# Patient Record
Sex: FEMALE | Race: WHITE | NOT HISPANIC OR LATINO | Employment: UNEMPLOYED | URBAN - NONMETROPOLITAN AREA
[De-identification: names, ages, dates, MRNs, and addresses within clinical notes are randomized per-mention and may not be internally consistent; named-entity substitution may affect disease eponyms.]

---

## 2021-05-25 ENCOUNTER — APPOINTMENT (OUTPATIENT)
Dept: WOUND CARE | Facility: HOSPITAL | Age: 81
End: 2021-05-25

## 2021-05-27 ENCOUNTER — OFFICE VISIT (OUTPATIENT)
Dept: WOUND CARE | Facility: HOSPITAL | Age: 81
End: 2021-05-27

## 2021-05-27 ENCOUNTER — LAB REQUISITION (OUTPATIENT)
Dept: LAB | Facility: HOSPITAL | Age: 81
End: 2021-05-27

## 2021-05-27 DIAGNOSIS — E11.622 TYPE 2 DIABETES MELLITUS WITH OTHER SKIN ULCER, UNSPECIFIED WHETHER LONG TERM INSULIN USE (HCC): ICD-10-CM

## 2021-05-27 DIAGNOSIS — L97.822 NON-PRESSURE CHRONIC ULCER OF OTHER PART OF LEFT LOWER LEG WITH FAT LAYER EXPOSED (HCC): ICD-10-CM

## 2021-05-27 DIAGNOSIS — Q82.0 HEREDITARY LYMPHEDEMA: ICD-10-CM

## 2021-05-27 DIAGNOSIS — Z00.00 ENCOUNTER FOR GENERAL ADULT MEDICAL EXAMINATION WITHOUT ABNORMAL FINDINGS: ICD-10-CM

## 2021-05-27 DIAGNOSIS — L98.499 TYPE 2 DIABETES MELLITUS WITH OTHER SKIN ULCER, UNSPECIFIED WHETHER LONG TERM INSULIN USE (HCC): ICD-10-CM

## 2021-05-27 DIAGNOSIS — R26.89 OTHER ABNORMALITIES OF GAIT AND MOBILITY: ICD-10-CM

## 2021-05-27 PROCEDURE — 87186 SC STD MICRODIL/AGAR DIL: CPT | Performed by: NURSE PRACTITIONER

## 2021-05-27 PROCEDURE — 87205 SMEAR GRAM STAIN: CPT | Performed by: NURSE PRACTITIONER

## 2021-05-27 PROCEDURE — 87176 TISSUE HOMOGENIZATION CULTR: CPT | Performed by: NURSE PRACTITIONER

## 2021-05-27 PROCEDURE — 99203 OFFICE O/P NEW LOW 30 MIN: CPT | Performed by: NURSE PRACTITIONER

## 2021-05-27 PROCEDURE — 87070 CULTURE OTHR SPECIMN AEROBIC: CPT | Performed by: NURSE PRACTITIONER

## 2021-05-27 PROCEDURE — 11042 DBRDMT SUBQ TIS 1ST 20SQCM/<: CPT | Performed by: NURSE PRACTITIONER

## 2021-05-27 PROCEDURE — G0463 HOSPITAL OUTPT CLINIC VISIT: HCPCS

## 2021-05-27 PROCEDURE — 87075 CULTR BACTERIA EXCEPT BLOOD: CPT | Performed by: NURSE PRACTITIONER

## 2021-05-30 LAB
BACTERIA SPEC AEROBE CULT: ABNORMAL
BACTERIA SPEC AEROBE CULT: ABNORMAL
GRAM STN SPEC: ABNORMAL
GRAM STN SPEC: ABNORMAL

## 2021-06-01 LAB — BACTERIA SPEC ANAEROBE CULT: NORMAL

## 2021-06-03 ENCOUNTER — OFFICE VISIT (OUTPATIENT)
Dept: WOUND CARE | Facility: HOSPITAL | Age: 81
End: 2021-06-03

## 2021-06-03 DIAGNOSIS — R26.89 OTHER ABNORMALITIES OF GAIT AND MOBILITY: ICD-10-CM

## 2021-06-03 DIAGNOSIS — Q82.0 HEREDITARY LYMPHEDEMA: ICD-10-CM

## 2021-06-03 DIAGNOSIS — E11.622 TYPE 2 DIABETES MELLITUS WITH OTHER SKIN ULCER, UNSPECIFIED WHETHER LONG TERM INSULIN USE (HCC): ICD-10-CM

## 2021-06-03 DIAGNOSIS — L97.822 NON-PRESSURE CHRONIC ULCER OF OTHER PART OF LEFT LOWER LEG WITH FAT LAYER EXPOSED (HCC): ICD-10-CM

## 2021-06-03 DIAGNOSIS — L98.499 TYPE 2 DIABETES MELLITUS WITH OTHER SKIN ULCER, UNSPECIFIED WHETHER LONG TERM INSULIN USE (HCC): ICD-10-CM

## 2021-06-03 PROCEDURE — 11042 DBRDMT SUBQ TIS 1ST 20SQCM/<: CPT | Performed by: NURSE PRACTITIONER

## 2021-06-11 ENCOUNTER — OFFICE VISIT (OUTPATIENT)
Dept: WOUND CARE | Facility: HOSPITAL | Age: 81
End: 2021-06-11

## 2021-06-11 DIAGNOSIS — E11.622 TYPE 2 DIABETES MELLITUS WITH OTHER SKIN ULCER, UNSPECIFIED WHETHER LONG TERM INSULIN USE (HCC): ICD-10-CM

## 2021-06-11 DIAGNOSIS — L97.822 NON-PRESSURE CHRONIC ULCER OF OTHER PART OF LEFT LOWER LEG WITH FAT LAYER EXPOSED (HCC): ICD-10-CM

## 2021-06-11 DIAGNOSIS — Q82.0 HEREDITARY LYMPHEDEMA: ICD-10-CM

## 2021-06-11 DIAGNOSIS — L98.499 TYPE 2 DIABETES MELLITUS WITH OTHER SKIN ULCER, UNSPECIFIED WHETHER LONG TERM INSULIN USE (HCC): ICD-10-CM

## 2021-06-11 PROCEDURE — 11042 DBRDMT SUBQ TIS 1ST 20SQCM/<: CPT | Performed by: PODIATRIST

## 2021-06-17 ENCOUNTER — OFFICE VISIT (OUTPATIENT)
Dept: WOUND CARE | Facility: HOSPITAL | Age: 81
End: 2021-06-17

## 2021-06-28 ENCOUNTER — OFFICE VISIT (OUTPATIENT)
Dept: WOUND CARE | Facility: HOSPITAL | Age: 81
End: 2021-06-28

## 2021-06-28 DIAGNOSIS — L97.822 NON-PRESSURE CHRONIC ULCER OF OTHER PART OF LEFT LOWER LEG WITH FAT LAYER EXPOSED (HCC): ICD-10-CM

## 2021-06-28 DIAGNOSIS — Q82.0 HEREDITARY LYMPHEDEMA: ICD-10-CM

## 2021-06-28 DIAGNOSIS — L98.499 TYPE 2 DIABETES MELLITUS WITH OTHER SKIN ULCER, UNSPECIFIED WHETHER LONG TERM INSULIN USE (HCC): ICD-10-CM

## 2021-06-28 DIAGNOSIS — E11.622 TYPE 2 DIABETES MELLITUS WITH OTHER SKIN ULCER, UNSPECIFIED WHETHER LONG TERM INSULIN USE (HCC): ICD-10-CM

## 2021-06-28 PROCEDURE — 11042 DBRDMT SUBQ TIS 1ST 20SQCM/<: CPT | Performed by: PODIATRIST

## 2021-07-08 ENCOUNTER — OFFICE VISIT (OUTPATIENT)
Dept: WOUND CARE | Facility: HOSPITAL | Age: 81
End: 2021-07-08

## 2021-07-08 DIAGNOSIS — E11.622 TYPE 2 DIABETES MELLITUS WITH OTHER SKIN ULCER, UNSPECIFIED WHETHER LONG TERM INSULIN USE (HCC): ICD-10-CM

## 2021-07-08 DIAGNOSIS — Q82.0 HEREDITARY LYMPHEDEMA: ICD-10-CM

## 2021-07-08 DIAGNOSIS — R26.89 OTHER ABNORMALITIES OF GAIT AND MOBILITY: ICD-10-CM

## 2021-07-08 DIAGNOSIS — L98.499 TYPE 2 DIABETES MELLITUS WITH OTHER SKIN ULCER, UNSPECIFIED WHETHER LONG TERM INSULIN USE (HCC): ICD-10-CM

## 2021-07-08 DIAGNOSIS — L97.822 NON-PRESSURE CHRONIC ULCER OF OTHER PART OF LEFT LOWER LEG WITH FAT LAYER EXPOSED (HCC): ICD-10-CM

## 2021-07-08 PROCEDURE — 99212 OFFICE O/P EST SF 10 MIN: CPT | Performed by: NURSE PRACTITIONER

## 2021-07-08 PROCEDURE — G0463 HOSPITAL OUTPT CLINIC VISIT: HCPCS

## 2023-08-17 ENCOUNTER — APPOINTMENT (OUTPATIENT)
Dept: GENERAL RADIOLOGY | Facility: HOSPITAL | Age: 83
DRG: 309 | End: 2023-08-17
Payer: MEDICARE

## 2023-08-17 ENCOUNTER — HOSPITAL ENCOUNTER (INPATIENT)
Facility: HOSPITAL | Age: 83
LOS: 6 days | Discharge: NURSING, REHAB OR LONG-TERM CARE | DRG: 309 | End: 2023-08-23
Attending: STUDENT IN AN ORGANIZED HEALTH CARE EDUCATION/TRAINING PROGRAM | Admitting: FAMILY MEDICINE
Payer: MEDICARE

## 2023-08-17 DIAGNOSIS — Z74.09 IMPAIRED MOBILITY: ICD-10-CM

## 2023-08-17 DIAGNOSIS — N17.9 ACUTE KIDNEY INJURY: ICD-10-CM

## 2023-08-17 DIAGNOSIS — Z74.09 IMPAIRED MOBILITY AND ADLS: ICD-10-CM

## 2023-08-17 DIAGNOSIS — I51.7 CARDIOMEGALY: ICD-10-CM

## 2023-08-17 DIAGNOSIS — I50.9 NEW ONSET OF CONGESTIVE HEART FAILURE: ICD-10-CM

## 2023-08-17 DIAGNOSIS — Z78.9 IMPAIRED MOBILITY AND ADLS: ICD-10-CM

## 2023-08-17 DIAGNOSIS — R06.02 SHORTNESS OF BREATH: ICD-10-CM

## 2023-08-17 DIAGNOSIS — I48.91 NEW ONSET ATRIAL FIBRILLATION: Primary | ICD-10-CM

## 2023-08-17 LAB
ALBUMIN SERPL-MCNC: 3.8 G/DL (ref 3.5–5.2)
ALBUMIN/GLOB SERPL: 1.2 G/DL
ALP SERPL-CCNC: 125 U/L (ref 39–117)
ALT SERPL W P-5'-P-CCNC: 12 U/L (ref 1–33)
ANION GAP SERPL CALCULATED.3IONS-SCNC: 13 MMOL/L (ref 5–15)
AST SERPL-CCNC: 11 U/L (ref 1–32)
BASOPHILS # BLD AUTO: 0.05 10*3/MM3 (ref 0–0.2)
BASOPHILS NFR BLD AUTO: 0.3 % (ref 0–1.5)
BILIRUB SERPL-MCNC: <0.2 MG/DL (ref 0–1.2)
BUN SERPL-MCNC: 32 MG/DL (ref 8–23)
BUN/CREAT SERPL: 18.1 (ref 7–25)
CALCIUM SPEC-SCNC: 9.6 MG/DL (ref 8.6–10.5)
CHLORIDE SERPL-SCNC: 107 MMOL/L (ref 98–107)
CO2 SERPL-SCNC: 20 MMOL/L (ref 22–29)
CREAT SERPL-MCNC: 1.77 MG/DL (ref 0.57–1)
D DIMER PPP FEU-MCNC: 0.78 MCGFEU/ML (ref 0–0.82)
DEPRECATED RDW RBC AUTO: 45.6 FL (ref 37–54)
EGFRCR SERPLBLD CKD-EPI 2021: 28.4 ML/MIN/1.73
EOSINOPHIL # BLD AUTO: 0.23 10*3/MM3 (ref 0–0.4)
EOSINOPHIL NFR BLD AUTO: 1.6 % (ref 0.3–6.2)
ERYTHROCYTE [DISTWIDTH] IN BLOOD BY AUTOMATED COUNT: 12.9 % (ref 12.3–15.4)
FLUAV RNA RESP QL NAA+PROBE: NOT DETECTED
FLUBV RNA RESP QL NAA+PROBE: NOT DETECTED
GEN 5 2HR TROPONIN T REFLEX: 16 NG/L
GLOBULIN UR ELPH-MCNC: 3.3 GM/DL
GLUCOSE SERPL-MCNC: 108 MG/DL (ref 65–99)
HBA1C MFR BLD: 6.4 % (ref 4.8–5.6)
HCT VFR BLD AUTO: 32.4 % (ref 34–46.6)
HGB BLD-MCNC: 10 G/DL (ref 12–15.9)
IMM GRANULOCYTES # BLD AUTO: 0.07 10*3/MM3 (ref 0–0.05)
IMM GRANULOCYTES NFR BLD AUTO: 0.5 % (ref 0–0.5)
LYMPHOCYTES # BLD AUTO: 2.24 10*3/MM3 (ref 0.7–3.1)
LYMPHOCYTES NFR BLD AUTO: 15.2 % (ref 19.6–45.3)
MCH RBC QN AUTO: 29.9 PG (ref 26.6–33)
MCHC RBC AUTO-ENTMCNC: 30.9 G/DL (ref 31.5–35.7)
MCV RBC AUTO: 97 FL (ref 79–97)
MONOCYTES # BLD AUTO: 0.66 10*3/MM3 (ref 0.1–0.9)
MONOCYTES NFR BLD AUTO: 4.5 % (ref 5–12)
NEUTROPHILS NFR BLD AUTO: 11.45 10*3/MM3 (ref 1.7–7)
NEUTROPHILS NFR BLD AUTO: 77.9 % (ref 42.7–76)
NRBC BLD AUTO-RTO: 0 /100 WBC (ref 0–0.2)
NT-PROBNP SERPL-MCNC: 2536 PG/ML (ref 0–1800)
PLATELET # BLD AUTO: 321 10*3/MM3 (ref 140–450)
PMV BLD AUTO: 9.9 FL (ref 6–12)
POTASSIUM SERPL-SCNC: 5.6 MMOL/L (ref 3.5–5.2)
POTASSIUM SERPL-SCNC: 5.9 MMOL/L (ref 3.5–5.2)
PROCALCITONIN SERPL-MCNC: 0.09 NG/ML (ref 0–0.25)
PROT SERPL-MCNC: 7.1 G/DL (ref 6–8.5)
RBC # BLD AUTO: 3.34 10*6/MM3 (ref 3.77–5.28)
SARS-COV-2 RNA RESP QL NAA+PROBE: NOT DETECTED
SODIUM SERPL-SCNC: 140 MMOL/L (ref 136–145)
T4 FREE SERPL-MCNC: 1.53 NG/DL (ref 0.93–1.7)
TROPONIN T DELTA: -1 NG/L
TROPONIN T SERPL HS-MCNC: 17 NG/L
TSH SERPL DL<=0.05 MIU/L-ACNC: 1.02 UIU/ML (ref 0.27–4.2)
WBC NRBC COR # BLD: 14.7 10*3/MM3 (ref 3.4–10.8)

## 2023-08-17 PROCEDURE — 84443 ASSAY THYROID STIM HORMONE: CPT | Performed by: STUDENT IN AN ORGANIZED HEALTH CARE EDUCATION/TRAINING PROGRAM

## 2023-08-17 PROCEDURE — 80053 COMPREHEN METABOLIC PANEL: CPT | Performed by: STUDENT IN AN ORGANIZED HEALTH CARE EDUCATION/TRAINING PROGRAM

## 2023-08-17 PROCEDURE — 93005 ELECTROCARDIOGRAM TRACING: CPT | Performed by: FAMILY MEDICINE

## 2023-08-17 PROCEDURE — 83880 ASSAY OF NATRIURETIC PEPTIDE: CPT | Performed by: STUDENT IN AN ORGANIZED HEALTH CARE EDUCATION/TRAINING PROGRAM

## 2023-08-17 PROCEDURE — 84484 ASSAY OF TROPONIN QUANT: CPT | Performed by: STUDENT IN AN ORGANIZED HEALTH CARE EDUCATION/TRAINING PROGRAM

## 2023-08-17 PROCEDURE — 84439 ASSAY OF FREE THYROXINE: CPT | Performed by: STUDENT IN AN ORGANIZED HEALTH CARE EDUCATION/TRAINING PROGRAM

## 2023-08-17 PROCEDURE — 83036 HEMOGLOBIN GLYCOSYLATED A1C: CPT | Performed by: FAMILY MEDICINE

## 2023-08-17 PROCEDURE — 99285 EMERGENCY DEPT VISIT HI MDM: CPT

## 2023-08-17 PROCEDURE — 93010 ELECTROCARDIOGRAM REPORT: CPT | Performed by: INTERNAL MEDICINE

## 2023-08-17 PROCEDURE — 87636 SARSCOV2 & INF A&B AMP PRB: CPT | Performed by: STUDENT IN AN ORGANIZED HEALTH CARE EDUCATION/TRAINING PROGRAM

## 2023-08-17 PROCEDURE — 36415 COLL VENOUS BLD VENIPUNCTURE: CPT | Performed by: STUDENT IN AN ORGANIZED HEALTH CARE EDUCATION/TRAINING PROGRAM

## 2023-08-17 PROCEDURE — 85025 COMPLETE CBC W/AUTO DIFF WBC: CPT | Performed by: STUDENT IN AN ORGANIZED HEALTH CARE EDUCATION/TRAINING PROGRAM

## 2023-08-17 PROCEDURE — 84145 PROCALCITONIN (PCT): CPT | Performed by: STUDENT IN AN ORGANIZED HEALTH CARE EDUCATION/TRAINING PROGRAM

## 2023-08-17 PROCEDURE — 71045 X-RAY EXAM CHEST 1 VIEW: CPT

## 2023-08-17 PROCEDURE — 84132 ASSAY OF SERUM POTASSIUM: CPT | Performed by: FAMILY MEDICINE

## 2023-08-17 PROCEDURE — 85379 FIBRIN DEGRADATION QUANT: CPT | Performed by: STUDENT IN AN ORGANIZED HEALTH CARE EDUCATION/TRAINING PROGRAM

## 2023-08-17 RX ORDER — DEXTROSE MONOHYDRATE 25 G/50ML
25 INJECTION, SOLUTION INTRAVENOUS ONCE
Status: COMPLETED | OUTPATIENT
Start: 2023-08-18 | End: 2023-08-18

## 2023-08-17 RX ORDER — NICOTINE POLACRILEX 4 MG
15 LOZENGE BUCCAL
Status: DISCONTINUED | OUTPATIENT
Start: 2023-08-17 | End: 2023-08-23 | Stop reason: HOSPADM

## 2023-08-17 RX ORDER — NITROGLYCERIN 0.4 MG/1
0.4 TABLET SUBLINGUAL
Status: DISCONTINUED | OUTPATIENT
Start: 2023-08-17 | End: 2023-08-23 | Stop reason: HOSPADM

## 2023-08-17 RX ORDER — FUROSEMIDE 10 MG/ML
20 INJECTION INTRAMUSCULAR; INTRAVENOUS EVERY 12 HOURS
Status: DISCONTINUED | OUTPATIENT
Start: 2023-08-18 | End: 2023-08-18

## 2023-08-17 RX ORDER — SODIUM CHLORIDE 0.9 % (FLUSH) 0.9 %
10 SYRINGE (ML) INJECTION AS NEEDED
Status: DISCONTINUED | OUTPATIENT
Start: 2023-08-17 | End: 2023-08-23 | Stop reason: HOSPADM

## 2023-08-17 RX ORDER — DEXTROSE MONOHYDRATE 25 G/50ML
25 INJECTION, SOLUTION INTRAVENOUS
Status: DISCONTINUED | OUTPATIENT
Start: 2023-08-17 | End: 2023-08-23 | Stop reason: HOSPADM

## 2023-08-17 RX ORDER — ENOXAPARIN SODIUM 100 MG/ML
40 INJECTION SUBCUTANEOUS EVERY 24 HOURS
Status: DISCONTINUED | OUTPATIENT
Start: 2023-08-18 | End: 2023-08-18

## 2023-08-17 NOTE — ED PROVIDER NOTES
"EMERGENCY DEPARTMENT ATTENDING NOTE    Patient Name: Mar Rodriguez    Chief Complaint   Patient presents with    Shortness of Breath       PATIENT PRESENTATION:  Mar Rodriguez is a very pleasant 83 y.o. female with multiple medical comorbidities including cerebral palsy, diabetes mellitus, and recently diagnosed kidney dysfunction who resides in a nursing home presenting emergency department brought in by EMS due to concerns for shortness of breath.     History is mostly provided by the patient's sister at the bedside.  She states that recently her kidney function has been declining patient has been more short of breath she was noted to nursing to be wheezing and appeared to have more labored breathing per report from the nursing home.  They were unable to get in touch with the doctor per his sister's report so ultimately called EMS.  She states patient has no history of atrial fibrillation or cardiac dysfunction that she is aware of.  No history of heart failure.  She does have chronic lymphedema.  Patient has reportedly demonstrated significant difficulty with ambulation due to increased work of breathing.  Patient denies any chest pain.  She does not endorse an occasional cough.      PHYSICAL EXAM:   VS: /83 (BP Location: Right arm, Patient Position: Lying)   Pulse 74   Temp 97.7 øF (36.5 øC) (Oral)   Resp 18   Ht 165.1 cm (65\")   Wt 111 kg (244 lb 8 oz)   SpO2 97%   BMI 40.69 kg/mý   GENERAL: Chronically ill-appearing elderly woman sitting in stretcher no acute distress; well-nourished, well-developed, awake, alert, no acute distress, nontoxic appearing, comfortable  EYES: PERRL, sclerae anicteric, extraocular movements grossly intact, symmetric lids  EARS, NOSE, MOUTH, THROAT: atraumatic external nose and ears, moist mucous membranes  NECK: symmetric, trachea midline  RESPIRATORY: unlabored respiratory effort, clear to auscultation bilaterally, good air movement  CARDIOVASCULAR: no " murmurs, peripheral pulses 2+ and equal in all extremities  GI: soft, nontender, nondistended  MUSCULOSKELETAL/EXTREMITIES: Bilateral lower extremities with chronic appearing swelling consistent with lymphedema nonpitting in nature seen in photos below; extremities without obvious deformity  SKIN: Redness and erythema of the bilateral lower extremities skin photo below; otherwise skin warm and dry with no obvious rashes    NEUROLOGIC: moving all 4 extremities symmetrically, GCS15  PSYCHIATRIC: alert, pleasant and cooperative. Appropriate mood and affect.      MEDICAL DECISION MAKING:    Mar Rodrgiuez is a 83 y.o. female presenting emergency department due to increased work of breathing shortness of breath in the setting of multiple medical comorbidities.    Differential Diagnosis Considered: New onset heart failure, volume overload, renal failure, COVID, pneumonia, pneumothorax    Labs Ordered:  Labs Reviewed   COMPREHENSIVE METABOLIC PANEL - Abnormal; Notable for the following components:       Result Value    Glucose 108 (*)     BUN 32 (*)     Creatinine 1.77 (*)     Potassium 5.6 (*)     CO2 20.0 (*)     Alkaline Phosphatase 125 (*)     eGFR 28.4 (*)     All other components within normal limits    Narrative:     GFR Normal >60  Chronic Kidney Disease <60  Kidney Failure <15    The GFR formula is only valid for adults with stable renal function between ages 18 and 70.   TROPONIN - Abnormal; Notable for the following components:    HS Troponin T 17 (*)     All other components within normal limits    Narrative:     High Sensitive Troponin T Reference Range:  <10.0 ng/L- Negative Female for AMI  <15.0 ng/L- Negative Male for AMI  >=10 - Abnormal Female indicating possible myocardial injury.  >=15 - Abnormal Male indicating possible myocardial injury.   Clinicians would have to utilize clinical acumen, EKG, Troponin, and serial changes to determine if it is an Acute Myocardial Infarction or myocardial injury  due to an underlying chronic condition.        BNP (IN-HOUSE) - Abnormal; Notable for the following components:    proBNP 2,536.0 (*)     All other components within normal limits    Narrative:     Among patients with dyspnea, NT-proBNP is highly sensitive for the detection of acute congestive heart failure. In addition NT-proBNP of <300 pg/ml effectively rules out acute congestive heart failure with 99% negative predictive value.     CBC WITH AUTO DIFFERENTIAL - Abnormal; Notable for the following components:    WBC 14.70 (*)     RBC 3.34 (*)     Hemoglobin 10.0 (*)     Hematocrit 32.4 (*)     MCHC 30.9 (*)     Neutrophil % 77.9 (*)     Lymphocyte % 15.2 (*)     Monocyte % 4.5 (*)     Neutrophils, Absolute 11.45 (*)     Immature Grans, Absolute 0.07 (*)     All other components within normal limits   HIGH SENSITIVITIY TROPONIN T 2HR - Abnormal; Notable for the following components:    HS Troponin T 16 (*)     All other components within normal limits    Narrative:     High Sensitive Troponin T Reference Range:  <10.0 ng/L- Negative Female for AMI  <15.0 ng/L- Negative Male for AMI  >=10 - Abnormal Female indicating possible myocardial injury.  >=15 - Abnormal Male indicating possible myocardial injury.   Clinicians would have to utilize clinical acumen, EKG, Troponin, and serial changes to determine if it is an Acute Myocardial Infarction or myocardial injury due to an underlying chronic condition.        POTASSIUM - Abnormal; Notable for the following components:    Potassium 5.9 (*)     All other components within normal limits   BASIC METABOLIC PANEL - Abnormal; Notable for the following components:    Glucose 103 (*)     BUN 31 (*)     Creatinine 1.83 (*)     eGFR 27.3 (*)     All other components within normal limits    Narrative:     GFR Normal >60  Chronic Kidney Disease <60  Kidney Failure <15    The GFR formula is only valid for adults with stable renal function between ages 18 and 70.   HEMOGLOBIN A1C -  Abnormal; Notable for the following components:    Hemoglobin A1C 6.40 (*)     All other components within normal limits    Narrative:     Hemoglobin A1C Ranges:    Increased Risk for Diabetes  5.7% to 6.4%  Diabetes                     >= 6.5%  Diabetic Goal                < 7.0%   TROPONIN - Abnormal; Notable for the following components:    HS Troponin T 21 (*)     All other components within normal limits    Narrative:     High Sensitive Troponin T Reference Range:  <10.0 ng/L- Negative Female for AMI  <15.0 ng/L- Negative Male for AMI  >=10 - Abnormal Female indicating possible myocardial injury.  >=15 - Abnormal Male indicating possible myocardial injury.   Clinicians would have to utilize clinical acumen, EKG, Troponin, and serial changes to determine if it is an Acute Myocardial Infarction or myocardial injury due to an underlying chronic condition.        COMPREHENSIVE METABOLIC PANEL - Abnormal; Notable for the following components:    Glucose 108 (*)     BUN 30 (*)     Creatinine 1.87 (*)     Alkaline Phosphatase 122 (*)     eGFR 26.6 (*)     All other components within normal limits    Narrative:     GFR Normal >60  Chronic Kidney Disease <60  Kidney Failure <15    The GFR formula is only valid for adults with stable renal function between ages 18 and 70.   PTH, INTACT - Abnormal; Notable for the following components:    PTH, Intact 83.8 (*)     All other components within normal limits    Narrative:     Results may be falsely decreased if patient taking Biotin.     URIC ACID - Abnormal; Notable for the following components:    Uric Acid 7.7 (*)     All other components within normal limits   CBC WITH AUTO DIFFERENTIAL - Abnormal; Notable for the following components:    RBC 3.51 (*)     Hemoglobin 10.4 (*)     MCV 98.6 (*)     MCHC 30.1 (*)     All other components within normal limits   COVID-19 AND FLU A/B, NP SWAB IN TRANSPORT MEDIA 8-12 HR TAT - Normal    Narrative:     Fact sheet for providers:  "https://www.fda.gov/media/535491/download    Fact sheet for patients: https://www.fda.gov/media/624203/download    Test performed by PCR.   D-DIMER, QUANTITATIVE - Normal    Narrative:     According to the assay 's published package insert, a normal (<0.50 MCGFEU/mL) D-dimer result in conjunction with a non-high clinical probability assessment, excludes deep vein thrombosis (DVT) and pulmonary embolism (PE) with high sensitivity.    D-dimer values increase with age and this can make VTE exclusion of an older population difficult. To address this, the [employer], based on best available evidence and recent guidelines, recommends that clinicians use age-adjusted D-dimer thresholds in patients greater than 50 years of age with: a) a low probability of PE who do not meet all Pulmonary Embolism Rule Out Criteria, or b) in those with intermediate probability of PE.   The formula for an age-adjusted D-dimer cut-off is \"age/100\".  For example, a 60 year old patient would have an age-adjusted cut-off of 0.60 MCGFEU/mL and an 80 year old 0.80 MCGFEU/mL.   PROCALCITONIN - Normal    Narrative:     As a Marker for Sepsis (Non-Neonates):    1. <0.5 ng/mL represents a low risk of severe sepsis and/or septic shock.  2. >2 ng/mL represents a high risk of severe sepsis and/or septic shock.    As a Marker for Lower Respiratory Tract Infections that require antibiotic therapy:    PCT on Admission    Antibiotic Therapy       6-12 Hrs later    >0.5                Strongly Recommended  >0.25 - <0.5        Recommended   0.1 - 0.25          Discouraged              Remeasure/reassess PCT  <0.1                Strongly Discouraged     Remeasure/reassess PCT    As 28 day mortality risk marker: \"Change in Procalcitonin Result\" (>80% or <=80%) if Day 0 (or Day 1) and Day 4 values are available. Refer to http://www.[employer]-pct-calculator.com    Change in PCT <=80%  A decrease of PCT levels below or equal to 80% " defines a positive change in PCT test result representing a higher risk for 28-day all-cause mortality of patients diagnosed with severe sepsis for septic shock.    Change in PCT >80%  A decrease of PCT levels of more than 80% defines a negative change in PCT result representing a lower risk for 28-day all-cause mortality of patients diagnosed with severe sepsis or septic shock.      T4, FREE - Normal    Narrative:     Results may be falsely increased if patient taking Biotin.     TSH - Normal   URINALYSIS W/ MICROSCOPIC IF INDICATED (NO CULTURE) - Normal    Narrative:     Urine microscopic not indicated.   MAGNESIUM - Normal   PHOSPHORUS - Normal   POCT GLUCOSE FINGERSTICK - Normal   POCT GLUCOSE FINGERSTICK - Normal   POCT GLUCOSE FINGERSTICK - Normal   POCT GLUCOSE FINGERSTICK - Normal   POCT GLUCOSE FINGERSTICK - Normal   POCT GLUCOSE FINGERSTICK - Normal   POCT GLUCOSE FINGERSTICK - Normal   POCT GLUCOSE FINGERSTICK - Normal   PROTEIN, URINE, RANDOM    Narrative:     Reference intervals for random urine have not been established.  Clinical usage is dependent upon physician's interpretation in combination with other laboratory tests.      CREATININE URINE RANDOM (KIDNEY FUNCTION) GFR COMPONENT    Narrative:     Reference intervals for random urine have not been established.  Clinical usage is dependent upon physician's interpretation in combination with other laboratory tests.      SODIUM, URINE, RANDOM    Narrative:     Reference intervals for random urine have not been established.  Clinical usage is dependent upon physician's interpretation in combination with other laboratory tests.      UREA NITROGEN, URINE    Narrative:     Reference intervals for random urine have not been established.  Clinical usage is dependent upon physician's interpretation in combination with other laboratory tests.      VITAMIN D,25-HYDROXY   POCT GLUCOSE FINGERSTICK   POCT GLUCOSE FINGERSTICK   POCT GLUCOSE FINGERSTICK   POCT  GLUCOSE FINGERSTICK   POCT GLUCOSE FINGERSTICK   POCT GLUCOSE FINGERSTICK   POCT GLUCOSE FINGERSTICK   POCT GLUCOSE FINGERSTICK   POCT GLUCOSE FINGERSTICK   POCT GLUCOSE FINGERSTICK   POCT GLUCOSE FINGERSTICK   POCT GLUCOSE FINGERSTICK   POCT GLUCOSE FINGERSTICK   CBC AND DIFFERENTIAL    Narrative:     The following orders were created for panel order CBC & Differential.  Procedure                               Abnormality         Status                     ---------                               -----------         ------                     CBC Auto Differential[[id number]]         Abnormal            Final result                 Please view results for these tests on the individual orders.   CBC AND DIFFERENTIAL    Narrative:     The following orders were created for panel order CBC & Differential.  Procedure                               Abnormality         Status                     ---------                               -----------         ------                     CBC Auto Differential[[id number]]        Abnormal            Final result                 Please view results for these tests on the individual orders.        Imaging Ordered:   US Renal Bilateral   Final Result   1. Bilateral renal ultrasound is within normal limits.   2. Bladder wall thickening is probably related to underdistention.   This report was finalized on 08/18/2023 15:25 by Dr. Dewayne Omalley MD.      XR Chest 1 View   Final Result   1.. Cardiomegaly.   2. No acute consolidative pneumonia or effusion.   This report was finalized on 08/17/2023 19:10 by Dr. Jay Nova MD.          Internal chart review:   Past Medical History:   Diagnosis Date    Abnormality of gait and mobility     Anemia     Anxiety     Cerebral palsy     Cognitive communication deficit     Depression     Diabetes mellitus     Disease of thyroid gland     Hyperglycemia     Hyperlipidemia     Hypertension     Hypokalemia     Insomnia     Lymphedema     Neuropathy      Urge incontinence        History reviewed. No pertinent surgical history.    Allergies   Allergen Reactions    Nsaids Other (See Comments)     No NSAIDS r/t renal disease         Current Facility-Administered Medications:     apixaban (ELIQUIS) tablet 2.5 mg, 2.5 mg, Oral, Q12H, Patti Navas PA-C, 2.5 mg at 08/18/23 2022    busPIRone (BUSPAR) tablet 10 mg, 10 mg, Oral, Daily, Vandana Churchill MD, 10 mg at 08/18/23 1500    dextrose (D50W) (25 g/50 mL) IV injection 25 g, 25 g, Intravenous, Q15 Min PRN, Vandana Churchill MD    dextrose (GLUTOSE) oral gel 15 g, 15 g, Oral, Q15 Min PRN, Vandana Churchill MD    ferrous sulfate tablet 325 mg, 325 mg, Oral, BID With Meals, Vandana Churchill MD, 325 mg at 08/18/23 2022    furosemide (LASIX) injection 20 mg, 20 mg, Intravenous, BID, Vandana Churchill MD, 20 mg at 08/18/23 2021    gabapentin (NEURONTIN) capsule 300 mg, 300 mg, Oral, Nightly, Vandana Churchill MD, 300 mg at 08/18/23 2021    glucagon (GLUCAGEN) injection 1 mg, 1 mg, Subcutaneous, Q15 Min PRN, Vandana Churchill MD    levothyroxine (SYNTHROID, LEVOTHROID) tablet 175 mcg, 175 mcg, Oral, Nightly, Vandana Churchill MD, 175 mcg at 08/18/23 2022    metoprolol tartrate (LOPRESSOR) tablet 12.5 mg, 12.5 mg, Oral, Q12H, Patti Navas PA-C, 12.5 mg at 08/18/23 2022    nitroglycerin (NITROSTAT) SL tablet 0.4 mg, 0.4 mg, Sublingual, Q5 Min PRN, Vandana Churchill MD    PARoxetine (PAXIL) tablet 20 mg, 20 mg, Oral, Daily, Vandana Churchill MD, 20 mg at 08/18/23 1500    raloxifene (EVISTA) tablet 60 mg, 60 mg, Oral, Daily, Vandana Churchill MD, 60 mg at 08/18/23 1500    [COMPLETED] Insert Peripheral IV, , , Once **AND** sodium chloride 0.9 % flush 10 mL, 10 mL, Intravenous, PRN, Himanshu Santos MD    My EKG interpretation: Atrial fibrillation with a rate of 102 no acute ST elevations ST depressions or T wave inversions.    My lab interpretation: Initial  high since troponin of 17 3016 with negative delta of 1.  Elevated creatinine to 1.77.  Elevated BNP to 2536.  Elevated white count of 40.70.  Hemoglobin 10.    My imaging interpretation: Chest x-ray with no evidence of pulmonary edema or consolidation.    ED Course and Re-evaluation: 84yo F presenting to emergency department due to shortness of breath in the setting of patient's exam concerning for some element of chronic lymphedema but I am also concerned she may have developed heart failure.  She also has new atrial fibrillation which she is never had before.  She is not hypoxic but her BNP and troponin are elevated which is suspicion for new heart failure diagnosis given comparison to her prior problems.  She also significant cardiomegaly on chest x-ray.  We will consider pulm embolism but her D-dimer is negative.  Negative COVID influenza testing otherwise.  Case discussed with the patient's primary care provider Dr. Churchill who admitted the patient to her service for further management.      ED Diagnosis:  Shortness of breath; New onset atrial fibrillation; Cardiomegaly; Acute kidney injury; New onset of congestive heart failure    Disposition: admit to the patient's primary care provider, Dr. Churchill        Signed:  Himanshu Santos MD  Emergency Medicine Physician    Please note that portions of this note were completed with a voice recognition program.      Himanshu Santos MD  08/19/23 3051

## 2023-08-18 ENCOUNTER — APPOINTMENT (OUTPATIENT)
Dept: ULTRASOUND IMAGING | Facility: HOSPITAL | Age: 83
DRG: 309 | End: 2023-08-18
Payer: MEDICARE

## 2023-08-18 ENCOUNTER — APPOINTMENT (OUTPATIENT)
Dept: CARDIOLOGY | Facility: HOSPITAL | Age: 83
DRG: 309 | End: 2023-08-18
Payer: MEDICARE

## 2023-08-18 LAB
ANION GAP SERPL CALCULATED.3IONS-SCNC: 11 MMOL/L (ref 5–15)
BILIRUB UR QL STRIP: NEGATIVE
BUN SERPL-MCNC: 31 MG/DL (ref 8–23)
BUN/CREAT SERPL: 16.9 (ref 7–25)
CALCIUM SPEC-SCNC: 9.1 MG/DL (ref 8.6–10.5)
CHLORIDE SERPL-SCNC: 107 MMOL/L (ref 98–107)
CLARITY UR: CLEAR
CO2 SERPL-SCNC: 23 MMOL/L (ref 22–29)
COLOR UR: YELLOW
CREAT SERPL-MCNC: 1.83 MG/DL (ref 0.57–1)
EGFRCR SERPLBLD CKD-EPI 2021: 27.3 ML/MIN/1.73
GLUCOSE BLDC GLUCOMTR-MCNC: 105 MG/DL (ref 70–130)
GLUCOSE BLDC GLUCOMTR-MCNC: 113 MG/DL (ref 70–130)
GLUCOSE BLDC GLUCOMTR-MCNC: 114 MG/DL (ref 70–130)
GLUCOSE BLDC GLUCOMTR-MCNC: 126 MG/DL (ref 70–130)
GLUCOSE BLDC GLUCOMTR-MCNC: 78 MG/DL (ref 70–130)
GLUCOSE BLDC GLUCOMTR-MCNC: 89 MG/DL (ref 70–130)
GLUCOSE BLDC GLUCOMTR-MCNC: 90 MG/DL (ref 70–130)
GLUCOSE SERPL-MCNC: 103 MG/DL (ref 65–99)
GLUCOSE UR STRIP-MCNC: NEGATIVE MG/DL
HGB UR QL STRIP.AUTO: NEGATIVE
KETONES UR QL STRIP: NEGATIVE
LEUKOCYTE ESTERASE UR QL STRIP.AUTO: NEGATIVE
NITRITE UR QL STRIP: NEGATIVE
PH UR STRIP.AUTO: <=5 [PH] (ref 5–8)
POTASSIUM SERPL-SCNC: 4.7 MMOL/L (ref 3.5–5.2)
PROT ?TM UR-MCNC: 4.2 MG/DL
PROT UR QL STRIP: NEGATIVE
SODIUM SERPL-SCNC: 141 MMOL/L (ref 136–145)
SODIUM UR-SCNC: 125 MMOL/L
SP GR UR STRIP: 1.01 (ref 1–1.03)
TROPONIN T SERPL HS-MCNC: 21 NG/L
UROBILINOGEN UR QL STRIP: NORMAL

## 2023-08-18 PROCEDURE — 84540 ASSAY OF URINE/UREA-N: CPT | Performed by: NURSE PRACTITIONER

## 2023-08-18 PROCEDURE — 82570 ASSAY OF URINE CREATININE: CPT | Performed by: NURSE PRACTITIONER

## 2023-08-18 PROCEDURE — 84484 ASSAY OF TROPONIN QUANT: CPT | Performed by: FAMILY MEDICINE

## 2023-08-18 PROCEDURE — 76775 US EXAM ABDO BACK WALL LIM: CPT

## 2023-08-18 PROCEDURE — 97165 OT EVAL LOW COMPLEX 30 MIN: CPT | Performed by: OCCUPATIONAL THERAPIST

## 2023-08-18 PROCEDURE — 99222 1ST HOSP IP/OBS MODERATE 55: CPT | Performed by: INTERNAL MEDICINE

## 2023-08-18 PROCEDURE — 97162 PT EVAL MOD COMPLEX 30 MIN: CPT

## 2023-08-18 PROCEDURE — 80048 BASIC METABOLIC PNL TOTAL CA: CPT | Performed by: FAMILY MEDICINE

## 2023-08-18 PROCEDURE — 93306 TTE W/DOPPLER COMPLETE: CPT | Performed by: INTERNAL MEDICINE

## 2023-08-18 PROCEDURE — 25010000002 ENOXAPARIN PER 10 MG: Performed by: FAMILY MEDICINE

## 2023-08-18 PROCEDURE — 25510000001 PERFLUTREN 6.52 MG/ML SUSPENSION: Performed by: FAMILY MEDICINE

## 2023-08-18 PROCEDURE — 25010000002 FUROSEMIDE PER 20 MG: Performed by: FAMILY MEDICINE

## 2023-08-18 PROCEDURE — 63710000001 INSULIN REGULAR HUMAN PER 5 UNITS: Performed by: FAMILY MEDICINE

## 2023-08-18 PROCEDURE — 82948 REAGENT STRIP/BLOOD GLUCOSE: CPT

## 2023-08-18 PROCEDURE — 84300 ASSAY OF URINE SODIUM: CPT | Performed by: NURSE PRACTITIONER

## 2023-08-18 PROCEDURE — 93306 TTE W/DOPPLER COMPLETE: CPT

## 2023-08-18 PROCEDURE — 84156 ASSAY OF PROTEIN URINE: CPT | Performed by: NURSE PRACTITIONER

## 2023-08-18 PROCEDURE — 81003 URINALYSIS AUTO W/O SCOPE: CPT | Performed by: NURSE PRACTITIONER

## 2023-08-18 RX ORDER — NYSTATIN 100000 [USP'U]/G
1 POWDER TOPICAL 2 TIMES DAILY
COMMUNITY
End: 2023-08-23 | Stop reason: HOSPADM

## 2023-08-18 RX ORDER — PAROXETINE HYDROCHLORIDE 20 MG/1
20 TABLET, FILM COATED ORAL DAILY
COMMUNITY

## 2023-08-18 RX ORDER — MONTELUKAST SODIUM 4 MG/1
1 TABLET, CHEWABLE ORAL 2 TIMES DAILY
COMMUNITY

## 2023-08-18 RX ORDER — ATORVASTATIN CALCIUM 40 MG/1
40 TABLET, FILM COATED ORAL NIGHTLY
COMMUNITY

## 2023-08-18 RX ORDER — FUROSEMIDE 10 MG/ML
20 INJECTION INTRAMUSCULAR; INTRAVENOUS
Status: DISCONTINUED | OUTPATIENT
Start: 2023-08-18 | End: 2023-08-20

## 2023-08-18 RX ORDER — RALOXIFENE HYDROCHLORIDE 60 MG/1
60 TABLET, FILM COATED ORAL DAILY
COMMUNITY

## 2023-08-18 RX ORDER — LORATADINE 10 MG/1
10 TABLET ORAL DAILY PRN
COMMUNITY
End: 2023-08-23 | Stop reason: HOSPADM

## 2023-08-18 RX ORDER — LEVOTHYROXINE SODIUM 175 UG/1
175 TABLET ORAL NIGHTLY
COMMUNITY

## 2023-08-18 RX ORDER — BUSPIRONE HYDROCHLORIDE 10 MG/1
10 TABLET ORAL DAILY
Status: DISCONTINUED | OUTPATIENT
Start: 2023-08-18 | End: 2023-08-23 | Stop reason: HOSPADM

## 2023-08-18 RX ORDER — BUSPIRONE HYDROCHLORIDE 10 MG/1
10 TABLET ORAL DAILY
COMMUNITY

## 2023-08-18 RX ORDER — NICOTINE POLACRILEX 4 MG
15 LOZENGE BUCCAL AS NEEDED
COMMUNITY

## 2023-08-18 RX ORDER — GABAPENTIN 300 MG/1
300 CAPSULE ORAL NIGHTLY
Status: DISCONTINUED | OUTPATIENT
Start: 2023-08-18 | End: 2023-08-23 | Stop reason: HOSPADM

## 2023-08-18 RX ORDER — FERROUS SULFATE 325(65) MG
325 TABLET ORAL 2 TIMES DAILY WITH MEALS
Status: DISCONTINUED | OUTPATIENT
Start: 2023-08-18 | End: 2023-08-23 | Stop reason: HOSPADM

## 2023-08-18 RX ORDER — LANOLIN ALCOHOL/MO/W.PET/CERES
1000 CREAM (GRAM) TOPICAL DAILY
COMMUNITY

## 2023-08-18 RX ORDER — RALOXIFENE HYDROCHLORIDE 60 MG/1
60 TABLET, FILM COATED ORAL DAILY
Status: DISCONTINUED | OUTPATIENT
Start: 2023-08-18 | End: 2023-08-23 | Stop reason: HOSPADM

## 2023-08-18 RX ORDER — FERROUS SULFATE TAB EC 324 MG (65 MG FE EQUIVALENT) 324 (65 FE) MG
324 TABLET DELAYED RESPONSE ORAL 2 TIMES DAILY WITH MEALS
COMMUNITY

## 2023-08-18 RX ORDER — GABAPENTIN 300 MG/1
300 CAPSULE ORAL NIGHTLY
COMMUNITY
End: 2023-08-23 | Stop reason: HOSPADM

## 2023-08-18 RX ORDER — INSULIN DEGLUDEC INJECTION 100 U/ML
6 INJECTION, SOLUTION SUBCUTANEOUS NIGHTLY
COMMUNITY

## 2023-08-18 RX ORDER — LOPERAMIDE HYDROCHLORIDE 2 MG/1
2 CAPSULE ORAL 4 TIMES DAILY PRN
COMMUNITY
End: 2023-08-23 | Stop reason: HOSPADM

## 2023-08-18 RX ORDER — POTASSIUM CHLORIDE 20 MEQ/1
20 TABLET, EXTENDED RELEASE ORAL DAILY
COMMUNITY
End: 2023-08-23 | Stop reason: HOSPADM

## 2023-08-18 RX ORDER — PAROXETINE HYDROCHLORIDE 20 MG/1
20 TABLET, FILM COATED ORAL DAILY
Status: DISCONTINUED | OUTPATIENT
Start: 2023-08-18 | End: 2023-08-23 | Stop reason: HOSPADM

## 2023-08-18 RX ADMIN — APIXABAN 2.5 MG: 2.5 TABLET, FILM COATED ORAL at 20:22

## 2023-08-18 RX ADMIN — METOPROLOL TARTRATE 12.5 MG: 25 TABLET, FILM COATED ORAL at 11:50

## 2023-08-18 RX ADMIN — SODIUM ZIRCONIUM CYCLOSILICATE 10 G: 10 POWDER, FOR SUSPENSION ORAL at 00:08

## 2023-08-18 RX ADMIN — PAROXETINE 20 MG: 20 TABLET, FILM COATED ORAL at 15:00

## 2023-08-18 RX ADMIN — ENOXAPARIN SODIUM 40 MG: 100 INJECTION SUBCUTANEOUS at 00:06

## 2023-08-18 RX ADMIN — INSULIN HUMAN 10 UNITS: 100 INJECTION, SOLUTION PARENTERAL at 00:07

## 2023-08-18 RX ADMIN — SODIUM BICARBONATE 50 MEQ: 84 INJECTION INTRAVENOUS at 00:08

## 2023-08-18 RX ADMIN — BUSPIRONE HYDROCHLORIDE 10 MG: 10 TABLET ORAL at 15:00

## 2023-08-18 RX ADMIN — FUROSEMIDE 20 MG: 10 INJECTION, SOLUTION INTRAMUSCULAR; INTRAVENOUS at 11:50

## 2023-08-18 RX ADMIN — APIXABAN 2.5 MG: 2.5 TABLET, FILM COATED ORAL at 11:50

## 2023-08-18 RX ADMIN — FUROSEMIDE 20 MG: 10 INJECTION, SOLUTION INTRAMUSCULAR; INTRAVENOUS at 20:21

## 2023-08-18 RX ADMIN — LEVOTHYROXINE SODIUM 175 MCG: 150 TABLET ORAL at 20:22

## 2023-08-18 RX ADMIN — METOPROLOL TARTRATE 12.5 MG: 25 TABLET, FILM COATED ORAL at 20:22

## 2023-08-18 RX ADMIN — FUROSEMIDE 20 MG: 10 INJECTION, SOLUTION INTRAMUSCULAR; INTRAVENOUS at 00:06

## 2023-08-18 RX ADMIN — FERROUS SULFATE TAB 325 MG (65 MG ELEMENTAL FE) 325 MG: 325 (65 FE) TAB at 20:22

## 2023-08-18 RX ADMIN — GABAPENTIN 300 MG: 300 CAPSULE ORAL at 20:21

## 2023-08-18 RX ADMIN — DEXTROSE MONOHYDRATE 25 G: 25 INJECTION, SOLUTION INTRAVENOUS at 00:08

## 2023-08-18 RX ADMIN — PERFLUTREN 6.52 MG: 6.52 INJECTION, SUSPENSION INTRAVENOUS at 16:31

## 2023-08-18 RX ADMIN — RALOXIFENE 60 MG: 60 TABLET ORAL at 15:00

## 2023-08-18 NOTE — CONSULTS
Nephrology ([facility]) Consult Note      Patient:  Mar Rodriguez  YOB: 1940  Date of Service: 8/18/2023  MRN: [id number]   Acct: [id number]   Primary Care Physician: Vandana Churchill MD  Advance Directive:   There are no questions and answers to display.     Admit Date: 8/17/2023       Hospital Day: 1  Referring Provider: No Known Provider      Patient personally seen and examined.  Complete chart including Consults, Notes, Operative Reports, Labs, Cardiology, and Radiology studies reviewed as able.        Subjective:  Mar Rodriguez is a 83 y.o. female for whom we were consulted for evaluation and treatment of acute kidney injury and hyperkalemia. Unknown baseline renal function. Patient denies any prior nephrological contacts. History of cerebral palsy, type 2 diabetes, hypertension, lymphedema. Presented to ER on 8/17 with shortness of breath and wheezing. Apparently this has been progressively worsening for some time.  Minimal information in chart related to any recent medical issues.  Patient is a very poor historian and is unable to provide much information. She does recall that someone told her recently her kidney function was not good. Her only complaints are of cough and dyspnea.  Minimal dyspnea at time of exam. Denies pain or discomfort. Denies changes in urination. No dysuria or hematuria. Denies NSAIDs. Appetite has been good with no n/v/d. Chronic lower extremity lymphedema. Patient does not think the swelling has been any worse recently. Home medications include Metformin and potassium supplement. Given low dose lasix overnight and urine output has been nonoliguric. Received Lokelma overnight and potassium has normalized    Allergies:  Nsaids    Home Meds:  Medications Prior to Admission   Medication Sig Dispense Refill Last Dose    atorvastatin (LIPITOR) 40 MG tablet Take 1 tablet by mouth Every Night.   8/16/2023 at 2100    busPIRone  (BUSPAR) 10 MG tablet Take 1 tablet by mouth Daily. For anxiety   8/17/2023 at 0900    colestipol (COLESTID) 1 g tablet Take 1 tablet by mouth 2 (Two) Times a Day. For chronic diarrhea   8/17/2023 at 0900    ferrous sulfate 324 (65 Fe) MG tablet delayed-release EC tablet Take 1 tablet by mouth 2 (Two) Times a Day With Meals. For anemia   8/17/2023 at 1600    metFORMIN (GLUCOPHAGE) 850 MG tablet Take 1 tablet by mouth 2 (Two) Times a Day With Meals.   8/17/2023 at 0900    nystatin (MYCOSTATIN) 733751 UNIT/GM powder Apply 1 application  topically to the appropriate area as directed 2 (Two) Times a Day. Apply to all folds topically every shift for redness   8/17/2023 at 0900    PARoxetine (PAXIL) 20 MG tablet Take 1 tablet by mouth Every Morning.   8/17/2023 at 0900    potassium chloride (K-DUR,KLOR-CON) 20 MEQ CR tablet Take 1 tablet by mouth 2 (Two) Times a Day.   8/17/2023 at 0800    gabapentin (NEURONTIN) 300 MG capsule Take 1 capsule by mouth Every Night. For idiopathic neuropathy   8/16/2023 at 2100    levothyroxine (SYNTHROID, LEVOTHROID) 175 MCG tablet Take 1 tablet by mouth Every Night. For hypothyroidism   8/16/2023 at 2100       Medicines:  Current Facility-Administered Medications   Medication Dose Route Frequency Provider Last Rate Last Admin    dextrose (D50W) (25 g/50 mL) IV injection 25 g  25 g Intravenous Q15 Min PRN Vandana Churchill MD        dextrose (GLUTOSE) oral gel 15 g  15 g Oral Q15 Min PRN Vandana Churchill MD        Enoxaparin Sodium (LOVENOX) syringe 40 mg  40 mg Subcutaneous Q24H Vandana Churchill MD   40 mg at 08/18/23 0006    furosemide (LASIX) injection 20 mg  20 mg Intravenous Q12H Vandana Churchill MD   20 mg at 08/18/23 0006    glucagon (GLUCAGEN) injection 1 mg  1 mg Subcutaneous Q15 Min PRN Vandana Churchill MD        nitroglycerin (NITROSTAT) SL tablet 0.4 mg  0.4 mg Sublingual Q5 Min PRN Vandana Churchill MD        sodium chloride 0.9 % flush 10 mL  10 mL  "Intravenous Himanshu Blackwell MD           Past Medical History:  Past Medical History:   Diagnosis Date    Abnormality of gait and mobility     Anemia     Anxiety     Cerebral palsy     Cognitive communication deficit     Depression     Diabetes mellitus     Disease of thyroid gland     Hyperglycemia     Hyperlipidemia     Hypertension     Hypokalemia     Insomnia     Lymphedema     Neuropathy     Urge incontinence        Past Surgical History:  History reviewed. No pertinent surgical history.    Family History  History reviewed. No pertinent family history.    Social History  Social History     Socioeconomic History    Marital status: Single   Tobacco Use    Smoking status: Never     Passive exposure: Past   Vaping Use    Vaping Use: Never used   Substance and Sexual Activity    Alcohol use: Never    Drug use: Never    Sexual activity: Not Currently         Review of Systems:  History obtained from chart review and the patient  General ROS: No fever or chills  Respiratory ROS: positive for - cough and shortness of breath  Cardiovascular ROS: No chest pain or palpitations  Gastrointestinal ROS: No abdominal pain or melena  Genito-Urinary ROS: No dysuria or hematuria  Psych ROS: No anxiety and depression  14 point ROS reviewed with the patient and negative except as noted above and in the HPI unless unable to obtain.      Objective:  Patient Vitals for the past 24 hrs:   BP Temp Temp src Pulse Resp SpO2 Height Weight   08/18/23 0740 149/81 97.7 øF (36.5 øC) Oral 89 20 98 % -- --   08/18/23 0400 147/93 98 øF (36.7 øC) Oral 94 18 99 % -- --   08/18/23 0250 143/83 97.9 øF (36.6 øC) Oral 81 18 100 % -- --   08/18/23 0138 144/84 98.4 øF (36.9 øC) Oral 97 18 97 % -- --   08/17/23 2307 158/82 98.2 øF (36.8 øC) Oral 106 22 98 % -- --   08/17/23 1816 142/81 98.4 øF (36.9 øC) Oral 101 22 100 % 165.1 cm (65\") 121 kg (267 lb 1.6 oz)       Intake/Output Summary (Last 24 hours) at 8/18/2023 0952  Last data filed at 8/18/2023 " 0945  Gross per 24 hour   Intake 620 ml   Output 1200 ml   Net -580 ml     General: awake/alert   Chest:  clear to auscultation bilaterally without respiratory distress  CVS: regular rate and rhythm  Abdominal: soft, nontender, positive bowel sounds  Extremities:  chronic BLE lymphedema  Skin: warm and dry without rash      Labs:  Results from last 7 days   Lab Units 08/17/23 1919   WBC 10*3/mm3 14.70*   HEMOGLOBIN g/dL 10.0*   HEMATOCRIT % 32.4*   PLATELETS 10*3/mm3 321         Results from last 7 days   Lab Units 08/18/23  0411 08/17/23  2219 08/17/23 1919   SODIUM mmol/L 141  --  140   POTASSIUM mmol/L 4.7 5.9* 5.6*   CHLORIDE mmol/L 107  --  107   CO2 mmol/L 23.0  --  20.0*   BUN mg/dL 31*  --  32*   CREATININE mg/dL 1.83*  --  1.77*   CALCIUM mg/dL 9.1  --  9.6   EGFR mL/min/1.73 27.3*  --  28.4*   BILIRUBIN mg/dL  --   --  <0.2   ALK PHOS U/L  --   --  125*   ALT (SGPT) U/L  --   --  12   AST (SGOT) U/L  --   --  11   GLUCOSE mg/dL 103*  --  108*       Radiology:   Imaging Results (Last 72 Hours)       Procedure Component Value Units Date/Time    XR Chest 1 View [[id number]] Collected: 08/17/23 1909     Updated: 08/17/23 1913    Narrative:      EXAMINATION: Chest 1 view 08/17/2023     HISTORY: Shortness of breath.     FINDINGS: Today's exam is compared to previous study of 03/22/2014.  There is moderate cardiomegaly. The thoracic aorta is tortuous. There is  no consolidative pneumonia or effusion. No free air seen beneath the  hemidiaphragms. There is chronic mild elevation of the right  hemidiaphragm.       Impression:      1.. Cardiomegaly.  2. No acute consolidative pneumonia or effusion.  This report was finalized on 08/17/2023 19:10 by Dr. Jay Nova MD.            Culture:  No results found for: BLOODCX, URINECX, WOUNDCX, MRSACX, RESPCX, STOOLCX      Assessment    Acute kidney injury vs chronic kidney disease?  Hyperkalemia--improved  Type 2 diabetes  Hypertension  Chronic lymphedema  Atrial  fibrillation  Anemia     Plan:   Renal US and urine studies pending  Hyperkalemia has improved. Appears relatively euvolemic on exam. Unknown baseline renal function so difficult to say if patient actually needs any acute interventions? Spoke with nursing staff and they will attempt to obtain some prior labs or records.  Continue to hold potassium and metformin  Further assessment and plan pending Dr Cueto's evaluation of patient      Thank you for the consult, we appreciate the opportunity to provide care to your patients.  Feel free to contact me if I can be of any further assistance.      Willis Gary, APRN  8/18/2023  09:52 CDT

## 2023-08-18 NOTE — CASE MANAGEMENT/SOCIAL WORK
Continued Stay Note   Rafael     Patient Name: Mar Rodriguez  MRN: [id number]  Today's Date: 8/18/2023    Admit Date: 8/17/2023    Plan: [facility]   Discharge Plan       Row Name 08/18/23 1214       Plan    Plan [facility]    Patient/Family in Agreement with Plan yes    Plan Comments Pt has a bed hold at [facility].  No precert. needed.  Pt can dc once medically stable.  [phone]    Final Discharge Disposition Code 03 - skilled nursing facility (SNF)                   Discharge Codes    No documentation.                       RORY GarciaW

## 2023-08-18 NOTE — PLAN OF CARE
Goal Outcome Evaluation:  Plan of Care Reviewed With: patient           Outcome Evaluation: OT eval completed. Pt presents alert and oriented x4, resting comfortably in bed with family at bedside. She reports at baseline requiring assistance with most adls but is able to stand and t/f to w/c with assist, residing at NH. Today she was able to bring self to sitting at EOB with Min A. Required set-up and Min A to don slippers. Mod A for sit <> stand t/f from EOB and all to take small side steps towards HOB. She was returned to [facility] in bed with Mod A. She would benefit from skilled OT services to address these deficits. Recommend d/c back to SNF.      Anticipated Discharge Disposition (OT): skilled nursing facility

## 2023-08-18 NOTE — CONSULTS
"  [facility] CONSULT NOTE      Reason for Consultation: new onset AF    Patient Care Team:  Vandana Churchill MD as PCP - General (Family Medicine)    Chief complaint Shortness of breath and wheezing    Subjective .     History of present illness:  This patient is an 83 year old white female without previous heart history.  PMH is significant for cerebral palsy, T2DM, HTN, and TIA.  She is a poor historian.  She presents to ER for progressively worsening shortness of breath and wheezing.  She admits to leg swelling.  She denies orthopnea or PND.  She denies any chest pain.  She says that someone recently told her she had \"kidney problems\" but is unable to provide any further detail.    Review of Systems  A 10-point review of systems is obtained and negative except for otherwise mentioned above.    History  Past Medical History:   Diagnosis Date    Abnormality of gait and mobility     Anemia     Anxiety     Cerebral palsy     Cognitive communication deficit     Depression     Diabetes mellitus     Disease of thyroid gland     Hyperglycemia     Hyperlipidemia     Hypertension     Hypokalemia     Insomnia     Lymphedema     Neuropathy     Urge incontinence    , History reviewed. No pertinent surgical history., History reviewed. No pertinent family history.,   Social History     Tobacco Use    Smoking status: Never     Passive exposure: Past   Vaping Use    Vaping Use: Never used   Substance Use Topics    Alcohol use: Never    Drug use: Never   ,   Medications Prior to Admission   Medication Sig Dispense Refill Last Dose    atorvastatin (LIPITOR) 40 MG tablet Take 1 tablet by mouth Every Night.   8/16/2023 at 2100    busPIRone (BUSPAR) 10 MG tablet Take 1 tablet by mouth Daily. For anxiety   8/17/2023 at 0900    colestipol (COLESTID) 1 g tablet Take 1 tablet by mouth 2 (Two) Times a Day. For chronic diarrhea   8/17/2023 at 0900    ferrous sulfate 324 (65 Fe) MG tablet delayed-release EC " "tablet Take 1 tablet by mouth 2 (Two) Times a Day With Meals. For anemia   8/17/2023 at 1600    metFORMIN (GLUCOPHAGE) 850 MG tablet Take 1 tablet by mouth 2 (Two) Times a Day With Meals.   8/17/2023 at 0900    nystatin (MYCOSTATIN) 323518 UNIT/GM powder Apply 1 application  topically to the appropriate area as directed 2 (Two) Times a Day. Apply to all folds topically every shift for redness   8/17/2023 at 0900    PARoxetine (PAXIL) 20 MG tablet Take 1 tablet by mouth Every Morning.   8/17/2023 at 0900    potassium chloride (K-DUR,KLOR-CON) 20 MEQ CR tablet Take 1 tablet by mouth 2 (Two) Times a Day.   8/17/2023 at 0800    gabapentin (NEURONTIN) 300 MG capsule Take 1 capsule by mouth Every Night. For idiopathic neuropathy   8/16/2023 at 2100    levothyroxine (SYNTHROID, LEVOTHROID) 175 MCG tablet Take 1 tablet by mouth Every Night. For hypothyroidism   8/16/2023 at 2100    and Allergies:  Nsaids    Objective     Vital Sign Min/Max for last 24 hours  Temp  Min: 97.7 øF (36.5 øC)  Max: 98.4 øF (36.9 øC)   BP  Min: 142/81  Max: 158/82   Pulse  Min: 81  Max: 106   Resp  Min: 18  Max: 22   SpO2  Min: 97 %  Max: 100 %   No data recorded   Weight  Min: 121 kg (267 lb 1.6 oz)  Max: 121 kg (267 lb 1.6 oz)     Flowsheet Rows      Flowsheet Row First Filed Value   Admission Height 165.1 cm (65\") Documented at 08/17/2023 1816   Admission Weight 121 kg (267 lb 1.6 oz) Documented at 08/17/2023 1816               Physical Exam:     General Appearance:    Alert, cooperative, in no acute distress   Head:    Normocephalic, without obvious abnormality, atraumatic   Eyes:            Lids and lashes normal, conjunctivae and sclerae normal, no   icterus, PERRLA, EOMI   Throat:   Oral mucosa pink and moist   Neck:   No adenopathy, supple, trachea midline, no thyromegaly, no   carotid bruit, no JVD   Lungs:     Clear to auscultation bilaterally,respirations regular, even     and unlabored    Heart:    Irregular rhythm and normal rate, " normal S1 and S2, no            murmur, no gallop, no rub, no click   Chest Wall:    No abnormalities observed   Abdomen:     Normal bowel sounds present in all four quadrants, no       masses, no organomegaly, soft non-tender, non-distended    Extremities:   Chronic changes consistent with venous stasis   Pulses:   Pulses palpable and equal bilaterally   Skin:   No bleeding, bruising or rash   Psychiatric:   Displays appropriate mood and affect           Results Review:    I reviewed the patient's new clinical results.    Results from last 7 days   Lab Units 08/17/23 1919   WBC 10*3/mm3 14.70*   HEMOGLOBIN g/dL 10.0*   HEMATOCRIT % 32.4*   PLATELETS 10*3/mm3 321     Results from last 7 days   Lab Units 08/18/23 0411   SODIUM mmol/L 141   POTASSIUM mmol/L 4.7   CHLORIDE mmol/L 107   CO2 mmol/L 23.0   BUN mg/dL 31*   CREATININE mg/dL 1.83*   GLUCOSE mg/dL 103*   CALCIUM mg/dL 9.1     Results from last 7 days   Lab Units 08/18/23 0411 08/17/23 2219 08/17/23 1919   SODIUM mmol/L 141  --  140   POTASSIUM mmol/L 4.7   < > 5.6*   CHLORIDE mmol/L 107  --  107   CO2 mmol/L 23.0  --  20.0*   BUN mg/dL 31*  --  32*   CREATININE mg/dL 1.83*  --  1.77*   CALCIUM mg/dL 9.1  --  9.6   BILIRUBIN mg/dL  --   --  <0.2   ALK PHOS U/L  --   --  125*   ALT (SGPT) U/L  --   --  12   AST (SGOT) U/L  --   --  11   GLUCOSE mg/dL 103*  --  108*    < > = values in this interval not displayed.     Results from last 7 days   Lab Units 08/18/23  0728 08/17/23 2219 08/17/23 1919   HSTROP T ng/L 21* 16* 17*     Results from last 7 days   Lab Units 08/17/23 1919   TSH uIU/mL 1.020   FREE T4 ng/dL 1.53       Assessment & Plan       New onset atrial fibrillation  Dyspnea  Edema  Acute kidney injury vs chronic kidney disease  Hyperlipidemia    AF:  CHADSVASC score 7.  Start Eliquis.  Start Lopressor for rate control.  Await echo.    Dyspnea/Edema:  Continue Lasix.  Await echo.  Strict I&O, daily weights, low sodium diet.    HL:  Continue  statin.    Thank you for the consult.  Please never hesitate to call with further questions or concerns.    I discussed the patient's findings and my recommendations with patient and family    Patti Navas PA-C  08/18/23  10:44 CDT

## 2023-08-18 NOTE — THERAPY EVALUATION
Patient Name: Mar Rodriguez  : 1940    MRN: [id number]                              Today's Date: 2023       Admit Date: 2023    Visit Dx:     ICD-10-CM ICD-9-CM   1. New onset atrial fibrillation  I48.91 427.31   2. Shortness of breath  R06.02 786.05   3. Cardiomegaly  I51.7 429.3   4. Acute kidney injury  N17.9 584.9   5. New onset of congestive heart failure  I50.9 428.0   6. Impaired mobility [Z74.09 (ICD-10-CM)]  Z74.09 799.89     Patient Active Problem List   Diagnosis    New onset atrial fibrillation     Past Medical History:   Diagnosis Date    Abnormality of gait and mobility     Anemia     Anxiety     Cerebral palsy     Cognitive communication deficit     Depression     Diabetes mellitus     Disease of thyroid gland     Hyperglycemia     Hyperlipidemia     Hypertension     Hypokalemia     Insomnia     Lymphedema     Neuropathy     Urge incontinence      History reviewed. No pertinent surgical history.   General Information       Row Name 23 1120          Physical Therapy Time and Intention    Document Type evaluation  short of breath, Dx:  afib  -     Mode of Treatment physical therapy;co-treatment  -       Row Name 23 1120          General Information    Patient Profile Reviewed yes  -     Prior Level of Function mod assist:;transfer;bed mobility;ADL's  -     Existing Precautions/Restrictions fall  -     Barriers to Rehab medically complex;previous functional deficit  -       Row Name 23 1120          Living Environment    People in Home facility resident  -       Row Name 23 1120          Cognition    Orientation Status (Cognition) oriented x 4  -       Row Name 23 1120          Safety Issues, Functional Mobility    Safety Issues Affecting Function (Mobility) ability to follow commands  -     Impairments Affecting Function (Mobility) balance;coordination;endurance/activity tolerance;grasp;motor control;range of motion  (ROM);strength;shortness of breath  -               User Key  (r) = Recorded By, (t) = Taken By, (c) = Cosigned By      Initials Name Provider Type     Rodrick June, PT Physical Therapist                   Mobility       Row Name 08/18/23 1120          Bed Mobility    Bed Mobility supine-sit;sit-supine  -     Supine-Sit [place] (Bed Mobility) minimum assist (75% patient effort)  -     Sit-Supine [place] (Bed Mobility) minimum assist (75% patient effort)  -       Row Name 08/18/23 1120          Sit-Stand Transfer    Sit-Stand [place] (Transfers) minimum assist (75% patient effort)  -     Comment, (Sit-Stand Transfer) pt allowed to use rw to stand  unable to take steps forward; able to side step to HOB  -               User Key  (r) = Recorded By, (t) = Taken By, (c) = Cosigned By      Initials Name Provider Type     Rodrick June, PT Physical Therapist                   Obj/Interventions       Row Name 08/18/23 1120          Range of Motion Comprehensive    General Range of Motion bilateral upper extremity ROM WFL;lower extremity range of motion deficits identified  -LH       Row Name 08/18/23 1120          Strength Comprehensive (MMT)    General Manual Muscle Testing (MMT) Assessment upper extremity strength deficits identified;lower extremity strength deficits identified  -LH       Row Name 08/18/23 1120          Sensory Assessment (Somatosensory)    Sensory Assessment (Somatosensory) sensation intact  -               User Key  (r) = Recorded By, (t) = Taken By, (c) = Cosigned By      Initials Name Provider Type     Rodrick June, PT Physical Therapist                   Goals/Plan       Row Name 08/18/23 1120          Bed Mobility Goal 1 (PT)    Activity/Assistive Device (Bed Mobility Goal 1, PT) bed mobility activities, all  -     [place] Level/Cues Needed (Bed Mobility Goal 1, PT) standby assist  -     Time Frame (Bed Mobility Goal 1, PT) 10 days  -      Progress/Outcomes (Bed Mobility Goal 1, PT) new goal  -       Row Name 08/18/23 1120          Transfer Goal 1 (PT)    Activity/Assistive Device (Transfer Goal 1, PT) sit-to-stand/stand-to-sit;bed-to-chair/chair-to-bed;walker, rolling  -     [place] Level/Cues Needed (Transfer Goal 1, PT) contact guard required  -     Time Frame (Transfer Goal 1, PT) 10 days  -     Progress/Outcome (Transfer Goal 1, PT) new goal  -       Row Name 08/18/23 1120          Therapy Assessment/Plan (PT)    Planned Therapy Interventions (PT) balance training;bed mobility training;home exercise program;strengthening;patient/family education;transfer training  -               User Key  (r) = Recorded By, (t) = Taken By, (c) = Cosigned By      Initials Name Provider Type     Rodrick June, PT Physical Therapist                   Clinical Impression       Row Name 08/18/23 1120          Pain    Pretreatment Pain Rating 0/10 - no pain  -     Posttreatment Pain Rating 0/10 - no pain  -     Pain Intervention(s) Medication (See MAR)  -       Row Name 08/18/23 1120          Plan of Care Review    Plan of Care Reviewed With patient;sibling  -     Outcome Evaluation PT IE complete.  A&Ox4.  Very pleasant.  Pt primarily t/f's to chair at NH.  Today she is min A bed mobility.  Min A sit<>stand.  Able to take side steps to HOB w/ RW min A x1.  Unable to take steps forward.  PT to see for bed mobility, t/f's and therex.  Recommend return to NH at [place].  Thank you for referral.  -       Row Name 08/18/23 1120          Therapy Assessment/Plan (PT)    Patient/Family Therapy Goals Statement (PT) return to NH  -     Rehab Potential (PT) good, to achieve stated therapy goals  -     Criteria for Skilled Interventions Met (PT) yes;skilled treatment is necessary  -     Therapy Frequency (PT) 2 times/day  -     Predicted Duration of Therapy Intervention (PT) until dc  -       Row Name 08/18/23 1120          Positioning and  Restraints    Pre-Treatment Position in bed  -LH     Post Treatment Position bed  -LH     In Bed notified nsg;fowlers;call light within reach;encouraged to call for assist;side rails up x2;exit alarm on;with family/caregiver;with nsg  -               User Key  (r) = Recorded By, (t) = Taken By, (c) = Cosigned By      Initials Name Provider Type     Rodrick June, PT Physical Therapist                   Outcome Measures       Row Name 08/18/23 1120          How much help from another person do you currently need...    Turning from your back to your side while in flat bed without using bedrails? 3  -LH     Moving from lying on back to sitting on the side of a flat bed without bedrails? 3  -LH     Moving to and from a bed to a chair (including a wheelchair)? 3  -LH     Standing up from a chair using your arms (e.g., wheelchair, bedside chair)? 3  -LH     Climbing 3-5 steps with a railing? 1  -LH     To walk in hospital room? 2  -     AM-PAC 6 Clicks Score (PT) 15  -     Highest level of mobility 4 --> Transferred to chair/commode  -       Row Name 08/18/23 1120 08/18/23 1117       Functional Assessment    Outcome Measure Options AM-PAC 6 Clicks Basic Mobility (PT)  - AM-PAC 6 Clicks Daily Activity (OT)  [facility]              User Key  (r) = Recorded By, (t) = Taken By, (c) = Cosigned By      Initials Name Provider Type     Rodrick June, PT Physical Therapist    Racquel Rodriguez, OTR/L, CSRS Occupational Therapist                                 Physical Therapy Education       Title: PT OT SLP Therapies (In Progress)       Topic: Physical Therapy (Done)       Point: Mobility training (Done)       Learning Progress Summary             Patient Acceptance, E,D, DU,VU,NR by  at 8/18/2023 1200    Comment: benefits of PT and POC, call for A OOB                         Point: Precautions (Done)       Learning Progress Summary             Patient Acceptance, E,D, DU,VU,NR by  at 8/18/2023 1200    Comment:  benefits of PT and POC, call for A OOB                                         User Key       Initials Effective Dates Name Provider Type Discipline     02/03/23 -  Rodrick June, PT Physical Therapist PT                  PT Recommendation and Plan  Planned Therapy Interventions (PT): balance training, bed mobility training, home exercise program, strengthening, patient/family education, transfer training  Plan of Care Reviewed With: patient, sibling  Outcome Evaluation: PT IE complete.  A&Ox4.  Very pleasant.  Pt primarily t/f's to chair at NH.  Today she is min A bed mobility.  Min A sit<>stand.  Able to take side steps to HOB w/ RW min A x1.  Unable to take steps forward.  PT to see for bed mobility, t/f's and therex.  Recommend return to NH at [place].  Thank you for referral.     Time Calculation:         PT Charges       Row Name 08/18/23 1201             Time Calculation    Start Time 1120  -      Stop Time 1150  -      Time Calculation (min) 30 min  -      PT Received On 08/18/23  -      PT Goal Re-Cert Due Date 08/28/23  -         Untimed Charges    PT Eval/Re-eval Minutes 30  -         Total Minutes    Untimed Charges Total Minutes 30  -       Total Minutes 30  -LH                User Key  (r) = Recorded By, (t) = Taken By, (c) = Cosigned By      Initials Name Provider Type     Rodrick June PT Physical Therapist                  Therapy Charges for Today       Code Description Service Date Service Provider Modifiers Qty    68468408328 HC PT EVAL MOD COMPLEXITY 2 8/18/2023 Rodrick June PT GP 1            PT G-Codes  Outcome Measure Options: AM-PAC 6 Clicks Basic Mobility (PT)  AM-PAC 6 Clicks Score (PT): 15  AM-PAC 6 Clicks Score (OT): 14  PT Discharge Summary  Anticipated Discharge Disposition (PT): skilled nursing facility    Rodrick June PT  8/18/2023

## 2023-08-18 NOTE — PLAN OF CARE
Problem: Adult Inpatient Plan of Care  Goal: Plan of Care Review  Recent Flowsheet Documentation  Taken 8/18/2023 1120 by Rodrick June, PT  Plan of Care Reviewed With:   patient   sibling  Outcome Evaluation: PT IE complete.  A&Ox4.  Very pleasant.  Pt primarily t/f's to chair at NH.  Today she is min A bed mobility.  Min A sit<>stand.  Able to take side steps to HOB w/ RW min A x1.  Unable to take steps forward.  PT to see for bed mobility, t/f's and therex.  Recommend return to NH at [place].  Thank you for referral.   Goal Outcome Evaluation:  Plan of Care Reviewed With: patient, sibling           Outcome Evaluation: PT IE complete.  A&Ox4.  Very pleasant.  Pt primarily t/f's to chair at NH.  Today she is min A bed mobility.  Min A sit<>stand.  Able to take side steps to HOB w/ RW min A x1.  Unable to take steps forward.  PT to see for bed mobility, t/f's and therex.  Recommend return to NH at [place].  Thank you for referral.      Anticipated Discharge Disposition (PT): skilled nursing facility

## 2023-08-18 NOTE — THERAPY EVALUATION
Patient Name: Mar Rodriguez  : 1940    MRN: [id number]                              Today's Date: 2023       Admit Date: 2023    Visit Dx:     ICD-10-CM ICD-9-CM   1. New onset atrial fibrillation  I48.91 427.31   2. Shortness of breath  R06.02 786.05   3. Cardiomegaly  I51.7 429.3   4. Acute kidney injury  N17.9 584.9   5. New onset of congestive heart failure  I50.9 428.0     Patient Active Problem List   Diagnosis    New onset atrial fibrillation     Past Medical History:   Diagnosis Date    Abnormality of gait and mobility     Anemia     Anxiety     Cerebral palsy     Cognitive communication deficit     Depression     Diabetes mellitus     Disease of thyroid gland     Hyperglycemia     Hyperlipidemia     Hypertension     Hypokalemia     Insomnia     Lymphedema     Neuropathy     Urge incontinence      History reviewed. No pertinent surgical history.   General Information       Row Name 23 1117          OT Time and Intention    Document Type evaluation  Pt presents with progressively worsening SOA, wheezing and LE swelling. Hx of CP, T2DM, TIA, HTN. Dx: new onset A-fib.  -JJ     Mode of Treatment occupational therapy  -JJ       Row Name 23 1117          General Information    Patient Profile Reviewed yes  -JJ     Prior Level of Function min assist:;mod assist:;transfer;bed mobility;ADL's  -JJ     Existing Precautions/Restrictions fall  -JJ     Barriers to Rehab medically complex;previous functional deficit  -JJ       Row Name 23 1117          Living Environment    People in Home facility resident  -JJ       Row Name 23 1117          Cognition    Orientation Status (Cognition) oriented x 4  -JJ       Row Name 23 1117          Safety Issues, Functional Mobility    Impairments Affecting Function (Mobility) balance;endurance/activity tolerance;strength;pain;range of motion (ROM)  -JJ               User Key  (r) = Recorded By, (t) = Taken By, (c) = Cosigned By       Initials Name Provider Type    Racquel Rodriguez OTR/L, PAIGE Occupational Therapist                     Mobility/ADL's       [facility] Name 08/18/23 1117          Bed Mobility    Bed Mobility supine-sit;sit-supine  -     Supine-Sit [place] (Bed Mobility) minimum assist (75% patient effort)  -JJ       Row Name 08/18/23 1117          Transfers    Transfers stand-sit transfer;sit-stand transfer  -     Comment, (Transfers) able to take small side steps towards HOB, R knee buckled.  -JJ       Row Name 08/18/23 1117          Sit-Stand Transfer    Sit-Stand [place] (Transfers) moderate assist (50% patient effort)  -JJ       Row Name 08/18/23 1117          Stand-Sit Transfer    Stand-Sit [place] (Transfers) moderate assist (50% patient effort)  -JJ       Row Name 08/18/23 1117          Activities of Daily Living    BADL Assessment/Intervention lower body dressing  -JJ       Row Name 08/18/23 1117          Lower Body Dressing Assessment/Training    [place] Level (Lower Body Dressing) don;shoes/slippers;minimum assist (75% patient effort)  -     Position (Lower Body Dressing) edge of bed sitting  -               User Key  (r) = Recorded By, (t) = Taken By, (c) = Cosigned By      Initials Name Provider Type    Racquel Rodriguez OTR/L, PAIGE Occupational Therapist                   Obj/Interventions       Row Name 08/18/23 1117          Sensory Assessment (Somatosensory)    Sensory Assessment (Somatosensory) UE sensation intact  -JJ       Row Name 08/18/23 1117          Range of Motion Comprehensive    General Range of Motion bilateral upper extremity ROM WFL  -JJ       Row Name 08/18/23 1117          Strength Comprehensive (MMT)    Comment, General Manual Muscle Testing (MMT) Assessment B UE strength grossly 4/5  -JJ       Row Name 08/18/23 1117          Balance    Balance Assessment sitting static balance;sitting dynamic balance;standing dynamic balance;standing static balance  -      Static Sitting Balance standby assist  -JJ     Dynamic Sitting Balance contact guard  -JJ     Position, Sitting Balance unsupported;sitting edge of bed  -JJ     Static Standing Balance contact guard  -JJ     Dynamic Standing Balance minimal assist  -JJ     Position/Device Used, Standing Balance supported;walker, front-wheeled  -JJ               User Key  (r) = Recorded By, (t) = Taken By, (c) = Cosigned By      Initials Name Provider Type    Racquel Rodriguez, OTR/L, CSRS Occupational Therapist                   Goals/Plan       Row Name 08/18/23 1117          Transfer Goal 1 (OT)    Activity/Assistive Device (Transfer Goal 1, OT) commode, bedside without drop arms  -JJ     [place] Level/Cues Needed (Transfer Goal 1, OT) minimum assist (75% or more patient effort)  -JJ     Time Frame (Transfer Goal 1, OT) long term goal (LTG);by discharge  -JJ     Progress/Outcome (Transfer Goal 1, OT) new goal  -J       Row Name 08/18/23 1117          Dressing Goal 1 (OT)    Activity/Device (Dressing Goal 1, OT) upper body dressing  -JJ     [place]/Cues Needed (Dressing Goal 1, OT) minimum assist (75% or more patient effort)  -JJ     Time Frame (Dressing Goal 1, OT) long term goal (LTG);by discharge  -JJ     Progress/Outcome (Dressing Goal 1, OT) new goal  -JJ       Row Name 08/18/23 1117          Toileting Goal 1 (OT)    Activity/Device (Toileting Goal 1, OT) toileting skills, all  -JJ     [place] Level/Cues Needed (Toileting Goal 1, OT) moderate assist (50-74% patient effort)  -JJ     Time Frame (Toileting Goal 1, OT) long term goal (LTG);by discharge  -JJ     Progress/Outcome (Toileting Goal 1, OT) new goal  -       Row Name 08/18/23 1117          Therapy Assessment/Plan (OT)    Planned Therapy Interventions (OT) activity tolerance training;adaptive equipment training;BADL retraining;functional balance retraining;transfer/mobility retraining;strengthening exercise;occupation/activity based  interventions;patient/caregiver education/training  -               User Key  (r) = Recorded By, (t) = Taken By, (c) = Cosigned By      Initials Name Provider Type    Racquel Rodriguez, OTR/L, CSRS Occupational Therapist                   Clinical Impression       Row Name 08/18/23 1117          Pain Assessment    Pretreatment Pain Rating 5/10  -JJ     Posttreatment Pain Rating 5/10  -JJ     Pain Location - Side/Orientation Bilateral  -JJ     Pain Location lower  -JJ     Pain Location - extremity  -JJ     Pain Intervention(s) Medication (See MAR);Repositioned;Ambulation/increased activity  -       Row Name 08/18/23 1117          Plan of Care Review    Plan of Care Reviewed With patient  -     Outcome Evaluation OT eval completed. Pt presents alert and oriented x4, resting comfortably in bed with family at bedside. She reports at baseline requiring assistance with most adls but is able to stand and t/f to w/c with assist, residing at NH. Today she was able to bring self to sitting at EOB with Min A. Required set-up and Min A to don slippers. Mod A for sit <> stand t/f from EOB and all to take small side steps towards HOB. She was returned to [facility] in bed with Mod A. She would benefit from skilled OT services to address these deficits. Recommend d/c back to SNF.  -       Row Name 08/18/23 1117          Therapy Assessment/Plan (OT)    Rehab Potential (OT) good, to achieve stated therapy goals  -     Criteria for Skilled Therapeutic Interventions Met (OT) yes;skilled treatment is necessary  -     Therapy Frequency (OT) 5 times/wk  -       Row Name 08/18/23 1117          Therapy Plan Review/Discharge Plan (OT)    Anticipated Discharge Disposition (OT) [facility] nursing facility  -       Row Name 08/18/23 1117          Positioning and Restraints    Pre-Treatment Position in bed  -     Post Treatment Position bed  -     In Bed notified nsg;fowlers;call light within reach;encouraged to call for  assist;exit alarm on;side rails up x2;with family/caregiver  -               User Key  (r) = Recorded By, (t) = Taken By, (c) = Cosigned By      Initials Name Provider Type    Racquel Rodriguez, MAVIS/L, DOMINICS Occupational Therapist                   Outcome Measures       Row Name 08/18/23 1117          How much help from another is currently needed...    Putting on and taking off regular lower body clothing? 2  -JJ     Bathing (including washing, rinsing, and drying) 2  -JJ     Toileting (which includes using toilet bed pan or urinal) 2  -JJ     Putting on and taking off regular upper body clothing 2  -JJ     Taking care of personal grooming (such as brushing teeth) 3  -JJ     Eating meals 3  -     AM-PAC 6 Clicks Score (OT) 14  -       Row Name 08/18/23 1120          How much help from another person do you currently need...    Turning from your back to your side while in flat bed without using bedrails? 3  -LH     Moving from lying on back to sitting on the side of a flat bed without bedrails? 3  -LH     Moving to and from a bed to a chair (including a wheelchair)? 3  -LH     Standing up from a chair using your arms (e.g., wheelchair, bedside chair)? 3  -LH     Climbing 3-5 steps with a railing? 1  -LH     To walk in hospital room? 2  -     AM-PAC 6 Clicks Score (PT) 15  -     Highest level of mobility 4 --> Transferred to chair/commode  -       Row Name 08/18/23 1120 08/18/23 1117       Functional Assessment    Outcome Measure Options AM-PAC 6 Clicks Basic Mobility (PT)  - AM-PAC 6 Clicks Daily Activity (OT)  -              User Key  (r) = Recorded By, (t) = Taken By, (c) = Cosigned By      Initials Name Provider Type     Rodrick June, PT Physical Therapist    Racquel Rodriguez, SREER/L, PAIGE Occupational Therapist                    Occupational Therapy Education       Title: PT OT SLP Therapies (In Progress)       Topic: Occupational Therapy (In Progress)       Point: ADL training (Done)        Description:   Instruct learner(s) on proper safety adaptation and remediation techniques during self care or transfers.   Instruct in proper use of assistive devices.                  Learning Progress Summary             Patient Acceptance, E, VU by  at 8/18/2023 1157                         Point: Home exercise program (Not Started)       Description:   Instruct learner(s) on appropriate technique for monitoring, assisting and/or progressing therapeutic exercises/activities.                  Learner Progress:  Not documented in this visit.              Point: Precautions (Done)       Description:   Instruct learner(s) on prescribed precautions during self-care and functional transfers.                  Learning Progress Summary             Patient Acceptance, E, VU by BERTIN at 8/18/2023 1157                         Point: Body mechanics (Not Started)       Description:   Instruct learner(s) on proper positioning and spine alignment during self-care, functional mobility activities and/or exercises.                  Learner Progress:  Not documented in this visit.                              User Key       Initials Effective Dates Name Provider Type Discipline     07/11/23 -  Racquel Junior, MAVIS/L, CSRS Occupational Therapist OT                  OT Recommendation and Plan  Planned Therapy Interventions (OT): activity tolerance training, adaptive equipment training, BADL retraining, functional balance retraining, transfer/mobility retraining, strengthening exercise, occupation/activity based interventions, patient/caregiver education/training  Therapy Frequency (OT): 5 times/wk  Plan of Care Review  Plan of Care Reviewed With: patient  Outcome Evaluation: OT agaal completed. Pt presents alert and oriented x4, resting comfortably in bed with family at bedside. She reports at baseline requiring assistance with most adls but is able to stand and t/f to w/c with assist, residing at NH. Today she was able to  bring self to sitting at EOB with Min A. Required set-up and Min A to don slippers. Mod A for sit <> stand t/f from EOB and all to take small side steps towards HOB. She was returned to [facility] in bed with Mod A. She would benefit from skilled OT services to address these deficits. Recommend d/c back to SNF.     Time Calculation:         Time Calculation- OT       Row Name 08/18/23 1117             Time Calculation- OT    OT Start Time 1117  -      OT Stop Time 1150  -      OT Time Calculation (min) 33 min  -      OT Received On 08/18/23  -      OT Goal Re-Cert Due Date 08/28/23  -                User Key  (r) = Recorded By, (t) = Taken By, (c) = Cosigned By      Initials Name Provider Type    Racquel Rodriguez OTR/L, CSRS Occupational Therapist                  Therapy Charges for Today       Code Description Service Date Service Provider Modifiers Qty    05275104614 HC OT EVAL LOW COMPLEXITY 2 8/18/2023 Racquel Junior OTR/L, CSRS GO 1                 MAVIS Gutiérrez/JENNY, CSRS  8/18/2023

## 2023-08-18 NOTE — H&P
History and Physical      CHIEF COMPLAINT:  SOA    Reason for Admission:  New Onset A Fib    History Obtained From:  chart    HISTORY OF PRESENT ILLNESS:      The patient is a 83 y.o. female who was admitted from ER with worsening dyspnea, tachycardia with exertion. She has also recently had an increase in her renal function. In ER she was in A Fib, with a controlled rate. She has had no CP. She has had no fevers. No dysuria. She has had no GI issues.   She is currently having her ECHO.     Past Medical History:    Past Medical History:   Diagnosis Date    Abnormality of gait and mobility     Anemia     Anxiety     Cerebral palsy     Cognitive communication deficit     Depression     Diabetes mellitus     Disease of thyroid gland     Hyperglycemia     Hyperlipidemia     Hypertension     Hypokalemia     Insomnia     Lymphedema     Neuropathy     Urge incontinence      Past Surgical History:    History reviewed. No pertinent surgical history.      Medications Prior to Admission:    Medications Prior to Admission   Medication Sig Dispense Refill Last Dose    atorvastatin (LIPITOR) 40 MG tablet Take 1 tablet by mouth Every Night.   8/16/2023 at 2100    busPIRone (BUSPAR) 10 MG tablet Take 1 tablet by mouth Daily. For anxiety   8/17/2023 at 0900    colestipol (COLESTID) 1 g tablet Take 1 tablet by mouth 2 (Two) Times a Day. For chronic diarrhea   8/17/2023 at 0900    ferrous sulfate 324 (65 Fe) MG tablet delayed-release EC tablet Take 1 tablet by mouth 2 (Two) Times a Day With Meals. For anemia   8/17/2023 at 1600    gabapentin (NEURONTIN) 300 MG capsule Take 1 capsule by mouth Every Night. For idiopathic neuropathy       insulin degludec (Tresiba FlexTouch) 100 UNIT/ML solution pen-injector injection Inject 6 Units under the skin into the appropriate area as directed Every Night.       levothyroxine (SYNTHROID, LEVOTHROID) 175 MCG tablet Take 1 tablet by mouth Every Night. For hypothyroidism       metFORMIN  (GLUCOPHAGE) 850 MG tablet Take 1 tablet by mouth 2 (Two) Times a Day With Meals.   8/17/2023 at 0900    nystatin (MYCOSTATIN) 660739 UNIT/GM powder Apply 1 application  topically to the appropriate area as directed 2 (Two) Times a Day. Apply to all folds topically every shift for redness   8/17/2023 at 0900    PARoxetine (PAXIL) 20 MG tablet Take 1 tablet by mouth Daily.   8/17/2023 at 0900    potassium chloride (K-DUR,KLOR-CON) 20 MEQ CR tablet Take 1 tablet by mouth Daily.   8/17/2023 at 0800    raloxifene (EVISTA) 60 MG tablet Take 1 tablet by mouth Daily.       vitamin B-12 (CYANOCOBALAMIN) 1000 MCG tablet Take 1 tablet by mouth Daily.       dextrose (GLUTOSE) 40 % gel Take 15 g by mouth As Needed for Low Blood Sugar (every 15 minutes).   Unknown    Diclofenac Sodium (VOLTAREN) 1 % gel gel Apply 4 g topically to the appropriate area as directed Every 6 (Six) Hours As Needed (to right knee for pain).   Unknown    glucagon (GLUCAGEN) 1 MG injection Inject 1 mg under the skin into the appropriate area as directed 1 (One) Time As Needed for Low Blood Sugar.   Unknown    loperamide (IMODIUM) 2 MG capsule Take 1 capsule by mouth 4 (Four) Times a Day As Needed for Diarrhea (for diarhea, after each loose stool). Do not exceed 6doses/24 hours   Unknown    loratadine (CLARITIN) 10 MG tablet Take 1 tablet by mouth Daily As Needed for Allergies (nasal congestion).   Unknown       Allergies:  Nsaids    Social History:   TOBACCO:   reports that she has never smoked. She has been exposed to tobacco smoke. She does not have any smokeless tobacco history on file.  ETOH:   reports no history of alcohol use.  DRUGS:   reports no history of drug use.  MARITAL STATUS:  not   OCCUPATION:  not working  Patient currently lives at a [facility]      Family History:   History reviewed. No pertinent family history.  REVIEW OF SYSTEMS:  Constitutional: Negative   CV: Negative  Pulmonary: SOA  GI: Negative  : Negative  Psych:  "Negative  Neuro: Negative  Skin: Negative  MusculoSkeletal: Negative  HEENT: Negative  Joints: Negative  Vitals:  /78 (BP Location: Right arm, Patient Position: Lying)   Pulse 90   Temp 99.5 øF (37.5 øC) (Oral)   Resp 18   Ht 165.1 cm (65\")   Wt 121 kg (267 lb 1.6 oz)   SpO2 96%   BMI 44.45 kg/mý     PHYSICAL EXAM (per notes):  Gen: NAD, alert, pleasant  HEENT: WNL  Lymph: no LAD  Neck: no JVD or masses  Chest: CTA bilaterally  CV: RRR  Abdomen: NT/ND  Extrem: no C/C/2+ LE edema  Neuro: Nonfocal  Skin: no rashes  Joints: no redness  DATA:  I have reviewed the admission labs and imaging tests.    ASSESSMENT AND PLAN:      New Onset A fib---appreciate Cardiology input, ECHO ordered  CHRISTOPHER--Nephrology has seen pt  Hyperkalemia--resolved with treatment, follow  HTN--follow BP  Cerebral Palsy  Depression/Anxiety  DM2  Chronic LE Lymphedema/Swelling      Vandana Churchill MD  14:06 CDT 8/18/2023  "

## 2023-08-19 LAB
25(OH)D3 SERPL-MCNC: 39.6 NG/ML (ref 30–100)
ALBUMIN SERPL-MCNC: 3.8 G/DL (ref 3.5–5.2)
ALBUMIN/GLOB SERPL: 1.2 G/DL
ALP SERPL-CCNC: 122 U/L (ref 39–117)
ALT SERPL W P-5'-P-CCNC: 12 U/L (ref 1–33)
ANION GAP SERPL CALCULATED.3IONS-SCNC: 12 MMOL/L (ref 5–15)
AST SERPL-CCNC: 13 U/L (ref 1–32)
BASOPHILS # BLD AUTO: 0.04 10*3/MM3 (ref 0–0.2)
BASOPHILS NFR BLD AUTO: 0.4 % (ref 0–1.5)
BH CV ECHO MEAS - AO MAX PG: 9.4 MMHG
BH CV ECHO MEAS - AO MEAN PG: 5 MMHG
BH CV ECHO MEAS - AO ROOT DIAM: 2.9 CM
BH CV ECHO MEAS - AO V2 MAX: 153 CM/SEC
BH CV ECHO MEAS - AO V2 VTI: 27.6 CM
BH CV ECHO MEAS - AVA(I,D): 2.8 CM2
BH CV ECHO MEAS - EDV(CUBED): 95.4 ML
BH CV ECHO MEAS - EDV(MOD-SP2): 67.7 ML
BH CV ECHO MEAS - EDV(MOD-SP4): 50.4 ML
BH CV ECHO MEAS - EF(MOD-SP2): 49 %
BH CV ECHO MEAS - EF(MOD-SP4): 61.5 %
BH CV ECHO MEAS - ESV(CUBED): 58.4 ML
BH CV ECHO MEAS - ESV(MOD-SP2): 34.5 ML
BH CV ECHO MEAS - ESV(MOD-SP4): 19.4 ML
BH CV ECHO MEAS - FS: 15.1 %
BH CV ECHO MEAS - IVS/LVPW: 1.18 CM
BH CV ECHO MEAS - IVSD: 1.25 CM
BH CV ECHO MEAS - LA DIMENSION: 3.3 CM
BH CV ECHO MEAS - LAT PEAK E' VEL: 12 CM/SEC
BH CV ECHO MEAS - LV DIASTOLIC VOL/BSA (35-75): 22.5 CM2
BH CV ECHO MEAS - LV MASS(C)D: 192.1 GRAMS
BH CV ECHO MEAS - LV MAX PG: 4.7 MMHG
BH CV ECHO MEAS - LV MEAN PG: 3 MMHG
BH CV ECHO MEAS - LV SYSTOLIC VOL/BSA (12-30): 8.7 CM2
BH CV ECHO MEAS - LV V1 MAX: 108 CM/SEC
BH CV ECHO MEAS - LV V1 VTI: 20.6 CM
BH CV ECHO MEAS - LVIDD: 4.6 CM
BH CV ECHO MEAS - LVIDS: 3.9 CM
BH CV ECHO MEAS - LVOT AREA: 3.8 CM2
BH CV ECHO MEAS - LVOT DIAM: 2.2 CM
BH CV ECHO MEAS - LVPWD: 1.06 CM
BH CV ECHO MEAS - MED PEAK E' VEL: 9.1 CM/SEC
BH CV ECHO MEAS - MR MAX PG: 56.6 MMHG
BH CV ECHO MEAS - MR MAX VEL: 376 CM/SEC
BH CV ECHO MEAS - MV A MAX VEL: 47.4 CM/SEC
BH CV ECHO MEAS - MV DEC TIME: 0.15 MSEC
BH CV ECHO MEAS - MV E MAX VEL: 145 CM/SEC
BH CV ECHO MEAS - MV E/A: 3.1
BH CV ECHO MEAS - PA V2 MAX: 105 CM/SEC
BH CV ECHO MEAS - SI(MOD-SP2): 14.8 ML/M2
BH CV ECHO MEAS - SI(MOD-SP4): 13.9 ML/M2
BH CV ECHO MEAS - SV(LVOT): 78.3 ML
BH CV ECHO MEAS - SV(MOD-SP2): 33.2 ML
BH CV ECHO MEAS - SV(MOD-SP4): 31 ML
BH CV ECHO MEAS - TR MAX PG: 23 MMHG
BH CV ECHO MEAS - TR MAX VEL: 240 CM/SEC
BH CV ECHO MEASUREMENTS AVERAGE E/E' RATIO: 13.74
BH CV XLRA - TDI S': 9.5 CM/SEC
BILIRUB SERPL-MCNC: 0.2 MG/DL (ref 0–1.2)
BUN SERPL-MCNC: 30 MG/DL (ref 8–23)
BUN/CREAT SERPL: 16 (ref 7–25)
CALCIUM SPEC-SCNC: 9.7 MG/DL (ref 8.6–10.5)
CHLORIDE SERPL-SCNC: 106 MMOL/L (ref 98–107)
CO2 SERPL-SCNC: 25 MMOL/L (ref 22–29)
CREAT SERPL-MCNC: 1.87 MG/DL (ref 0.57–1)
CREAT UR-MCNC: 22.3 MG/DL
DEPRECATED RDW RBC AUTO: 45.7 FL (ref 37–54)
EGFRCR SERPLBLD CKD-EPI 2021: 26.6 ML/MIN/1.73
EOSINOPHIL # BLD AUTO: 0.36 10*3/MM3 (ref 0–0.4)
EOSINOPHIL NFR BLD AUTO: 3.9 % (ref 0.3–6.2)
ERYTHROCYTE [DISTWIDTH] IN BLOOD BY AUTOMATED COUNT: 12.9 % (ref 12.3–15.4)
GLOBULIN UR ELPH-MCNC: 3.2 GM/DL
GLUCOSE BLDC GLUCOMTR-MCNC: 121 MG/DL (ref 70–130)
GLUCOSE BLDC GLUCOMTR-MCNC: 191 MG/DL (ref 70–130)
GLUCOSE BLDC GLUCOMTR-MCNC: 93 MG/DL (ref 70–130)
GLUCOSE SERPL-MCNC: 108 MG/DL (ref 65–99)
HCT VFR BLD AUTO: 34.6 % (ref 34–46.6)
HGB BLD-MCNC: 10.4 G/DL (ref 12–15.9)
IMM GRANULOCYTES # BLD AUTO: 0.04 10*3/MM3 (ref 0–0.05)
IMM GRANULOCYTES NFR BLD AUTO: 0.4 % (ref 0–0.5)
LEFT ATRIUM VOLUME INDEX: 15.2 ML/M2
LEFT ATRIUM VOLUME: 34 ML
LYMPHOCYTES # BLD AUTO: 2.56 10*3/MM3 (ref 0.7–3.1)
LYMPHOCYTES NFR BLD AUTO: 27.8 % (ref 19.6–45.3)
MAGNESIUM SERPL-MCNC: 2 MG/DL (ref 1.6–2.4)
MCH RBC QN AUTO: 29.6 PG (ref 26.6–33)
MCHC RBC AUTO-ENTMCNC: 30.1 G/DL (ref 31.5–35.7)
MCV RBC AUTO: 98.6 FL (ref 79–97)
MONOCYTES # BLD AUTO: 0.64 10*3/MM3 (ref 0.1–0.9)
MONOCYTES NFR BLD AUTO: 6.9 % (ref 5–12)
NEUTROPHILS NFR BLD AUTO: 5.58 10*3/MM3 (ref 1.7–7)
NEUTROPHILS NFR BLD AUTO: 60.6 % (ref 42.7–76)
NRBC BLD AUTO-RTO: 0 /100 WBC (ref 0–0.2)
PHOSPHATE SERPL-MCNC: 4.1 MG/DL (ref 2.5–4.5)
PLATELET # BLD AUTO: 310 10*3/MM3 (ref 140–450)
PMV BLD AUTO: 10.4 FL (ref 6–12)
POTASSIUM SERPL-SCNC: 4.6 MMOL/L (ref 3.5–5.2)
PROT SERPL-MCNC: 7 G/DL (ref 6–8.5)
PTH-INTACT SERPL-MCNC: 83.8 PG/ML (ref 15–65)
QT INTERVAL: 306 MS
QTC INTERVAL: 390 MS
RBC # BLD AUTO: 3.51 10*6/MM3 (ref 3.77–5.28)
SODIUM SERPL-SCNC: 143 MMOL/L (ref 136–145)
URATE SERPL-MCNC: 7.7 MG/DL (ref 2.4–5.7)
UUN 24H UR-MCNC: 126 MG/DL
WBC NRBC COR # BLD: 9.22 10*3/MM3 (ref 3.4–10.8)

## 2023-08-19 PROCEDURE — 84100 ASSAY OF PHOSPHORUS: CPT | Performed by: NURSE PRACTITIONER

## 2023-08-19 PROCEDURE — 82306 VITAMIN D 25 HYDROXY: CPT | Performed by: NURSE PRACTITIONER

## 2023-08-19 PROCEDURE — 83970 ASSAY OF PARATHORMONE: CPT | Performed by: NURSE PRACTITIONER

## 2023-08-19 PROCEDURE — 82948 REAGENT STRIP/BLOOD GLUCOSE: CPT

## 2023-08-19 PROCEDURE — 80053 COMPREHEN METABOLIC PANEL: CPT | Performed by: NURSE PRACTITIONER

## 2023-08-19 PROCEDURE — 99232 SBSQ HOSP IP/OBS MODERATE 35: CPT | Performed by: INTERNAL MEDICINE

## 2023-08-19 PROCEDURE — 97530 THERAPEUTIC ACTIVITIES: CPT

## 2023-08-19 PROCEDURE — 97110 THERAPEUTIC EXERCISES: CPT

## 2023-08-19 PROCEDURE — 84550 ASSAY OF BLOOD/URIC ACID: CPT | Performed by: NURSE PRACTITIONER

## 2023-08-19 PROCEDURE — 83735 ASSAY OF MAGNESIUM: CPT | Performed by: NURSE PRACTITIONER

## 2023-08-19 PROCEDURE — 85025 COMPLETE CBC W/AUTO DIFF WBC: CPT | Performed by: NURSE PRACTITIONER

## 2023-08-19 PROCEDURE — 25010000002 FUROSEMIDE PER 20 MG: Performed by: FAMILY MEDICINE

## 2023-08-19 RX ADMIN — BUSPIRONE HYDROCHLORIDE 10 MG: 10 TABLET ORAL at 09:47

## 2023-08-19 RX ADMIN — FERROUS SULFATE TAB 325 MG (65 MG ELEMENTAL FE) 325 MG: 325 (65 FE) TAB at 17:21

## 2023-08-19 RX ADMIN — METOPROLOL TARTRATE 12.5 MG: 25 TABLET, FILM COATED ORAL at 09:47

## 2023-08-19 RX ADMIN — GABAPENTIN 300 MG: 300 CAPSULE ORAL at 20:05

## 2023-08-19 RX ADMIN — LEVOTHYROXINE SODIUM 175 MCG: 150 TABLET ORAL at 20:05

## 2023-08-19 RX ADMIN — APIXABAN 2.5 MG: 2.5 TABLET, FILM COATED ORAL at 20:05

## 2023-08-19 RX ADMIN — APIXABAN 2.5 MG: 2.5 TABLET, FILM COATED ORAL at 09:47

## 2023-08-19 RX ADMIN — METOPROLOL TARTRATE 12.5 MG: 25 TABLET, FILM COATED ORAL at 20:05

## 2023-08-19 RX ADMIN — RALOXIFENE 60 MG: 60 TABLET ORAL at 09:47

## 2023-08-19 RX ADMIN — FUROSEMIDE 20 MG: 10 INJECTION, SOLUTION INTRAMUSCULAR; INTRAVENOUS at 17:21

## 2023-08-19 RX ADMIN — PAROXETINE 20 MG: 20 TABLET, FILM COATED ORAL at 09:47

## 2023-08-19 RX ADMIN — FERROUS SULFATE TAB 325 MG (65 MG ELEMENTAL FE) 325 MG: 325 (65 FE) TAB at 09:47

## 2023-08-19 RX ADMIN — FUROSEMIDE 20 MG: 10 INJECTION, SOLUTION INTRAMUSCULAR; INTRAVENOUS at 09:47

## 2023-08-19 NOTE — PLAN OF CARE
Goal Outcome Evaluation:    Problem: Adult Inpatient Plan of Care  Goal: Plan of Care Review  Outcome: Ongoing, Progressing  Flowsheets (Taken 8/19/2023 0352)  Progress: no change  Plan of Care Reviewed With: patient  Outcome Evaluation: No c/o pain. Pt has verbalized she is worried about her kidneys. AF 69-97. Will update MD as needed.

## 2023-08-19 NOTE — THERAPY TREATMENT NOTE
Acute Care - Physical Therapy Treatment Note  [facility]     Patient Name: Mar Rodriguez  : 1940  MRN: [id number]  Today's Date: 2023      Visit Dx:     ICD-10-CM ICD-9-CM   1. New onset atrial fibrillation  I48.91 427.31   2. Shortness of breath  R06.02 786.05   3. Cardiomegaly  I51.7 429.3   4. Acute kidney injury  N17.9 584.9   5. New onset of congestive heart failure  I50.9 428.0   6. Impaired mobility [Z74.09 (ICD-10-CM)]  Z74.09 799.89     Patient Active Problem List   Diagnosis    New onset atrial fibrillation     Past Medical History:   Diagnosis Date    Abnormality of gait and mobility     Anemia     Anxiety     Cerebral palsy     Cognitive communication deficit     Depression     Diabetes mellitus     Disease of thyroid gland     Hyperglycemia     Hyperlipidemia     Hypertension     Hypokalemia     Insomnia     Lymphedema     Neuropathy     Urge incontinence      History reviewed. No pertinent surgical history.  PT Assessment (last 12 hours)       PT Evaluation and Treatment       Row Name 23 1535 23 1516       Physical Therapy Time and Intention    Subjective Information complains of;pain  -LY --    Document Type therapy note (daily note)  -LY --  -LY    Mode of Treatment physical therapy  -LY --  -LY      Row Name 23 1535 23 1516       General Information    Existing Precautions/Restrictions fall  -LY --  -LY      Row Name 23 1535          Pain    Pretreatment Pain Rating 5/10  -LY     Posttreatment Pain Rating 5/10  -LY     Pain Location - Side/Orientation Right  -LY     Pain Location lower  -LY     Pain Location - extremity  -LY     Pain Intervention(s) Medication (See MAR);Ambulation/increased activity  -LY       Row Name 23 1535          Bed Mobility    Supine-Sit [place] (Bed Mobility) verbal cues;contact guard  -LY     Sit-Supine [place] (Bed Mobility) verbal cues;contact guard  -LY     Assistive Device (Bed Mobility) bed rails;head  of bed elevated  -LY       Row Name 08/19/23 1535          Transfers    Transfers sit-stand transfer  -LY       Row Name 08/19/23 1535          Sit-Stand Transfer    Sit-Stand [place] (Transfers) verbal cues;minimum assist (75% patient effort)  -LY     Assistive Device (Sit-Stand Transfers) walker, front-wheeled  -LY     Comment, (Sit-Stand Transfer) x4 reps  -LY       Row Name 08/19/23 1535          Stand-Sit Transfer    Stand-Sit [place] (Transfers) verbal cues;minimum assist (75% patient effort)  -LY       Row Name 08/19/23 1535          Gait/Stairs (Locomotion)    [place] Level (Gait) verbal cues;minimum assist (75% patient effort)  -LY     Assistive Device (Gait) walker, front-wheeled  -LY     Distance in Feet (Gait) side steps to the L and R  -LY     Comment, (Gait/Stairs) unsteady steps, assist with RWX management  -LY       Row Name 08/19/23 1535          Motor Skills    Comments, Therapeutic Exercise BLE AROM x15 reps seated  -LY     Additional Documentation Comments, Therapeutic Exercise (Row)  -LY       Row Name 08/19/23 1535          Positioning and Restraints    Pre-Treatment Position in bed  -LY     Post Treatment Position bed  -LY     In Bed fowlers;call light within reach;encouraged to call for assist;exit alarm on  -LY               User Key  (r) = Recorded By, (t) = Taken By, (c) = Cosigned By      Initials Name Provider Type    Debra Kang, PTA Physical Therapist Assistant                    Physical Therapy Education       Title: PT OT SLP Therapies (In Progress)       Topic: Physical Therapy (Done)       Point: Mobility training (Done)       Learning Progress Summary             Patient Acceptance, E,D, MATEO,VU,NR by  at 8/18/2023 1200    Comment: benefits of PT and POC, call for A OOB                         Point: Precautions (Done)       Learning Progress Summary             Patient Acceptance, E,D, MATEO,VU,NR by  at 8/18/2023 1200    Comment: benefits of PT and POC, call  for A OOB                                         User Key       Initials Effective Dates Name Provider Type [facility] 02/03/23 -  Rodrick June, PT Physical Therapist PT                  PT Recommendation and Plan  Anticipated Discharge Disposition (PT): skilled nursing facility      Outcome Measures       Row Name 08/19/23 1535             How much help from another person do you currently need...    Turning from your back to your side while in flat bed without using bedrails? 3  -LY      Moving from lying on back to sitting on the side of a flat bed without bedrails? 3  -LY      Moving to and from a bed to a chair (including a wheelchair)? 3  -LY      Standing up from a chair using your arms (e.g., wheelchair, bedside chair)? 3  -LY      Climbing 3-5 steps with a railing? 1  -LY      To walk in hospital room? 2  -LY      AM-PAC 6 Clicks Score (PT) 15  -LY         Functional Assessment    Outcome Measure Options AM-PAC 6 Clicks Basic Mobility (PT)  -LY                User Key  (r) = Recorded By, (t) = Taken By, (c) = Cosigned By      Initials Name Provider Type    Debra Kang PTA Physical Therapist Assistant                     Time Calculation:    PT Charges       Row Name 08/19/23 1609             Time Calculation    Start Time 1535  -LY      Stop Time 1559  -LY      Time Calculation (min) 24 min  -LY      PT Received On 08/19/23  -LY         Time Calculation- PT    Total Timed Code Minutes- PT 24 minute(s)  -LY         Timed Charges    90659 - PT Therapeutic Exercise Minutes 12  -LY      35921 - PT Therapeutic Activity Minutes 12  -LY         Total Minutes    Timed Charges Total Minutes 24  -LY       Total Minutes 24  -LY                User Key  (r) = Recorded By, (t) = Taken By, (c) = Cosigned By      Initials Name Provider Type    Debra Kang PTA Physical Therapist Assistant                  Therapy Charges for Today       Code Description Service Date Service Provider Modifiers Qty     [id number]  PT THERAPEUTIC ACT EA 15 MIN 8/19/2023 Debra Dewey, PTA GP 1    13007072685 HC PT THER PROC EA 15 MIN 8/19/2023 Debra Dewey, RIAZ GP 1            PT G-Codes  Outcome Measure Options: AM-PAC 6 Clicks Basic Mobility (PT)  AM-PAC 6 Clicks Score (PT): 15  AM-PAC 6 Clicks Score (OT): 14    Debra Dewey PTA  8/19/2023

## 2023-08-19 NOTE — PROGRESS NOTES
"Progress Note  Mar Rodriguez  8/19/2023 17:26 CDT  Subjective:   Admit Date:   8/17/2023      CC/ADMIT DX:       Interval History:   Reviewed overnight events and nursing notes.  She has no new complaints. No CP or SOA.       I have reviewed all labs/diagnostics from the last 24hrs.       ROS:   I have done a 10 point ROS and all are negative, except what is mentioned in the HPI.    Diet: Cardiac Diets, Diabetic Diets, Renal Diets; Healthy Heart (2-3 Na+); Consistent Carbohydrate; Low Potassium; Texture: Regular Texture (IDDSI 7); Fluid Consistency: Thin (IDDSI 0)    Medications:      apixaban, 2.5 mg, Oral, Q12H  busPIRone, 10 mg, Oral, Daily  ferrous sulfate, 325 mg, Oral, BID With Meals  furosemide, 20 mg, Intravenous, BID  gabapentin, 300 mg, Oral, Nightly  levothyroxine, 175 mcg, Oral, Nightly  metoprolol tartrate, 12.5 mg, Oral, Q12H  PARoxetine, 20 mg, Oral, Daily  raloxifene, 60 mg, Oral, Daily            Objective:   Vitals: /79 (BP Location: Right arm, Patient Position: Sitting)   Pulse 73   Temp 98.1 øF (36.7 øC) (Oral)   Resp 16   Ht 165.1 cm (65\")   Wt 111 kg (244 lb 8 oz)   SpO2 96%   BMI 40.69 kg/mý    Intake/Output Summary (Last 24 hours) at 8/19/2023 1726  Last data filed at 8/19/2023 0900  Gross per 24 hour   Intake 560 ml   Output 1750 ml   Net -1190 ml     General appearance: alert and cooperative with exam  Lungs: clear to auscultation bilaterally  Heart: irreg, irreg  Abdomen: soft, non-tender; bowel sounds normal; no masses,  no organomegaly  Extremities: extremities normal, atraumatic, no cyanosis or edema  Neurologic:  No obvious focal neurologic deficits.    Assessment and Plan:     New onset atrial fibrillation    Cerebral Palsy    CHRISTOPHER    HTN    DM2    Chronic LE edema    Plan:   Continue present medication(s)    Follow with Cardiology and Nephrology   Labs are stable   Increase activity      Discharge planning:   a skilled nursing facility     Reviewed treatment plans " with the patient and/or family.             Electronically signed by Vandana Churchill MD on 8/19/2023 at 17:26 CDT

## 2023-08-19 NOTE — PROGRESS NOTES
"Chief Complaint   Patient presents with    Shortness of Breath     S: No acute events noted overnight.  The patient is very worried about her kidneys according to her report but denies having any palpitations, chest discomfort.  She describes breathing as being improved.  She notes baseline lower extremity edema.    Medications: Reviewed    Review of Systems: All pertinent negative and positives as noted above.  Otherwise, all systems reviewed and found to be negative.    Telemetry: Atrial fibrillation, generally with heart rate ranging between 70 and 90 bpm    O:  /68 (BP Location: Right arm, Patient Position: Lying)   Pulse 76   Temp 97.9 øF (36.6 øC) (Oral)   Resp 18   Ht 165.1 cm (65\")   Wt 111 kg (244 lb 8 oz)   SpO2 98%   BMI 40.69 kg/mý   Temp:  [97.7 øF (36.5 øC)-99.5 øF (37.5 øC)] 97.9 øF (36.6 øC)  Heart Rate:  [74-90] 76  Resp:  [18] 18  BP: (139-159)/(68-95) 139/68    Intake/Output Summary (Last 24 hours) at 8/19/2023 1222  Last data filed at 8/19/2023 0900  Gross per 24 hour   Intake 560 ml   Output 1750 ml   Net -1190 ml     General: No acute distress, sitting up in bed  CV: Irregularly irregular with normal heart rate  Pulmonary: Decreased breath sounds bilaterally but otherwise relatively clear  GI: Obese, soft, nontender, active bowel sounds  Extremities: Chronic venous stasis changes noted bilateral lower extremities with trace edema, warm and well-perfused    Diagnostic Data:    Lab Results   Component Value Date    WBC 9.22 08/19/2023    HGB 10.4 (L) 08/19/2023    HCT 34.6 08/19/2023    MCV 98.6 (H) 08/19/2023     08/19/2023     Lab Results   Component Value Date    GLUCOSE 108 (H) 08/19/2023    CALCIUM 9.7 08/19/2023     08/19/2023    K 4.6 08/19/2023    CO2 25.0 08/19/2023     08/19/2023    BUN 30 (H) 08/19/2023    CREATININE 1.87 (H) 08/19/2023    EGFR 26.6 (L) 08/19/2023    BCR 16.0 08/19/2023    ANIONGAP 12.0 08/19/2023     Results for orders placed during the " hospital encounter of 08/17/23    Adult Transthoracic Echo Complete W/ Cont if Necessary Per Protocol    Interpretation Summary    Left ventricular systolic function is normal. Left ventricular ejection fraction appears to be 56 - 60%.    Normal right ventricular cavity size and systolic function noted.    No significant valvular abnormalities identified on this study.    ASSESSMENT/PLAN:    1.  Newly diagnosed atrial fibrillation, chronicity unknown  2.  Dyspnea, potentially multifactorial  3.  Renal insufficiency, chronicity unknown  4.  Hyperlipidemia  5.  Cerebral palsy    -The patient remains asymptomatic with heart rate well controlled.  Continue metoprolol.  This can be titrated further if heart rate elevates.  -Continue Eliquis at current dosing of 2.5 mg twice daily given the patient's age greater than 80 and creatinine greater than 1.5.  If creatinine improves to level of less than 1.5, Eliquis can be increased to a dose of 5 mg twice daily.  -The patient had an echocardiogram yesterday showing normal biventricular systolic function and no significant valvular abnormalities.  -At this time, cardiology will be available as needed.  Please feel free to call if we can assist further.     Alert-The patient is alert, awake and responds to voice. The patient is oriented to time, place, and person. The triage nurse is able to obtain subjective information.

## 2023-08-19 NOTE — PLAN OF CARE
Problem: Adult Inpatient Plan of Care  Goal: Plan of Care Review  Outcome: Ongoing, Progressing  Goal: Patient-Specific Goal (Individualized)  Outcome: Ongoing, Progressing  Goal: Absence of Hospital-Acquired Illness or Injury  Outcome: Ongoing, Progressing  Intervention: Identify and Manage Fall Risk  Recent Flowsheet Documentation  Taken 8/19/2023 1300 by Beulah Dewey RN  Safety Promotion/Fall Prevention: safety round/check completed  Taken 8/19/2023 1241 by Beulah Dewey RN  Safety Promotion/Fall Prevention: safety round/check completed  Taken 8/19/2023 1100 by Beulah Dewey RN  Safety Promotion/Fall Prevention: safety round/check completed  Taken 8/19/2023 1026 by Beulah Dewey RN  Safety Promotion/Fall Prevention: safety round/check completed  Taken 8/19/2023 0900 by Beulah Dewey RN  Safety Promotion/Fall Prevention: safety round/check completed  Taken 8/19/2023 0800 by Beulah Dewey RN  Safety Promotion/Fall Prevention: safety round/check completed  Intervention: Prevent Skin Injury  Recent Flowsheet Documentation  Taken 8/19/2023 0800 by Beulah Dewey RN  Body Position: semi-prone  Intervention: Prevent and Manage VTE (Venous Thromboembolism) Risk  Recent Flowsheet Documentation  Taken 8/19/2023 0800 by Beulah Dewey RN  Activity Management: activity encouraged  VTE Prevention/Management: (medications) --  Range of Motion: active ROM (range of motion) encouraged  Goal: Optimal Comfort and Wellbeing  Outcome: Ongoing, Progressing  Intervention: Provide Person-Centered Care  Recent Flowsheet Documentation  Taken 8/19/2023 0800 by Beulah Dewey RN  Trust Relationship/Rapport: care explained  Goal: Readiness for Transition of Care  Outcome: Ongoing, Progressing     Problem: Fall Injury Risk  Goal: Absence of Fall and Fall-Related Injury  Outcome: Ongoing, Progressing  Intervention: Identify and Manage Contributors  Recent Flowsheet Documentation  Taken 8/19/2023 0800 by Beulah Dewey RN  Medication  Review/Management: medications reviewed  Intervention: Promote Injury-Free Environment  Recent Flowsheet Documentation  Taken 8/19/2023 1300 by Beulah Dewey RN  Safety Promotion/Fall Prevention: safety round/check completed  Taken 8/19/2023 1241 by Beulah Dewey RN  Safety Promotion/Fall Prevention: safety round/check completed  Taken 8/19/2023 1100 by Beulah Dewey RN  Safety Promotion/Fall Prevention: safety round/check completed  Taken 8/19/2023 1026 by Beulah Dewey RN  Safety Promotion/Fall Prevention: safety round/check completed  Taken 8/19/2023 0900 by Beulah Dewey RN  Safety Promotion/Fall Prevention: safety round/check completed  Taken 8/19/2023 0800 by Beulah Dewey RN  Safety Promotion/Fall Prevention: safety round/check completed     Problem: Skin Injury Risk Increased  Goal: Skin Health and Integrity  Outcome: Ongoing, Progressing  Intervention: Optimize Skin Protection  Recent Flowsheet Documentation  Taken 8/19/2023 0800 by Beulah Dewey RN  Head of Bed (HOB) Positioning: HOB at 30-45 degrees     Problem: Adjustment to Illness (Sepsis/Septic Shock)  Goal: Optimal Coping  Outcome: Ongoing, Progressing  Intervention: Optimize Psychosocial Adjustment to Illness  Recent Flowsheet Documentation  Taken 8/19/2023 0800 by Beulah Dewey RN  Family/Support System Care: presence promoted     Problem: Bleeding (Sepsis/Septic Shock)  Goal: Absence of Bleeding  Outcome: Ongoing, Progressing     Problem: Glycemic Control Impaired (Sepsis/Septic Shock)  Goal: Blood Glucose Level Within Desired Range  Outcome: Ongoing, Progressing     Problem: Infection Progression (Sepsis/Septic Shock)  Goal: Absence of Infection Signs and Symptoms  Outcome: Ongoing, Progressing  Intervention: Promote Recovery  Recent Flowsheet Documentation  Taken 8/19/2023 0800 by Beulah Dewey RN  Activity Management: activity encouraged     Problem: Nutrition Impaired (Sepsis/Septic Shock)  Goal: Optimal Nutrition Intake  Outcome: Ongoing,  Progressing   Goal Outcome Evaluation:

## 2023-08-20 LAB
ANION GAP SERPL CALCULATED.3IONS-SCNC: 11 MMOL/L (ref 5–15)
BUN SERPL-MCNC: 37 MG/DL (ref 8–23)
BUN/CREAT SERPL: 15.5 (ref 7–25)
CALCIUM SPEC-SCNC: 9.5 MG/DL (ref 8.6–10.5)
CHLORIDE SERPL-SCNC: 104 MMOL/L (ref 98–107)
CO2 SERPL-SCNC: 27 MMOL/L (ref 22–29)
CREAT SERPL-MCNC: 2.38 MG/DL (ref 0.57–1)
EGFRCR SERPLBLD CKD-EPI 2021: 19.9 ML/MIN/1.73
GLUCOSE BLDC GLUCOMTR-MCNC: 111 MG/DL (ref 70–130)
GLUCOSE BLDC GLUCOMTR-MCNC: 114 MG/DL (ref 70–130)
GLUCOSE BLDC GLUCOMTR-MCNC: 130 MG/DL (ref 70–130)
GLUCOSE BLDC GLUCOMTR-MCNC: 91 MG/DL (ref 70–130)
GLUCOSE SERPL-MCNC: 105 MG/DL (ref 65–99)
MAGNESIUM SERPL-MCNC: 2 MG/DL (ref 1.6–2.4)
POTASSIUM SERPL-SCNC: 4.1 MMOL/L (ref 3.5–5.2)
SODIUM SERPL-SCNC: 142 MMOL/L (ref 136–145)

## 2023-08-20 PROCEDURE — 97110 THERAPEUTIC EXERCISES: CPT

## 2023-08-20 PROCEDURE — 97530 THERAPEUTIC ACTIVITIES: CPT

## 2023-08-20 PROCEDURE — 83735 ASSAY OF MAGNESIUM: CPT | Performed by: INTERNAL MEDICINE

## 2023-08-20 PROCEDURE — 82948 REAGENT STRIP/BLOOD GLUCOSE: CPT

## 2023-08-20 PROCEDURE — 80048 BASIC METABOLIC PNL TOTAL CA: CPT | Performed by: INTERNAL MEDICINE

## 2023-08-20 RX ORDER — SODIUM CHLORIDE 9 MG/ML
50 INJECTION, SOLUTION INTRAVENOUS CONTINUOUS
Status: DISCONTINUED | OUTPATIENT
Start: 2023-08-20 | End: 2023-08-23

## 2023-08-20 RX ADMIN — SODIUM CHLORIDE 50 ML/HR: 9 INJECTION, SOLUTION INTRAVENOUS at 09:01

## 2023-08-20 RX ADMIN — FERROUS SULFATE TAB 325 MG (65 MG ELEMENTAL FE) 325 MG: 325 (65 FE) TAB at 09:02

## 2023-08-20 RX ADMIN — GABAPENTIN 300 MG: 300 CAPSULE ORAL at 20:30

## 2023-08-20 RX ADMIN — METOPROLOL TARTRATE 12.5 MG: 25 TABLET, FILM COATED ORAL at 20:29

## 2023-08-20 RX ADMIN — FERROUS SULFATE TAB 325 MG (65 MG ELEMENTAL FE) 325 MG: 325 (65 FE) TAB at 17:38

## 2023-08-20 RX ADMIN — LEVOTHYROXINE SODIUM 175 MCG: 150 TABLET ORAL at 20:29

## 2023-08-20 RX ADMIN — BUSPIRONE HYDROCHLORIDE 10 MG: 10 TABLET ORAL at 09:02

## 2023-08-20 RX ADMIN — RALOXIFENE 60 MG: 60 TABLET ORAL at 09:02

## 2023-08-20 RX ADMIN — METOPROLOL TARTRATE 12.5 MG: 25 TABLET, FILM COATED ORAL at 09:02

## 2023-08-20 RX ADMIN — APIXABAN 2.5 MG: 2.5 TABLET, FILM COATED ORAL at 20:30

## 2023-08-20 RX ADMIN — APIXABAN 2.5 MG: 2.5 TABLET, FILM COATED ORAL at 09:02

## 2023-08-20 RX ADMIN — PAROXETINE 20 MG: 20 TABLET, FILM COATED ORAL at 09:02

## 2023-08-20 NOTE — THERAPY TREATMENT NOTE
Acute Care - Physical Therapy Treatment Note  [facility]     Patient Name: Mar Rodriguez  : 1940  MRN: [id number]  Today's Date: 2023      Visit Dx:     ICD-10-CM ICD-9-CM   1. New onset atrial fibrillation  I48.91 427.31   2. Shortness of breath  R06.02 786.05   3. Cardiomegaly  I51.7 429.3   4. Acute kidney injury  N17.9 584.9   5. New onset of congestive heart failure  I50.9 428.0   6. Impaired mobility [Z74.09 (ICD-10-CM)]  Z74.09 799.89     Patient Active Problem List   Diagnosis    New onset atrial fibrillation     Past Medical History:   Diagnosis Date    Abnormality of gait and mobility     Anemia     Anxiety     Cerebral palsy     Cognitive communication deficit     Depression     Diabetes mellitus     Disease of thyroid gland     Hyperglycemia     Hyperlipidemia     Hypertension     Hypokalemia     Insomnia     Lymphedema     Neuropathy     Urge incontinence      History reviewed. No pertinent surgical history.  PT Assessment (last 12 hours)       PT Evaluation and Treatment       Row Name 23 1455          Physical Therapy Time and Intention    Subjective Information no complaints  -LY     Document Type therapy note (daily note)  -LY     Mode of Treatment physical therapy  -LY       Row Name 23 1455          General Information    Existing Precautions/Restrictions fall  -LY       Row Name 23 1455          Pain    Pretreatment Pain Rating 0/10 - no pain  -LY     Posttreatment Pain Rating 0/10 - no pain  -LY       Row Name 23 1455          Bed Mobility    Supine-Sit [place] (Bed Mobility) verbal cues;standby assist  -LY     Assistive Device (Bed Mobility) bed rails;head of bed elevated  -LY       Row Name 23 1455          Sit-Stand Transfer    Sit-Stand [place] (Transfers) verbal cues;minimum assist (75% patient effort)  -LY     Assistive Device (Sit-Stand Transfers) walker, front-wheeled  -LY     Comment, (Sit-Stand Transfer) R knee buckling  -LY        Row Name 08/20/23 1455          Stand-Sit Transfer    Stand-Sit [place] (Transfers) verbal cues;minimum assist (75% patient effort)  -LY       Row Name 08/20/23 1455          Gait/Stairs (Locomotion)    [place] Level (Gait) verbal cues;minimum assist (75% patient effort)  -LY     Assistive Device (Gait) walker, front-wheeled  -LY     Distance in Feet (Gait) side steps x3 sets  -LY       Row Name 08/20/23 1455          Motor Skills    Comments, Therapeutic Exercise BLE AROM x15 reps seated  -LY       Row Name 08/20/23 1455          Positioning and Restraints    Pre-Treatment Position in bed  -LY     Post Treatment Position bed  -LY     In Bed fowlers;call light within reach;encouraged to call for assist  -LY               User Key  (r) = Recorded By, (t) = Taken By, (c) = Cosigned By      Initials Name Provider Type    LY Debra Dewey, PTA Physical Therapist Assistant                    Physical Therapy Education       Title: PT OT SLP Therapies (In Progress)       Topic: Physical Therapy (Done)       Point: Mobility training (Done)       Learning Progress Summary             Patient Acceptance, E,D, DU,VU,NR by  at 8/18/2023 1200    Comment: benefits of PT and POC, call for A OOB                         Point: Precautions (Done)       Learning Progress Summary             Patient Acceptance, E,D, DU,VU,NR by  at 8/18/2023 1200    Comment: benefits of PT and POC, call for A OOB                                         User Key       Initials Effective Dates Name Provider Type Discipline     02/03/23 -  Rodrick June D, PT Physical Therapist PT                  PT Recommendation and Plan  Anticipated Discharge Disposition (PT): skilled nursing facility      Outcome Measures       Row Name 08/20/23 1455 08/19/23 1535          How much help from another person do you currently need...    Turning from your back to your side while in flat bed without using bedrails? 3  -LY 3  -LY     Moving from lying  on back to sitting on the side of a flat bed without bedrails? 3  -LY 3  -LY     Moving to and from a bed to a chair (including a wheelchair)? 3  -LY 3  -LY     Standing up from a chair using your arms (e.g., wheelchair, bedside chair)? 3  -LY 3  -LY     Climbing 3-5 steps with a railing? 1  -LY 1  -LY     To walk in hospital room? 2  -LY 2  -LY     AM-PAC 6 Clicks Score (PT) 15  -LY 15  -LY        Functional Assessment    Outcome Measure Options AM-PAC 6 Clicks Basic Mobility (PT)  -LY AM-PAC 6 Clicks Basic Mobility (PT)  -LY               User Key  (r) = Recorded By, (t) = Taken By, (c) = Cosigned By      Initials Name Provider Type    Debra Kang PTA Physical Therapist Assistant                     Time Calculation:    PT Charges       Row Name 08/20/23 1602             Time Calculation    Start Time 1455  -LY      Stop Time 1520  -LY      Time Calculation (min) 25 min  -LY      PT Received On 08/20/23  -LY         Time Calculation- PT    Total Timed Code Minutes- PT 25 minute(s)  -LY         Timed Charges    67710 - PT Therapeutic Exercise Minutes 12  -LY      48010 - PT Therapeutic Activity Minutes 13  -LY         Total Minutes    Timed Charges Total Minutes 25  -LY       Total Minutes 25  -LY                User Key  (r) = Recorded By, (t) = Taken By, (c) = Cosigned By      Initials Name Provider Type    Debra Kang PTA Physical Therapist Assistant                  Therapy Charges for Today       Code Description Service Date Service Provider Modifiers Qty    58064489362 HC PT THERAPEUTIC ACT EA 15 MIN 8/19/2023 Debra Dewey PTA GP 1    10100807250 HC PT THER PROC EA 15 MIN 8/19/2023 Debra Dewey PTA GP 1            PT G-Codes  Outcome Measure Options: AM-PAC 6 Clicks Basic Mobility (PT)  AM-PAC 6 Clicks Score (PT): 15  AM-PAC 6 Clicks Score (OT): 14    Debra Dewey PTA  8/20/2023

## 2023-08-20 NOTE — PLAN OF CARE
Goal Outcome Evaluation:              Outcome Evaluation: VSS, HR AF 55-78, with HR down as low as 50, on RA, no c/o of pain, takes iron po, large dark bm this shfit with smaller bm after supper, IVF infusing, BLE edematous with dry flaky skin, pt not able to tolerate legs being elevated for very long, appetite good, BP improved after taking am bp meds, safety maintained

## 2023-08-20 NOTE — PROGRESS NOTES
Nephrology ([facility]) Progress Note      Patient:  Mar Rodriguez  YOB: 1940  Date of Service: 8/20/2023  MRN: [id number]   Acct: [id number]   Primary Care Physician: Vandana Churchill MD  Advance Directive:   Code Status and Medical Interventions:   Ordered at: 08/18/23 1946     Level Of Support Discussed With:    Health Care Surrogate     Code Status (Patient has no pulse and is not breathing):    CPR (Attempt to Resuscitate)     Medical Interventions (Patient has pulse or is breathing):    Full Support     Comments:    spoke with sisterZamzam     Admit Date: 8/17/2023       Hospital Day: 3  Referring Provider: No Known Provider      Patient personally seen and examined.  Complete chart including Consults, Notes, Operative Reports, Labs, Cardiology, and Radiology studies reviewed as able.        Subjective:  Mar Rodriguez is a 83 y.o. female for whom we were consulted for evaluation and treatment of acute kidney injury and hyperkalemia. Unknown baseline renal function. Patient denies any prior nephrological contacts. History of cerebral palsy, type 2 diabetes, hypertension, lymphedema. Presented to ER on 8/17 with shortness of breath and wheezing. Apparently, this has been progressively worsening for some time.  Minimal information in chart related to any recent medical issues.  Patient is a very poor historian and is unable to provide much information. She does recall that someone told her recently her kidney function was not good. Her only complaints were of cough and dyspnea.  Minimal dyspnea at time of exam. Denies pain or discomfort. Denied changes in urination. No dysuria or hematuria. Denied NSAIDs. Appetite has been good with no n/v/d. Chronic lower extremity lymphedema. Patient does not think the swelling has been any worse recently. Home medications include Metformin and potassium supplement. Given low dose lasix overnight and urine  output had been nonoliguric. Received Lokelma overnight and potassium has normalized.     Today, no overnight events.  Patient remains a poor historian and without significant or new complaints upon questioning.  Tolerating diet.  Questions about renal functions.    Allergies:  Nsaids    Home Meds:  Medications Prior to Admission   Medication Sig Dispense Refill Last Dose    atorvastatin (LIPITOR) 40 MG tablet Take 1 tablet by mouth Every Night.   8/16/2023 at 2100    busPIRone (BUSPAR) 10 MG tablet Take 1 tablet by mouth Daily. For anxiety   8/17/2023 at 0900    colestipol (COLESTID) 1 g tablet Take 1 tablet by mouth 2 (Two) Times a Day. For chronic diarrhea   8/17/2023 at 0900    ferrous sulfate 324 (65 Fe) MG tablet delayed-release EC tablet Take 1 tablet by mouth 2 (Two) Times a Day With Meals. For anemia   8/17/2023 at 1600    gabapentin (NEURONTIN) 300 MG capsule Take 1 capsule by mouth Every Night. For idiopathic neuropathy       insulin degludec (Tresiba FlexTouch) 100 UNIT/ML solution pen-injector injection Inject 6 Units under the skin into the appropriate area as directed Every Night.       levothyroxine (SYNTHROID, LEVOTHROID) 175 MCG tablet Take 1 tablet by mouth Every Night. For hypothyroidism       metFORMIN (GLUCOPHAGE) 850 MG tablet Take 1 tablet by mouth 2 (Two) Times a Day With Meals.   8/17/2023 at 0900    nystatin (MYCOSTATIN) 854388 UNIT/GM powder Apply 1 application  topically to the appropriate area as directed 2 (Two) Times a Day. Apply to all folds topically every shift for redness   8/17/2023 at 0900    PARoxetine (PAXIL) 20 MG tablet Take 1 tablet by mouth Daily.   8/17/2023 at 0900    potassium chloride (K-DUR,KLOR-CON) 20 MEQ CR tablet Take 1 tablet by mouth Daily.   8/17/2023 at 0800    raloxifene (EVISTA) 60 MG tablet Take 1 tablet by mouth Daily.       vitamin B-12 (CYANOCOBALAMIN) 1000 MCG tablet Take 1 tablet by mouth Daily.       dextrose (GLUTOSE) 40 % gel Take 15 g by mouth As  Needed for Low Blood Sugar (every 15 minutes).   Unknown    Diclofenac Sodium (VOLTAREN) 1 % gel gel Apply 4 g topically to the appropriate area as directed Every 6 (Six) Hours As Needed (to right knee for pain).   Unknown    glucagon (GLUCAGEN) 1 MG injection Inject 1 mg under the skin into the appropriate area as directed 1 (One) Time As Needed for Low Blood Sugar.   Unknown    loperamide (IMODIUM) 2 MG capsule Take 1 capsule by mouth 4 (Four) Times a Day As Needed for Diarrhea (for diarhea, after each loose stool). Do not exceed 6doses/24 hours   Unknown    loratadine (CLARITIN) 10 MG tablet Take 1 tablet by mouth Daily As Needed for Allergies (nasal congestion).   Unknown       Medicines:  Current Facility-Administered Medications   Medication Dose Route Frequency Provider Last Rate Last Admin    apixaban (ELIQUIS) tablet 2.5 mg  2.5 mg Oral Q12H Patti Navas PA-C   2.5 mg at 08/20/23 0902    busPIRone (BUSPAR) tablet 10 mg  10 mg Oral Daily Vandana Churchill MD   10 mg at 08/20/23 0902    dextrose (D50W) (25 g/50 mL) IV injection 25 g  25 g Intravenous Q15 Min PRN Vandana Churchill MD        dextrose (GLUTOSE) oral gel 15 g  15 g Oral Q15 Min PRN Vandana Churchill MD        ferrous sulfate tablet 325 mg  325 mg Oral BID With Meals Vandana Churchill MD   325 mg at 08/20/23 0902    gabapentin (NEURONTIN) capsule 300 mg  300 mg Oral Nightly Vandana Churchill MD   300 mg at 08/19/23 2005    glucagon (GLUCAGEN) injection 1 mg  1 mg Subcutaneous Q15 Min PRN Vandana Churchill MD        levothyroxine (SYNTHROID, LEVOTHROID) tablet 175 mcg  175 mcg Oral Nightly Vandana Churchill MD   175 mcg at 08/19/23 2005    metoprolol tartrate (LOPRESSOR) tablet 12.5 mg  12.5 mg Oral Q12H Patti Navas PA-C   12.5 mg at 08/20/23 0902    nitroglycerin (NITROSTAT) SL tablet 0.4 mg  0.4 mg Sublingual Q5 Min PRN Vandana Churchill MD        PARoxetine (PAXIL) tablet 20 mg  20 mg Oral  Daily Vandana Churchill MD   20 mg at 08/20/23 0902    raloxifene (EVISTA) tablet 60 mg  60 mg Oral Daily Vandana Churchill MD   60 mg at 08/20/23 0902    sodium chloride 0.9 % flush 10 mL  10 mL Intravenous PRN Himanshu Santos MD        sodium chloride 0.9 % infusion  50 mL/hr Intravenous Continuous Vandana Churchill MD 50 mL/hr at 08/20/23 0901 50 mL/hr at 08/20/23 0901       Past Medical History:  Past Medical History:   Diagnosis Date    Abnormality of gait and mobility     Anemia     Anxiety     Cerebral palsy     Cognitive communication deficit     Depression     Diabetes mellitus     Disease of thyroid gland     Hyperglycemia     Hyperlipidemia     Hypertension     Hypokalemia     Insomnia     Lymphedema     Neuropathy     Urge incontinence        Past Surgical History:  History reviewed. No pertinent surgical history.    Family History  History reviewed. No pertinent family history.    Social History  Social History     Socioeconomic History    Marital status: Single   Tobacco Use    Smoking status: Never     Passive exposure: Past   Vaping Use    Vaping Use: Never used   Substance and Sexual Activity    Alcohol use: Never    Drug use: Never    Sexual activity: Not Currently       Review of Systems:  History obtained from chart review and the patient  General ROS: No fever or chills  Respiratory ROS: No cough, shortness of breath, wheezing  Cardiovascular ROS: No chest pain or palpitations  Gastrointestinal ROS: No abdominal pain or melena  Genito-Urinary ROS: No dysuria or hematuria  Psych ROS: No anxiety and depression  14 point ROS reviewed with the patient and negative except as noted above and in the HPI unless unable to obtain.    Objective:  Patient Vitals for the past 24 hrs:   BP Temp Temp src Pulse Resp SpO2 Weight   08/20/23 0700 172/83 97.8 øF (36.6 øC) Oral 67 16 99 % --   08/20/23 0337 163/94 97.6 øF (36.4 øC) Oral 76 18 98 % --   08/20/23 0000 149/84 98.2 øF (36.8 øC) Oral 76 18  96 % --   08/19/23 2022 150/69 98.9 øF (37.2 øC) Oral 74 18 97 % 107 kg (236 lb)   08/19/23 1500 140/79 98.1 øF (36.7 øC) Oral 73 16 96 % --       Intake/Output Summary (Last 24 hours) at 8/20/2023 1140  Last data filed at 8/20/2023 1047  Gross per 24 hour   Intake 480 ml   Output 600 ml   Net -120 ml     General: awake/alert   Chest:  clear to auscultation bilaterally without respiratory distress  CVS: IRRR  Abdominal: soft, nontender, positive bowel sounds  Extremities: no cyanosis or edema  Skin: warm and dry without rash      Labs:  Results from last 7 days   Lab Units 08/19/23  0606 08/17/23  1919   WBC 10*3/mm3 9.22 14.70*   HEMOGLOBIN g/dL 10.4* 10.0*   HEMATOCRIT % 34.6 32.4*   PLATELETS 10*3/mm3 310 321         Results from last 7 days   Lab Units 08/20/23  0510 08/19/23  0606 08/18/23  0411 08/17/23  2219 08/17/23  1919   SODIUM mmol/L 142 143 141  --  140   POTASSIUM mmol/L 4.1 4.6 4.7   < > 5.6*   CHLORIDE mmol/L 104 106 107  --  107   CO2 mmol/L 27.0 25.0 23.0  --  20.0*   BUN mg/dL 37* 30* 31*  --  32*   CREATININE mg/dL 2.38* 1.87* 1.83*  --  1.77*   CALCIUM mg/dL 9.5 9.7 9.1  --  9.6   EGFR mL/min/1.73 19.9* 26.6* 27.3*  --  28.4*   BILIRUBIN mg/dL  --  0.2  --   --  <0.2   ALK PHOS U/L  --  122*  --   --  125*   ALT (SGPT) U/L  --  12  --   --  12   AST (SGOT) U/L  --  13  --   --  11   GLUCOSE mg/dL 105* 108* 103*  --  108*    < > = values in this interval not displayed.       Radiology:   Imaging Results (Last 72 Hours)       Procedure Component Value Units Date/Time    US Renal Bilateral [[id number]] Collected: 08/18/23 1517     Updated: 08/18/23 1528    Narrative:      EXAMINATION:  US RENAL BILATERAL-  8/18/2023 1:41 PM CDT     HISTORY: CHRISTOPHER; I48.91-Unspecified atrial fibrillation; R06.02-Shortness  of breath; I51.7-Cardiomegaly; N17.9-Acute kidney failure, unspecified;  I50.9-Heart failure, unspecified; Z74.09-Other reduced mobility.      COMPARISON: No comparison study.     TECHNIQUE: Grayscale  and color flow imaging were performed.     FINDINGS: The visualized abdominal aorta is normal caliber. There is  mild bladder wall thickening. The bladder is not very well distended.  The right kidney measures 10.6 cm and the left kidney measures 10 cm.  The cortical thickness and echogenicity are normal. There is no  hydronephrosis.          Impression:      1. Bilateral renal ultrasound is within normal limits.  2. Bladder wall thickening is probably related to underdistention.  This report was finalized on 08/18/2023 15:25 by Dr. Dewayne Omalley MD.    XR Chest 1 View [[id number]] Collected: 08/17/23 1909     Updated: 08/17/23 1913    Narrative:      EXAMINATION: Chest 1 view 08/17/2023     HISTORY: Shortness of breath.     FINDINGS: Today's exam is compared to previous study of 03/22/2014.  There is moderate cardiomegaly. The thoracic aorta is tortuous. There is  no consolidative pneumonia or effusion. No free air seen beneath the  hemidiaphragms. There is chronic mild elevation of the right  hemidiaphragm.       Impression:      1.. Cardiomegaly.  2. No acute consolidative pneumonia or effusion.  This report was finalized on 08/17/2023 19:10 by Dr. Jay Nova MD.            Culture:  No results found for: BLOODCX, URINECX, WOUNDCX, MRSACX, RESPCX, STOOLCX      Assessment   Acute kidney injury with unspecified baseline  Hyperkalemia  Hypertension  Diabetes type 2  Chronic lymphedema  Atrial fibrillation-new onset per diagnosis  Anemia  Secondary hyperparathyroidism    Plan:  Discussed with patient, nursing, Dr. Churchill  Work-up reviewed to date  Potassium improved with medical management, repeat Lokelma dosing as needed  Cardiology evaluation reviewed  Monitor labs  IV diuretics now on hold and IV fluid given increased BUN/creatinine    Fei Cueto MD  8/20/2023  11:40 CDT

## 2023-08-20 NOTE — PROGRESS NOTES
"Progress Note  Mar Rodriguez  8/20/2023 08:16 CDT  Subjective:   Admit Date:   8/17/2023      CC/ADMIT DX:       Interval History:   Reviewed overnight events and nursing notes.  She denies any new issues.       I have reviewed all labs/diagnostics from the last 24hrs.       ROS:   I have done a 10 point ROS and all are negative, except what is mentioned in the HPI.    Diet: Cardiac Diets, Diabetic Diets, Renal Diets; Healthy Heart (2-3 Na+); Consistent Carbohydrate; Low Potassium; Texture: Regular Texture (IDDSI 7); Fluid Consistency: Thin (IDDSI 0)    Medications:   sodium chloride, 50 mL/hr      apixaban, 2.5 mg, Oral, Q12H  busPIRone, 10 mg, Oral, Daily  ferrous sulfate, 325 mg, Oral, BID With Meals  gabapentin, 300 mg, Oral, Nightly  levothyroxine, 175 mcg, Oral, Nightly  metoprolol tartrate, 12.5 mg, Oral, Q12H  PARoxetine, 20 mg, Oral, Daily  raloxifene, 60 mg, Oral, Daily            Objective:   Vitals: /83 (BP Location: Left arm, Patient Position: Lying)   Pulse 67   Temp 97.8 øF (36.6 øC) (Oral)   Resp 16   Ht 165.1 cm (65\")   Wt 107 kg (236 lb)   SpO2 99%   BMI 39.27 kg/mý    Intake/Output Summary (Last 24 hours) at 8/20/2023 0816  Last data filed at 8/20/2023 0337  Gross per 24 hour   Intake 560 ml   Output 300 ml   Net 260 ml     General appearance: alert and cooperative with exam  Lungs: clear to auscultation bilaterally  Heart: irreg, irreg  Abdomen: soft, non-tender; bowel sounds normal; no masses,  no organomegaly  Extremities: extremities normal, atraumatic, no cyanosis or edema  Neurologic:  No obvious focal neurologic deficits.    Assessment and Plan:     New onset atrial fibrillation    Cerebral Palsy    CHRISTOPHER    HTN    DM2    Chronic LE edema    Plan:   Continue present medication(s)    Follow with Cardiology and Nephrology   Renal function a bit worse on labs. Stop diuretic and give IVF at a low rate and follow.    Recheck labs in am      Discharge planning:   a skilled " nursing facility     Reviewed treatment plans with the patient and/or family.             Electronically signed by Vandana Churchill MD on 8/20/2023 at 08:16 CDT

## 2023-08-21 LAB
ANION GAP SERPL CALCULATED.3IONS-SCNC: 8 MMOL/L (ref 5–15)
BUN SERPL-MCNC: 38 MG/DL (ref 8–23)
BUN/CREAT SERPL: 16.7 (ref 7–25)
CALCIUM SPEC-SCNC: 9.5 MG/DL (ref 8.6–10.5)
CHLORIDE SERPL-SCNC: 106 MMOL/L (ref 98–107)
CO2 SERPL-SCNC: 27 MMOL/L (ref 22–29)
CREAT SERPL-MCNC: 2.28 MG/DL (ref 0.57–1)
EGFRCR SERPLBLD CKD-EPI 2021: 21 ML/MIN/1.73
GLUCOSE BLDC GLUCOMTR-MCNC: 107 MG/DL (ref 70–130)
GLUCOSE BLDC GLUCOMTR-MCNC: 108 MG/DL (ref 70–130)
GLUCOSE BLDC GLUCOMTR-MCNC: 115 MG/DL (ref 70–130)
GLUCOSE BLDC GLUCOMTR-MCNC: 121 MG/DL (ref 70–130)
GLUCOSE SERPL-MCNC: 111 MG/DL (ref 65–99)
MAGNESIUM SERPL-MCNC: 2 MG/DL (ref 1.6–2.4)
POTASSIUM SERPL-SCNC: 4.3 MMOL/L (ref 3.5–5.2)
SODIUM SERPL-SCNC: 141 MMOL/L (ref 136–145)

## 2023-08-21 PROCEDURE — 83735 ASSAY OF MAGNESIUM: CPT | Performed by: INTERNAL MEDICINE

## 2023-08-21 PROCEDURE — 97110 THERAPEUTIC EXERCISES: CPT

## 2023-08-21 PROCEDURE — 80048 BASIC METABOLIC PNL TOTAL CA: CPT | Performed by: INTERNAL MEDICINE

## 2023-08-21 PROCEDURE — 82948 REAGENT STRIP/BLOOD GLUCOSE: CPT

## 2023-08-21 PROCEDURE — 97535 SELF CARE MNGMENT TRAINING: CPT | Performed by: OCCUPATIONAL THERAPIST

## 2023-08-21 PROCEDURE — 97530 THERAPEUTIC ACTIVITIES: CPT

## 2023-08-21 RX ADMIN — METOPROLOL TARTRATE 12.5 MG: 25 TABLET, FILM COATED ORAL at 20:51

## 2023-08-21 RX ADMIN — RALOXIFENE 60 MG: 60 TABLET ORAL at 09:02

## 2023-08-21 RX ADMIN — FERROUS SULFATE TAB 325 MG (65 MG ELEMENTAL FE) 325 MG: 325 (65 FE) TAB at 09:02

## 2023-08-21 RX ADMIN — GABAPENTIN 300 MG: 300 CAPSULE ORAL at 20:50

## 2023-08-21 RX ADMIN — LEVOTHYROXINE SODIUM 175 MCG: 150 TABLET ORAL at 20:51

## 2023-08-21 RX ADMIN — BUSPIRONE HYDROCHLORIDE 10 MG: 10 TABLET ORAL at 09:02

## 2023-08-21 RX ADMIN — APIXABAN 2.5 MG: 2.5 TABLET, FILM COATED ORAL at 20:50

## 2023-08-21 RX ADMIN — PAROXETINE 20 MG: 20 TABLET, FILM COATED ORAL at 09:02

## 2023-08-21 RX ADMIN — METOPROLOL TARTRATE 12.5 MG: 25 TABLET, FILM COATED ORAL at 09:02

## 2023-08-21 RX ADMIN — FERROUS SULFATE TAB 325 MG (65 MG ELEMENTAL FE) 325 MG: 325 (65 FE) TAB at 18:19

## 2023-08-21 RX ADMIN — SODIUM CHLORIDE 100 ML/HR: 9 INJECTION, SOLUTION INTRAVENOUS at 16:30

## 2023-08-21 RX ADMIN — APIXABAN 2.5 MG: 2.5 TABLET, FILM COATED ORAL at 09:02

## 2023-08-21 NOTE — CASE MANAGEMENT/SOCIAL WORK
Continued Stay Note  [facility]     Patient Name: Mar Rodriguez  MRN: [id number]  Today's Date: 8/21/2023    Admit Date: 8/17/2023    Plan: [facility]   Discharge Plan       Row Name 08/21/23 1430       Plan    Plan [facility]    Plan Comments Chart reviewed. Patient continues to have bed hold at [facility]. Will follow for return.                  [facility] Guidry

## 2023-08-21 NOTE — PROGRESS NOTES
"Progress Note  Mar Rodriguez  8/21/2023 05:57 CDT  Subjective:   Admit Date:   8/17/2023      CC/ADMIT DX:       Interval History:   Reviewed overnight events and nursing notes.  She has no new complaints.       I have reviewed all labs/diagnostics from the last 24hrs.       ROS:   I have done a 10 point ROS and all are negative, except what is mentioned in the HPI.    Diet: Cardiac Diets, Diabetic Diets, Renal Diets; Healthy Heart (2-3 Na+); Consistent Carbohydrate; Low Potassium; Texture: Regular Texture (IDDSI 7); Fluid Consistency: Thin (IDDSI 0)    Medications:   sodium chloride, 100 mL/hr, Last Rate: 50 mL/hr (08/20/23 0901)      apixaban, 2.5 mg, Oral, Q12H  busPIRone, 10 mg, Oral, Daily  ferrous sulfate, 325 mg, Oral, BID With Meals  gabapentin, 300 mg, Oral, Nightly  levothyroxine, 175 mcg, Oral, Nightly  metoprolol tartrate, 12.5 mg, Oral, Q12H  PARoxetine, 20 mg, Oral, Daily  raloxifene, 60 mg, Oral, Daily            Objective:   Vitals: /74 (BP Location: Right arm, Patient Position: Lying)   Pulse 71   Temp 97.9 øF (36.6 øC) (Oral)   Resp 18   Ht 165.1 cm (65\")   Wt 104 kg (230 lb 1.6 oz)   SpO2 95%   BMI 38.29 kg/mý    Intake/Output Summary (Last 24 hours) at 8/21/2023 0557  Last data filed at 8/21/2023 0338  Gross per 24 hour   Intake 240 ml   Output 750 ml   Net -510 ml     General appearance: alert and cooperative with exam  Lungs: clear to auscultation bilaterally  Heart: irreg, irreg  Abdomen: soft, non-tender; bowel sounds normal; no masses,  no organomegaly  Extremities: extremities normal, atraumatic, no cyanosis or edema  Neurologic:  No obvious focal neurologic deficits.    Assessment and Plan:     New onset atrial fibrillation    Cerebral Palsy    CHRISTOPHER    HTN    DM2    Chronic LE edema    Plan:   Continue present medication(s)    Follow with Cardiology and Nephrology   Renal function has worsened. Diuretic has been stopped, and I will give IVF at a higher rate. Follow " volume status   Recheck labs in am      Discharge planning:   a skilled nursing facility     Reviewed treatment plans with the patient and/or family.             Electronically signed by Vandana Churchill MD on 8/21/2023 at 05:57 CDT

## 2023-08-21 NOTE — THERAPY TREATMENT NOTE
Patient Name: Mar Rodriguez  : 1940    MRN: [id number]                              Today's Date: 2023       Admit Date: 2023    Visit Dx:     ICD-10-CM ICD-9-CM   1. New onset atrial fibrillation  I48.91 427.31   2. Shortness of breath  R06.02 786.05   3. Cardiomegaly  I51.7 429.3   4. Acute kidney injury  N17.9 584.9   5. New onset of congestive heart failure  I50.9 428.0   6. Impaired mobility [Z74.09 (ICD-10-CM)]  Z74.09 799.89   7. Impaired mobility and ADLs [Z74.09, Z78.9 (ICD-10-CM)]  Z74.09 V49.89    Z78.9      Patient Active Problem List   Diagnosis    New onset atrial fibrillation     Past Medical History:   Diagnosis Date    Abnormality of gait and mobility     Anemia     Anxiety     Cerebral palsy     Cognitive communication deficit     Depression     Diabetes mellitus     Disease of thyroid gland     Hyperglycemia     Hyperlipidemia     Hypertension     Hypokalemia     Insomnia     Lymphedema     Neuropathy     Urge incontinence      History reviewed. No pertinent surgical history.   General Information       Row Name 23 0954          OT Time and Intention    Document Type therapy note (daily note)  -J     Mode of Treatment occupational therapy  -J       Row Name 23 0954          General Information    Patient Profile Reviewed yes  -     Existing Precautions/Restrictions fall  -JJ       Row Name 23 0954          Safety Issues, Functional Mobility    Impairments Affecting Function (Mobility) balance;coordination;endurance/activity tolerance;grasp;motor control;range of motion (ROM);strength;shortness of breath  -               User Key  (r) = Recorded By, (t) = Taken By, (c) = Cosigned By      Initials Name Provider Type    Racquel Rodriguez, SREER/L, CSRS Occupational Therapist                     Mobility/ADL's       Row Name 23 0954          Bed Mobility    Bed Mobility supine-sit  -J     Supine-Sit [place] (Bed Mobility) verbal  cues;standby assist  -     Assistive Device (Bed Mobility) bed rails;head of bed elevated  -       Row Name 08/21/23 0954          Transfers    Transfers sit-stand transfer;stand-sit transfer;bed-chair transfer  -     Comment, (Transfers) completed from elevated bed surface.  -       Row Name 08/21/23 0954          Bed-Chair Transfer    Bed-Chair [place] (Transfers) minimum assist (75% patient effort)  -     Assistive Device (Bed-Chair Transfers) walker, front-wheeled  -[email or web address]       Row Name 08/21/23 0954          Sit-Stand Transfer    Sit-Stand [place] (Transfers) verbal cues;minimum assist (75% patient effort)  -     Assistive Device (Sit-Stand Transfers) walker, front-wheeled  -JJ       Row Name 08/21/23 0954          Stand-Sit Transfer    Stand-Sit [place] (Transfers) verbal cues;minimum assist (75% patient effort)  -     Assistive Device (Stand-Sit Transfers) walker, 4-wheeled  -[email or web address]       Row Name 08/21/23 0954          Activities of Daily Living    BADL Assessment/Intervention lower body dressing;grooming;upper body dressing;toileting  -       Row Name 08/21/23 0954          Lower Body Dressing Assessment/Training    [place] Level (Lower Body Dressing) don;shoes/slippers;minimum assist (75% patient effort)  -     Position (Lower Body Dressing) edge of bed sitting  -       Row Name 08/21/23 0954          Grooming Assessment/Training    [place] Level (Grooming) wash face, hands;hair care, combing/brushing;set up;standby assist  -     Position (Grooming) edge of bed sitting  -       Row Name 08/21/23 0954          Upper Body Dressing Assessment/Training    [place] Level (Upper Body Dressing) don;doff;set up;minimum assist (75% patient effort)  -     Comment, (Upper Body Dressing) hospital gown  -       Row Name 08/21/23 0954          Toileting Assessment/Training    [place] Level (Toileting) adjust/manage clothing;change pad/brief;set up;moderate assist  (50% patient effort)  -JJ     Position (Toileting) supported sitting;supported standing  -JJ     Comment, (Toileting) found incontinent of urine with wet brief.  -JJ               User Key  (r) = Recorded By, (t) = Taken By, (c) = Cosigned By      Initials Name Provider Type    Racquel Rodriguez OTR/L, PAIGE Occupational Therapist                   Obj/Interventions    No documentation.                  Goals/Plan    No documentation.                  Clinical Impression       Row Name 08/21/23 0954          Pain Assessment    Pretreatment Pain Rating 0/10 - no pain  -JJ     Posttreatment Pain Rating 0/10 - no pain  -JJ       Row Name 08/21/23 0954          Plan of Care Review    Plan of Care Reviewed With patient  -JJ     Outcome Evaluation OT tx completed this am. She presents alert and oriented, agreeable to participate in therapy this am. She was able to come to sitting at EOB with SBA. Required Min A with set-up to don slip on shoes. Pt found incontinent of urine with soiled brief, required Mod a to don/doff brief and Max A for perineal hygiene. Don/doffed [facility] gown with set-up and Min A. Min A for sit <> stand t/f from EOB and Min A to take small steps from bed to chair. She was able to complete grooming task seated upright in chair with set-up only. Left sitting up in chair. Continue OT POC.  -JJ       Row Name 08/21/23 0954          Therapy Plan Review/Discharge Plan (OT)    Anticipated Discharge Disposition (OT) skilled nursing facility  -J       Row Name 08/21/23 0954          Positioning and Restraints    Pre-Treatment Position in bed  -JJ     Post Treatment Position chair  -JJ     In Chair notified nsg;reclined;call light within reach;encouraged to call for assist  -JJ               User Key  (r) = Recorded By, (t) = Taken By, (c) = Cosigned By      Initials Name Provider Type    Racquel Rodriguez OTR/L, PAIGE Occupational Therapist                   Outcome Measures       Row Name 08/21/23  0954          How much help from another is currently needed...    Putting on and taking off regular lower body clothing? 2  -JJ     Bathing (including washing, rinsing, and drying) 2  -JJ     Toileting (which includes using toilet bed pan or urinal) 2  -JJ     Putting on and taking off regular upper body clothing 3  -JJ     Taking care of personal grooming (such as brushing teeth) 3  -JJ     Eating meals 4  -JJ     AM-PAC 6 Clicks Score (OT) 16  -JJ       Row Name 08/21/23 0954          Functional Assessment    Outcome Measure Options AM-PAC 6 Clicks Daily Activity (OT)  -J               User Key  (r) = Recorded By, (t) = Taken By, (c) = Cosigned By      Initials Name Provider Type    Racquel Rodriguez, MAVIS/L, CSRS Occupational Therapist                    Occupational Therapy Education       Title: PT OT SLP Therapies (In Progress)       Topic: Occupational Therapy (In Progress)       Point: ADL training (Done)       Description:   Instruct learner(s) on proper safety adaptation and remediation techniques during self care or transfers.   Instruct in proper use of assistive devices.                  Learning Progress Summary             Patient Acceptance, E, VU by BERTIN at 8/21/2023 1023    Acceptance, E, VU by BERTIN at 8/18/2023 1157                         Point: Home exercise program (Not Started)       Description:   Instruct learner(s) on appropriate technique for monitoring, assisting and/or progressing therapeutic exercises/activities.                  Learner Progress:  Not documented in this visit.              Point: Precautions (Done)       Description:   Instruct learner(s) on prescribed precautions during self-care and functional transfers.                  Learning Progress Summary             Patient Acceptance, E, VU by BERTIN at 8/21/2023 1023    Acceptance, E, VU by BERTIN at 8/18/2023 1157                         Point: Body mechanics (In Progress)       Description:   Instruct learner(s) on proper  positioning and spine alignment during self-care, functional mobility activities and/or exercises.                  Learning Progress Summary             Patient Acceptance, E, NR by LY at 8/19/2023 1311                                         User Key       Initials Effective Dates Name Provider Type Discipline    BERTIN 07/11/23 -  Racquel Junior OTR/L, PAIGE Occupational Therapist OT    FRAN 07/18/23 -  Beulah Dewey, ZACHARIAH Registered Nurse Nurse                  OT Recommendation and Plan  Planned Therapy Interventions (OT): activity tolerance training, adaptive equipment training, BADL retraining, functional balance retraining, transfer/mobility retraining, strengthening exercise, occupation/activity based interventions, patient/caregiver education/training  Therapy Frequency (OT): 5 times/wk  Plan of Care Review  Plan of Care Reviewed With: patient  Outcome Evaluation: OT tx completed this am. She presents alert and oriented, agreeable to participate in therapy this am. She was able to come to sitting at EOB with SBA. Required Min A with set-up to don slip on shoes. Pt found incontinent of urine with soiled brief, required Mod a to don/doff brief and Max A for perineal hygiene. Don/doffed hospital gown with set-up and Min A. Min A for sit <> stand t/f from EOB and Min A to take small steps from bed to chair. She was able to complete grooming task seated upright in chair with set-up only. Left sitting up in chair. Continue OT POC.     Time Calculation:         Time Calculation- OT       Row Name 08/21/23 0954             Time Calculation- OT    OT Start Time 0953  -      OT Stop Time 1031  -      OT Time Calculation (min) 38 min  -      Total Timed Code Minutes- OT 38 minute(s)  -      OT Received On 08/21/23  -                User Key  (r) = Recorded By, (t) = Taken By, (c) = Cosigned By      Initials Name Provider Type    Racquel Rodriguez OTR/L, PAIGE Occupational Therapist                  Therapy  Charges for Today       Code Description Service Date Service Provider Modifiers Qty    52040475068 HC OT SELF CARE/MGMT/TRAIN EA 15 MIN 8/21/2023 Racquel Junior, SREER/L, CSRS GO 3                 MAVIS Gutiérrez/L, CSRS  8/21/2023

## 2023-08-21 NOTE — THERAPY TREATMENT NOTE
Acute Care - Physical Therapy Treatment Note  [facility]     Patient Name: Mar Rodriguez  : 1940  MRN: [id number]  Today's Date: 2023      Visit Dx:     ICD-10-CM ICD-9-CM   1. New onset atrial fibrillation  I48.91 427.31   2. Shortness of breath  R06.02 786.05   3. Cardiomegaly  I51.7 429.3   4. Acute kidney injury  N17.9 584.9   5. New onset of congestive heart failure  I50.9 428.0   6. Impaired mobility [Z74.09 (ICD-10-CM)]  Z74.09 799.89   7. Impaired mobility and ADLs [Z74.09, Z78.9 (ICD-10-CM)]  Z74.09 V49.89    Z78.9      Patient Active Problem List   Diagnosis    New onset atrial fibrillation     Past Medical History:   Diagnosis Date    Abnormality of gait and mobility     Anemia     Anxiety     Cerebral palsy     Cognitive communication deficit     Depression     Diabetes mellitus     Disease of thyroid gland     Hyperglycemia     Hyperlipidemia     Hypertension     Hypokalemia     Insomnia     Lymphedema     Neuropathy     Urge incontinence      History reviewed. No pertinent surgical history.  PT Assessment (last 12 hours)       PT Evaluation and Treatment       Row Name 23 145          Physical Therapy Time and Intention    Subjective Information no complaints  -     Document Type therapy note (daily note)  -     Mode of Treatment physical therapy  -[facility] Name 23 1451          General Information    Existing Precautions/Restrictions fall  -[facility] Name 23 1451          Pain    Pretreatment Pain Rating 0/10 - no pain  -     Posttreatment Pain Rating 0/10 - no pain  -[facility] Name 23 1451          Bed Mobility    Supine-Sit [place] (Bed Mobility) verbal cues;standby assist  -     Sit-Supine [place] (Bed Mobility) standby assist  -       Row Name 23 1451          Transfers    Comment, (Transfers) practiced transfer x 5, focused on maintaining standing longer  -       Row Name 23 1451          Sit-Stand Transfer     Sit-Stand [place] (Transfers) verbal cues;minimum assist (75% patient effort)  -MARYSOL     Assistive Device (Sit-Stand Transfers) walker, front-wheeled  -MARYSOL       Row Name 08/21/23 1451          Stand-Sit Transfer    Stand-Sit [place] (Transfers) verbal cues;minimum assist (75% patient effort)  -MARYSOL     Assistive Device (Stand-Sit Transfers) walker, front-wheeled  -MARYSOL       Row Name 08/21/23 1451          Gait/Stairs (Locomotion)    [place] Level (Gait) verbal cues;minimum assist (75% patient effort)  -MARYSOL     Assistive Device (Gait) walker, front-wheeled  -MARYSOL     Distance in Feet (Gait) few side steps  -MARYSOL       Row Name 08/21/23 1451          Motor Skills    Comments, Therapeutic Exercise sitting AROM BLE 2 x 20  -MARYSOL       Row Name 08/21/23 1451          Positioning and Restraints    Pre-Treatment Position in bed  -MARYSOL     Post Treatment Position bed  -MARYSOL     In Bed fowlers;call light within reach;with family/caregiver;side rails up x2  -MARYSOL               User Key  (r) = Recorded By, (t) = Taken By, (c) = Cosigned By      Initials Name Provider Type    MARYSOL Mati Tineo, PTA Physical Therapist Assistant                    Physical Therapy Education       Title: PT OT SLP Therapies (In Progress)       Topic: Physical Therapy (Done)       Point: Mobility training (Done)       Learning Progress Summary             Patient Acceptance, E,D, DU,VU,NR by  at 8/18/2023 1200    Comment: benefits of PT and POC, call for A OOB                         Point: Precautions (Done)       Learning Progress Summary             Patient Acceptance, E,D, DU,VU,NR by  at 8/18/2023 1200    Comment: benefits of PT and POC, call for A OOB                                         User Key       Initials Effective Dates Name Provider Type [facility] 02/03/23 -  Rodrick June, PT Physical Therapist PT                  PT Recommendation and Plan         Outcome Measures       Row Name 08/21/23 1451 08/20/23 1455 08/19/23  1535       How much help from another person do you currently need...    Turning from your back to your side while in flat bed without using bedrails? 3  -MARYSOL 3  -LY 3  -LY    Moving from lying on back to sitting on the side of a flat bed without bedrails? 3  -MARYSOL 3  -LY 3  -LY    Moving to and from a bed to a chair (including a wheelchair)? 3  -MARYSOL 3  -LY 3  -LY    Standing up from a chair using your arms (e.g., wheelchair, bedside chair)? 3  -MARYSOL 3  -LY 3  -LY    Climbing 3-5 steps with a railing? 1  -MARYSOL 1  -LY 1  -LY    To walk in hospital room? 2  -MARYSOL 2  -LY 2  -LY    AM-PAC 6 Clicks Score (PT) 15  -MARYSOL 15  -LY 15  -LY       Functional Assessment    Outcome Measure Options AM-PAC 6 Clicks Basic Mobility (PT)  -MARYSOL AM-PAC 6 Clicks Basic Mobility (PT)  -LY AM-PAC 6 Clicks Basic Mobility (PT)  -LY              User Key  (r) = Recorded By, (t) = Taken By, (c) = Cosigned By      Initials Name Provider Type    Mati Ingram PTA Physical Therapist Assistant    Debra Kang PTA Physical Therapist Assistant                     Time Calculation:    PT Charges       Row Name 08/21/23 1451             Time Calculation    Start Time 1451  -MARYSOL      Stop Time 1516  -MARYSOL      Time Calculation (min) 25 min  -MARYSOL      PT Received On 08/21/23  -MARYSOL         Time Calculation- PT    Total Timed Code Minutes- PT 25 minute(s)  -MARYSOL         Timed Charges    76607 - PT Therapeutic Exercise Minutes 10  -MARYSOL      13248 - PT Therapeutic Activity Minutes 15  -MARYSOL         Total Minutes    Timed Charges Total Minutes 25  -MARYSOL       Total Minutes 25  -MARYSOL                User Key  (r) = Recorded By, (t) = Taken By, (c) = Cosigned By      Initials Name Provider Type    Mati Ingram PTA Physical Therapist Assistant                  Therapy Charges for Today       Code Description Service Date Service Provider Modifiers Qty    10965313324 HC PT THERAPEUTIC ACT EA 15 MIN 8/21/2023 Mati Tineo PTA GP 1    44310468698 HC PT THER PROC  EA 15 MIN 8/21/2023 Mati Tineo, PTA GP 1            PT G-Codes  Outcome Measure Options: AM-PAC 6 Clicks Basic Mobility (PT)  AM-PAC 6 Clicks Score (PT): 15  AM-PAC 6 Clicks Score (OT): 16    Mati Tineo PTA  8/21/2023

## 2023-08-21 NOTE — PROGRESS NOTES
Nephrology ([facility]) Progress Note      Patient:  Mar Rodriguez  YOB: 1940  Date of Service: 8/21/2023  MRN: [id number]   Acct: [id number]   Primary Care Physician: Vandana Churchill MD  Advance Directive:   Code Status and Medical Interventions:   Ordered at: 08/18/23 1946     Level Of Support Discussed With:    Health Care Surrogate     Code Status (Patient has no pulse and is not breathing):    CPR (Attempt to Resuscitate)     Medical Interventions (Patient has pulse or is breathing):    Full Support     Comments:    spoke with sisterZamzam     Admit Date: 8/17/2023       Hospital Day: 4  Referring Provider: No Known Provider      Patient personally seen and examined.  Complete chart including Consults, Notes, Operative Reports, Labs, Cardiology, and Radiology studies reviewed as able.        Subjective:  Mar Rodriguez is a 83 y.o. female for whom we were consulted for evaluation and treatment of acute kidney injury and hyperkalemia. Unknown baseline renal function. Patient denies any prior nephrological contacts. History of cerebral palsy, type 2 diabetes, hypertension, lymphedema. Presented to ER on 8/17 with shortness of breath and wheezing. Apparently, this has been progressively worsening for some time.  Minimal information in chart related to any recent medical issues.  Patient is a very poor historian and is unable to provide much information. She does recall that someone told her recently her kidney function was not good. Her only complaints were of cough and dyspnea.  Minimal dyspnea at time of exam. Denies pain or discomfort. Denied changes in urination. No dysuria or hematuria. Denied NSAIDs. Appetite has been good with no n/v/d. Chronic lower extremity lymphedema. Patient does not think the swelling has been any worse recently. Home medications include Metformin and potassium supplement. Given low dose lasix overnight and urine  output had been nonoliguric. Received Lokelma overnight and potassium has normalized.     Today, no overnight events.  She had good appetite. She is non-oliguric.     Allergies:  Nsaids    Home Meds:  Medications Prior to Admission   Medication Sig Dispense Refill Last Dose    atorvastatin (LIPITOR) 40 MG tablet Take 1 tablet by mouth Every Night.   8/16/2023 at 2100    busPIRone (BUSPAR) 10 MG tablet Take 1 tablet by mouth Daily. For anxiety   8/17/2023 at 0900    colestipol (COLESTID) 1 g tablet Take 1 tablet by mouth 2 (Two) Times a Day. For chronic diarrhea   8/17/2023 at 0900    ferrous sulfate 324 (65 Fe) MG tablet delayed-release EC tablet Take 1 tablet by mouth 2 (Two) Times a Day With Meals. For anemia   8/17/2023 at 1600    gabapentin (NEURONTIN) 300 MG capsule Take 1 capsule by mouth Every Night. For idiopathic neuropathy       insulin degludec (Tresiba FlexTouch) 100 UNIT/ML solution pen-injector injection Inject 6 Units under the skin into the appropriate area as directed Every Night.       levothyroxine (SYNTHROID, LEVOTHROID) 175 MCG tablet Take 1 tablet by mouth Every Night. For hypothyroidism       metFORMIN (GLUCOPHAGE) 850 MG tablet Take 1 tablet by mouth 2 (Two) Times a Day With Meals.   8/17/2023 at 0900    nystatin (MYCOSTATIN) 352747 UNIT/GM powder Apply 1 application  topically to the appropriate area as directed 2 (Two) Times a Day. Apply to all folds topically every shift for redness   8/17/2023 at 0900    PARoxetine (PAXIL) 20 MG tablet Take 1 tablet by mouth Daily.   8/17/2023 at 0900    potassium chloride (K-DUR,KLOR-CON) 20 MEQ CR tablet Take 1 tablet by mouth Daily.   8/17/2023 at 0800    raloxifene (EVISTA) 60 MG tablet Take 1 tablet by mouth Daily.       vitamin B-12 (CYANOCOBALAMIN) 1000 MCG tablet Take 1 tablet by mouth Daily.       dextrose (GLUTOSE) 40 % gel Take 15 g by mouth As Needed for Low Blood Sugar (every 15 minutes).   Unknown    Diclofenac Sodium (VOLTAREN) 1 % gel gel  Apply 4 g topically to the appropriate area as directed Every 6 (Six) Hours As Needed (to right knee for pain).   Unknown    glucagon (GLUCAGEN) 1 MG injection Inject 1 mg under the skin into the appropriate area as directed 1 (One) Time As Needed for Low Blood Sugar.   Unknown    loperamide (IMODIUM) 2 MG capsule Take 1 capsule by mouth 4 (Four) Times a Day As Needed for Diarrhea (for diarhea, after each loose stool). Do not exceed 6doses/24 hours   Unknown    loratadine (CLARITIN) 10 MG tablet Take 1 tablet by mouth Daily As Needed for Allergies (nasal congestion).   Unknown       Medicines:  Current Facility-Administered Medications   Medication Dose Route Frequency Provider Last Rate Last Admin    apixaban (ELIQUIS) tablet 2.5 mg  2.5 mg Oral Q12H Patti Navas PA-C   2.5 mg at 08/21/23 0902    busPIRone (BUSPAR) tablet 10 mg  10 mg Oral Daily Vandana Churchill MD   10 mg at 08/21/23 0902    dextrose (D50W) (25 g/50 mL) IV injection 25 g  25 g Intravenous Q15 Min PRN Vandana Churchill MD        dextrose (GLUTOSE) oral gel 15 g  15 g Oral Q15 Min PRN Vandana Churchill MD        ferrous sulfate tablet 325 mg  325 mg Oral BID With Meals Vandana Churchill MD   325 mg at 08/21/23 0902    gabapentin (NEURONTIN) capsule 300 mg  300 mg Oral Nightly Vandana Churchill MD   300 mg at 08/20/23 2030    glucagon (GLUCAGEN) injection 1 mg  1 mg Subcutaneous Q15 Min PRN Vandana Churchill MD        levothyroxine (SYNTHROID, LEVOTHROID) tablet 175 mcg  175 mcg Oral Nightly Vandana Churchill MD   175 mcg at 08/20/23 2029    metoprolol tartrate (LOPRESSOR) tablet 12.5 mg  12.5 mg Oral Q12H Patti Navas PA-C   12.5 mg at 08/21/23 0902    nitroglycerin (NITROSTAT) SL tablet 0.4 mg  0.4 mg Sublingual Q5 Min PRN Vandana Churchill MD        PARoxetine (PAXIL) tablet 20 mg  20 mg Oral Daily Vandana Churchill MD   20 mg at 08/21/23 0902    raloxifene (EVISTA) tablet 60 mg  60 mg  Oral Daily Vandana Churchill MD   60 mg at 08/21/23 0902    sodium chloride 0.9 % flush 10 mL  10 mL Intravenous PRN Himanshu Santos MD        sodium chloride 0.9 % infusion  100 mL/hr Intravenous Continuous Vandana Churchill  mL/hr at 08/21/23 0619 100 mL/hr at 08/21/23 0619       Past Medical History:  Past Medical History:   Diagnosis Date    Abnormality of gait and mobility     Anemia     Anxiety     Cerebral palsy     Cognitive communication deficit     Depression     Diabetes mellitus     Disease of thyroid gland     Hyperglycemia     Hyperlipidemia     Hypertension     Hypokalemia     Insomnia     Lymphedema     Neuropathy     Urge incontinence        Past Surgical History:  History reviewed. No pertinent surgical history.    Family History  History reviewed. No pertinent family history.    Social History  Social History     Socioeconomic History    Marital status: Single   Tobacco Use    Smoking status: Never     Passive exposure: Past   Vaping Use    Vaping Use: Never used   Substance and Sexual Activity    Alcohol use: Never    Drug use: Never    Sexual activity: Not Currently       Review of Systems:  History obtained from chart review and the patient  General ROS: No fever or chills  Respiratory ROS: No cough, shortness of breath, wheezing  Cardiovascular ROS: No chest pain or palpitations  Gastrointestinal ROS: No abdominal pain or melena  Genito-Urinary ROS: No dysuria or hematuria  Psych ROS: No anxiety and depression  14 point ROS reviewed with the patient and negative except as noted above and in the HPI unless unable to obtain.    Objective:  Patient Vitals for the past 24 hrs:   BP Temp Temp src Pulse Resp SpO2 Weight   08/21/23 1540 116/96 98.3 øF (36.8 øC) Oral 63 18 97 % --   08/21/23 1058 150/86 97.6 øF (36.4 øC) Oral 79 18 98 % --   08/21/23 0717 179/98 97.8 øF (36.6 øC) Oral 74 18 98 % --   08/21/23 0338 165/74 97.9 øF (36.6 øC) Oral 71 18 95 % --   08/20/23 2347 147/73 97.8  øF (36.6 øC) Oral 64 18 97 % --   08/20/23 2100 -- -- -- -- -- -- 104 kg (230 lb 1.6 oz)   08/20/23 1808 143/71 98.7 øF (37.1 øC) Oral 82 18 96 % --         Intake/Output Summary (Last 24 hours) at 8/21/2023 1610  Last data filed at 8/21/2023 1540  Gross per 24 hour   Intake 200 ml   Output 1050 ml   Net -850 ml       General: awake/alert   Chest:  clear to auscultation bilaterally without respiratory distress  CVS: IRRR  Abdominal: soft, nontender, positive bowel sounds  Extremities: no cyanosis or edema  Skin: warm and dry without rash      Labs:  Results from last 7 days   Lab Units 08/19/23  0606 08/17/23  1919   WBC 10*3/mm3 9.22 14.70*   HEMOGLOBIN g/dL 10.4* 10.0*   HEMATOCRIT % 34.6 32.4*   PLATELETS 10*3/mm3 310 321           Results from last 7 days   Lab Units 08/21/23  0605 08/20/23  0510 08/19/23  0606 08/17/23  2219 08/17/23  1919   SODIUM mmol/L 141 142 143   < > 140   POTASSIUM mmol/L 4.3 4.1 4.6   < > 5.6*   CHLORIDE mmol/L 106 104 106   < > 107   CO2 mmol/L 27.0 27.0 25.0   < > 20.0*   BUN mg/dL 38* 37* 30*   < > 32*   CREATININE mg/dL 2.28* 2.38* 1.87*   < > 1.77*   CALCIUM mg/dL 9.5 9.5 9.7   < > 9.6   EGFR mL/min/1.73 21.0* 19.9* 26.6*   < > 28.4*   BILIRUBIN mg/dL  --   --  0.2  --  <0.2   ALK PHOS U/L  --   --  122*  --  125*   ALT (SGPT) U/L  --   --  12  --  12   AST (SGOT) U/L  --   --  13  --  11   GLUCOSE mg/dL 111* 105* 108*   < > 108*    < > = values in this interval not displayed.         Radiology:   Imaging Results (Last 72 Hours)       ** No results found for the last 72 hours. **            Culture:  No results found for: BLOODCX, URINECX, WOUNDCX, MRSACX, RESPCX, STOOLCX      Assessment   Acute kidney injury with unspecified baseline  Hyperkalemia  Hypertension  Diabetes type 2  Chronic lymphedema  Atrial fibrillation-new onset per diagnosis  Anemia  Secondary hyperparathyroidism      Plan:  Discussed with patient, family  Work-up reviewed to date  Potassium improved with medical  management   Cardiology evaluation reviewed  Monitor labs  Avoid overdiuresis.      Kit Aly MD  8/21/2023  16:10 CDT

## 2023-08-21 NOTE — PLAN OF CARE
Goal Outcome Evaluation:  Plan of Care Reviewed With: patient           Outcome Evaluation: OT tx completed this am. She presents alert and oriented, agreeable to participate in therapy this am. She was able to come to sitting at EOB with SBA. Required Min A with set-up to don slip on shoes. Pt found incontinent of urine with soiled brief, required Mod a to don/doff brief and Max A for perineal hygiene. [facility] with [facility]-up and Min A. Min A for sit <> stand t/f from EOB and Min A to take small steps from bed to chair. She was able to complete grooming task seated upright in chair with set-up only. Left sitting up in chair. Continue OT POC.      Anticipated Discharge Disposition (OT): skilled nursing facility

## 2023-08-22 LAB
ANION GAP SERPL CALCULATED.3IONS-SCNC: 11 MMOL/L (ref 5–15)
BUN SERPL-MCNC: 34 MG/DL (ref 8–23)
BUN/CREAT SERPL: 18.8 (ref 7–25)
CALCIUM SPEC-SCNC: 8.4 MG/DL (ref 8.6–10.5)
CHLORIDE SERPL-SCNC: 108 MMOL/L (ref 98–107)
CO2 SERPL-SCNC: 23 MMOL/L (ref 22–29)
CREAT SERPL-MCNC: 1.81 MG/DL (ref 0.57–1)
EGFRCR SERPLBLD CKD-EPI 2021: 27.7 ML/MIN/1.73
GLUCOSE BLDC GLUCOMTR-MCNC: 111 MG/DL (ref 70–130)
GLUCOSE BLDC GLUCOMTR-MCNC: 112 MG/DL (ref 70–130)
GLUCOSE BLDC GLUCOMTR-MCNC: 150 MG/DL (ref 70–130)
GLUCOSE SERPL-MCNC: 102 MG/DL (ref 65–99)
POTASSIUM SERPL-SCNC: 4.5 MMOL/L (ref 3.5–5.2)
SODIUM SERPL-SCNC: 142 MMOL/L (ref 136–145)

## 2023-08-22 PROCEDURE — 97530 THERAPEUTIC ACTIVITIES: CPT

## 2023-08-22 PROCEDURE — 97110 THERAPEUTIC EXERCISES: CPT

## 2023-08-22 PROCEDURE — 80048 BASIC METABOLIC PNL TOTAL CA: CPT | Performed by: FAMILY MEDICINE

## 2023-08-22 PROCEDURE — 82948 REAGENT STRIP/BLOOD GLUCOSE: CPT

## 2023-08-22 RX ADMIN — METOPROLOL TARTRATE 12.5 MG: 25 TABLET, FILM COATED ORAL at 20:06

## 2023-08-22 RX ADMIN — FERROUS SULFATE TAB 325 MG (65 MG ELEMENTAL FE) 325 MG: 325 (65 FE) TAB at 17:33

## 2023-08-22 RX ADMIN — FERROUS SULFATE TAB 325 MG (65 MG ELEMENTAL FE) 325 MG: 325 (65 FE) TAB at 08:33

## 2023-08-22 RX ADMIN — RALOXIFENE 60 MG: 60 TABLET ORAL at 08:33

## 2023-08-22 RX ADMIN — APIXABAN 2.5 MG: 2.5 TABLET, FILM COATED ORAL at 20:06

## 2023-08-22 RX ADMIN — METOPROLOL TARTRATE 12.5 MG: 25 TABLET, FILM COATED ORAL at 08:33

## 2023-08-22 RX ADMIN — APIXABAN 2.5 MG: 2.5 TABLET, FILM COATED ORAL at 08:33

## 2023-08-22 RX ADMIN — SODIUM CHLORIDE 50 ML/HR: 9 INJECTION, SOLUTION INTRAVENOUS at 13:58

## 2023-08-22 RX ADMIN — PAROXETINE 20 MG: 20 TABLET, FILM COATED ORAL at 08:33

## 2023-08-22 RX ADMIN — SODIUM CHLORIDE 50 ML/HR: 9 INJECTION, SOLUTION INTRAVENOUS at 20:06

## 2023-08-22 RX ADMIN — BUSPIRONE HYDROCHLORIDE 10 MG: 10 TABLET ORAL at 08:33

## 2023-08-22 RX ADMIN — LEVOTHYROXINE SODIUM 175 MCG: 150 TABLET ORAL at 20:06

## 2023-08-22 RX ADMIN — GABAPENTIN 300 MG: 300 CAPSULE ORAL at 20:06

## 2023-08-22 RX ADMIN — SODIUM CHLORIDE 100 ML/HR: 9 INJECTION, SOLUTION INTRAVENOUS at 01:42

## 2023-08-22 NOTE — PLAN OF CARE
Goal Outcome Evaluation:  Plan of Care Reviewed With: patient        Progress: no change  Outcome Evaluation: Pt was up in the chair.  Able to transfer sit to stand with min assist, pt took 3 attempts to be able to stand.  Pt was able to take a few steps from the  chair to the bed with RWX and min assist.  Transfered sit to supine with SBA.

## 2023-08-22 NOTE — PROGRESS NOTES
"Progress Note  Mar Rodriguez  8/22/2023 16:19 CDT  Subjective:   Admit Date:   8/17/2023      CC/ADMIT DX:       Interval History:   Reviewed overnight events and nursing notes.  She denies any CP or SOA.       I have reviewed all labs/diagnostics from the last 24hrs.       ROS:   I have done a 10 point ROS and all are negative, except what is mentioned in the HPI.    Diet: Cardiac Diets, Diabetic Diets, Renal Diets; Healthy Heart (2-3 Na+); Consistent Carbohydrate; Low Potassium; Texture: Regular Texture (IDDSI 7); Fluid Consistency: Thin (IDDSI 0)    Medications:   sodium chloride, 50 mL/hr, Last Rate: 50 mL/hr (08/22/23 1358)      apixaban, 2.5 mg, Oral, Q12H  busPIRone, 10 mg, Oral, Daily  ferrous sulfate, 325 mg, Oral, BID With Meals  gabapentin, 300 mg, Oral, Nightly  levothyroxine, 175 mcg, Oral, Nightly  metoprolol tartrate, 12.5 mg, Oral, Q12H  PARoxetine, 20 mg, Oral, Daily  raloxifene, 60 mg, Oral, Daily            Objective:   Vitals: /58 (BP Location: Right arm, Patient Position: Lying)   Pulse 70   Temp 97.7 øF (36.5 øC) (Oral)   Resp 16   Ht 165.1 cm (65\")   Wt 106 kg (233 lb 11.2 oz)   SpO2 93%   BMI 38.89 kg/mý    Intake/Output Summary (Last 24 hours) at 8/22/2023 1619  Last data filed at 8/22/2023 1300  Gross per 24 hour   Intake 800 ml   Output 600 ml   Net 200 ml     General appearance: alert and cooperative with exam  Lungs: clear to auscultation bilaterally  Heart: irreg, irreg  Abdomen: soft, non-tender; bowel sounds normal; no masses,  no organomegaly  Extremities: extremities normal, atraumatic, no cyanosis or edema  Neurologic:  No obvious focal neurologic deficits.    Assessment and Plan:     New onset atrial fibrillation    Cerebral Palsy    CHRISTOPHER    HTN    DM2    Chronic LE edema    Plan:   Continue present medication(s)    Follow with Cardiology and Nephrology  Continue IVF. Labs are improved.    Recheck labs in am      Discharge planning:   a skilled nursing facility "     Reviewed treatment plans with the patient and/or family.             Electronically signed by Vandana Churchill MD on 8/22/2023 at 16:19 CDT

## 2023-08-22 NOTE — THERAPY TREATMENT NOTE
Acute Care - Physical Therapy Treatment Note  [facility]     Patient Name: Mar Rodriguez  : 1940  MRN: [id number]  Today's Date: 2023      Visit Dx:     ICD-10-CM ICD-9-CM   1. New onset atrial fibrillation  I48.91 427.31   2. Shortness of breath  R06.02 786.05   3. Cardiomegaly  I51.7 429.3   4. Acute kidney injury  N17.9 584.9   5. New onset of congestive heart failure  I50.9 428.0   6. Impaired mobility [Z74.09 (ICD-10-CM)]  Z74.09 799.89   7. Impaired mobility and ADLs [Z74.09, Z78.9 (ICD-10-CM)]  Z74.09 V49.89    Z78.9      Patient Active Problem List   Diagnosis    New onset atrial fibrillation     Past Medical History:   Diagnosis Date    Abnormality of gait and mobility     Anemia     Anxiety     Cerebral palsy     Cognitive communication deficit     Depression     Diabetes mellitus     Disease of thyroid gland     Hyperglycemia     Hyperlipidemia     Hypertension     Hypokalemia     Insomnia     Lymphedema     Neuropathy     Urge incontinence      History reviewed. No pertinent surgical history.  PT Assessment (last 12 hours)       PT Evaluation and Treatment       [facility] Name 23 1529          Physical Therapy Time and Intention    Subjective Information no complaints  -     Document Type therapy note (daily note)  -     Mode of Treatment physical therapy  -MARYSOL       Row Name 23 1529          General Information    Existing Precautions/Restrictions fall  -MARYSOL       Row Name 23 1529          Pain    Pretreatment Pain Rating 0/10 - no pain  -     Posttreatment Pain Rating 0/10 - no pain  -[facility] Name 23 1529          Bed Mobility    Sit-Supine [place] (Bed Mobility) standby assist  -MARYSOL       Row Name 23 1529          Transfers    Comment, (Transfers) took 3 attempts to be able to stand  -[facility] Name 23 1529          Sit-Stand Transfer    Sit-Stand [place] (Transfers) verbal cues;minimum assist (75% patient effort)  -      Assistive Device (Sit-Stand Transfers) walker, front-wheeled  -MARYSOL       Row Name 08/22/23 1529          Stand-Sit Transfer    Stand-Sit [place] (Transfers) verbal cues;minimum assist (75% patient effort)  -MARYSOL     Assistive Device (Stand-Sit Transfers) walker, front-wheeled  -MARYSOL       Row Name 08/22/23 1529          Gait/Stairs (Locomotion)    [place] Level (Gait) verbal cues;minimum assist (75% patient effort)  -MARYSOL     Assistive Device (Gait) walker, front-wheeled  -MARYSOL     Distance in Feet (Gait) few steps from the chair to the bed  -MARYSOL       Row Name 08/22/23 1529          Motor Skills    Comments, Therapeutic Exercise sitting AROM BLE X 20  -MARYSOL       Row Name 08/22/23 1529          Positioning and Restraints    Pre-Treatment Position sitting in chair/recliner  -MARYSOL     Post Treatment Position bed  -MARYSOL     In Bed fowlers;call light within reach;encouraged to call for assist;side rails up x2  -MARYSOL               User Key  (r) = Recorded By, (t) = Taken By, (c) = Cosigned By      Initials Name Provider Type    MARYSOL Mati Tineo, PTA Physical Therapist Assistant                    Physical Therapy Education       Title: PT OT SLP Therapies (In Progress)       Topic: Physical Therapy (Done)       Point: Mobility training (Done)       Learning Progress Summary             Patient Acceptance, E,D, DU,VU,NR by  at 8/18/2023 1200    Comment: benefits of PT and POC, call for A OOB                         Point: Precautions (Done)       Learning Progress Summary             Patient Acceptance, E,D, DU,VU,NR by  at 8/18/2023 1200    Comment: benefits of PT and POC, call for A OOB                                         User Key       Initials Effective Dates Name Provider Type [facility] 02/03/23 -  Rodrick June, PT Physical Therapist PT                  PT Recommendation and Plan     Plan of Care Reviewed With: patient  Progress: no change  Outcome Evaluation: Pt was up in the chair.  Able to transfer  sit to stand with min assist, pt took 3 attempts to be able to stand.  Pt was able to take a few steps from the  chair to the bed with RWX and min assist.  Transfered sit to supine with SBA.   Outcome Measures       Row Name 08/22/23 1529 08/21/23 1451 08/20/23 1455       How much help from another person do you currently need...    Turning from your back to your side while in flat bed without using bedrails? 3  -MARYSOL 3  -MARYSOL 3  -LY    Moving from lying on back to sitting on the side of a flat bed without bedrails? 3  -MARYSOL 3  -MARYSOL 3  -LY    Moving to and from a bed to a chair (including a wheelchair)? 3  -MARYSOL 3  -MARYSOL 3  -LY    Standing up from a chair using your arms (e.g., wheelchair, bedside chair)? 3  -MARYSOL 3  -MARYSOL 3  -LY    Climbing 3-5 steps with a railing? 1  -MARYSOL 1  -MARYSOL 1  -LY    To walk in hospital room? 1  -MARYSOL 2  -MARYSOL 2  -LY    AM-PAC 6 Clicks Score (PT) 14  -MARYSOL 15  -MARYSOL 15  -LY       Functional Assessment    Outcome Measure Options AM-PAC 6 Clicks Basic Mobility (PT)  -MARYSOL AM-PAC 6 Clicks Basic Mobility (PT)  -MARYSOL AM-PAC 6 Clicks Basic Mobility (PT)  -LY              User Key  (r) = Recorded By, (t) = Taken By, (c) = Cosigned By      Initials Name Provider Type    Mati Ingram, RIAZ Physical Therapist Assistant    Debra Knag, RIAZ Physical Therapist Assistant                     Time Calculation:    PT Charges       Row Name 08/22/23 1529             Time Calculation    Start Time 1529  -MARYSOL      Stop Time 1600  -MARYSOL      Time Calculation (min) 31 min  -MARYSOL      PT Received On 08/22/23  -MARYSOL         Time Calculation- PT    Total Timed Code Minutes- PT 31 minute(s)  -MARYSOL         Timed Charges    79821 - PT Therapeutic Exercise Minutes 13  -MARYSOL      85705 - PT Therapeutic Activity Minutes 18  -MARYSOL         Total Minutes    Timed Charges Total Minutes 31  -MARYSOL       Total Minutes 31  -MARYSOL                User Key  (r) = Recorded By, (t) = Taken By, (c) = Cosigned By      Initials Name Provider Type    Mati Ingram  RIAZ ALVARADO Physical Therapist Assistant                  Therapy Charges for Today       Code Description Service Date Service Provider Modifiers Qty    47446426222 HC PT THERAPEUTIC ACT EA 15 MIN 8/21/2023 Mati Tineo, PTA GP 1    30751137110 HC PT THER PROC EA 15 MIN 8/21/2023 Mati Tineo, PTA GP 1    72307222179 HC PT THERAPEUTIC ACT EA 15 MIN 8/22/2023 Mati Tineo, PTA GP 1    40071412977 HC PT THER PROC EA 15 MIN 8/22/2023 Mati Tineo, PTA GP 1            PT G-Codes  Outcome Measure Options: AM-PAC 6 Clicks Basic Mobility (PT)  AM-PAC 6 Clicks Score (PT): 14  AM-PAC 6 Clicks Score (OT): 16    Mati Tineo PTA  8/22/2023

## 2023-08-22 NOTE — PROGRESS NOTES
Nephrology ([facility]) Progress Note      Patient:  Mar Rodriguez  YOB: 1940  Date of Service: 8/22/2023  MRN: [id number]   Acct: [id number]   Primary Care Physician: Vandana Churchill MD  Advance Directive:   Code Status and Medical Interventions:   Ordered at: 08/18/23 1946     Level Of Support Discussed With:    Health Care Surrogate     Code Status (Patient has no pulse and is not breathing):    CPR (Attempt to Resuscitate)     Medical Interventions (Patient has pulse or is breathing):    Full Support     Comments:    spoke with sisterZamzam     Admit Date: 8/17/2023       Hospital Day: 5  Referring Provider: No Known Provider      Patient personally seen and examined.  Complete chart including Consults, Notes, Operative Reports, Labs, Cardiology, and Radiology studies reviewed as able.        Subjective:  Mar Rodriguez is a 83 y.o. female for whom we were consulted for evaluation and treatment of acute kidney injury and hyperkalemia. Unknown baseline renal function. Patient denies any prior nephrological contacts. History of cerebral palsy, type 2 diabetes, hypertension, lymphedema. Presented to ER on 8/17 with shortness of breath and wheezing. Apparently, this has been progressively worsening for some time.  Minimal information in chart related to any recent medical issues.  Patient is a very poor historian and is unable to provide much information. She does recall that someone told her recently her kidney function was not good. Her only complaints were of cough and dyspnea.  Minimal dyspnea at time of exam. Denies pain or discomfort. Denied changes in urination. No dysuria or hematuria. Denied NSAIDs. Appetite has been good with no n/v/d. Chronic lower extremity lymphedema. Patient does not think the swelling has been any worse recently. Home medications include Metformin and potassium supplement. Given low dose lasix overnight and urine  output had been nonoliguric. Received Lokelma overnight and potassium has normalized.     Today, no overnight events.  She is non-oliguric.     Allergies:  Nsaids    Home Meds:  Medications Prior to Admission   Medication Sig Dispense Refill Last Dose    atorvastatin (LIPITOR) 40 MG tablet Take 1 tablet by mouth Every Night.   8/16/2023 at 2100    busPIRone (BUSPAR) 10 MG tablet Take 1 tablet by mouth Daily. For anxiety   8/17/2023 at 0900    colestipol (COLESTID) 1 g tablet Take 1 tablet by mouth 2 (Two) Times a Day. For chronic diarrhea   8/17/2023 at 0900    ferrous sulfate 324 (65 Fe) MG tablet delayed-release EC tablet Take 1 tablet by mouth 2 (Two) Times a Day With Meals. For anemia   8/17/2023 at 1600    gabapentin (NEURONTIN) 300 MG capsule Take 1 capsule by mouth Every Night. For idiopathic neuropathy       insulin degludec (Tresiba FlexTouch) 100 UNIT/ML solution pen-injector injection Inject 6 Units under the skin into the appropriate area as directed Every Night.       levothyroxine (SYNTHROID, LEVOTHROID) 175 MCG tablet Take 1 tablet by mouth Every Night. For hypothyroidism       metFORMIN (GLUCOPHAGE) 850 MG tablet Take 1 tablet by mouth 2 (Two) Times a Day With Meals.   8/17/2023 at 0900    nystatin (MYCOSTATIN) 784087 UNIT/GM powder Apply 1 application  topically to the appropriate area as directed 2 (Two) Times a Day. Apply to all folds topically every shift for redness   8/17/2023 at 0900    PARoxetine (PAXIL) 20 MG tablet Take 1 tablet by mouth Daily.   8/17/2023 at 0900    potassium chloride (K-DUR,KLOR-CON) 20 MEQ CR tablet Take 1 tablet by mouth Daily.   8/17/2023 at 0800    raloxifene (EVISTA) 60 MG tablet Take 1 tablet by mouth Daily.       vitamin B-12 (CYANOCOBALAMIN) 1000 MCG tablet Take 1 tablet by mouth Daily.       dextrose (GLUTOSE) 40 % gel Take 15 g by mouth As Needed for Low Blood Sugar (every 15 minutes).   Unknown    Diclofenac Sodium (VOLTAREN) 1 % gel gel Apply 4 g topically to  the appropriate area as directed Every 6 (Six) Hours As Needed (to right knee for pain).   Unknown    glucagon (GLUCAGEN) 1 MG injection Inject 1 mg under the skin into the appropriate area as directed 1 (One) Time As Needed for Low Blood Sugar.   Unknown    loperamide (IMODIUM) 2 MG capsule Take 1 capsule by mouth 4 (Four) Times a Day As Needed for Diarrhea (for diarhea, after each loose stool). Do not exceed 6doses/24 hours   Unknown    loratadine (CLARITIN) 10 MG tablet Take 1 tablet by mouth Daily As Needed for Allergies (nasal congestion).   Unknown       Medicines:  Current Facility-Administered Medications   Medication Dose Route Frequency Provider Last Rate Last Admin    apixaban (ELIQUIS) tablet 2.5 mg  2.5 mg Oral Q12H Patti Navas PA-C   2.5 mg at 08/22/23 0833    busPIRone (BUSPAR) tablet 10 mg  10 mg Oral Daily Vandana Churchill MD   10 mg at 08/22/23 0833    dextrose (D50W) (25 g/50 mL) IV injection 25 g  25 g Intravenous Q15 Min PRN Vandana Churchill MD        dextrose (GLUTOSE) oral gel 15 g  15 g Oral Q15 Min PRN Vandana Churchill MD        ferrous sulfate tablet 325 mg  325 mg Oral BID With Meals Vandana Churchill MD   325 mg at 08/22/23 0833    gabapentin (NEURONTIN) capsule 300 mg  300 mg Oral Nightly Vandana Churchill MD   300 mg at 08/21/23 2050    glucagon (GLUCAGEN) injection 1 mg  1 mg Subcutaneous Q15 Min PRN Vandana Churchill MD        levothyroxine (SYNTHROID, LEVOTHROID) tablet 175 mcg  175 mcg Oral Nightly Vandana Churchill MD   175 mcg at 08/21/23 2051    metoprolol tartrate (LOPRESSOR) tablet 12.5 mg  12.5 mg Oral Q12H Patti Navas PA-C   12.5 mg at 08/22/23 0833    nitroglycerin (NITROSTAT) SL tablet 0.4 mg  0.4 mg Sublingual Q5 Min PRN Vandana Churchill MD        PARoxetine (PAXIL) tablet 20 mg  20 mg Oral Daily Vandana Churchill MD   20 mg at 08/22/23 0833    raloxifene (EVISTA) tablet 60 mg  60 mg Oral Daily Zhao  Vandana Villavicencio MD   60 mg at 08/22/23 0833    sodium chloride 0.9 % flush 10 mL  10 mL Intravenous PRN Himanshu Santos MD        sodium chloride 0.9 % infusion  100 mL/hr Intravenous Continuous Vandana Churchill  mL/hr at 08/22/23 0142 100 mL/hr at 08/22/23 0142       Past Medical History:  Past Medical History:   Diagnosis Date    Abnormality of gait and mobility     Anemia     Anxiety     Cerebral palsy     Cognitive communication deficit     Depression     Diabetes mellitus     Disease of thyroid gland     Hyperglycemia     Hyperlipidemia     Hypertension     Hypokalemia     Insomnia     Lymphedema     Neuropathy     Urge incontinence        Past Surgical History:  History reviewed. No pertinent surgical history.    Family History  History reviewed. No pertinent family history.    Social History  Social History     Socioeconomic History    Marital status: Single   Tobacco Use    Smoking status: Never     Passive exposure: Past   Vaping Use    Vaping Use: Never used   Substance and Sexual Activity    Alcohol use: Never    Drug use: Never    Sexual activity: Not Currently       Review of Systems:  History obtained from chart review and the patient  General ROS: No fever or chills  Respiratory ROS: No cough, shortness of breath, wheezing  Cardiovascular ROS: No chest pain or palpitations  Gastrointestinal ROS: No abdominal pain or melena  Genito-Urinary ROS: No dysuria or hematuria  Psych ROS: No anxiety and depression  14 point ROS reviewed with the patient and negative except as noted above and in the HPI unless unable to obtain.    Objective:  Patient Vitals for the past 24 hrs:   BP Temp Temp src Pulse Resp SpO2 Weight   08/22/23 1208 125/58 97.7 øF (36.5 øC) Oral 70 16 93 % --   08/22/23 0719 151/84 97.7 øF (36.5 øC) Oral 69 16 98 % --   08/22/23 0417 148/89 97.7 øF (36.5 øC) Oral 71 16 96 % --   08/21/23 2053 154/87 98.2 øF (36.8 øC) Oral 84 18 98 % 106 kg (233 lb 11.2 oz)   08/21/23 1540 116/96  98.3 øF (36.8 øC) Oral 63 18 97 % --         Intake/Output Summary (Last 24 hours) at 8/22/2023 1327  Last data filed at 8/22/2023 0900  Gross per 24 hour   Intake 440 ml   Output 1200 ml   Net -760 ml       General: awake/alert   Chest:  clear to auscultation bilaterally without respiratory distress  CVS: IRRR  Abdominal: soft, nontender, positive bowel sounds  Extremities: no cyanosis or edema  Skin: warm and dry without rash      Labs:  Results from last 7 days   Lab Units 08/19/23  0606 08/17/23  1919   WBC 10*3/mm3 9.22 14.70*   HEMOGLOBIN g/dL 10.4* 10.0*   HEMATOCRIT % 34.6 32.4*   PLATELETS 10*3/mm3 310 321           Results from last 7 days   Lab Units 08/22/23  0547 08/21/23  0605 08/20/23  0510 08/19/23  0606 08/17/23 2219 08/17/23 1919   SODIUM mmol/L 142 141 142 143   < > 140   POTASSIUM mmol/L 4.5 4.3 4.1 4.6   < > 5.6*   CHLORIDE mmol/L 108* 106 104 106   < > 107   CO2 mmol/L 23.0 27.0 27.0 25.0   < > 20.0*   BUN mg/dL 34* 38* 37* 30*   < > 32*   CREATININE mg/dL 1.81* 2.28* 2.38* 1.87*   < > 1.77*   CALCIUM mg/dL 8.4* 9.5 9.5 9.7   < > 9.6   EGFR mL/min/1.73 27.7* 21.0* 19.9* 26.6*   < > 28.4*   BILIRUBIN mg/dL  --   --   --  0.2  --  <0.2   ALK PHOS U/L  --   --   --  122*  --  125*   ALT (SGPT) U/L  --   --   --  12  --  12   AST (SGOT) U/L  --   --   --  13  --  11   GLUCOSE mg/dL 102* 111* 105* 108*   < > 108*    < > = values in this interval not displayed.         Radiology:   Imaging Results (Last 72 Hours)       ** No results found for the last 72 hours. **            Culture:  No results found for: BLOODCX, URINECX, WOUNDCX, MRSACX, RESPCX, STOOLCX      Assessment   Acute kidney injury with unspecified baseline  Hyperkalemia  Hypertension  Diabetes type 2  Chronic lymphedema  Atrial fibrillation-new onset per diagnosis  Anemia  Secondary hyperparathyroidism      Plan:  Discussed with patient   Work-up reviewed to date  Potassium improved with medical management   Cardiology evaluation  reviewed  Monitor labs  Avoid overdiuresis. Will decrease IV fluids.       Kit Aly MD  8/22/2023  13:27 CDT

## 2023-08-23 VITALS
OXYGEN SATURATION: 97 % | SYSTOLIC BLOOD PRESSURE: 145 MMHG | DIASTOLIC BLOOD PRESSURE: 84 MMHG | TEMPERATURE: 98.1 F | WEIGHT: 229.4 LBS | RESPIRATION RATE: 16 BRPM | HEART RATE: 78 BPM | BODY MASS INDEX: 38.22 KG/M2 | HEIGHT: 65 IN

## 2023-08-23 LAB
ANION GAP SERPL CALCULATED.3IONS-SCNC: 11 MMOL/L (ref 5–15)
BUN SERPL-MCNC: 31 MG/DL (ref 8–23)
BUN/CREAT SERPL: 20 (ref 7–25)
CALCIUM SPEC-SCNC: 9.5 MG/DL (ref 8.6–10.5)
CHLORIDE SERPL-SCNC: 110 MMOL/L (ref 98–107)
CO2 SERPL-SCNC: 22 MMOL/L (ref 22–29)
CREAT SERPL-MCNC: 1.55 MG/DL (ref 0.57–1)
EGFRCR SERPLBLD CKD-EPI 2021: 33.3 ML/MIN/1.73
GLUCOSE BLDC GLUCOMTR-MCNC: 113 MG/DL (ref 70–130)
GLUCOSE BLDC GLUCOMTR-MCNC: 118 MG/DL (ref 70–130)
GLUCOSE BLDC GLUCOMTR-MCNC: 90 MG/DL (ref 70–130)
GLUCOSE SERPL-MCNC: 103 MG/DL (ref 65–99)
POTASSIUM SERPL-SCNC: 4.1 MMOL/L (ref 3.5–5.2)
SODIUM SERPL-SCNC: 143 MMOL/L (ref 136–145)

## 2023-08-23 PROCEDURE — 97530 THERAPEUTIC ACTIVITIES: CPT

## 2023-08-23 PROCEDURE — 80048 BASIC METABOLIC PNL TOTAL CA: CPT | Performed by: INTERNAL MEDICINE

## 2023-08-23 PROCEDURE — 82948 REAGENT STRIP/BLOOD GLUCOSE: CPT

## 2023-08-23 RX ORDER — GABAPENTIN 300 MG/1
300 CAPSULE ORAL NIGHTLY
Qty: 30 CAPSULE | Refills: 0 | Status: SHIPPED | OUTPATIENT
Start: 2023-08-23

## 2023-08-23 RX ORDER — SODIUM CHLORIDE 9 MG/ML
50 INJECTION, SOLUTION INTRAVENOUS CONTINUOUS
Qty: 100 ML | Refills: 0 | Status: SHIPPED | OUTPATIENT
Start: 2023-08-23

## 2023-08-23 RX ADMIN — PAROXETINE 20 MG: 20 TABLET, FILM COATED ORAL at 10:11

## 2023-08-23 RX ADMIN — RALOXIFENE 60 MG: 60 TABLET ORAL at 10:11

## 2023-08-23 RX ADMIN — BUSPIRONE HYDROCHLORIDE 10 MG: 10 TABLET ORAL at 10:11

## 2023-08-23 RX ADMIN — FERROUS SULFATE TAB 325 MG (65 MG ELEMENTAL FE) 325 MG: 325 (65 FE) TAB at 10:11

## 2023-08-23 RX ADMIN — Medication 10 ML: at 10:10

## 2023-08-23 RX ADMIN — APIXABAN 2.5 MG: 2.5 TABLET, FILM COATED ORAL at 10:10

## 2023-08-23 RX ADMIN — METOPROLOL TARTRATE 12.5 MG: 25 TABLET, FILM COATED ORAL at 10:11

## 2023-08-23 NOTE — THERAPY TREATMENT NOTE
Acute Care - Physical Therapy Treatment Note  [facility]     Patient Name: Mar Rodriguez  : 1940  MRN: [id number]  Today's Date: 2023      Visit Dx:     ICD-10-CM ICD-9-CM   1. New onset atrial fibrillation  I48.91 427.31   2. Shortness of breath  R06.02 786.05   3. Cardiomegaly  I51.7 429.3   4. Acute kidney injury  N17.9 584.9   5. New onset of congestive heart failure  I50.9 428.0   6. Impaired mobility [Z74.09 (ICD-10-CM)]  Z74.09 799.89   7. Impaired mobility and ADLs [Z74.09, Z78.9 (ICD-10-CM)]  Z74.09 V49.89    Z78.9      Patient Active Problem List   Diagnosis    New onset atrial fibrillation     Past Medical History:   Diagnosis Date    Abnormality of gait and mobility     Anemia     Anxiety     Cerebral palsy     Cognitive communication deficit     Depression     Diabetes mellitus     Disease of thyroid gland     Hyperglycemia     Hyperlipidemia     Hypertension     Hypokalemia     Insomnia     Lymphedema     Neuropathy     Urge incontinence      History reviewed. No pertinent surgical history.  PT Assessment (last 12 hours)       PT Evaluation and Treatment       Row Name 23 1128          Physical Therapy Time and Intention    Subjective Information complains of;pain;weakness  -NW     Document Type therapy note (daily note)  -NW     Mode of Treatment physical therapy  -NW       Row Name 23 1128          General Information    Existing Precautions/Restrictions fall  -NW       Row Name 23 1128          Pain    Pain Location - Side/Orientation Bilateral  -NW     Pain Location lower  -NW     Pain Location - extremity  -NW     Additional Documentation Pain Scale: FACES Pre/Post-Treatment (Group)  -NW       Row Name 23 1128          Pain Scale: FACES Pre/Post-Treatment    Pain: FACES Scale, Pretreatment 2-->hurts little bit  -NW     Posttreatment Pain Rating 4-->hurts little more  -NW       Row Name 23 1128          Bed Mobility    Supine-Sit [place] (Bed  Mobility) verbal cues;standby assist  -NW       Row Name 08/23/23 1128          Sit-Stand Transfer    Sit-Stand [place] (Transfers) verbal cues;minimum assist (75% patient effort);moderate assist (50% patient effort)  mult attempts before able to get upright  -NW     Assistive Device (Sit-Stand Transfers) walker, front-wheeled  -NW       Row Name 08/23/23 1128          Stand-Sit Transfer    Stand-Sit [place] (Transfers) verbal cues;minimum assist (75% patient effort)  -NW     Assistive Device (Stand-Sit Transfers) walker, front-wheeled  -NW       Row Name 08/23/23 1128          Gait/Stairs (Locomotion)    [place] Level (Gait) verbal cues;minimum assist (75% patient effort)  -NW     Assistive Device (Gait) walker, front-wheeled  -NW     Distance in Feet (Gait) steps from bed-chair  -NW     Comment, (Gait/Stairs) assist w/ AD  -NW       Row Name 08/23/23 1128          Safety Issues, Functional Mobility    Impairments Affecting Function (Mobility) strength  -NW       Row Name 08/23/23 1128          Motor Skills    Comments, Therapeutic Exercise AROM BLEs x10 w/ rest breaks  -NW       Row Name 08/23/23 1128          Positioning and Restraints    Pre-Treatment Position in bed  -NW     Post Treatment Position chair  -NW     In Chair reclined;call light within reach;encouraged to call for assist;with other staff  -NW               User Key  (r) = Recorded By, (t) = Taken By, (c) = Cosigned By      Initials Name Provider Type    Radha Jeffers, PTA Physical Therapist Assistant                    Physical Therapy Education       Title: PT OT SLP Therapies (In Progress)       Topic: Physical Therapy (Done)       Point: Mobility training (Done)       Learning Progress Summary             Patient Acceptance, E,D, DU,VU,NR by  at 8/18/2023 1200    Comment: benefits of PT and POC, call for A OOB                         Point: Precautions (Done)       Learning Progress Summary             Patient Acceptance,  TEA HARMON, MATEO,STEPHANIE,NR by  at 8/18/2023 1200    Comment: benefits of PT and POC, call for A OOB                                         User Key       Initials Effective Dates Name Provider Type Discipline     02/03/23 -  Rodrick June, PT Physical Therapist PT                  PT Recommendation and Plan     Plan of Care Reviewed With: patient  Progress: no change  Outcome Evaluation: Bed mobility sba w/ extra time. sat EOB for several minutes before able to attempt to stand. mult attempts to stand min/mod x1 before able to get upright. Pt able to take steps min x1 helping w/ rwx to chair. Then stood in place for few minutes for WB through LES before sitting. Pt tolerated AROM BLEs x10 w/ rest breaks. Plan to d/c back to snf when medically stable   Outcome Measures       Row Name 08/22/23 1529 08/21/23 1451 08/20/23 1455       How much help from another person do you currently need...    Turning from your back to your side while in flat bed without using bedrails? 3  -MARYSOL 3  -MARYSOL 3  -LY    Moving from lying on back to sitting on the side of a flat bed without bedrails? 3  -MARYSOL 3  -MARYSOL 3  -LY    Moving to and from a bed to a chair (including a wheelchair)? 3  -MARYSOL 3  -MARYSOL 3  -LY    Standing up from a chair using your arms (e.g., wheelchair, bedside chair)? 3  -MARYSOL 3  -MARYSOL 3  -LY    Climbing 3-5 steps with a railing? 1  -MARYSOL 1  -MARYSOL 1  -LY    To walk in hospital room? 1  -MARYSOL 2  -MARYSOL 2  -LY    AM-PAC 6 Clicks Score (PT) 14  -MARYSOL 15  -MARYSOL 15  -LY       Functional Assessment    Outcome Measure Options AM-PAC 6 Clicks Basic Mobility (PT)  -MARYSOL AM-PAC 6 Clicks Basic Mobility (PT)  -MARYSOL AM-PAC 6 Clicks Basic Mobility (PT)  -LY              User Key  (r) = Recorded By, (t) = Taken By, (c) = Cosigned By      Initials Name Provider Type    Mati Ingram, PTA Physical Therapist Assistant    Debra Kang, PTA Physical Therapist Assistant                     Time Calculation:    PT Charges       Row Name 08/23/23 1145             Time  Calculation    Start Time 1128  -NW      Stop Time 1146  -NW      Time Calculation (min) 18 min  -NW      PT Received On 08/23/23  -NW      PT Goal Re-Cert Due Date 08/28/23  -NW         Time Calculation- PT    Total Timed Code Minutes- PT 18 minute(s)  -NW         Timed Charges    16796 - PT Therapeutic Activity Minutes 18  -NW         Total Minutes    Timed Charges Total Minutes 18  -NW       Total Minutes 18  -NW                User Key  (r) = Recorded By, (t) = Taken By, (c) = Cosigned By      Initials Name Provider Type    NW Radha Neely PTA Physical Therapist Assistant                  Therapy Charges for Today       Code Description Service Date Service Provider Modifiers Qty    91725633622 HC PT THERAPEUTIC ACT EA 15 MIN 8/23/2023 Radha Neely PTA GP 1            PT G-Codes  Outcome Measure Options: AM-PAC 6 Clicks Basic Mobility (PT)  AM-PAC 6 Clicks Score (PT): 14  AM-PAC 6 Clicks Score (OT): 16    Radha Neely PTA  8/23/2023

## 2023-08-23 NOTE — PLAN OF CARE
Goal Outcome Evaluation:  Plan of Care Reviewed With: patient        Progress: no change  Outcome Evaluation: Bed mobility sba w/ extra time. sat EOB for several minutes before able to attempt to stand. mult attempts to stand min/mod x1 before able to get upright. Pt able to take steps min x1 helping w/ rwx to chair. Then stood in place for few minutes for WB through LES before sitting. Pt tolerated AROM BLEs x10 w/ rest breaks. Plan to d/c back to snf when medically stable

## 2023-08-23 NOTE — CASE MANAGEMENT/SOCIAL WORK
Continued Stay Note   [place]     Patient Name: Mar Rodriguez  MRN: [id number]  Today's Date: 8/23/2023    Admit Date: 8/17/2023    Plan: [facility]   Discharge Plan       Row Name 08/23/23 0917       Plan    Plan Comments Pt has a bed hold at [facility]. No precert. needed. Pt can dc once medically stable. [phone]                   Discharge Codes    No documentation.                 Expected Discharge Date and Time       Expected Discharge Date Expected Discharge Time    Aug 23, 2023               BRADLEY Garcia

## 2023-08-23 NOTE — PROGRESS NOTES
Nephrology ([facility]) Progress Note      Patient:  Mar Rodriguez  YOB: 1940  Date of Service: 8/23/2023  MRN: [id number]   Acct: [id number]   Primary Care Physician: Vandana Churchill MD  Advance Directive:   Code Status and Medical Interventions:   Ordered at: 08/18/23 1946     Level Of Support Discussed With:    Health Care Surrogate     Code Status (Patient has no pulse and is not breathing):    CPR (Attempt to Resuscitate)     Medical Interventions (Patient has pulse or is breathing):    Full Support     Comments:    spoke with sisterZamzam     Admit Date: 8/17/2023       Hospital Day: 6  Referring Provider: No Known Provider      Patient personally seen and examined.  Complete chart including Consults, Notes, Operative Reports, Labs, Cardiology, and Radiology studies reviewed as able.        Subjective:  Mar Rodriguez is a 83 y.o. female for whom we were consulted for evaluation and treatment of acute kidney injury and hyperkalemia. Unknown enal function. Patient denies any prior nephrological contacts. She does recall that someone told her recently her kidney function was not good. Patient is a very poor historian and is unable to provide much information. History of cerebral palsy, type 2 diabetes, hypertension, lymphedema. Presented to ER on 8/17 with shortness of breath and wheezing. Apparently, this has been progressively worsening for some time.  Her only complaints were of cough and dyspnea.  Minimal dyspnea at time of  initial exam. Denied changes in urination. No dysuria or hematuria. Denied NSAIDs. Appetite has been good with no n/v/d. Chronic lower extremity lymphedema. Patient does not think the swelling has been any worse recently. Home medications include Metformin and potassium supplement. Hyperkalemia was managed medically and renal function has been improving with IV fluids    Today is awake and alert. No new overnight  issues. Urine output nonoliguric    Allergies:  Nsaids    Home Meds:  Medications Prior to Admission   Medication Sig Dispense Refill Last Dose    atorvastatin (LIPITOR) 40 MG tablet Take 1 tablet by mouth Every Night.   8/16/2023 at 2100    busPIRone (BUSPAR) 10 MG tablet Take 1 tablet by mouth Daily. For anxiety   8/17/2023 at 0900    colestipol (COLESTID) 1 g tablet Take 1 tablet by mouth 2 (Two) Times a Day. For chronic diarrhea   8/17/2023 at 0900    ferrous sulfate 324 (65 Fe) MG tablet delayed-release EC tablet Take 1 tablet by mouth 2 (Two) Times a Day With Meals. For anemia   8/17/2023 at 1600    gabapentin (NEURONTIN) 300 MG capsule Take 1 capsule by mouth Every Night. For idiopathic neuropathy       insulin degludec (Tresiba FlexTouch) 100 UNIT/ML solution pen-injector injection Inject 6 Units under the skin into the appropriate area as directed Every Night.       levothyroxine (SYNTHROID, LEVOTHROID) 175 MCG tablet Take 1 tablet by mouth Every Night. For hypothyroidism       metFORMIN (GLUCOPHAGE) 850 MG tablet Take 1 tablet by mouth 2 (Two) Times a Day With Meals.   8/17/2023 at 0900    nystatin (MYCOSTATIN) 822091 UNIT/GM powder Apply 1 application  topically to the appropriate area as directed 2 (Two) Times a Day. Apply to all folds topically every shift for redness   8/17/2023 at 0900    PARoxetine (PAXIL) 20 MG tablet Take 1 tablet by mouth Daily.   8/17/2023 at 0900    potassium chloride (K-DUR,KLOR-CON) 20 MEQ CR tablet Take 1 tablet by mouth Daily.   8/17/2023 at 0800    raloxifene (EVISTA) 60 MG tablet Take 1 tablet by mouth Daily.       vitamin B-12 (CYANOCOBALAMIN) 1000 MCG tablet Take 1 tablet by mouth Daily.       dextrose (GLUTOSE) 40 % gel Take 15 g by mouth As Needed for Low Blood Sugar (every 15 minutes).   Unknown    Diclofenac Sodium (VOLTAREN) 1 % gel gel Apply 4 g topically to the appropriate area as directed Every 6 (Six) Hours As Needed (to right knee for pain).   Unknown     glucagon (GLUCAGEN) 1 MG injection Inject 1 mg under the skin into the appropriate area as directed 1 (One) Time As Needed for Low Blood Sugar.   Unknown    loperamide (IMODIUM) 2 MG capsule Take 1 capsule by mouth 4 (Four) Times a Day As Needed for Diarrhea (for diarhea, after each loose stool). Do not exceed 6doses/24 hours   Unknown    loratadine (CLARITIN) 10 MG tablet Take 1 tablet by mouth Daily As Needed for Allergies (nasal congestion).   Unknown       Medicines:  Current Facility-Administered Medications   Medication Dose Route Frequency Provider Last Rate Last Admin    apixaban (ELIQUIS) tablet 2.5 mg  2.5 mg Oral Q12H Patti Navas PA-C   2.5 mg at 08/23/23 1010    busPIRone (BUSPAR) tablet 10 mg  10 mg Oral Daily Vandana Churchill MD   10 mg at 08/23/23 1011    dextrose (D50W) (25 g/50 mL) IV injection 25 g  25 g Intravenous Q15 Min PRN Vandana Churchill MD        dextrose (GLUTOSE) oral gel 15 g  15 g Oral Q15 Min PRN Vandana Churchill MD        ferrous sulfate tablet 325 mg  325 mg Oral BID With Meals Vandana Churchill MD   325 mg at 08/23/23 1011    gabapentin (NEURONTIN) capsule 300 mg  300 mg Oral Nightly Vandana Churchill MD   300 mg at 08/22/23 2006    glucagon (GLUCAGEN) injection 1 mg  1 mg Subcutaneous Q15 Min PRN Vandana Churchill MD        levothyroxine (SYNTHROID, LEVOTHROID) tablet 175 mcg  175 mcg Oral Nightly Vandana Churchill MD   175 mcg at 08/22/23 2006    metoprolol tartrate (LOPRESSOR) tablet 12.5 mg  12.5 mg Oral Q12H Patti Navas PA-C   12.5 mg at 08/23/23 1011    nitroglycerin (NITROSTAT) SL tablet 0.4 mg  0.4 mg Sublingual Q5 Min PRN Vandana Churchill MD        PARoxetine (PAXIL) tablet 20 mg  20 mg Oral Daily Vandana Churchill MD   20 mg at 08/23/23 1011    raloxifene (EVISTA) tablet 60 mg  60 mg Oral Daily Vandana Churchill MD   60 mg at 08/23/23 1011    sodium chloride 0.9 % flush 10 mL  10 mL Intravenous PRN  Himanshu Santos MD   10 mL at 08/23/23 1010    sodium chloride 0.9 % infusion  50 mL/hr Intravenous Continuous Kit Aly MD 50 mL/hr at 08/22/23 2006 50 mL/hr at 08/22/23 2006       Past Medical History:  Past Medical History:   Diagnosis Date    Abnormality of gait and mobility     Anemia     Anxiety     Cerebral palsy     Cognitive communication deficit     Depression     Diabetes mellitus     Disease of thyroid gland     Hyperglycemia     Hyperlipidemia     Hypertension     Hypokalemia     Insomnia     Lymphedema     Neuropathy     Urge incontinence        Past Surgical History:  History reviewed. No pertinent surgical history.    Family History  History reviewed. No pertinent family history.    Social History  Social History     Socioeconomic History    Marital status: Single   Tobacco Use    Smoking status: Never     Passive exposure: Past   Vaping Use    Vaping Use: Never used   Substance and Sexual Activity    Alcohol use: Never    Drug use: Never    Sexual activity: Not Currently       Review of Systems:  History obtained from chart review and the patient  General ROS: No fever or chills  Respiratory ROS: No cough, shortness of breath, wheezing  Cardiovascular ROS: No chest pain or palpitations  Gastrointestinal ROS: No abdominal pain or melena  Genito-Urinary ROS: No dysuria or hematuria  Psych ROS: No anxiety and depression  14 point ROS reviewed with the patient and negative except as noted above and in the HPI unless unable to obtain.    Objective:  Patient Vitals for the past 24 hrs:   BP Temp Temp src Pulse Resp SpO2 Weight   08/23/23 0805 156/85 98 øF (36.7 øC) Oral 64 16 99 % --   08/23/23 0348 140/81 98.1 øF (36.7 øC) Oral 64 16 99 % 104 kg (229 lb 6.4 oz)   08/22/23 2010 155/97 98.6 øF (37 øC) Oral 75 16 98 % --   08/22/23 1619 159/75 98.1 øF (36.7 øC) Oral 74 16 98 % --   08/22/23 1208 125/58 97.7 øF (36.5 øC) Oral 70 16 93 % --       Intake/Output Summary (Last 24 hours) at  8/23/2023 1023  Last data filed at 8/23/2023 0348  Gross per 24 hour   Intake 600 ml   Output 400 ml   Net 200 ml     General: awake/alert   Chest:  clear to auscultation bilaterally without respiratory distress  CVS:  IRRR  Abdominal: soft, nontender, positive bowel sounds  Extremities:  BLE lymphedema  Skin: warm and dry without rash      Labs:  Results from last 7 days   Lab Units 08/19/23  0606 08/17/23  1919   WBC 10*3/mm3 9.22 14.70*   HEMOGLOBIN g/dL 10.4* 10.0*   HEMATOCRIT % 34.6 32.4*   PLATELETS 10*3/mm3 310 321         Results from last 7 days   Lab Units 08/23/23  0503 08/22/23  0547 08/21/23  0605 08/20/23  0510 08/19/23  0606 08/17/23 2219 08/17/23 1919   SODIUM mmol/L 143 142 141   < > 143   < > 140   POTASSIUM mmol/L 4.1 4.5 4.3   < > 4.6   < > 5.6*   CHLORIDE mmol/L 110* 108* 106   < > 106   < > 107   CO2 mmol/L 22.0 23.0 27.0   < > 25.0   < > 20.0*   BUN mg/dL 31* 34* 38*   < > 30*   < > 32*   CREATININE mg/dL 1.55* 1.81* 2.28*   < > 1.87*   < > 1.77*   CALCIUM mg/dL 9.5 8.4* 9.5   < > 9.7   < > 9.6   EGFR mL/min/1.73 33.3* 27.7* 21.0*   < > 26.6*   < > 28.4*   BILIRUBIN mg/dL  --   --   --   --  0.2  --  <0.2   ALK PHOS U/L  --   --   --   --  122*  --  125*   ALT (SGPT) U/L  --   --   --   --  12  --  12   AST (SGOT) U/L  --   --   --   --  13  --  11   GLUCOSE mg/dL 103* 102* 111*   < > 108*   < > 108*    < > = values in this interval not displayed.       Radiology:   Imaging Results (Last 72 Hours)       ** No results found for the last 72 hours. **            Culture:  No results found for: BLOODCX, URINECX, WOUNDCX, MRSACX, RESPCX, STOOLCX      Assessment    Acute kidney injury--improving  Unknown baseline chronic kidney disease?  Hyperkalemia--improved  Type 2 diabetes  Hypertension  Chronic lymphedema  Atrial fibrillation  Anemia   Secondary hyperparathyroidism       Plan:   Continue gentle IV fluids  2.  Monitor labs    LAZARO Olivera  8/23/2023  10:23 CDT

## 2023-08-23 NOTE — PLAN OF CARE
Goal Outcome Evaluation:  Plan of Care Reviewed With: patient        Progress: improving  Outcome Evaluation: Patient being discharged. New prescription for eliquis for new-onset atrial fib. No shortness of breath. On room air, still in a-fib but rate controlled. Other vitals WNL. No c/o pain. Transporting back to [facility] by EMS.

## 2023-08-23 NOTE — DISCHARGE SUMMARY
Hospital Discharge Summary    Mar Rodriguez  :  1940  MRN:  [id number]    Admit date:  2023  Discharge date:      Admitting Physician:    Vandana Churchill MD    Discharge Diagnoses:      New onset atrial fibrillation    CHRISTOPHER    DM2    Depression   Chronic LE Edema/Lymphedema    Hospital Course:   She was admitted with new onset A Fib. She was seen and treated by Cardiology, and Nephrology. They helped direct her care. She has been stable. She does not tolerate diuretics and had a significant decline in renal function with Lasix. Her VS are stable. She is taking PO.     Discharge Medications:         Discharge Medications        New Medications        Instructions Start Date   apixaban 2.5 MG tablet tablet  Commonly known as: ELIQUIS   2.5 mg, Oral, Every 12 Hours Scheduled      metoprolol tartrate 25 MG tablet  Commonly known as: LOPRESSOR   12.5 mg, Oral, Every 12 Hours Scheduled      sodium chloride 0.9 % solution   50 mL/hr (50 mL/hr), Intravenous, Continuous             Changes to Medications        Instructions Start Date   gabapentin 300 MG capsule  Commonly known as: NEURONTIN  What changed: additional instructions   300 mg, Oral, Nightly             Continue These Medications        Instructions Start Date   atorvastatin 40 MG tablet  Commonly known as: LIPITOR   40 mg, Oral, Nightly      busPIRone 10 MG tablet  Commonly known as: BUSPAR   10 mg, Oral, Daily, For anxiety      colestipol 1 g tablet  Commonly known as: COLESTID   1 g, Oral, 2 Times Daily, For chronic diarrhea      dextrose 40 % gel  Commonly known as: GLUTOSE   15 g, Oral, As Needed      ferrous sulfate 324 (65 Fe) MG tablet delayed-release EC tablet   324 mg, Oral, 2 Times Daily With Meals, For anemia      glucagon 1 MG injection  Commonly known as: GLUCAGEN   1 mg, Subcutaneous, Once As Needed      levothyroxine 175 MCG tablet  Commonly known as: SYNTHROID, LEVOTHROID   175 mcg, Oral, Nightly, For hypothyroidism       PARoxetine 20 MG tablet  Commonly known as: PAXIL   20 mg, Oral, Daily      Tresiba FlexTouch 100 UNIT/ML solution pen-injector injection  Generic drug: insulin degludec   6 Units, Subcutaneous, Nightly      vitamin B-12 1000 MCG tablet  Commonly known as: CYANOCOBALAMIN   1,000 mcg, Oral, Daily             Stop These Medications      Diclofenac Sodium 1 % gel gel  Commonly known as: VOLTAREN     loperamide 2 MG capsule  Commonly known as: IMODIUM     loratadine 10 MG tablet  Commonly known as: CLARITIN     metFORMIN 850 MG tablet  Commonly known as: GLUCOPHAGE     nystatin 838172 UNIT/GM powder  Commonly known as: MYCOSTATIN     potassium chloride 20 MEQ CR tablet  Commonly known as: K-DUR,KLOR-CON            ASK your doctor about these medications        Instructions Start Date   raloxifene 60 MG tablet  Commonly known as: EVISTA   60 mg, Oral, Daily               Consults:  Nephrology, Cardiology    Significant Diagnostic Studies:  see complete admission record      Disposition:   to Nursing Home in stable condition  Follow up with Nephrology, Cardiology as they recommend    Diet: cardiac, low sodium, carb control    Activity: as tolerated    Special Instructions: continue IVF, NS at 50 cc an hour, do BMP tomorrow and call to me so I can make decision on continuing, Accuchecks BID      The patient or family are to call or return if there are any problems, questions, concerns or change in her condition.     Signed:  Vandana Churchill MD MD  8/23/2023, 13:42 CDT

## 2023-08-24 NOTE — PROGRESS NOTES
Forms were printed and given to Dr. Whitt. Will complete this encounter. Please refer to the other MyChart msg.    TRICIA Ochoa on 8/24/2023 at 9:08 AM     Nephrology ([facility]) Progress Note      Patient:  Mar Rodriguez  YOB: 1940  Date of Service: 8/19/2023  MRN: [id number]   Acct: [id number]   Primary Care Physician: Vandana Churchill MD  Advance Directive:   Code Status and Medical Interventions:   Ordered at: 08/18/23 1946     Level Of Support Discussed With:    Health Care Surrogate     Code Status (Patient has no pulse and is not breathing):    CPR (Attempt to Resuscitate)     Medical Interventions (Patient has pulse or is breathing):    Full Support     Comments:    spoke with sisterZamzam     Admit Date: 8/17/2023       Hospital Day: 2  Referring Provider: No Known Provider      Patient personally seen and examined.  Complete chart including Consults, Notes, Operative Reports, Labs, Cardiology, and Radiology studies reviewed as able.        Subjective:  Mar Rodriguez is a 83 y.o. female for whom we were consulted for evaluation and treatment of acute kidney injury and hyperkalemia. Unknown baseline renal function. Patient denies any prior nephrological contacts. History of cerebral palsy, type 2 diabetes, hypertension, lymphedema. Presented to ER on 8/17 with shortness of breath and wheezing. Apparently, this has been progressively worsening for some time.  Minimal information in chart related to any recent medical issues.  Patient is a very poor historian and is unable to provide much information. She does recall that someone told her recently her kidney function was not good. Her only complaints were of cough and dyspnea.  Minimal dyspnea at time of exam. Denies pain or discomfort. Denied changes in urination. No dysuria or hematuria. Denied NSAIDs. Appetite has been good with no n/v/d. Chronic lower extremity lymphedema. Patient does not think the swelling has been any worse recently. Home medications include Metformin and potassium supplement. Given low dose lasix overnight and urine  output had been nonoliguric. Received Lokelma overnight and potassium has normalized.     Today, no overnight events.  Patient remains a poor historian and without significant or new complaints upon questioning.  Tolerating diet.    Allergies:  Nsaids    Home Meds:  Medications Prior to Admission   Medication Sig Dispense Refill Last Dose    atorvastatin (LIPITOR) 40 MG tablet Take 1 tablet by mouth Every Night.   8/16/2023 at 2100    busPIRone (BUSPAR) 10 MG tablet Take 1 tablet by mouth Daily. For anxiety   8/17/2023 at 0900    colestipol (COLESTID) 1 g tablet Take 1 tablet by mouth 2 (Two) Times a Day. For chronic diarrhea   8/17/2023 at 0900    ferrous sulfate 324 (65 Fe) MG tablet delayed-release EC tablet Take 1 tablet by mouth 2 (Two) Times a Day With Meals. For anemia   8/17/2023 at 1600    gabapentin (NEURONTIN) 300 MG capsule Take 1 capsule by mouth Every Night. For idiopathic neuropathy       insulin degludec (Tresiba FlexTouch) 100 UNIT/ML solution pen-injector injection Inject 6 Units under the skin into the appropriate area as directed Every Night.       levothyroxine (SYNTHROID, LEVOTHROID) 175 MCG tablet Take 1 tablet by mouth Every Night. For hypothyroidism       metFORMIN (GLUCOPHAGE) 850 MG tablet Take 1 tablet by mouth 2 (Two) Times a Day With Meals.   8/17/2023 at 0900    nystatin (MYCOSTATIN) 236201 UNIT/GM powder Apply 1 application  topically to the appropriate area as directed 2 (Two) Times a Day. Apply to all folds topically every shift for redness   8/17/2023 at 0900    PARoxetine (PAXIL) 20 MG tablet Take 1 tablet by mouth Daily.   8/17/2023 at 0900    potassium chloride (K-DUR,KLOR-CON) 20 MEQ CR tablet Take 1 tablet by mouth Daily.   8/17/2023 at 0800    raloxifene (EVISTA) 60 MG tablet Take 1 tablet by mouth Daily.       vitamin B-12 (CYANOCOBALAMIN) 1000 MCG tablet Take 1 tablet by mouth Daily.       dextrose (GLUTOSE) 40 % gel Take 15 g by mouth As Needed for Low Blood Sugar (every  15 minutes).   Unknown    Diclofenac Sodium (VOLTAREN) 1 % gel gel Apply 4 g topically to the appropriate area as directed Every 6 (Six) Hours As Needed (to right knee for pain).   Unknown    glucagon (GLUCAGEN) 1 MG injection Inject 1 mg under the skin into the appropriate area as directed 1 (One) Time As Needed for Low Blood Sugar.   Unknown    loperamide (IMODIUM) 2 MG capsule Take 1 capsule by mouth 4 (Four) Times a Day As Needed for Diarrhea (for diarhea, after each loose stool). Do not exceed 6doses/24 hours   Unknown    loratadine (CLARITIN) 10 MG tablet Take 1 tablet by mouth Daily As Needed for Allergies (nasal congestion).   Unknown       Medicines:  Current Facility-Administered Medications   Medication Dose Route Frequency Provider Last Rate Last Admin    apixaban (ELIQUIS) tablet 2.5 mg  2.5 mg Oral Q12H Patti Navas PA-C   2.5 mg at 08/19/23 0947    busPIRone (BUSPAR) tablet 10 mg  10 mg Oral Daily Vandana Churchill MD   10 mg at 08/19/23 0947    dextrose (D50W) (25 g/50 mL) IV injection 25 g  25 g Intravenous Q15 Min PRN Vandana Chruchill MD        dextrose (GLUTOSE) oral gel 15 g  15 g Oral Q15 Min PRN Vandana Churchill MD        ferrous sulfate tablet 325 mg  325 mg Oral BID With Meals Vandana Churchill MD   325 mg at 08/19/23 0947    furosemide (LASIX) injection 20 mg  20 mg Intravenous BID Vandana Churchill MD   20 mg at 08/19/23 0947    gabapentin (NEURONTIN) capsule 300 mg  300 mg Oral Nightly Vandana Churchill MD   300 mg at 08/18/23 2021    glucagon (GLUCAGEN) injection 1 mg  1 mg Subcutaneous Q15 Min PRN Vnadana Churchill MD        levothyroxine (SYNTHROID, LEVOTHROID) tablet 175 mcg  175 mcg Oral Nightly Vandana Churchill MD   175 mcg at 08/18/23 2022    metoprolol tartrate (LOPRESSOR) tablet 12.5 mg  12.5 mg Oral Q12H Patti Navas PA-C   12.5 mg at 08/19/23 0947    nitroglycerin (NITROSTAT) SL tablet 0.4 mg  0.4 mg Sublingual Q5 Min  Vandana Donnelly MD        PARoxetine (PAXIL) tablet 20 mg  20 mg Oral Daily Vandana Churchill MD   20 mg at 08/19/23 0947    raloxifene (EVISTA) tablet 60 mg  60 mg Oral Daily Vandana Churchill MD   60 mg at 08/19/23 0947    sodium chloride 0.9 % flush 10 mL  10 mL Intravenous Himanshu Blackwell MD           Past Medical History:  Past Medical History:   Diagnosis Date    Abnormality of gait and mobility     Anemia     Anxiety     Cerebral palsy     Cognitive communication deficit     Depression     Diabetes mellitus     Disease of thyroid gland     Hyperglycemia     Hyperlipidemia     Hypertension     Hypokalemia     Insomnia     Lymphedema     Neuropathy     Urge incontinence        Past Surgical History:  History reviewed. No pertinent surgical history.    Family History  History reviewed. No pertinent family history.    Social History  Social History     Socioeconomic History    Marital status: Single   Tobacco Use    Smoking status: Never     Passive exposure: Past   Vaping Use    Vaping Use: Never used   Substance and Sexual Activity    Alcohol use: Never    Drug use: Never    Sexual activity: Not Currently       Review of Systems:  History obtained from chart review and the patient  General ROS: No fever or chills  Respiratory ROS: No cough, shortness of breath, wheezing  Cardiovascular ROS: No chest pain or palpitations  Gastrointestinal ROS: No abdominal pain or melena  Genito-Urinary ROS: No dysuria or hematuria  Psych ROS: No anxiety and depression  14 point ROS reviewed with the patient and negative except as noted above and in the HPI unless unable to obtain.    Objective:  Patient Vitals for the past 24 hrs:   BP Temp Temp src Pulse Resp SpO2 Weight   08/19/23 1500 140/79 98.1 øF (36.7 øC) Oral 73 16 96 % --   08/19/23 1100 139/68 97.9 øF (36.6 øC) Oral 76 18 98 % --   08/19/23 0700 148/83 97.7 øF (36.5 øC) Oral 74 18 97 % --   08/19/23 0504 150/95 98.5 øF (36.9 øC) Oral 74 18 98 %  --   08/19/23 0012 148/78 98.9 øF (37.2 øC) Oral 88 18 98 % --   08/18/23 1953 159/81 99.1 øF (37.3 øC) Oral 87 18 96 % 111 kg (244 lb 8 oz)       Intake/Output Summary (Last 24 hours) at 8/19/2023 1613  Last data filed at 8/19/2023 0900  Gross per 24 hour   Intake 560 ml   Output 1750 ml   Net -1190 ml     General: awake/alert   Chest:  clear to auscultation bilaterally without respiratory distress  CVS: IRRR  Abdominal: soft, nontender, positive bowel sounds  Extremities: no cyanosis or edema  Skin: warm and dry without rash      Labs:  Results from last 7 days   Lab Units 08/19/23  0606 08/17/23  1919   WBC 10*3/mm3 9.22 14.70*   HEMOGLOBIN g/dL 10.4* 10.0*   HEMATOCRIT % 34.6 32.4*   PLATELETS 10*3/mm3 310 321         Results from last 7 days   Lab Units 08/19/23  0606 08/18/23  0411 08/17/23 2219 08/17/23 1919   SODIUM mmol/L 143 141  --  140   POTASSIUM mmol/L 4.6 4.7 5.9* 5.6*   CHLORIDE mmol/L 106 107  --  107   CO2 mmol/L 25.0 23.0  --  20.0*   BUN mg/dL 30* 31*  --  32*   CREATININE mg/dL 1.87* 1.83*  --  1.77*   CALCIUM mg/dL 9.7 9.1  --  9.6   EGFR mL/min/1.73 26.6* 27.3*  --  28.4*   BILIRUBIN mg/dL 0.2  --   --  <0.2   ALK PHOS U/L 122*  --   --  125*   ALT (SGPT) U/L 12  --   --  12   AST (SGOT) U/L 13  --   --  11   GLUCOSE mg/dL 108* 103*  --  108*       Radiology:   Imaging Results (Last 72 Hours)       Procedure Component Value Units Date/Time    US Renal Bilateral [[id number]] Collected: 08/18/23 1517     Updated: 08/18/23 1528    Narrative:      EXAMINATION:  US RENAL BILATERAL-  8/18/2023 1:41 PM CDT     HISTORY: CHRISTOPHER; I48.91-Unspecified atrial fibrillation; R06.02-Shortness  of breath; I51.7-Cardiomegaly; N17.9-Acute kidney failure, unspecified;  I50.9-Heart failure, unspecified; Z74.09-Other reduced mobility.      COMPARISON: No comparison study.     TECHNIQUE: Grayscale and color flow imaging were performed.     FINDINGS: The visualized abdominal aorta is normal caliber. There is  mild  bladder wall thickening. The bladder is not very well distended.  The right kidney measures 10.6 cm and the left kidney measures 10 cm.  The cortical thickness and echogenicity are normal. There is no  hydronephrosis.          Impression:      1. Bilateral renal ultrasound is within normal limits.  2. Bladder wall thickening is probably related to underdistention.  This report was finalized on 08/18/2023 15:25 by Dr. Dewayne Omalley MD.    XR Chest 1 View [[id number]] Collected: 08/17/23 1909     Updated: 08/17/23 1913    Narrative:      EXAMINATION: Chest 1 view 08/17/2023     HISTORY: Shortness of breath.     FINDINGS: Today's exam is compared to previous study of 03/22/2014.  There is moderate cardiomegaly. The thoracic aorta is tortuous. There is  no consolidative pneumonia or effusion. No free air seen beneath the  hemidiaphragms. There is chronic mild elevation of the right  hemidiaphragm.       Impression:      1.. Cardiomegaly.  2. No acute consolidative pneumonia or effusion.  This report was finalized on 08/17/2023 19:10 by Dr. Jay Nova MD.            Culture:  No results found for: BLOODCX, URINECX, WOUNDCX, MRSACX, RESPCX, STOOLCX      Assessment   Acute kidney injury with unspecified baseline  Hyperkalemia  Hypertension  Diabetes type 2  Chronic lymphedema  Atrial fibrillation-new onset per diagnosis  Anemia  Secondary hyperparathyroidism    Plan:  Discussed with patient, nursing  Work-up reviewed to date  Potassium improved with medical management, repeat Lokelma dosing as needed  Cardiology evaluation reviewed  Monitor labs  IV diuretics, monitor response    Fei Cueto MD  8/19/2023  16:13 CDT

## 2023-08-24 NOTE — THERAPY DISCHARGE NOTE
Acute Care - Occupational Therapy Discharge Summary  [facility]     Patient Name: Mar Rodriguez  : 1940  MRN: [id number]    Today's Date: 2023                 Admit Date: 2023        OT Recommendation and Plan    Visit Dx:    ICD-10-CM ICD-9-CM   1. New onset atrial fibrillation  I48.91 427.31   2. Shortness of breath  R06.02 786.05   3. Cardiomegaly  I51.7 429.3   4. Acute kidney injury  N17.9 584.9   5. New onset of congestive heart failure  I50.9 428.0   6. Impaired mobility [Z74.09 (ICD-10-CM)]  Z74.09 799.89   7. Impaired mobility and ADLs [Z74.09, Z78.9 (ICD-10-CM)]  Z74.09 V49.89    Z78.9                 OT Rehab Goals       Row Name 23 0700             Transfer Goal 1 (OT)    Activity/Assistive Device (Transfer Goal 1, OT) commode, bedside without drop arms  -CS      Raysal Level/Cues Needed (Transfer Goal 1, OT) minimum assist (75% or more patient effort)  -CS      Time Frame (Transfer Goal 1, OT) long term goal (LTG);by discharge  -CS      Progress/Outcome (Transfer Goal 1, OT) goal not met  -CS         Dressing Goal 1 (OT)    Activity/Device (Dressing Goal 1, OT) upper body dressing  -CS      Raysal/Cues Needed (Dressing Goal 1, OT) minimum assist (75% or more patient effort)  -CS      Time Frame (Dressing Goal 1, OT) long term goal (LTG);by discharge  -CS      Progress/Outcome (Dressing Goal 1, OT) goal not met  -CS         Toileting Goal 1 (OT)    Activity/Device (Toileting Goal 1, OT) toileting skills, all  -CS      Raysal Level/Cues Needed (Toileting Goal 1, OT) moderate assist (50-74% patient effort)  -CS      Time Frame (Toileting Goal 1, OT) long term goal (LTG);by discharge  -CS      Progress/Outcome (Toileting Goal 1, OT) goal not met  -CS                User Key  (r) = Recorded By, (t) = Taken By, (c) = Cosigned By      Initials Name Provider Type Discipline    CS Woodward, Esme S, OTR/L, CNT Occupational Therapist OT                     Outcome  Measures       Row Name 08/22/23 1529 08/21/23 1452          How much help from another person do you currently need...    Turning from your back to your side while in flat bed without using bedrails? 3  -MARYSOL 3  -MARYSOL     Moving from lying on back to sitting on the side of a flat bed without bedrails? 3  -MARYSOL 3  -MARYSOL     Moving to and from a bed to a chair (including a wheelchair)? 3  -MARYSOL 3  -MARYSOL     Standing up from a chair using your arms (e.g., wheelchair, bedside chair)? 3  -MARYSOL 3  -MARYSOL     Climbing 3-5 steps with a railing? 1  -MARYSOL 1  -MARYSOL     To walk in hospital room? 1  -MARYSOL 2  -MARYSOL     AM-PAC 6 Clicks Score (PT) 14  -MARYSOL 15  -MARYSOL        Functional Assessment    Outcome Measure Options AM-PAC 6 Clicks Basic Mobility (PT)  -MARYSOL AM-PAC 6 Clicks Basic Mobility (PT)  -MARYSOL               User Key  (r) = Recorded By, (t) = Taken By, (c) = Cosigned By      Initials Name Provider Type    Mati Ingram, PTA Physical Therapist Assistant                            OT Discharge Summary  Anticipated Discharge Disposition (OT): skilled nursing facility  Reason for Discharge: Discharge from facility  Outcomes Achieved: Refer to plan of care for updates on goals achieved  Discharge Destination: SNF      Esme Woodward, SREER/L, HUA  8/24/2023

## 2024-12-31 ENCOUNTER — APPOINTMENT (OUTPATIENT)
Dept: GENERAL RADIOLOGY | Facility: HOSPITAL | Age: 84
DRG: 640 | End: 2024-12-31
Payer: MEDICARE

## 2024-12-31 ENCOUNTER — HOSPITAL ENCOUNTER (INPATIENT)
Facility: HOSPITAL | Age: 84
LOS: 6 days | Discharge: NURSING, REHAB OR LONG-TERM CARE | DRG: 640 | End: 2025-01-06
Attending: EMERGENCY MEDICINE | Admitting: FAMILY MEDICINE
Payer: MEDICARE

## 2024-12-31 DIAGNOSIS — R09.89 PULMONARY VASCULAR CONGESTION: Primary | ICD-10-CM

## 2024-12-31 DIAGNOSIS — Z74.09 IMPAIRED MOBILITY: ICD-10-CM

## 2024-12-31 LAB
ALBUMIN SERPL-MCNC: 3.5 G/DL (ref 3.5–5.2)
ALBUMIN/GLOB SERPL: 0.9 G/DL
ALP SERPL-CCNC: 201 U/L (ref 39–117)
ALT SERPL W P-5'-P-CCNC: 55 U/L (ref 1–33)
ANION GAP SERPL CALCULATED.3IONS-SCNC: 12 MMOL/L (ref 5–15)
AST SERPL-CCNC: 37 U/L (ref 1–32)
BASOPHILS # BLD AUTO: 0.04 10*3/MM3 (ref 0–0.2)
BASOPHILS NFR BLD AUTO: 0.3 % (ref 0–1.5)
BILIRUB SERPL-MCNC: 0.3 MG/DL (ref 0–1.2)
BUN SERPL-MCNC: 47 MG/DL (ref 8–23)
BUN/CREAT SERPL: 22.2 (ref 7–25)
CALCIUM SPEC-SCNC: 9.1 MG/DL (ref 8.6–10.5)
CHLORIDE SERPL-SCNC: 102 MMOL/L (ref 98–107)
CO2 SERPL-SCNC: 25 MMOL/L (ref 22–29)
CREAT SERPL-MCNC: 2.12 MG/DL (ref 0.57–1)
D-LACTATE SERPL-SCNC: 2.6 MMOL/L (ref 0.5–2)
DEPRECATED RDW RBC AUTO: 51.3 FL (ref 37–54)
EGFRCR SERPLBLD CKD-EPI 2021: 22.7 ML/MIN/1.73
EOSINOPHIL # BLD AUTO: 0.05 10*3/MM3 (ref 0–0.4)
EOSINOPHIL NFR BLD AUTO: 0.4 % (ref 0.3–6.2)
ERYTHROCYTE [DISTWIDTH] IN BLOOD BY AUTOMATED COUNT: 13.2 % (ref 12.3–15.4)
FLUAV RNA RESP QL NAA+PROBE: NOT DETECTED
FLUBV RNA RESP QL NAA+PROBE: NOT DETECTED
GEN 5 1HR TROPONIN T REFLEX: 19 NG/L
GLOBULIN UR ELPH-MCNC: 4 GM/DL
GLUCOSE SERPL-MCNC: 236 MG/DL (ref 65–99)
HCT VFR BLD AUTO: 44.2 % (ref 34–46.6)
HGB BLD-MCNC: 13.4 G/DL (ref 12–15.9)
IMM GRANULOCYTES # BLD AUTO: 0.05 10*3/MM3 (ref 0–0.05)
IMM GRANULOCYTES NFR BLD AUTO: 0.4 % (ref 0–0.5)
L PNEUMO1 AG UR QL IA: NEGATIVE
LYMPHOCYTES # BLD AUTO: 0.97 10*3/MM3 (ref 0.7–3.1)
LYMPHOCYTES NFR BLD AUTO: 7.8 % (ref 19.6–45.3)
MAGNESIUM SERPL-MCNC: 2 MG/DL (ref 1.6–2.4)
MCH RBC QN AUTO: 31.8 PG (ref 26.6–33)
MCHC RBC AUTO-ENTMCNC: 30.3 G/DL (ref 31.5–35.7)
MCV RBC AUTO: 105 FL (ref 79–97)
MONOCYTES # BLD AUTO: 0.88 10*3/MM3 (ref 0.1–0.9)
MONOCYTES NFR BLD AUTO: 7.1 % (ref 5–12)
NEUTROPHILS NFR BLD AUTO: 10.46 10*3/MM3 (ref 1.7–7)
NEUTROPHILS NFR BLD AUTO: 84 % (ref 42.7–76)
NRBC BLD AUTO-RTO: 0 /100 WBC (ref 0–0.2)
NT-PROBNP SERPL-MCNC: 2789 PG/ML (ref 0–1800)
PLATELET # BLD AUTO: 244 10*3/MM3 (ref 140–450)
PMV BLD AUTO: 10.5 FL (ref 6–12)
POTASSIUM SERPL-SCNC: 4.8 MMOL/L (ref 3.5–5.2)
PROT SERPL-MCNC: 7.5 G/DL (ref 6–8.5)
RBC # BLD AUTO: 4.21 10*6/MM3 (ref 3.77–5.28)
RSV RNA RESP QL NAA+PROBE: NOT DETECTED
SARS-COV-2 RNA RESP QL NAA+PROBE: NOT DETECTED
SODIUM SERPL-SCNC: 139 MMOL/L (ref 136–145)
TROPONIN T % DELTA: -27 %
TROPONIN T NUMERIC DELTA: -7 NG/L
TROPONIN T SERPL HS-MCNC: 26 NG/L
WBC NRBC COR # BLD AUTO: 12.45 10*3/MM3 (ref 3.4–10.8)

## 2024-12-31 PROCEDURE — 25010000002 FUROSEMIDE PER 20 MG: Performed by: FAMILY MEDICINE

## 2024-12-31 PROCEDURE — 25010000002 NITROGLYCERIN 200 MCG/ML SOLUTION

## 2024-12-31 PROCEDURE — 25010000002 CEFTRIAXONE PER 250 MG

## 2024-12-31 PROCEDURE — 80053 COMPREHEN METABOLIC PANEL: CPT | Performed by: EMERGENCY MEDICINE

## 2024-12-31 PROCEDURE — 87449 NOS EACH ORGANISM AG IA: CPT

## 2024-12-31 PROCEDURE — 87899 AGENT NOS ASSAY W/OPTIC: CPT

## 2024-12-31 PROCEDURE — 85025 COMPLETE CBC W/AUTO DIFF WBC: CPT | Performed by: EMERGENCY MEDICINE

## 2024-12-31 PROCEDURE — 96366 THER/PROPH/DIAG IV INF ADDON: CPT

## 2024-12-31 PROCEDURE — 71045 X-RAY EXAM CHEST 1 VIEW: CPT

## 2024-12-31 PROCEDURE — 87040 BLOOD CULTURE FOR BACTERIA: CPT

## 2024-12-31 PROCEDURE — 25010000002 ENOXAPARIN PER 10 MG: Performed by: FAMILY MEDICINE

## 2024-12-31 PROCEDURE — 83605 ASSAY OF LACTIC ACID: CPT

## 2024-12-31 PROCEDURE — 87637 SARSCOV2&INF A&B&RSV AMP PRB: CPT | Performed by: EMERGENCY MEDICINE

## 2024-12-31 PROCEDURE — 93010 ELECTROCARDIOGRAM REPORT: CPT | Performed by: STUDENT IN AN ORGANIZED HEALTH CARE EDUCATION/TRAINING PROGRAM

## 2024-12-31 PROCEDURE — 84484 ASSAY OF TROPONIN QUANT: CPT | Performed by: EMERGENCY MEDICINE

## 2024-12-31 PROCEDURE — 25010000002 FUROSEMIDE PER 20 MG: Performed by: EMERGENCY MEDICINE

## 2024-12-31 PROCEDURE — 25010000002 LABETALOL 5 MG/ML SOLUTION: Performed by: FAMILY MEDICINE

## 2024-12-31 PROCEDURE — 96372 THER/PROPH/DIAG INJ SC/IM: CPT

## 2024-12-31 PROCEDURE — 94799 UNLISTED PULMONARY SVC/PX: CPT

## 2024-12-31 PROCEDURE — 99285 EMERGENCY DEPT VISIT HI MDM: CPT

## 2024-12-31 PROCEDURE — 96376 TX/PRO/DX INJ SAME DRUG ADON: CPT

## 2024-12-31 PROCEDURE — 25010000002 HYDRALAZINE PER 20 MG: Performed by: EMERGENCY MEDICINE

## 2024-12-31 PROCEDURE — 93005 ELECTROCARDIOGRAM TRACING: CPT | Performed by: EMERGENCY MEDICINE

## 2024-12-31 PROCEDURE — 83880 ASSAY OF NATRIURETIC PEPTIDE: CPT | Performed by: EMERGENCY MEDICINE

## 2024-12-31 PROCEDURE — 94660 CPAP INITIATION&MGMT: CPT

## 2024-12-31 PROCEDURE — 25010000002 FUROSEMIDE PER 20 MG

## 2024-12-31 PROCEDURE — 83735 ASSAY OF MAGNESIUM: CPT | Performed by: EMERGENCY MEDICINE

## 2024-12-31 PROCEDURE — 96375 TX/PRO/DX INJ NEW DRUG ADDON: CPT

## 2024-12-31 PROCEDURE — 96365 THER/PROPH/DIAG IV INF INIT: CPT

## 2024-12-31 RX ORDER — METOPROLOL TARTRATE 25 MG/1
25 TABLET, FILM COATED ORAL 2 TIMES DAILY
Status: DISCONTINUED | OUTPATIENT
Start: 2024-12-31 | End: 2025-01-06 | Stop reason: HOSPADM

## 2024-12-31 RX ORDER — FUROSEMIDE 10 MG/ML
80 INJECTION INTRAMUSCULAR; INTRAVENOUS ONCE
Status: COMPLETED | OUTPATIENT
Start: 2024-12-31 | End: 2024-12-31

## 2024-12-31 RX ORDER — IPRATROPIUM BROMIDE AND ALBUTEROL SULFATE 2.5; .5 MG/3ML; MG/3ML
3 SOLUTION RESPIRATORY (INHALATION) EVERY 6 HOURS PRN
COMMUNITY

## 2024-12-31 RX ORDER — ALPRAZOLAM 0.5 MG
0.25 TABLET ORAL ONCE
Status: COMPLETED | OUTPATIENT
Start: 2024-12-31 | End: 2024-12-31

## 2024-12-31 RX ORDER — BISACODYL 10 MG
10 SUPPOSITORY, RECTAL RECTAL DAILY PRN
Status: DISCONTINUED | OUTPATIENT
Start: 2024-12-31 | End: 2025-01-06 | Stop reason: HOSPADM

## 2024-12-31 RX ORDER — BISACODYL 5 MG/1
5 TABLET, DELAYED RELEASE ORAL DAILY PRN
Status: DISCONTINUED | OUTPATIENT
Start: 2024-12-31 | End: 2025-01-06 | Stop reason: HOSPADM

## 2024-12-31 RX ORDER — MULTIVITAMIN WITH IRON
1000 TABLET ORAL DAILY
Status: DISCONTINUED | OUTPATIENT
Start: 2025-01-01 | End: 2025-01-06 | Stop reason: HOSPADM

## 2024-12-31 RX ORDER — FUROSEMIDE 40 MG/1
40 TABLET ORAL DAILY
COMMUNITY

## 2024-12-31 RX ORDER — NITROGLYCERIN 0.4 MG/1
0.4 TABLET SUBLINGUAL
Status: DISCONTINUED | OUTPATIENT
Start: 2024-12-31 | End: 2025-01-06 | Stop reason: HOSPADM

## 2024-12-31 RX ORDER — SODIUM CHLORIDE 0.9 % (FLUSH) 0.9 %
10 SYRINGE (ML) INJECTION AS NEEDED
Status: DISCONTINUED | OUTPATIENT
Start: 2024-12-31 | End: 2025-01-06

## 2024-12-31 RX ORDER — NITROGLYCERIN 20 MG/100ML
5-200 INJECTION INTRAVENOUS
Status: DISCONTINUED | OUTPATIENT
Start: 2024-12-31 | End: 2025-01-02

## 2024-12-31 RX ORDER — FERROUS SULFATE 325(65) MG
325 TABLET ORAL
Status: DISCONTINUED | OUTPATIENT
Start: 2025-01-01 | End: 2025-01-06 | Stop reason: HOSPADM

## 2024-12-31 RX ORDER — HYDRALAZINE HYDROCHLORIDE 20 MG/ML
10 INJECTION INTRAMUSCULAR; INTRAVENOUS ONCE
Status: COMPLETED | OUTPATIENT
Start: 2024-12-31 | End: 2024-12-31

## 2024-12-31 RX ORDER — ATORVASTATIN CALCIUM 40 MG/1
40 TABLET, FILM COATED ORAL NIGHTLY
Status: DISCONTINUED | OUTPATIENT
Start: 2024-12-31 | End: 2025-01-06 | Stop reason: HOSPADM

## 2024-12-31 RX ORDER — METOPROLOL TARTRATE 25 MG/1
25 TABLET, FILM COATED ORAL 2 TIMES DAILY
COMMUNITY

## 2024-12-31 RX ORDER — CHLORHEXIDINE GLUCONATE 500 MG/1
1 CLOTH TOPICAL ONCE
Status: COMPLETED | OUTPATIENT
Start: 2024-12-31 | End: 2024-12-31

## 2024-12-31 RX ORDER — LEVOTHYROXINE SODIUM 100 UG/1
200 TABLET ORAL NIGHTLY
Status: DISCONTINUED | OUTPATIENT
Start: 2024-12-31 | End: 2025-01-06 | Stop reason: HOSPADM

## 2024-12-31 RX ORDER — ENOXAPARIN SODIUM 100 MG/ML
40 INJECTION SUBCUTANEOUS EVERY 24 HOURS
Status: DISCONTINUED | OUTPATIENT
Start: 2024-12-31 | End: 2024-12-31

## 2024-12-31 RX ORDER — AMOXICILLIN 250 MG
2 CAPSULE ORAL 2 TIMES DAILY
Status: DISCONTINUED | OUTPATIENT
Start: 2024-12-31 | End: 2025-01-06 | Stop reason: HOSPADM

## 2024-12-31 RX ORDER — IPRATROPIUM BROMIDE AND ALBUTEROL SULFATE 2.5; .5 MG/3ML; MG/3ML
3 SOLUTION RESPIRATORY (INHALATION) EVERY 6 HOURS PRN
Status: DISCONTINUED | OUTPATIENT
Start: 2024-12-31 | End: 2025-01-01

## 2024-12-31 RX ORDER — LABETALOL HYDROCHLORIDE 5 MG/ML
10 INJECTION, SOLUTION INTRAVENOUS ONCE
Status: COMPLETED | OUTPATIENT
Start: 2024-12-31 | End: 2024-12-31

## 2024-12-31 RX ORDER — BUSPIRONE HYDROCHLORIDE 5 MG/1
7.5 TABLET ORAL 2 TIMES DAILY
Status: DISCONTINUED | OUTPATIENT
Start: 2024-12-31 | End: 2025-01-06 | Stop reason: HOSPADM

## 2024-12-31 RX ORDER — ACETAMINOPHEN 325 MG/1
650 TABLET ORAL EVERY 6 HOURS PRN
COMMUNITY

## 2024-12-31 RX ORDER — SODIUM CHLORIDE 0.9 % (FLUSH) 0.9 %
10 SYRINGE (ML) INJECTION AS NEEDED
Status: DISCONTINUED | OUTPATIENT
Start: 2024-12-31 | End: 2025-01-06 | Stop reason: HOSPADM

## 2024-12-31 RX ORDER — GABAPENTIN 300 MG/1
300 CAPSULE ORAL NIGHTLY
Status: DISCONTINUED | OUTPATIENT
Start: 2024-12-31 | End: 2025-01-06 | Stop reason: HOSPADM

## 2024-12-31 RX ORDER — CHLORHEXIDINE GLUCONATE 500 MG/1
1 CLOTH TOPICAL EVERY 24 HOURS
Status: DISCONTINUED | OUTPATIENT
Start: 2025-01-01 | End: 2025-01-04

## 2024-12-31 RX ORDER — SODIUM CHLORIDE 9 MG/ML
40 INJECTION, SOLUTION INTRAVENOUS AS NEEDED
Status: DISCONTINUED | OUTPATIENT
Start: 2024-12-31 | End: 2025-01-06 | Stop reason: HOSPADM

## 2024-12-31 RX ORDER — SODIUM CHLORIDE 0.9 % (FLUSH) 0.9 %
10 SYRINGE (ML) INJECTION EVERY 12 HOURS SCHEDULED
Status: DISCONTINUED | OUTPATIENT
Start: 2024-12-31 | End: 2025-01-06 | Stop reason: HOSPADM

## 2024-12-31 RX ORDER — NYSTATIN 100000 [USP'U]/G
1 POWDER TOPICAL EVERY 12 HOURS
COMMUNITY

## 2024-12-31 RX ORDER — ACETAMINOPHEN 325 MG/1
650 TABLET ORAL EVERY 6 HOURS PRN
Status: DISCONTINUED | OUTPATIENT
Start: 2024-12-31 | End: 2025-01-06 | Stop reason: HOSPADM

## 2024-12-31 RX ORDER — POLYETHYLENE GLYCOL 3350 17 G/17G
17 POWDER, FOR SOLUTION ORAL DAILY PRN
Status: DISCONTINUED | OUTPATIENT
Start: 2024-12-31 | End: 2025-01-06 | Stop reason: HOSPADM

## 2024-12-31 RX ORDER — CLONIDINE HYDROCHLORIDE 0.1 MG/1
0.1 TABLET ORAL EVERY 8 HOURS PRN
COMMUNITY
End: 2025-01-06 | Stop reason: HOSPADM

## 2024-12-31 RX ORDER — LABETALOL HYDROCHLORIDE 5 MG/ML
10 INJECTION, SOLUTION INTRAVENOUS
Status: DISCONTINUED | OUTPATIENT
Start: 2024-12-31 | End: 2025-01-06 | Stop reason: HOSPADM

## 2024-12-31 RX ORDER — GUAIFENESIN 600 MG/1
1200 TABLET, EXTENDED RELEASE ORAL 2 TIMES DAILY
COMMUNITY
End: 2025-01-16 | Stop reason: HOSPADM

## 2024-12-31 RX ORDER — AZITHROMYCIN 250 MG/1
500 TABLET, FILM COATED ORAL
Status: COMPLETED | OUTPATIENT
Start: 2024-12-31 | End: 2025-01-02

## 2024-12-31 RX ORDER — ANORECTAL OINTMENT 15.7; .44; 24; 20.6 G/100G; G/100G; G/100G; G/100G
1 OINTMENT TOPICAL 3 TIMES DAILY
Status: ON HOLD | COMMUNITY
End: 2025-01-14

## 2024-12-31 RX ORDER — IPRATROPIUM BROMIDE AND ALBUTEROL SULFATE 2.5; .5 MG/3ML; MG/3ML
3 SOLUTION RESPIRATORY (INHALATION)
Status: DISCONTINUED | OUTPATIENT
Start: 2024-12-31 | End: 2024-12-31

## 2024-12-31 RX ORDER — ALPRAZOLAM 0.5 MG
0.25 TABLET ORAL NIGHTLY PRN
Status: DISCONTINUED | OUTPATIENT
Start: 2024-12-31 | End: 2024-12-31

## 2024-12-31 RX ADMIN — CHLORHEXIDINE GLUCONATE 1 APPLICATION: 500 CLOTH TOPICAL at 22:00

## 2024-12-31 RX ADMIN — Medication 1 APPLICATION: at 22:00

## 2024-12-31 RX ADMIN — HYDRALAZINE HYDROCHLORIDE 10 MG: 20 INJECTION, SOLUTION INTRAMUSCULAR; INTRAVENOUS at 15:53

## 2024-12-31 RX ADMIN — BUSPIRONE HYDROCHLORIDE 7.5 MG: 5 TABLET ORAL at 21:03

## 2024-12-31 RX ADMIN — LABETALOL HYDROCHLORIDE 10 MG: 5 INJECTION, SOLUTION INTRAVENOUS at 18:26

## 2024-12-31 RX ADMIN — NITROGLYCERIN 100 MCG/MIN: 20 INJECTION INTRAVENOUS at 19:53

## 2024-12-31 RX ADMIN — FUROSEMIDE 80 MG: 10 INJECTION, SOLUTION INTRAVENOUS at 12:28

## 2024-12-31 RX ADMIN — GABAPENTIN 300 MG: 300 CAPSULE ORAL at 21:02

## 2024-12-31 RX ADMIN — CEFTRIAXONE SODIUM 2000 MG: 2 INJECTION, POWDER, FOR SOLUTION INTRAMUSCULAR; INTRAVENOUS at 23:19

## 2024-12-31 RX ADMIN — ATORVASTATIN CALCIUM 40 MG: 40 TABLET, FILM COATED ORAL at 21:02

## 2024-12-31 RX ADMIN — FUROSEMIDE 80 MG: 10 INJECTION, SOLUTION INTRAVENOUS at 20:56

## 2024-12-31 RX ADMIN — METOPROLOL TARTRATE 25 MG: 25 TABLET, FILM COATED ORAL at 21:02

## 2024-12-31 RX ADMIN — Medication 10 ML: at 22:00

## 2024-12-31 RX ADMIN — FUROSEMIDE 5 MG/HR: 10 INJECTION, SOLUTION INTRAMUSCULAR; INTRAVENOUS at 17:42

## 2024-12-31 RX ADMIN — DOCUSATE SODIUM 50 MG AND SENNOSIDES 8.6 MG 2 TABLET: 8.6; 5 TABLET, FILM COATED ORAL at 22:00

## 2024-12-31 RX ADMIN — LABETALOL HYDROCHLORIDE 10 MG: 5 INJECTION, SOLUTION INTRAVENOUS at 16:19

## 2024-12-31 RX ADMIN — ENOXAPARIN SODIUM 40 MG: 100 INJECTION SUBCUTANEOUS at 17:42

## 2024-12-31 RX ADMIN — LEVOTHYROXINE SODIUM 200 MCG: 100 TABLET ORAL at 21:02

## 2024-12-31 RX ADMIN — ALPRAZOLAM 0.25 MG: 0.5 TABLET ORAL at 18:04

## 2024-12-31 RX ADMIN — AZITHROMYCIN 500 MG: 250 TABLET, FILM COATED ORAL at 23:19

## 2024-12-31 NOTE — ED NOTES
"Nursing report ED to floor  Mar ROSARIO [facility]  84 y.o.  female    HPI:   Chief Complaint   Patient presents with    Edema     fluid       Admitting doctor:   Vandana Churchill MD    Consulting provider(s):  Consults       No orders found from 12/2/2024 to 1/1/2025.             Admitting diagnosis:   The encounter diagnosis was Pulmonary vascular congestion.    Code status:   Current Code Status       Date Active Code Status Order ID Comments User Context       Prior            Allergies:   Nsaids    Intake and Output  No intake or output data in the 24 hours ending 12/31/24 1551    Weight:       12/31/24  1102   Weight: 127 kg (281 lb)       Most recent vitals:   Vitals:    12/31/24 1102 12/31/24 1230 12/31/24 1331 12/31/24 1500   BP: (!) 201/112 (!) 187/93 (!) 177/109 (!) 201/116   BP Location: Right arm      Patient Position: Lying      Pulse: 92 88 88    Resp: 22 26     Temp: 97.8 °F (36.6 °C)      TempSrc: Oral      SpO2: 94% 93% 92%    Weight: 127 kg (281 lb)      Height: 165.1 cm (65\")        Oxygen Therapy: .    Active LDAs/IV Access:   Lines, Drains & Airways       Active LDAs       Name Placement date Placement time Site Days    Peripheral IV 08/23/23 1643 Posterior;Right Forearm 08/23/23  1643  Forearm  496    Peripheral IV 12/31/24 1213 Left;Posterior Hand 12/31/24  1213  Hand  less than 1                    Labs (abnormal labs have a star):   Labs Reviewed   COMPREHENSIVE METABOLIC PANEL - Abnormal; Notable for the following components:       Result Value    Glucose 236 (*)     BUN 47 (*)     Creatinine 2.12 (*)     ALT (SGPT) 55 (*)     AST (SGOT) 37 (*)     Alkaline Phosphatase 201 (*)     eGFR 22.7 (*)     All other components within normal limits    Narrative:     GFR Categories in Chronic Kidney Disease (CKD)      GFR Category          GFR (mL/min/1.73)    Interpretation  G1                     90 or greater         Normal or high (1)  G2                      60-89                Mild " decrease (1)  G3a                   45-59                Mild to moderate decrease  G3b                   30-44                Moderate to severe decrease  G4                    15-29                Severe decrease  G5                    14 or less           Kidney failure          (1)In the absence of evidence of kidney disease, neither GFR category G1 or G2 fulfill the criteria for CKD.    eGFR calculation 2021 CKD-EPI creatinine equation, which does not include race as a factor   BNP (IN-HOUSE) - Abnormal; Notable for the following components:    proBNP 2,789.0 (*)     All other components within normal limits    Narrative:     This assay is used as an aid in the diagnosis of individuals suspected of having heart failure. It can be used as an aid in the diagnosis of acute decompensated heart failure (ADHF) in patients presenting with signs and symptoms of ADHF to the emergency department (ED). In addition, NT-proBNP of <300 pg/mL indicates ADHF is not likely.    Age Range Result Interpretation  NT-proBNP Concentration (pg/mL:      <50             Positive            >450                   Gray                 300-450                    Negative             <300    50-75           Positive            >900                  Gray                300-900                  Negative            <300      >75             Positive            >1800                  Gray                300-1800                  Negative            <300   TROPONIN - Abnormal; Notable for the following components:    HS Troponin T 26 (*)     All other components within normal limits    Narrative:     High Sensitive Troponin T Reference Range:  <14.0 ng/L- Negative Female for AMI  <22.0 ng/L- Negative Male for AMI  >=14 - Abnormal Female indicating possible myocardial injury.  >=22 - Abnormal Male indicating possible myocardial injury.   Clinicians would have to utilize clinical acumen, EKG, Troponin, and serial changes to determine if it is an  Acute Myocardial Infarction or myocardial injury due to an underlying chronic condition.        CBC WITH AUTO DIFFERENTIAL - Abnormal; Notable for the following components:    WBC 12.45 (*)     .0 (*)     MCHC 30.3 (*)     Neutrophil % 84.0 (*)     Lymphocyte % 7.8 (*)     Neutrophils, Absolute 10.46 (*)     All other components within normal limits   HIGH SENSITIVITIY TROPONIN T 1HR - Abnormal; Notable for the following components:    HS Troponin T 19 (*)     All other components within normal limits    Narrative:     High Sensitive Troponin T Reference Range:  <14.0 ng/L- Negative Female for AMI  <22.0 ng/L- Negative Male for AMI  >=14 - Abnormal Female indicating possible myocardial injury.  >=22 - Abnormal Male indicating possible myocardial injury.   Clinicians would have to utilize clinical acumen, EKG, Troponin, and serial changes to determine if it is an Acute Myocardial Infarction or myocardial injury due to an underlying chronic condition.        COVID-19/FLUA&B/RSV, NP SWAB IN TRANSPORT MEDIA 1 HR TAT - Normal    Narrative:     Fact sheet for providers: https://www.fda.gov/media/699287/download    Fact sheet for patients: https://www.fda.gov/media/652260/download    Test performed by PCR.   MAGNESIUM - Normal   COVID PRE-OP / PRE-PROCEDURE SCREENING ORDER (NO ISOLATION)    Narrative:     The following orders were created for panel order COVID PRE-OP / PRE-PROCEDURE SCREENING ORDER (NO ISOLATION) - Swab, Nasopharynx.  Procedure                               Abnormality         Status                     ---------                               -----------         ------                     COVID-19, FLU A/B, RSV P...[[id number]]  Normal              Final result                 Please view results for these tests on the individual orders.   CBC AND DIFFERENTIAL    Narrative:     The following orders were created for panel order CBC & Differential.  Procedure                               Abnormality          Status                     ---------                               -----------         ------                     CBC Auto Differential[[id number]]        Abnormal            Final result                 Please view results for these tests on the individual orders.       Meds given in ED:   Medications   sodium chloride 0.9 % flush 10 mL (has no administration in time range)   hydrALAZINE (APRESOLINE) injection 10 mg (has no administration in time range)   furosemide (LASIX) injection 80 mg (80 mg Intravenous Given 12/31/24 1228)           NIH Stroke Scale:       Isolation/Infection(s):  No active isolations   No active infections     COVID Testing  Collected .  Resulted .    Nursing report ED to floor:  Mental status: A&O x4.  Ambulatory status: bedbound.  Precautions: none.    ED nurse phone [phone] ..

## 2024-12-31 NOTE — ED PROVIDER NOTES
Subjective   History of Present Illness  Patient is brought to emergency room by ambulance from an outlying nursing home with a complaint of increased fluid and shortness of breath.  The patient says is been getting worse for the past 3 days.  They have tried Lasix there at the nursing home and has not helped.  She continues to get more short of breath.  She does not normally use oxygen but has had to use it since yesterday.    History provided by:  Patient and nursing home   used: No    Shortness of Breath  Severity:  Severe  Onset quality:  Gradual  Duration:  3 days  Timing:  Constant  Progression:  Worsening  Chronicity:  New  Context: not activity, not animal exposure, not emotional upset, not fumes, not known allergens, not occupational exposure, not pollens, not smoke exposure, not strong odors, not URI and not weather changes    Relieved by:  Nothing  Worsened by:  Nothing  Ineffective treatments:  Diuretics  Associated symptoms: cough    Associated symptoms: no abdominal pain, no chest pain, no claudication, no diaphoresis, no ear pain, no fever, no headaches, no hemoptysis, no neck pain, no PND, no rash, no sore throat, no sputum production, no syncope, no swollen glands, no vomiting and no wheezing    Risk factors: no recent alcohol use, no family hx of DVT, no hx of cancer, no hx of PE/DVT, no obesity, no oral contraceptive use, no prolonged immobilization, no recent surgery and no tobacco use        Review of Systems   Constitutional: Negative.  Negative for diaphoresis and fever.   HENT: Negative.  Negative for ear pain and sore throat.    Respiratory:  Positive for cough and shortness of breath. Negative for hemoptysis, sputum production and wheezing.    Cardiovascular: Negative.  Negative for chest pain, claudication, syncope and PND.   Gastrointestinal: Negative.  Negative for abdominal pain and vomiting.   Genitourinary: Negative.    Musculoskeletal: Negative.  Negative for neck  pain.   Skin: Negative.  Negative for rash.   Neurological: Negative.  Negative for headaches.   Psychiatric/Behavioral: Negative.     All other systems reviewed and are negative.      Past Medical History:   Diagnosis Date    Abnormality of gait and mobility     Anemia     Anxiety     Cerebral palsy     Cognitive communication deficit     Depression     Diabetes mellitus     Disease of thyroid gland     Hyperglycemia     Hyperlipidemia     Hypertension     Hypokalemia     Insomnia     Lymphedema     Neuropathy     Urge incontinence        Allergies   Allergen Reactions    Nsaids Other (See Comments)     No NSAIDS r/t renal disease       History reviewed. No pertinent surgical history.    History reviewed. No pertinent family history.    Social History     Socioeconomic History    Marital status: Single   Tobacco Use    Smoking status: Never     Passive exposure: Past   Vaping Use    Vaping status: Never Used   Substance and Sexual Activity    Alcohol use: Never    Drug use: Never    Sexual activity: Not Currently       Prior to Admission medications    Medication Sig Start Date End Date Taking? Authorizing Provider   apixaban (ELIQUIS) 2.5 MG tablet tablet Take 1 tablet by mouth Every 12 (Twelve) Hours. Indications: Atrial Fibrillation 8/23/23   Vandana Churchill MD   atorvastatin (LIPITOR) 40 MG tablet Take 1 tablet by mouth Every Night.    ProviderAixa MD   busPIRone (BUSPAR) 10 MG tablet Take 1 tablet by mouth Daily. For anxiety    ProviderAixa MD   colestipol (COLESTID) 1 g tablet Take 1 tablet by mouth 2 (Two) Times a Day. For chronic diarrhea    ProviderAixa MD   dextrose (GLUTOSE) 40 % gel Take 15 g by mouth As Needed for Low Blood Sugar (every 15 minutes).    ProviderAixa MD   ferrous sulfate 324 (65 Fe) MG tablet delayed-release EC tablet Take 1 tablet by mouth 2 (Two) Times a Day With Meals. For anemia    ProviderAixa MD   gabapentin (NEURONTIN) 300 MG  capsule Take 1 capsule by mouth Every Night. 8/23/23   Vandana Churchill MD   glucagon (GLUCAGEN) 1 MG injection Inject 1 mg under the skin into the appropriate area as directed 1 (One) Time As Needed for Low Blood Sugar.    Aixa Stewart MD   insulin degludec (Tresiba FlexTouch) 100 UNIT/ML solution pen-injector injection Inject 6 Units under the skin into the appropriate area as directed Every Night.    Aixa Stewart MD   levothyroxine (SYNTHROID, LEVOTHROID) 175 MCG tablet Take 1 tablet by mouth Every Night. For hypothyroidism    Aixa Stewart MD   metoprolol tartrate (LOPRESSOR) 25 MG tablet Take 0.5 tablets by mouth Every 12 (Twelve) Hours. 8/23/23   Vandana Churchill MD   PARoxetine (PAXIL) 20 MG tablet Take 1 tablet by mouth Daily.    Aixa Stewart MD   raloxifene (EVISTA) 60 MG tablet Take 1 tablet by mouth Daily.    Aixa Stewart MD   sodium chloride 0.9 % solution Infuse 50 mL/hr into a venous catheter Continuous. 8/23/23   Vandana Churchill MD   vitamin B-12 (CYANOCOBALAMIN) 1000 MCG tablet Take 1 tablet by mouth Daily.    Aixa Stewart MD       Medications   sodium chloride 0.9 % flush 10 mL (has no administration in time range)   labetalol (NORMODYNE,TRANDATE) injection 10 mg (has no administration in time range)   furosemide (LASIX) injection 80 mg (80 mg Intravenous Given 12/31/24 1228)   hydrALAZINE (APRESOLINE) injection 10 mg (10 mg Intravenous Given 12/31/24 1553)       Vitals:    12/31/24 1500   BP: (!) 201/116   Pulse:    Resp:    Temp:    SpO2:          Objective   Physical Exam  Vitals and nursing note reviewed.   Constitutional:       Appearance: Normal appearance.   HENT:      Head: Normocephalic and atraumatic.   Eyes:      Extraocular Movements: Extraocular movements intact.      Pupils: Pupils are equal, round, and reactive to light.   Cardiovascular:      Rate and Rhythm: Normal rate and regular rhythm.   Pulmonary:      Effort:  Respiratory distress present.      Breath sounds: Wheezing and rales present.   Abdominal:      Palpations: Abdomen is soft.      Tenderness: There is no abdominal tenderness.   Musculoskeletal:      Right lower leg: Edema present.      Left lower leg: Edema present.   Skin:     General: Skin is warm and dry.   Neurological:      General: No focal deficit present.      Mental Status: She is alert and oriented to person, place, and time. Mental status is at baseline.   Psychiatric:         Mood and Affect: Mood normal.         Behavior: Behavior normal.         Procedures         Lab Results (last 24 hours)       Procedure Component Value Units Date/Time    COVID PRE-OP / PRE-PROCEDURE SCREENING ORDER (NO ISOLATION) - Swab, Nasopharynx [[id number]]  (Normal) Collected: 12/31/24 1210    Specimen: Swab from Nasopharynx Updated: 12/31/24 1250    Narrative:      The following orders were created for panel order COVID PRE-OP / PRE-PROCEDURE SCREENING ORDER (NO ISOLATION) - Swab, Nasopharynx.  Procedure                               Abnormality         Status                     ---------                               -----------         ------                     COVID-19, FLU A/B, RSV P...[[id number]]  Normal              Final result                 Please view results for these tests on the individual orders.    COVID-19, FLU A/B, RSV PCR 1 HR TAT - Swab, Nasopharynx [[id number]]  (Normal) Collected: 12/31/24 1210    Specimen: Swab from Nasopharynx Updated: 12/31/24 1250     COVID19 Not Detected     Influenza A PCR Not Detected     Influenza B PCR Not Detected     RSV, PCR Not Detected    Narrative:      Fact sheet for providers: https://www.fda.gov/media/890810/download    Fact sheet for patients: https://www.fda.gov/media/039162/download    Test performed by PCR.    CBC & Differential [[id number]]  (Abnormal) Collected: 12/31/24 1213    Specimen: Blood Updated: 12/31/24 1222    Narrative:      The following orders were  created for panel order CBC & Differential.  Procedure                               Abnormality         Status                     ---------                               -----------         ------                     CBC Auto Differential[[id number]]        Abnormal            Final result                 Please view results for these tests on the individual orders.    Comprehensive Metabolic Panel [[id number]]  (Abnormal) Collected: 12/31/24 1213    Specimen: Blood Updated: 12/31/24 1245     Glucose 236 mg/dL      BUN 47 mg/dL      Creatinine 2.12 mg/dL      Sodium 139 mmol/L      Potassium 4.8 mmol/L      Chloride 102 mmol/L      CO2 25.0 mmol/L      Calcium 9.1 mg/dL      Total Protein 7.5 g/dL      Albumin 3.5 g/dL      ALT (SGPT) 55 U/L      AST (SGOT) 37 U/L      Alkaline Phosphatase 201 U/L      Total Bilirubin 0.3 mg/dL      Globulin 4.0 gm/dL      A/G Ratio 0.9 g/dL      BUN/Creatinine Ratio 22.2     Anion Gap 12.0 mmol/L      eGFR 22.7 mL/min/1.73     Narrative:      GFR Categories in Chronic Kidney Disease (CKD)      GFR Category          GFR (mL/min/1.73)    Interpretation  G1                     90 or greater         Normal or high (1)  G2                      60-89                Mild decrease (1)  G3a                   45-59                Mild to moderate decrease  G3b                   30-44                Moderate to severe decrease  G4                    15-29                Severe decrease  G5                    14 or less           Kidney failure          (1)In the absence of evidence of kidney disease, neither GFR category G1 or G2 fulfill the criteria for CKD.    eGFR calculation 2021 CKD-EPI creatinine equation, which does not include race as a factor    BNP [[id number]]  (Abnormal) Collected: 12/31/24 1213    Specimen: Blood Updated: 12/31/24 1245     proBNP 2,789.0 pg/mL     Narrative:      This assay is used as an aid in the diagnosis of individuals suspected of having heart failure. It can  be used as an aid in the diagnosis of acute decompensated heart failure (ADHF) in patients presenting with signs and symptoms of ADHF to the emergency department (ED). In addition, NT-proBNP of <300 pg/mL indicates ADHF is not likely.    Age Range Result Interpretation  NT-proBNP Concentration (pg/mL:      <50             Positive            >450                   Gray                 300-450                    Negative             <300    50-75           Positive            >900                  Gray                300-900                  Negative            <300      >75             Positive            >1800                  Gray                300-1800                  Negative            <300    High Sensitivity Troponin T [[id number]]  (Abnormal) Collected: 12/31/24 1213    Specimen: Blood Updated: 12/31/24 1245     HS Troponin T 26 ng/L     Narrative:      High Sensitive Troponin T Reference Range:  <14.0 ng/L- Negative Female for AMI  <22.0 ng/L- Negative Male for AMI  >=14 - Abnormal Female indicating possible myocardial injury.  >=22 - Abnormal Male indicating possible myocardial injury.   Clinicians would have to utilize clinical acumen, EKG, Troponin, and serial changes to determine if it is an Acute Myocardial Infarction or myocardial injury due to an underlying chronic condition.         Magnesium [[id number]]  (Normal) Collected: 12/31/24 1213    Specimen: Blood Updated: 12/31/24 1245     Magnesium 2.0 mg/dL     CBC Auto Differential [[id number]]  (Abnormal) Collected: 12/31/24 1213    Specimen: Blood Updated: 12/31/24 1222     WBC 12.45 10*3/mm3      RBC 4.21 10*6/mm3      Hemoglobin 13.4 g/dL      Hematocrit 44.2 %      .0 fL      MCH 31.8 pg      MCHC 30.3 g/dL      RDW 13.2 %      RDW-SD 51.3 fl      MPV 10.5 fL      Platelets 244 10*3/mm3      Neutrophil % 84.0 %      Lymphocyte % 7.8 %      Monocyte % 7.1 %      Eosinophil % 0.4 %      Basophil % 0.3 %      Immature Grans % 0.4 %       Neutrophils, Absolute 10.46 10*3/mm3      Lymphocytes, Absolute 0.97 10*3/mm3      Monocytes, Absolute 0.88 10*3/mm3      Eosinophils, Absolute 0.05 10*3/mm3      Basophils, Absolute 0.04 10*3/mm3      Immature Grans, Absolute 0.05 10*3/mm3      nRBC 0.0 /100 WBC     High Sensitivity Troponin T 1Hr [[id number]]  (Abnormal) Collected: 12/31/24 1334    Specimen: Blood Updated: 12/31/24 1357     HS Troponin T 19 ng/L      Troponin T Numeric Delta -7 ng/L      Troponin T % Delta -27 %     Narrative:      High Sensitive Troponin T Reference Range:  <14.0 ng/L- Negative Female for AMI  <22.0 ng/L- Negative Male for AMI  >=14 - Abnormal Female indicating possible myocardial injury.  >=22 - Abnormal Male indicating possible myocardial injury.   Clinicians would have to utilize clinical acumen, EKG, Troponin, and serial changes to determine if it is an Acute Myocardial Infarction or myocardial injury due to an underlying chronic condition.                 XR Chest 1 View   Final Result       1.  Cardiomegaly and mild pulmonary vascular congestion. No alessio edema.       2.  No definite acute cardiopulmonary process.               This report was signed and finalized on 12/31/2024 12:57 PM by Dr. Deny Alcantar MD.              ED Course          MDM  Number of Diagnoses or Management Options  Pulmonary vascular congestion: new and requires workup  Diagnosis management comments: Patient does have increasing fluid in her chest x-ray.  She does require some diuresis.  Her renal functions are also slightly worse.  I spoke with Dr. Candelario's office because they are on-call for the morning.  We will admit for further care.       Amount and/or Complexity of Data Reviewed  Clinical lab tests: ordered and reviewed  Tests in the radiology section of CPT®: ordered and reviewed  Tests in the medicine section of CPT®: ordered and reviewed  Decide to obtain previous medical records or to obtain history from someone other than the  patient: yes  Discuss the patient with other providers: yes    Risk of Complications, Morbidity, and/or Mortality  Presenting problems: moderate  Diagnostic procedures: moderate  Management options: moderate    Patient Progress  Patient progress: stable        Final diagnoses:   Pulmonary vascular congestion          Homer Sanon Jr., MD  12/31/24 0113

## 2024-12-31 NOTE — PROGRESS NOTES
"Pharmacy Dosing Service  Anticoagulant  Enoxaparin    Assessment/Action/Plan:  Pharmacy to dose Enoxaparin for VTE prophylaxis. Initiated Enoxaparin 40 mg   SQ every 24 hours. Labs/dose reviewed. BMI of 46.76 kg/m2 and renal function noted at 27 ml/min. Pharmacy will continue to monitor renal function and adjust dose accordingly.     Subjective:  Mar Rodriguez is a 84 y.o. female  Objective:  [Ht: 165.1 cm (65\"); Wt: 127 kg (281 lb); BMI: Body mass index is 46.76 kg/m².]  Estimated Creatinine Clearance: 27 mL/min (A) (by C-G formula based on SCr of 2.12 mg/dL (H)).   Lab Results   Component Value Date    INR 0.95 03/23/2014    PROTIME 12.9 03/23/2014      Lab Results   Component Value Date    HGB 13.4 12/31/2024    HGB 10.4 (L) 08/19/2023    HGB 10.0 (L) 08/17/2023      Lab Results   Component Value Date     12/31/2024     08/19/2023     08/17/2023         Hema Brower, PharmD  12/31/24 17:00 CST     "

## 2025-01-01 ENCOUNTER — APPOINTMENT (OUTPATIENT)
Dept: CARDIOLOGY | Facility: HOSPITAL | Age: 85
DRG: 640 | End: 2025-01-01
Payer: MEDICARE

## 2025-01-01 ENCOUNTER — APPOINTMENT (OUTPATIENT)
Dept: GENERAL RADIOLOGY | Facility: HOSPITAL | Age: 85
DRG: 640 | End: 2025-01-01
Payer: MEDICARE

## 2025-01-01 ENCOUNTER — APPOINTMENT (OUTPATIENT)
Dept: CT IMAGING | Facility: HOSPITAL | Age: 85
DRG: 640 | End: 2025-01-01
Payer: MEDICARE

## 2025-01-01 LAB
ALBUMIN SERPL-MCNC: 3.2 G/DL (ref 3.5–5.2)
ALBUMIN/GLOB SERPL: 0.9 G/DL
ALP SERPL-CCNC: 183 U/L (ref 39–117)
ALT SERPL W P-5'-P-CCNC: 47 U/L (ref 1–33)
ANION GAP SERPL CALCULATED.3IONS-SCNC: 15 MMOL/L (ref 5–15)
ARTERIAL PATENCY WRIST A: POSITIVE
ASCENDING AORTA: 3.7 CM
AST SERPL-CCNC: 30 U/L (ref 1–32)
ATMOSPHERIC PRESS: 759 MMHG
AV MEAN PRESS GRAD SYS DOP V1V2: 4.4 MMHG
AV VMAX SYS DOP: 143.6 CM/SEC
B PARAPERT DNA SPEC QL NAA+PROBE: NOT DETECTED
B PERT DNA SPEC QL NAA+PROBE: NOT DETECTED
BASE EXCESS BLDA CALC-SCNC: 1.8 MMOL/L (ref 0–2)
BASOPHILS # BLD AUTO: 0.03 10*3/MM3 (ref 0–0.2)
BASOPHILS NFR BLD AUTO: 0.2 % (ref 0–1.5)
BDY SITE: ABNORMAL
BH CV ECHO MEAS - AO MAX PG: 8.3 MMHG
BH CV ECHO MEAS - AO ROOT DIAM: 3 CM
BH CV ECHO MEAS - AO V2 VTI: 24.4 CM
BH CV ECHO MEAS - AVA(I,D): 1.63 CM2
BH CV ECHO MEAS - EDV(CUBED): 70.3 ML
BH CV ECHO MEAS - EDV(MOD-SP2): 78.4 ML
BH CV ECHO MEAS - EDV(MOD-SP4): 65.3 ML
BH CV ECHO MEAS - EF(MOD-SP2): 70.2 %
BH CV ECHO MEAS - EF(MOD-SP4): 63.7 %
BH CV ECHO MEAS - ESV(CUBED): 25.6 ML
BH CV ECHO MEAS - ESV(MOD-SP2): 23.4 ML
BH CV ECHO MEAS - ESV(MOD-SP4): 23.7 ML
BH CV ECHO MEAS - FS: 28.6 %
BH CV ECHO MEAS - IVS/LVPW: 0.75 CM
BH CV ECHO MEAS - IVSD: 0.83 CM
BH CV ECHO MEAS - LA DIMENSION: 3.5 CM
BH CV ECHO MEAS - LAT PEAK E' VEL: 13 CM/SEC
BH CV ECHO MEAS - LV MASS(C)D: 126.4 GRAMS
BH CV ECHO MEAS - LV MAX PG: 3.7 MMHG
BH CV ECHO MEAS - LV MEAN PG: 2.17 MMHG
BH CV ECHO MEAS - LV V1 MAX: 95.5 CM/SEC
BH CV ECHO MEAS - LV V1 VTI: 15.5 CM
BH CV ECHO MEAS - LVIDD: 4.1 CM
BH CV ECHO MEAS - LVIDS: 2.9 CM
BH CV ECHO MEAS - LVOT AREA: 2.6 CM2
BH CV ECHO MEAS - LVOT DIAM: 1.81 CM
BH CV ECHO MEAS - LVPWD: 1.1 CM
BH CV ECHO MEAS - MED PEAK E' VEL: 15 CM/SEC
BH CV ECHO MEAS - MV DEC TIME: 0.2 SEC
BH CV ECHO MEAS - MV E MAX VEL: 115 CM/SEC
BH CV ECHO MEAS - MV MAX PG: 4.7 MMHG
BH CV ECHO MEAS - MV MEAN PG: 2.29 MMHG
BH CV ECHO MEAS - MV V2 VTI: 20.9 CM
BH CV ECHO MEAS - MVA(VTI): 1.9 CM2
BH CV ECHO MEAS - PA V2 MAX: 82.5 CM/SEC
BH CV ECHO MEAS - RAP SYSTOLE: 3 MMHG
BH CV ECHO MEAS - RV MAX PG: 1.72 MMHG
BH CV ECHO MEAS - RV V1 MAX: 65.6 CM/SEC
BH CV ECHO MEAS - RV V1 VTI: 11.1 CM
BH CV ECHO MEAS - RVDD: 2.9 CM
BH CV ECHO MEAS - RVSP: 30.5 MMHG
BH CV ECHO MEAS - SV(LVOT): 39.7 ML
BH CV ECHO MEAS - SV(MOD-SP2): 55 ML
BH CV ECHO MEAS - SV(MOD-SP4): 41.6 ML
BH CV ECHO MEAS - TAPSE (>1.6): 2 CM
BH CV ECHO MEAS - TR MAX PG: 27.5 MMHG
BH CV ECHO MEAS - TR MAX VEL: 261.7 CM/SEC
BH CV ECHO MEASUREMENTS AVERAGE E/E' RATIO: 8.21
BH CV XLRA - RV BASE: 3.3 CM
BH CV XLRA - RV LENGTH: 6.7 CM
BH CV XLRA - RV MID: 2.4 CM
BH CV XLRA - TDI S': 15 CM/SEC
BILIRUB SERPL-MCNC: 0.3 MG/DL (ref 0–1.2)
BODY TEMPERATURE: 37
BUN SERPL-MCNC: 52 MG/DL (ref 8–23)
BUN/CREAT SERPL: 23.4 (ref 7–25)
C PNEUM DNA NPH QL NAA+NON-PROBE: NOT DETECTED
CALCIUM SPEC-SCNC: 8.7 MG/DL (ref 8.6–10.5)
CHLORIDE SERPL-SCNC: 100 MMOL/L (ref 98–107)
CO2 SERPL-SCNC: 23 MMOL/L (ref 22–29)
CREAT SERPL-MCNC: 2.22 MG/DL (ref 0.57–1)
D-LACTATE SERPL-SCNC: 2.2 MMOL/L (ref 0.5–2)
D-LACTATE SERPL-SCNC: <0.2 MMOL/L (ref 0.5–2)
DEPRECATED RDW RBC AUTO: 47.4 FL (ref 37–54)
EGFRCR SERPLBLD CKD-EPI 2021: 21.5 ML/MIN/1.73
EOSINOPHIL # BLD AUTO: 0.08 10*3/MM3 (ref 0–0.4)
EOSINOPHIL NFR BLD AUTO: 0.6 % (ref 0.3–6.2)
EPAP: 6
ERYTHROCYTE [DISTWIDTH] IN BLOOD BY AUTOMATED COUNT: 13.2 % (ref 12.3–15.4)
FLUAV SUBTYP SPEC NAA+PROBE: NOT DETECTED
FLUBV RNA ISLT QL NAA+PROBE: NOT DETECTED
GLOBULIN UR ELPH-MCNC: 3.7 GM/DL
GLUCOSE BLDC GLUCOMTR-MCNC: 129 MG/DL (ref 70–130)
GLUCOSE BLDC GLUCOMTR-MCNC: 148 MG/DL (ref 70–130)
GLUCOSE SERPL-MCNC: 153 MG/DL (ref 65–99)
HADV DNA SPEC NAA+PROBE: NOT DETECTED
HCO3 BLDA-SCNC: 27.7 MMOL/L (ref 20–26)
HCOV 229E RNA SPEC QL NAA+PROBE: NOT DETECTED
HCOV HKU1 RNA SPEC QL NAA+PROBE: NOT DETECTED
HCOV NL63 RNA SPEC QL NAA+PROBE: NOT DETECTED
HCOV OC43 RNA SPEC QL NAA+PROBE: DETECTED
HCT VFR BLD AUTO: 39.4 % (ref 34–46.6)
HGB BLD-MCNC: 12.8 G/DL (ref 12–15.9)
HMPV RNA NPH QL NAA+NON-PROBE: NOT DETECTED
HPIV1 RNA ISLT QL NAA+PROBE: NOT DETECTED
HPIV2 RNA SPEC QL NAA+PROBE: NOT DETECTED
HPIV3 RNA NPH QL NAA+PROBE: NOT DETECTED
HPIV4 P GENE NPH QL NAA+PROBE: NOT DETECTED
IMM GRANULOCYTES # BLD AUTO: 0.12 10*3/MM3 (ref 0–0.05)
IMM GRANULOCYTES NFR BLD AUTO: 0.9 % (ref 0–0.5)
INHALED O2 CONCENTRATION: 35 %
IPAP: 12
IVRT: 42 MS
LEFT ATRIUM VOLUME INDEX: 23 ML/M2
LEFT ATRIUM VOLUME: 48 ML
LV EF BIPLANE MOD: 67 %
LYMPHOCYTES # BLD AUTO: 1.27 10*3/MM3 (ref 0.7–3.1)
LYMPHOCYTES NFR BLD AUTO: 9.5 % (ref 19.6–45.3)
Lab: ABNORMAL
M PNEUMO IGG SER IA-ACNC: NOT DETECTED
MCH RBC QN AUTO: 31.8 PG (ref 26.6–33)
MCHC RBC AUTO-ENTMCNC: 32.5 G/DL (ref 31.5–35.7)
MCV RBC AUTO: 98 FL (ref 79–97)
MODALITY: ABNORMAL
MONOCYTES # BLD AUTO: 1.12 10*3/MM3 (ref 0.1–0.9)
MONOCYTES NFR BLD AUTO: 8.4 % (ref 5–12)
NEUTROPHILS NFR BLD AUTO: 10.68 10*3/MM3 (ref 1.7–7)
NEUTROPHILS NFR BLD AUTO: 80.4 % (ref 42.7–76)
NRBC BLD AUTO-RTO: 0 /100 WBC (ref 0–0.2)
PCO2 BLDA: 47.6 MM HG (ref 35–45)
PCO2 TEMP ADJ BLD: 47.6 MM HG (ref 35–45)
PH BLDA: 7.37 PH UNITS (ref 7.35–7.45)
PH, TEMP CORRECTED: 7.37 PH UNITS (ref 7.35–7.45)
PLATELET # BLD AUTO: 223 10*3/MM3 (ref 140–450)
PMV BLD AUTO: 11 FL (ref 6–12)
PO2 BLD: 268 MM[HG] (ref 0–500)
PO2 BLDA: 93.8 MM HG (ref 83–108)
PO2 TEMP ADJ BLD: 93.8 MM HG (ref 83–108)
POTASSIUM SERPL-SCNC: 4.5 MMOL/L (ref 3.5–5.2)
PROT SERPL-MCNC: 6.9 G/DL (ref 6–8.5)
PSV: 6 CMH2O
QT INTERVAL: 362 MS
QTC INTERVAL: 435 MS
RBC # BLD AUTO: 4.02 10*6/MM3 (ref 3.77–5.28)
RHINOVIRUS RNA SPEC NAA+PROBE: NOT DETECTED
RSV RNA NPH QL NAA+NON-PROBE: NOT DETECTED
SAO2 % BLDCOA: 97.6 % (ref 94–99)
SARS-COV-2 RNA RESP QL NAA+PROBE: NOT DETECTED
SET MECH RESP RATE: 18
SODIUM SERPL-SCNC: 138 MMOL/L (ref 136–145)
VENTILATOR MODE: ABNORMAL
WBC NRBC COR # BLD AUTO: 13.3 10*3/MM3 (ref 3.4–10.8)

## 2025-01-01 PROCEDURE — 82803 BLOOD GASES ANY COMBINATION: CPT

## 2025-01-01 PROCEDURE — 93306 TTE W/DOPPLER COMPLETE: CPT

## 2025-01-01 PROCEDURE — 80053 COMPREHEN METABOLIC PANEL: CPT

## 2025-01-01 PROCEDURE — 94660 CPAP INITIATION&MGMT: CPT

## 2025-01-01 PROCEDURE — 94799 UNLISTED PULMONARY SVC/PX: CPT

## 2025-01-01 PROCEDURE — 85025 COMPLETE CBC W/AUTO DIFF WBC: CPT

## 2025-01-01 PROCEDURE — 71250 CT THORAX DX C-: CPT

## 2025-01-01 PROCEDURE — 94640 AIRWAY INHALATION TREATMENT: CPT

## 2025-01-01 PROCEDURE — 71045 X-RAY EXAM CHEST 1 VIEW: CPT

## 2025-01-01 PROCEDURE — 25010000002 CEFTRIAXONE PER 250 MG

## 2025-01-01 PROCEDURE — 25510000001 PERFLUTREN 6.52 MG/ML SUSPENSION: Performed by: INTERNAL MEDICINE

## 2025-01-01 PROCEDURE — 83605 ASSAY OF LACTIC ACID: CPT

## 2025-01-01 PROCEDURE — 0202U NFCT DS 22 TRGT SARS-COV-2: CPT | Performed by: PHYSICIAN ASSISTANT

## 2025-01-01 PROCEDURE — 36600 WITHDRAWAL OF ARTERIAL BLOOD: CPT

## 2025-01-01 PROCEDURE — 94761 N-INVAS EAR/PLS OXIMETRY MLT: CPT

## 2025-01-01 PROCEDURE — 25010000002 FUROSEMIDE PER 20 MG: Performed by: PHYSICIAN ASSISTANT

## 2025-01-01 PROCEDURE — 93306 TTE W/DOPPLER COMPLETE: CPT | Performed by: INTERNAL MEDICINE

## 2025-01-01 PROCEDURE — 94664 DEMO&/EVAL PT USE INHALER: CPT

## 2025-01-01 PROCEDURE — 82948 REAGENT STRIP/BLOOD GLUCOSE: CPT

## 2025-01-01 RX ORDER — DEXTROSE MONOHYDRATE 25 G/50ML
25 INJECTION, SOLUTION INTRAVENOUS
Status: DISCONTINUED | OUTPATIENT
Start: 2025-01-01 | End: 2025-01-06 | Stop reason: HOSPADM

## 2025-01-01 RX ORDER — FUROSEMIDE 10 MG/ML
100 INJECTION INTRAMUSCULAR; INTRAVENOUS ONCE
Status: COMPLETED | OUTPATIENT
Start: 2025-01-01 | End: 2025-01-01

## 2025-01-01 RX ORDER — IPRATROPIUM BROMIDE AND ALBUTEROL SULFATE 2.5; .5 MG/3ML; MG/3ML
3 SOLUTION RESPIRATORY (INHALATION)
Status: DISCONTINUED | OUTPATIENT
Start: 2025-01-01 | End: 2025-01-06 | Stop reason: HOSPADM

## 2025-01-01 RX ORDER — NICOTINE POLACRILEX 4 MG
15 LOZENGE BUCCAL
Status: DISCONTINUED | OUTPATIENT
Start: 2025-01-01 | End: 2025-01-06 | Stop reason: HOSPADM

## 2025-01-01 RX ORDER — IBUPROFEN 600 MG/1
1 TABLET ORAL
Status: DISCONTINUED | OUTPATIENT
Start: 2025-01-01 | End: 2025-01-06 | Stop reason: HOSPADM

## 2025-01-01 RX ORDER — INSULIN LISPRO 100 [IU]/ML
2-7 INJECTION, SOLUTION INTRAVENOUS; SUBCUTANEOUS
Status: DISCONTINUED | OUTPATIENT
Start: 2025-01-01 | End: 2025-01-06 | Stop reason: HOSPADM

## 2025-01-01 RX ADMIN — METOPROLOL TARTRATE 25 MG: 25 TABLET, FILM COATED ORAL at 20:35

## 2025-01-01 RX ADMIN — CEFTRIAXONE SODIUM 2000 MG: 2 INJECTION, POWDER, FOR SOLUTION INTRAMUSCULAR; INTRAVENOUS at 20:58

## 2025-01-01 RX ADMIN — GABAPENTIN 300 MG: 300 CAPSULE ORAL at 20:35

## 2025-01-01 RX ADMIN — IPRATROPIUM BROMIDE AND ALBUTEROL SULFATE 3 ML: 2.5; .5 SOLUTION RESPIRATORY (INHALATION) at 07:00

## 2025-01-01 RX ADMIN — APIXABAN 2.5 MG: 2.5 TABLET, FILM COATED ORAL at 20:35

## 2025-01-01 RX ADMIN — Medication 10 ML: at 20:36

## 2025-01-01 RX ADMIN — DOCUSATE SODIUM 50 MG AND SENNOSIDES 8.6 MG 2 TABLET: 8.6; 5 TABLET, FILM COATED ORAL at 09:47

## 2025-01-01 RX ADMIN — Medication 10 ML: at 09:51

## 2025-01-01 RX ADMIN — FUROSEMIDE 100 MG: 10 INJECTION, SOLUTION INTRAMUSCULAR; INTRAVENOUS at 11:06

## 2025-01-01 RX ADMIN — FERROUS SULFATE TAB 325 MG (65 MG ELEMENTAL FE) 325 MG: 325 (65 FE) TAB at 09:48

## 2025-01-01 RX ADMIN — Medication 1 APPLICATION: at 20:35

## 2025-01-01 RX ADMIN — ACETAMINOPHEN 650 MG: 325 TABLET ORAL at 00:18

## 2025-01-01 RX ADMIN — Medication 1 APPLICATION: at 09:49

## 2025-01-01 RX ADMIN — IPRATROPIUM BROMIDE AND ALBUTEROL SULFATE 3 ML: 2.5; .5 SOLUTION RESPIRATORY (INHALATION) at 18:37

## 2025-01-01 RX ADMIN — ATORVASTATIN CALCIUM 40 MG: 40 TABLET, FILM COATED ORAL at 20:35

## 2025-01-01 RX ADMIN — BUSPIRONE HYDROCHLORIDE 7.5 MG: 5 TABLET ORAL at 20:35

## 2025-01-01 RX ADMIN — METOPROLOL TARTRATE 25 MG: 25 TABLET, FILM COATED ORAL at 09:48

## 2025-01-01 RX ADMIN — PERFLUTREN 13.04 MG: 6.52 INJECTION, SUSPENSION INTRAVENOUS at 09:57

## 2025-01-01 RX ADMIN — CYANOCOBALAMIN TAB 500 MCG 1000 MCG: 500 TAB at 09:48

## 2025-01-01 RX ADMIN — LEVOTHYROXINE SODIUM 200 MCG: 100 TABLET ORAL at 20:35

## 2025-01-01 RX ADMIN — BUSPIRONE HYDROCHLORIDE 7.5 MG: 5 TABLET ORAL at 09:49

## 2025-01-01 RX ADMIN — IPRATROPIUM BROMIDE AND ALBUTEROL SULFATE 3 ML: 2.5; .5 SOLUTION RESPIRATORY (INHALATION) at 13:49

## 2025-01-01 RX ADMIN — APIXABAN 2.5 MG: 2.5 TABLET, FILM COATED ORAL at 09:49

## 2025-01-01 RX ADMIN — AZITHROMYCIN 500 MG: 250 TABLET, FILM COATED ORAL at 20:35

## 2025-01-01 NOTE — PLAN OF CARE
Goal Outcome Evaluation:      Pt A&O x4. Afib rate controlled, BP stable off NTG gtt. Lasix gtt dc'd- 100 of lasix given with good response. 1400 cc UO this shift. One small BM. Safety maintained.

## 2025-01-01 NOTE — PROGRESS NOTES
RT EQUIPMENT DEVICE RELATED - SKIN ASSESSMENT    Hayes Score:  Hayes Score: 14     RT Medical Equipment/Device:     NIV Mask:  Under-the-nose   size:  b    Skin Assessment:      Nose:  Intact    Device Skin Pressure Protection:  Skin-to-device areas padded:  None Required    Nurse Notification:  Mellisa Levine, CRT

## 2025-01-01 NOTE — PLAN OF CARE
Problem: Adult Inpatient Plan of Care  Goal: Plan of Care Review  Outcome: Progressing  Flowsheets (Taken 1/1/2025 0637)  Progress: improving  Outcome Evaluation: AOx4. Remains on bipap. Continues lasix gtt. nitroglycerin gtt stopped. Turned q2h with wedges.  Plan of Care Reviewed With: patient  Goal: Patient-Specific Goal (Individualized)  Outcome: Progressing  Goal: Absence of Hospital-Acquired Illness or Injury  Outcome: Progressing  Intervention: Identify and Manage Fall Risk  Recent Flowsheet Documentation  Taken 1/1/2025 0600 by Jay Stephenson RN  Safety Promotion/Fall Prevention: safety round/check completed  Taken 1/1/2025 0500 by Jay Stephenson RN  Safety Promotion/Fall Prevention: safety round/check completed  Taken 1/1/2025 0400 by Jay Stephenson RN  Safety Promotion/Fall Prevention: safety round/check completed  Taken 1/1/2025 0300 by Jay Stephenson RN  Safety Promotion/Fall Prevention: safety round/check completed  Taken 1/1/2025 0200 by Jay Stephenson RN  Safety Promotion/Fall Prevention: safety round/check completed  Taken 1/1/2025 0100 by Jay Stephenson RN  Safety Promotion/Fall Prevention: safety round/check completed  Taken 1/1/2025 0000 by Jay Stephenson RN  Safety Promotion/Fall Prevention: safety round/check completed  Taken 12/31/2024 2300 by Jay Stephenson RN  Safety Promotion/Fall Prevention: safety round/check completed  Taken 12/31/2024 2200 by Jay Stephenson RN  Safety Promotion/Fall Prevention: safety round/check completed  Taken 12/31/2024 2100 by Jay Stephenson RN  Safety Promotion/Fall Prevention: safety round/check completed  Taken 12/31/2024 2000 by Jay Stephenson RN  Safety Promotion/Fall Prevention: safety round/check completed  Taken 12/31/2024 1930 by Jay Stephenson RN  Safety Promotion/Fall Prevention: safety round/check completed  Intervention: Prevent Skin Injury  Recent Flowsheet Documentation  Taken 1/1/2025 0600 by Jay Stephenson RN  Body Position:   right   turned  Taken 1/1/2025 0500 by Sachin  ZACHARIAH Thompson  Body Position: supine  Taken 1/1/2025 0400 by Jay Stephenson RN  Body Position:   supine   turned  Taken 1/1/2025 0300 by Jay Stephenson RN  Body Position: left  Taken 1/1/2025 0200 by Jay Stephenson RN  Body Position:   left   turned  Taken 1/1/2025 0100 by Jay Stephenson RN  Body Position: right  Taken 1/1/2025 0000 by Jay Stephenson RN  Body Position:   right   turned  Taken 12/31/2024 2300 by Jay Stephenson RN  Body Position: left  Taken 12/31/2024 2200 by Jay Stephenson RN  Body Position:   left   turned  Taken 12/31/2024 2100 by Jay Stephenson RN  Body Position: right  Taken 12/31/2024 2000 by Jay Stephenson RN  Body Position:   right   turned  Taken 12/31/2024 1930 by Jay Stephenson RN  Body Position: supine  Skin Protection: (preventative dressings to heel and coccyx)   transparent dressing maintained   silicone foam dressing in place  Goal: Optimal Comfort and Wellbeing  Outcome: Progressing  Intervention: Provide Person-Centered Care  Recent Flowsheet Documentation  Taken 12/31/2024 1930 by Jay Stephenson RN  Trust Relationship/Rapport:   care explained   choices provided   questions encouraged   questions answered   reassurance provided  Goal: Readiness for Transition of Care  Outcome: Progressing  Intervention: Mutually Develop Transition Plan  Recent Flowsheet Documentation  Taken 1/1/2025 0455 by Jay Stephenson RN  Equipment Currently Used at Home: wheelchair  Taken 1/1/2025 0436 by Jay Stephenson RN  Transportation Anticipated: health plan transportation  Patient/Family Anticipated Services at Transition: skilled nursing  Patient/Family Anticipates Transition to: long-term care facility     Problem: Skin Injury Risk Increased  Goal: Skin Health and Integrity  Outcome: Progressing  Intervention: Optimize Skin Protection  Recent Flowsheet Documentation  Taken 1/1/2025 0600 by Jay Stephenson RN  Activity Management: bedrest  Head of Bed (HOB) Positioning: HOB elevated  Taken 1/1/2025 0500 by Jay Stephenson RN  Activity  Management: bedrest  Head of Bed (HOB) Positioning: HOB elevated  Taken 1/1/2025 0400 by Jay Stephenson RN  Activity Management: bedrest  Head of Bed (HOB) Positioning: HOB elevated  Taken 1/1/2025 0300 by Jay Stephenson RN  Activity Management: bedrest  Head of Bed (HOB) Positioning: HOB elevated  Taken 1/1/2025 0200 by Jay Stephenson RN  Activity Management: bedrest  Head of Bed (HOB) Positioning: HOB elevated  Taken 1/1/2025 0100 by Jay Stephenson RN  Activity Management: bedrest  Head of Bed (HOB) Positioning: HOB elevated  Taken 1/1/2025 0000 by Jay Stephenson RN  Activity Management: bedrest  Head of Bed (HOB) Positioning: HOB elevated  Taken 12/31/2024 2300 by Jay Stephenson RN  Activity Management: bedrest  Head of Bed (HOB) Positioning: HOB elevated  Taken 12/31/2024 2200 by Jay Stephenson RN  Activity Management: bedrest  Head of Bed (HOB) Positioning: HOB elevated  Taken 12/31/2024 2100 by Jay Stephenson RN  Activity Management: bedrest  Head of Bed (HOB) Positioning: HOB elevated  Taken 12/31/2024 2000 by Jay Stephenson RN  Activity Management: bedrest  Head of Bed (HOB) Positioning: HOB elevated  Taken 12/31/2024 1930 by Jay Stephenson RN  Activity Management: bedrest  Pressure Reduction Techniques:   weight shift assistance provided   pressure points protected  Head of Bed (HOB) Positioning: HOB elevated  Pressure Reduction Devices:   foam padding utilized   positioning supports utilized   pressure-redistributing mattress utilized  Skin Protection: (preventative dressings to heel and coccyx)   transparent dressing maintained   silicone foam dressing in place     Problem: Sepsis/Septic Shock  Goal: Optimal Coping  Outcome: Progressing  Intervention: Support Patient and Family Response  Recent Flowsheet Documentation  Taken 12/31/2024 1930 by Jay Stephenson RN  Family/Support System Care:   support provided   caregiver stress acknowledged  Goal: Absence of Bleeding  Outcome: Progressing  Goal: Blood Glucose Level Within  Target Range  Outcome: Progressing  Goal: Absence of Infection Signs and Symptoms  Outcome: Progressing  Intervention: Promote Recovery  Recent Flowsheet Documentation  Taken 1/1/2025 0600 by Jay Stephenson RN  Activity Management: bedrest  Taken 1/1/2025 0500 by Jay Stephenson RN  Activity Management: bedrest  Taken 1/1/2025 0400 by Jay Stephenson RN  Activity Management: bedrest  Taken 1/1/2025 0300 by Jay Stephenson RN  Activity Management: bedrest  Taken 1/1/2025 0200 by Jay Stephenson RN  Activity Management: bedrest  Taken 1/1/2025 0100 by Jay Stephenson RN  Activity Management: bedrest  Taken 1/1/2025 0000 by Jay Stephenson RN  Activity Management: bedrest  Taken 12/31/2024 2300 by Jay Stephenson RN  Activity Management: bedrest  Taken 12/31/2024 2200 by Jay Stephenson RN  Activity Management: bedrest  Taken 12/31/2024 2100 by Jay Stephenson RN  Activity Management: bedrest  Taken 12/31/2024 2000 by Jay Stephenson RN  Activity Management: bedrest  Taken 12/31/2024 1930 by Jay Stephenson RN  Activity Management: bedrest  Goal: Optimal Nutrition Delivery  Outcome: Progressing     Problem: Noninvasive Ventilation Acute  Goal: Effective Unassisted Ventilation and Oxygenation  Outcome: Progressing     Problem: Comorbidity Management  Goal: Maintenance of Heart Failure Symptom Control  Outcome: Progressing  Goal: Blood Pressure in Desired Range  Outcome: Progressing   Goal Outcome Evaluation:  Plan of Care Reviewed With: patient        Progress: improving  Outcome Evaluation: AOx4. Remains on bipap. Continues lasix gtt. nitroglycerin gtt stopped. Turned q2h with wedges.

## 2025-01-01 NOTE — PROGRESS NOTES
[facility] Intensivist Services  INPATIENT PROGRESS NOTE    Patient Name: Mar Rodriguez  Date of Admission: 12/31/2024  Today's Date: 01/01/25  Length of Stay:  LOS: 1 day   Primary Care Physician: Vandana Churchill MD  Next of Kin: Primary Emergency Contact: Zamzam Marcus, Christ Phone: [phone]      Subjective   Chief Complaint: respiratory failure  HPI   84 year old female with PMH of anxiety, cerebral palsy, depression, DM, thyroid disease, hyperglycemia, hyperlipidemia, HTN, and lymphedema admitted after presenting to ED from SNF with complaint of retaining fluid and shortness of breath. She is prescribed 40 mg po lasix daily, but she believed this was not helping. She does not typically wear O2, but did have to start wearing oxygen yesterday.     ED course workup was significant for: proBNP 2789, HS troponin T 26 and repeat 19.  BUN 47 creatinine 2.12.  Glucose 236.  AST/ALT 37/55.  WBC 12.4.   CXR: Mild pulmonary vascular congestion    Patient was initially admitted to the floor and held in ED as there was no bed availability. She was started on lasix gtt. However, intensivist team was later consulted by covering family practice PA, Rocio Sykes, for worsening oxygenation. She was placed on bipap and remained on lasix infusion after being transferred to ICU for closer monitoring. She was also placed on nitro gtt.     Interval history:  1/1/25: Patient has since been weaned off nitro gtt. Her UOP was not as robust as anticipated on lasix gtt. Lasix gtt was stopped and a one-time dose of 100 mg lasix was given.  She went for CT of chest today which showed mild bronchial wall thickening with mild mosaic attenuation throughout the lungs, which could be reactive airway disease such as asthma or bronchitis.  Additionally, respiratory panel revealed patient is positive for coronavirus OC43.  Appropriate precautions are now in place.  Echocardiogram was performed  which shows EF 61 6 5%, normal right ventricular cavity size and systolic function, and no significant valvular dysfunction.      Review of Systems   Reason unable to perform ROS: Patient is a poor historian.  Patient's sister did help with some of her history.      All pertinent negatives and positives are as above. All other systems have been reviewed and are negative unless otherwise stated.     Past Medical and Past Surgical History   Active and Resolved Problems  Active Hospital Problems    Diagnosis  POA    **Pulmonary vascular congestion [R09.89]  Yes      Resolved Hospital Problems   No resolved problems to display.       Past Medical History:   Past Medical History:   Diagnosis Date    Abnormality of gait and mobility     Anemia     Anxiety     Cerebral palsy     Cognitive communication deficit     Depression     Diabetes mellitus     Disease of thyroid gland     Hyperglycemia     Hyperlipidemia     Hypertension     Hypokalemia     Insomnia     Lymphedema     Neuropathy     Urge incontinence        Past Surgical History: History reviewed. No pertinent surgical history.    Social and Family History     Family History:  family history is not on file.    Tobacco/Social History:  reports that she has never smoked. She has been exposed to tobacco smoke. She does not have any smokeless tobacco history on file. She reports that she does not drink alcohol and does not use drugs.    Allergies   Allergies:   She is allergic to nsaids.    Objective    Temp:  [98.7 °F (37.1 °C)-101.2 °F (38.4 °C)] 98.8 °F (37.1 °C)  Heart Rate:  [] 97  Resp:  [21-26] 26  BP: ()/() 124/43  Physical Exam  Constitutional:       Appearance: She is ill-appearing. She is not toxic-appearing or diaphoretic.   HENT:      Head: Atraumatic.      Nose: Nose normal.      Mouth/Throat:      Mouth: Mucous membranes are dry.   Eyes:      Extraocular Movements: Extraocular movements intact.      Pupils: Pupils are equal, round, and  reactive to light.   Cardiovascular:      Rate and Rhythm: Normal rate and regular rhythm.      Pulses: Normal pulses.      Heart sounds: No murmur heard.  Pulmonary:      Effort: Tachypnea present.      Comments: Diminished in bilateral bases, otherwise clear  Abdominal:      General: Abdomen is flat. Bowel sounds are normal. There is no distension.      Palpations: Abdomen is soft.      Tenderness: There is no abdominal tenderness.   Musculoskeletal:         General: Normal range of motion.      Cervical back: Normal range of motion.   Skin:     General: Skin is warm.      Capillary Refill: Capillary refill takes less than 2 seconds.   Neurological:      General: No focal deficit present.      Mental Status: She is alert. Mental status is at baseline.   Psychiatric:         Mood and Affect: Mood is anxious.           Inpatient Medications   Medications: Scheduled Meds:apixaban, 2.5 mg, Oral, Q12H  atorvastatin, 40 mg, Oral, Nightly  azithromycin, 500 mg, Oral, Q24H  busPIRone, 7.5 mg, Oral, BID  cefTRIAXone, 2,000 mg, Intravenous, Q24H  Chlorhexidine Gluconate Cloth, 1 Application, Topical, Q24H  ferrous sulfate, 325 mg, Oral, Daily With Breakfast  gabapentin, 300 mg, Oral, Nightly  ipratropium-albuterol, 3 mL, Nebulization, Q6H While Awake - RT  levothyroxine, 200 mcg, Oral, Nightly  metoprolol tartrate, 25 mg, Oral, BID  mupirocin, 1 Application, Each Nare, BID  senna-docusate sodium, 2 tablet, Oral, BID  sodium chloride, 10 mL, Intravenous, Q12H  vitamin B-12, 1,000 mcg, Oral, Daily      Continuous Infusions:nitroglycerin, 5-200 mcg/min, Last Rate: Stopped (01/01/25 1100)      PRN Meds:.  acetaminophen    senna-docusate sodium **AND** polyethylene glycol **AND** bisacodyl **AND** bisacodyl    labetalol    nitroglycerin    [COMPLETED] Insert Peripheral IV **AND** sodium chloride    sodium chloride    sodium chloride    I have reviewed the patient's current medications.   Outpatient Medications     Current  Outpatient Medications   Medication Instructions    acetaminophen (TYLENOL) 650 mg, Oral, Every 6 Hours PRN    apixaban (ELIQUIS) 2.5 mg, Oral, Every 12 Hours Scheduled    atorvastatin (LIPITOR) 40 mg, Nightly    busPIRone (BUSPAR) 7.5 mg, Oral, 2 Times Daily, For anxiety    cloNIDine (CATAPRES) 0.1 mg, Oral, Every 8 Hours PRN    colestipol (COLESTID) 1 g, 2 Times Daily    dextrose (GLUTOSE) 15 g, Oral, As Needed    ferrous sulfate 324 mg, 2 Times Daily With Meals    furosemide (LASIX) 40 mg, Daily    gabapentin (NEURONTIN) 300 mg, Oral, Nightly    glucagon (GLUCAGEN) 1 mg, Subcutaneous, Once As Needed    guaiFENesin (MUCINEX) 1,200 mg, 2 Times Daily    ipratropium-albuterol (DUO-NEB) 0.5-2.5 mg/3 ml nebulizer 3 mL, Nebulization, Every 6 Hours PRN    levothyroxine (SYNTHROID, LEVOTHROID) 200 mcg, Nightly    Menthol-Zinc Oxide (Calmoseptine) 0.44-20.6 % ointment 1 Application, Topical, 3 times daily, Both buttocks    metoprolol tartrate (LOPRESSOR) 25 mg, 2 Times Daily    nystatin (MYCOSTATIN) 298266 UNIT/GM powder 1 Application, Topical, Every 12 Hours, Abd folds    phenylephrine-mineral oil-petrolatum (PREPARATION H) 0.25-14-74.9 % ointment hemorrhoidal ointment 1 Application, Rectal, Every 6 Hours PRN    Tresiba FlexTouch 6 Units, Nightly    vitamin B-12 (CYANOCOBALAMIN) 1,000 mcg, Daily       Current Antibiotics   azithromycin - 250 MG  cefTRIAXone (ROCEPHIN) 2000 mg IVPB in 100 mL NS (MBP)    Results:   CBC:      Lab 01/01/25 0239 12/31/24  1213   WBC 13.30* 12.45*   HEMOGLOBIN 12.8 13.4   HEMATOCRIT 39.4 44.2   PLATELETS 223 244   NEUTROS ABS 10.68* 10.46*   IMMATURE GRANS (ABS) 0.12* 0.05   LYMPHS ABS 1.27 0.97   MONOS ABS 1.12* 0.88   EOS ABS 0.08 0.05   MCV 98.0* 105.0*       LIVER FUNCTION TESTS:      Lab 01/01/25 0239 12/31/24  1213   TOTAL PROTEIN 6.9 7.5   ALBUMIN 3.2* 3.5   GLOBULIN 3.7 4.0   ALT (SGPT) 47* 55*   AST (SGOT) 30 37*   BILIRUBIN 0.3 0.3   ALK PHOS 183* 201*       ABG:      Lab  01/01/25  0642 01/01/25  0239 12/31/24  1213   PH, ARTERIAL 7.374  --   --    PO2 ART 93.8  --   --    PCO2, ARTERIAL 47.6*  --   --    HCO3 ART 27.7*  --   --    ANION GAP  --  15.0 12.0       BMP/MG/PHOS:      Lab 01/01/25  0239 12/31/24  1213   SODIUM 138 139   POTASSIUM 4.5 4.8   CHLORIDE 100 102   BUN 52* 47*   CREATININE 2.22* 2.12*   CALCIUM 8.7 9.1   MAGNESIUM  --  2.0       IMAGING STUDIES:  CT Chest Without Contrast Diagnostic    Result Date: 1/1/2025   Mild bronchial wall thickening with mild mosaic attenuation throughout the lungs which can be seen with reactive airway disease such as asthma or bronchitis.    This report was signed and finalized on 1/1/2025 10:50 AM by Brad Barbour.      XR Chest 1 View    Result Date: 1/1/2025   Mildly increased perihilar vascular prominence which can be seen with vascular congestion.  No alessio edema.    This report was signed and finalized on 1/1/2025 8:33 AM by Brad Barbour.      XR Chest 1 View    Result Date: 12/31/2024   1.  Cardiomegaly and mild pulmonary vascular congestion. No alessio edema.  2.  No definite acute cardiopulmonary process.    This report was signed and finalized on 12/31/2024 12:57 PM by Dr. Deny Alcantar MD.          Assessment/Plan   84-year-old female with past medical history of cerebral palsy, hypertension, thyroid disease, among others admitted after presenting to ED from [facility] with dyspnea.  She was thought to have some volume overload and was started on Lasix drip and BiPAP.  She was also started on nitro drip.  Lasix and nitro drips have been discontinued.  She is no longer requiring BiPAP either.  She was found to be positive for coronavirus OC43.  Precautions are in place.  She remains on empiric treatment with Rocephin.    Acute respiratory failure with hypoxia  -Improving  -In setting of coronavirus OC43  -Thought to possibly be due to volume overload/pulmonary vascular congestion  -No longer requiring BiPAP to maintain adequate  "oxygenation  -Patient has now been weaned to room air  -Monitor closely  -No longer requiring Lasix or nitro infusions  -Remains on empiric treatment with Rocephin  -CT chest indicative of reactive airway disease such as bronchitis or asthma     Mild pulmonary vascular congestion  -s/p lasix gtt  -Monitor UOP  -ECHO showed EF 61-65% with normal RV cavity size and systolic function with no significant valvular abnormalities    Hypertension  -Continue lopressor as scheduled  -VS per ICU protocol    Diabetes  -Starting low dose SSI, frequent accu-checks, and hypoglycemia protocols    Thyroid disease  -Continue synthroid  -Check TSH with morning labs    Anxiety  -Continue buspar      CODE STATUS: Full, I had an extensive conversation with the patient and her sister in regards to CODE STATUS.  At this time, they wish to continue full code, but the patient and her sister state that the patient wishes to \"not remain on life support for a long time\".    VTE prophylaxis: Eliquis    Antibiotics: Rocephin, azithromycin     Disposition: Patient will likely be suitable for transition to medical floor tomorrow.  She will need to go back to SNF placement once discharged from the hospital.    Please see MDM section and the rest of the note for further information on patient assessment and treatment.    Part of this note may be an electronic transcription/translation of spoken language to printed text using the Dragon Dictation System    Electronically signed by López Pacheco PA-C on 1/1/2025 at 16:13 CST  "

## 2025-01-01 NOTE — H&P
[facility] Intensivist Services    Date of Admission: 12/31/2024  Date of Note: 12/31/24  Primary Care Physician: Vandana Churchill MD    History   Mrs. Rodriguez is an 84-year-old female who presented to [facility] emergency department midday 12/31/2024 via EMS from local nursing home with a chief complaint of retaining fluid with shortness of breath.  Reporting that these symptoms have been getting worse over the last 3 days.  She is prescribed 40 mg of Lasix daily but explains that this has not been helping.  Does not typically wear oxygen but has required use since yesterday.    ED course workup was significant for: proBNP 2789, HS troponin T 26 and repeat 19 delta of -27.  BUN 47 creatinine 2.12.  Glucose 236.  AST/ALT 37/55.  WBC 12.4.   CXR: Diffuse pulmonary vascular congestion    ER provider, Dr. Sanon, contacted PCP office of Dr. Phan and patient was admitted to their service for further workup and evaluation of pulmonary vascular congestion.  Mrs. Rodriguez was held in the ER due to no bed availability. She was started on Lasix drip.     Intensivist team was consulted by [facility] family practice PA. Rocio Sykes in regard to worsening patient status while being held in the ER. Rocio explained that nursing had notified her that Mrs. Rodriguez was requiring high levels of supplemental O2 and was showing increased work of breathing. I    I evaluated the patient while she was in ER bed 24. She had audible wheezes that could be heard from the door, supplemental O2 at 6 L per nasal cannula with SpO2 90%, /90, 110 bpm, 34 bpm. She was able to answer my questions but in 1-2 word form. I immediately requested BIPAP and nitroglycerine drip for what appeared as worsening CHF exacerbation. Request was made to have patient moved to the ICU. GIGI Sykes notified.     Reevaluation upon arrival to ICU 3 finds her in much less distress,  currently on BIPAP and tolerating it well. Lasix infusing. Nursing is about to start Nitro drip.     Past Medical History     Active and Resolved Problems  Active Hospital Problems    Diagnosis  POA    **Pulmonary vascular congestion [R09.89]  Yes      Resolved Hospital Problems   No resolved problems to display.       Past Medical History:   Past Medical History:   Diagnosis Date    Abnormality of gait and mobility     Anemia     Anxiety     Cerebral palsy     Cognitive communication deficit     Depression     Diabetes mellitus     Disease of thyroid gland     Hyperglycemia     Hyperlipidemia     Hypertension     Hypokalemia     Insomnia     Lymphedema     Neuropathy     Urge incontinence        Prior Surgeries: She  has no past surgical history on file.    Past Surgical History: History reviewed. No pertinent surgical history.    Social and Family History     Family History:  family history is not on file.    Tobacco/Social History:  reports that she has never smoked. She has been exposed to tobacco smoke. She does not have any smokeless tobacco history on file. She reports that she does not drink alcohol and does not use drugs.    Allergies     Allergies:   She is allergic to nsaids.    Allergies   Allergen Reactions    Nsaids Other (See Comments)     No NSAIDS r/t renal disease       Labs     Basic Labs:  Common labs          12/31/2024    12:13   Common Labs   Glucose 236    BUN 47    Creatinine 2.12    Sodium 139    Potassium 4.8    Chloride 102    Calcium 9.1    Albumin 3.5    Total Bilirubin 0.3    Alkaline Phosphatase 201    AST (SGOT) 37    ALT (SGPT) 55    WBC 12.45    Hemoglobin 13.4    Hematocrit 44.2    Platelets 244        Diabetic:      Inpatient Medications     Medications: Scheduled Meds:apixaban, 2.5 mg, Oral, Q12H  atorvastatin, 40 mg, Oral, Nightly  busPIRone, 7.5 mg, Oral, BID  [START ON 1/1/2025] ferrous sulfate, 325 mg, Oral, Daily With Breakfast  furosemide, 80 mg, Intravenous,  Once  gabapentin, 300 mg, Oral, Nightly  levothyroxine, 200 mcg, Oral, Nightly  metoprolol tartrate, 25 mg, Oral, BID  [START ON 1/1/2025] vitamin B-12, 1,000 mcg, Oral, Daily      Continuous Infusions:furosemide, 5 mg/hr, Last Rate: 5 mg/hr (12/31/24 1940)  nitroglycerin, 5-200 mcg/min, Last Rate: 100 mcg/min (12/31/24 1953)  Pharmacy to Dose enoxaparin (LOVENOX),       PRN Meds:.  acetaminophen    ipratropium-albuterol    labetalol    Pharmacy to Dose enoxaparin (LOVENOX)    [COMPLETED] Insert Peripheral IV **AND** sodium chloride    I have reviewed the patient's current medications.   Outpatient Medications     Outpatient Medications:   Outpatient Medications Marked as Taking for the 12/31/24 encounter (Hospital Encounter)   Medication Sig Dispense Refill    apixaban (ELIQUIS) 2.5 MG tablet tablet Take 1 tablet by mouth Every 12 (Twelve) Hours. Indications: Atrial Fibrillation 60 tablet 0    atorvastatin (LIPITOR) 40 MG tablet Take 1 tablet by mouth Every Night.      busPIRone (BUSPAR) 7.5 MG tablet Take 1 tablet by mouth 2 (Two) Times a Day. For anxiety      colestipol (COLESTID) 1 g tablet Take 1 tablet by mouth 2 (Two) Times a Day. For chronic diarrhea      ferrous sulfate 324 (65 Fe) MG tablet delayed-release EC tablet Take 1 tablet by mouth 2 (Two) Times a Day With Meals. For anemia      furosemide (LASIX) 40 MG tablet Take 1 tablet by mouth Daily.      gabapentin (NEURONTIN) 300 MG capsule Take 1 capsule by mouth Every Night. 30 capsule 0    guaiFENesin (MUCINEX) 600 MG 12 hr tablet Take 2 tablets by mouth 2 (Two) Times a Day.      insulin degludec (Tresiba FlexTouch) 100 UNIT/ML solution pen-injector injection Inject 6 Units under the skin into the appropriate area as directed Every Night.      levothyroxine (SYNTHROID, LEVOTHROID) 200 MCG tablet Take 1 tablet by mouth Every Night. For hypothyroidism      metoprolol tartrate (LOPRESSOR) 25 MG tablet Take 1 tablet by mouth 2 (Two) Times a Day.      vitamin  "B-12 (CYANOCOBALAMIN) 1000 MCG tablet Take 1 tablet by mouth Daily.         Current Antibiotics     This patient does not have an active medication from one of the medication groupers.    Exam     Vitals: Her  height is 165.1 cm (65\") and weight is 127 kg (281 lb). Her oral temperature is 97.8 °F (36.6 °C). Her blood pressure is 153/87 and her pulse is 107. Her respiration is 26 and oxygen saturation is 98%.     GENERAL:  Alert, no acute distress.   SKIN:  Warm, dry  EYES:  Pupils equal, round and reactive to light.  EOMs intact.    HEAD:  Normocephalic.  NECK:  Supple   RESP:  Lungs with wheezes throughout. Good airflow.  Tachypneic 26 bpm.  On BiPAP.   CARDIAC: Sinus tachycardia 110 bpm. Normal S1,S2.  Bilateral lower extremity lymphedema  GI:  Soft, nontender, normal bowel sounds  MSK:  Normal muscle bulk, tone  NEUROLOGICAL:  No focal neurological deficits.  PSYCHIATRIC:  Normal affect and mood.  Alert and Oriented x 3.     Results and Cultures Review     Result Review:  I have personally reviewed the results from the time of this admission to 12/31/2024 20:15 CST and agree with these findings:  [x]  Laboratory list / accordion  [x]  Microbiology  [x]  Radiology  []  EKG/Telemetry   []  Cardiology/Vascular   []  Pathology  [x]  Old records  []  Other:  Most notable findings include: Per HPI      Culture Data:   No results found for: \"BLOODCX\", \"URINECX\", \"WOUNDCX\", \"MRSACX\", \"RESPCX\", \"STOOLCX\"    Assessment/Plan   Assessment    This is an 84-year-old female with past medical history significant for paroxysmal atrial fibrillation-on Eliquis, cerebral palsy, cognitive communication deficit, DM type II, hypertension, hyperlipidemia, lymphedema, hypothyroid, anemia and abnormal gait.  She presented to [facility] emergency department from local nursing home where she resides with a chief complaint of 3-day history of worsening fluid retention and shortness of breath.  She was initially admitted under her " family practice providers group as an ER hold patient due to bed availability.  While in the ER she continued to have increased oxygen demands and worsening shortness of breath intensivist service was contacted for evaluation.  He was accepted as transfer to the ICU.     Problems    *Pulmonary vascular congestion  *Acute Respiratory failure with hypoxia  *hypertension  *DM-type II    Plan    *Admit to ICU  *continue lasix drip  *Add nitro drip titrate to effect and wean as able   *continue BIPAP to maintain sp02 >92%  *No documented hx of heart failure. Last TTE 8/18/23 with LV systolic function normal with an LVEF of roughly 60%.   *ECHO ordered  *respiratory panel negative   *no obvious signs of infection  *obtain blood cultures and follow  *obtain S. Pneumo Ag urine and urine Legionella antigen   *duoneb Q 6 hrs   *review home medications and restart pertinent as able      VTE Prophylaxis:    Pharmacologic VTE prophylaxis orders are present.        Total critical care time: Approximately 50 minutes    Due to a high probability of clinically significant, life threatening deterioration, the patient required my highest level of preparedness to intervene emergently and I personally spent this critical care time directly and personally managing the patient.     This critical care time included obtaining a history; examining the patient; pulse oximetry; ordering and review of studies; arranging urgent treatment with development of a management plan; evaluation of patient's response to treatment; frequent reassessment; and, discussions with other providers.    This critical care time was performed to assess and manage the high probability of imminent, life-threatening deterioration that could result in multi-organ failure. It was exclusive of separately billable procedures and treating other patients and teaching time.    Please see MDM section and the rest of the note for further information on patient assessment and  treatment.    Part of this note may be an electronic transcription/translation of spoken language to printed text using the Dragon Dictation System.    Electronically signed by LAZARO Garrido on 12/31/2024 at 20:15 CST

## 2025-01-02 ENCOUNTER — APPOINTMENT (OUTPATIENT)
Dept: GENERAL RADIOLOGY | Facility: HOSPITAL | Age: 85
DRG: 640 | End: 2025-01-02
Payer: MEDICARE

## 2025-01-02 LAB
ALBUMIN SERPL-MCNC: 3.2 G/DL (ref 3.5–5.2)
ALBUMIN/GLOB SERPL: 0.8 G/DL
ALP SERPL-CCNC: 184 U/L (ref 39–117)
ALT SERPL W P-5'-P-CCNC: 55 U/L (ref 1–33)
ANION GAP SERPL CALCULATED.3IONS-SCNC: 13 MMOL/L (ref 5–15)
AST SERPL-CCNC: 39 U/L (ref 1–32)
BASOPHILS # BLD AUTO: 0.05 10*3/MM3 (ref 0–0.2)
BASOPHILS NFR BLD AUTO: 0.5 % (ref 0–1.5)
BILIRUB SERPL-MCNC: 0.2 MG/DL (ref 0–1.2)
BUN SERPL-MCNC: 59 MG/DL (ref 8–23)
BUN/CREAT SERPL: 25 (ref 7–25)
CALCIUM SPEC-SCNC: 8.7 MG/DL (ref 8.6–10.5)
CHLORIDE SERPL-SCNC: 100 MMOL/L (ref 98–107)
CO2 SERPL-SCNC: 26 MMOL/L (ref 22–29)
CREAT SERPL-MCNC: 2.36 MG/DL (ref 0.57–1)
DEPRECATED RDW RBC AUTO: 49.8 FL (ref 37–54)
EGFRCR SERPLBLD CKD-EPI 2021: 20 ML/MIN/1.73
EOSINOPHIL # BLD AUTO: 0.32 10*3/MM3 (ref 0–0.4)
EOSINOPHIL NFR BLD AUTO: 3.1 % (ref 0.3–6.2)
ERYTHROCYTE [DISTWIDTH] IN BLOOD BY AUTOMATED COUNT: 12.9 % (ref 12.3–15.4)
GLOBULIN UR ELPH-MCNC: 3.8 GM/DL
GLUCOSE BLDC GLUCOMTR-MCNC: 138 MG/DL (ref 70–130)
GLUCOSE BLDC GLUCOMTR-MCNC: 144 MG/DL (ref 70–130)
GLUCOSE BLDC GLUCOMTR-MCNC: 161 MG/DL (ref 70–130)
GLUCOSE BLDC GLUCOMTR-MCNC: 172 MG/DL (ref 70–130)
GLUCOSE SERPL-MCNC: 130 MG/DL (ref 65–99)
HCT VFR BLD AUTO: 41 % (ref 34–46.6)
HGB BLD-MCNC: 12.4 G/DL (ref 12–15.9)
IMM GRANULOCYTES # BLD AUTO: 0.07 10*3/MM3 (ref 0–0.05)
IMM GRANULOCYTES NFR BLD AUTO: 0.7 % (ref 0–0.5)
LYMPHOCYTES # BLD AUTO: 1.87 10*3/MM3 (ref 0.7–3.1)
LYMPHOCYTES NFR BLD AUTO: 18 % (ref 19.6–45.3)
MAGNESIUM SERPL-MCNC: 2.1 MG/DL (ref 1.6–2.4)
MCH RBC QN AUTO: 31 PG (ref 26.6–33)
MCHC RBC AUTO-ENTMCNC: 30.2 G/DL (ref 31.5–35.7)
MCV RBC AUTO: 102.5 FL (ref 79–97)
MONOCYTES # BLD AUTO: 1.17 10*3/MM3 (ref 0.1–0.9)
MONOCYTES NFR BLD AUTO: 11.3 % (ref 5–12)
NEUTROPHILS NFR BLD AUTO: 6.9 10*3/MM3 (ref 1.7–7)
NEUTROPHILS NFR BLD AUTO: 66.4 % (ref 42.7–76)
NRBC BLD AUTO-RTO: 0 /100 WBC (ref 0–0.2)
PHOSPHATE SERPL-MCNC: 5.6 MG/DL (ref 2.5–4.5)
PLATELET # BLD AUTO: 243 10*3/MM3 (ref 140–450)
PMV BLD AUTO: 10.8 FL (ref 6–12)
POTASSIUM SERPL-SCNC: 4.3 MMOL/L (ref 3.5–5.2)
PROT SERPL-MCNC: 7 G/DL (ref 6–8.5)
RBC # BLD AUTO: 4 10*6/MM3 (ref 3.77–5.28)
S PNEUM AG SPEC QL LA: NEGATIVE
SODIUM SERPL-SCNC: 139 MMOL/L (ref 136–145)
TSH SERPL DL<=0.05 MIU/L-ACNC: 0.52 UIU/ML (ref 0.27–4.2)
WBC NRBC COR # BLD AUTO: 10.38 10*3/MM3 (ref 3.4–10.8)

## 2025-01-02 PROCEDURE — 71045 X-RAY EXAM CHEST 1 VIEW: CPT

## 2025-01-02 PROCEDURE — 83735 ASSAY OF MAGNESIUM: CPT | Performed by: PHYSICIAN ASSISTANT

## 2025-01-02 PROCEDURE — 63710000001 INSULIN LISPRO (HUMAN) PER 5 UNITS: Performed by: PHYSICIAN ASSISTANT

## 2025-01-02 PROCEDURE — 25010000002 CEFTRIAXONE PER 250 MG

## 2025-01-02 PROCEDURE — 94799 UNLISTED PULMONARY SVC/PX: CPT

## 2025-01-02 PROCEDURE — 94761 N-INVAS EAR/PLS OXIMETRY MLT: CPT

## 2025-01-02 PROCEDURE — 84100 ASSAY OF PHOSPHORUS: CPT | Performed by: PHYSICIAN ASSISTANT

## 2025-01-02 PROCEDURE — 85025 COMPLETE CBC W/AUTO DIFF WBC: CPT

## 2025-01-02 PROCEDURE — 80053 COMPREHEN METABOLIC PANEL: CPT

## 2025-01-02 PROCEDURE — 82948 REAGENT STRIP/BLOOD GLUCOSE: CPT

## 2025-01-02 PROCEDURE — 94660 CPAP INITIATION&MGMT: CPT

## 2025-01-02 PROCEDURE — 94664 DEMO&/EVAL PT USE INHALER: CPT

## 2025-01-02 PROCEDURE — 84443 ASSAY THYROID STIM HORMONE: CPT | Performed by: PHYSICIAN ASSISTANT

## 2025-01-02 RX ADMIN — BUSPIRONE HYDROCHLORIDE 7.5 MG: 5 TABLET ORAL at 09:03

## 2025-01-02 RX ADMIN — CHLORHEXIDINE GLUCONATE 1 APPLICATION: 500 CLOTH TOPICAL at 04:00

## 2025-01-02 RX ADMIN — INSULIN LISPRO 2 UNITS: 100 INJECTION, SOLUTION INTRAVENOUS; SUBCUTANEOUS at 16:35

## 2025-01-02 RX ADMIN — Medication 1 APPLICATION: at 09:04

## 2025-01-02 RX ADMIN — Medication 10 MG: at 20:26

## 2025-01-02 RX ADMIN — BUSPIRONE HYDROCHLORIDE 7.5 MG: 5 TABLET ORAL at 20:25

## 2025-01-02 RX ADMIN — CYANOCOBALAMIN TAB 500 MCG 1000 MCG: 500 TAB at 09:03

## 2025-01-02 RX ADMIN — ATORVASTATIN CALCIUM 40 MG: 40 TABLET, FILM COATED ORAL at 20:26

## 2025-01-02 RX ADMIN — METOPROLOL TARTRATE 25 MG: 25 TABLET, FILM COATED ORAL at 09:03

## 2025-01-02 RX ADMIN — Medication 10 ML: at 20:27

## 2025-01-02 RX ADMIN — IPRATROPIUM BROMIDE AND ALBUTEROL SULFATE 3 ML: 2.5; .5 SOLUTION RESPIRATORY (INHALATION) at 11:23

## 2025-01-02 RX ADMIN — IPRATROPIUM BROMIDE AND ALBUTEROL SULFATE 3 ML: 2.5; .5 SOLUTION RESPIRATORY (INHALATION) at 05:47

## 2025-01-02 RX ADMIN — APIXABAN 2.5 MG: 2.5 TABLET, FILM COATED ORAL at 20:26

## 2025-01-02 RX ADMIN — METOPROLOL TARTRATE 25 MG: 25 TABLET, FILM COATED ORAL at 20:26

## 2025-01-02 RX ADMIN — CEFTRIAXONE SODIUM 2000 MG: 2 INJECTION, POWDER, FOR SOLUTION INTRAMUSCULAR; INTRAVENOUS at 20:27

## 2025-01-02 RX ADMIN — APIXABAN 2.5 MG: 2.5 TABLET, FILM COATED ORAL at 09:03

## 2025-01-02 RX ADMIN — Medication 10 ML: at 09:04

## 2025-01-02 RX ADMIN — AZITHROMYCIN 500 MG: 250 TABLET, FILM COATED ORAL at 20:25

## 2025-01-02 RX ADMIN — LEVOTHYROXINE SODIUM 200 MCG: 100 TABLET ORAL at 20:26

## 2025-01-02 RX ADMIN — FERROUS SULFATE TAB 325 MG (65 MG ELEMENTAL FE) 325 MG: 325 (65 FE) TAB at 09:03

## 2025-01-02 RX ADMIN — GABAPENTIN 300 MG: 300 CAPSULE ORAL at 20:26

## 2025-01-02 RX ADMIN — INSULIN LISPRO 2 UNITS: 100 INJECTION, SOLUTION INTRAVENOUS; SUBCUTANEOUS at 22:00

## 2025-01-02 RX ADMIN — Medication 1 APPLICATION: at 20:26

## 2025-01-02 RX ADMIN — IPRATROPIUM BROMIDE AND ALBUTEROL SULFATE 3 ML: 2.5; .5 SOLUTION RESPIRATORY (INHALATION) at 18:36

## 2025-01-02 NOTE — PLAN OF CARE
Goal Outcome Evaluation:                 Problem: Adult Inpatient Plan of Care  Goal: Plan of Care Review  Outcome: Progressing  Goal: Patient-Specific Goal (Individualized)  Outcome: Progressing  Goal: Absence of Hospital-Acquired Illness or Injury  Outcome: Progressing  Intervention: Identify and Manage Fall Risk  Recent Flowsheet Documentation  Taken 1/2/2025 0600 by Fercho Metzger RN  Safety Promotion/Fall Prevention: safety round/check completed  Taken 1/2/2025 0500 by Fercho Metzger RN  Safety Promotion/Fall Prevention: safety round/check completed  Taken 1/2/2025 0400 by Fercho Metzger RN  Safety Promotion/Fall Prevention: safety round/check completed  Taken 1/2/2025 0300 by Fercho Metzger RN  Safety Promotion/Fall Prevention: safety round/check completed  Taken 1/2/2025 0000 by Fercho Metzger RN  Safety Promotion/Fall Prevention: safety round/check completed  Taken 1/1/2025 2300 by Fercho Metzger RN  Safety Promotion/Fall Prevention: safety round/check completed  Taken 1/1/2025 2200 by Fercho Metzger RN  Safety Promotion/Fall Prevention: safety round/check completed  Taken 1/1/2025 2100 by Fercho Metzger RN  Safety Promotion/Fall Prevention: safety round/check completed  Taken 1/1/2025 2000 by Fercho Metzger RN  Safety Promotion/Fall Prevention: safety round/check completed  Taken 1/1/2025 1900 by Fercho Metzger RN  Safety Promotion/Fall Prevention: safety round/check completed  Intervention: Prevent Skin Injury  Recent Flowsheet Documentation  Taken 1/2/2025 0500 by Fercho Metzger RN  Body Position: supine  Taken 1/2/2025 0400 by Fercho Metzger RN  Skin Protection: silicone foam dressing in place  Taken 1/2/2025 0300 by Fercho Metzger RN  Body Position:   right   side-lying   turned  Taken 1/1/2025 2300 by Fercho Metzger RN  Body Position:   turned   left   side-lying  Taken 1/1/2025 2100 by Fercho Metzger RN  Body Position: supine  Taken 1/1/2025 2000 by Fercho Metzger RN  Skin Protection:  silicone foam dressing in place  Taken 1/1/2025 1900 by Fercho Metzger RN  Body Position:   turned   left   side-lying  Intervention: Prevent and Manage VTE (Venous Thromboembolism) Risk  Recent Flowsheet Documentation  Taken 1/1/2025 2000 by Fercho Metzger RN  VTE Prevention/Management: (see mar) other (see comments)  Goal: Optimal Comfort and Wellbeing  Outcome: Progressing  Intervention: Provide Person-Centered Care  Recent Flowsheet Documentation  Taken 1/2/2025 0400 by Fercho Metzger RN  Trust Relationship/Rapport:   care explained   choices provided  Taken 1/2/2025 0000 by Fercho Metzger RN  Trust Relationship/Rapport:   care explained   choices provided  Taken 1/1/2025 2000 by Fercho Metzger RN  Trust Relationship/Rapport:   care explained   choices provided  Goal: Readiness for Transition of Care  Outcome: Progressing     Problem: Skin Injury Risk Increased  Goal: Skin Health and Integrity  Outcome: Progressing  Intervention: Optimize Skin Protection  Recent Flowsheet Documentation  Taken 1/2/2025 0500 by Fercho Metzger RN  Head of Bed ([facility]) Positioning: HOB at 30-45 degrees  Taken 1/2/2025 0400 by Fercho Metzger RN  Activity Management: bedrest  Pressure Reduction Techniques: weight shift assistance provided  Pressure Reduction Devices: pressure-redistributing mattress utilized  Skin Protection: silicone foam dressing in place  Taken 1/2/2025 0300 by Fercho Metzger RN  Head of Bed (HOB) Positioning: HOB at 30-45 degrees  Taken 1/1/2025 2300 by Fercho Metzger RN  Head of Bed (HOB) Positioning: HOB at 30-45 degrees  Taken 1/1/2025 2100 by Fercho Metzger RN  Head of Bed (HOB) Positioning: HOB at 30-45 degrees  Taken 1/1/2025 2000 by Fercho Metzger RN  Pressure Reduction Techniques: weight shift assistance provided  Pressure Reduction Devices: pressure-redistributing mattress utilized  Skin Protection: silicone foam dressing in place  Taken 1/1/2025 1900 by Fercho Metzger RN  Head of Bed ([facility])  Positioning: HOB at 30-45 degrees     Problem: Sepsis/Septic Shock  Goal: Optimal Coping  Outcome: Progressing  Intervention: Support Patient and Family Response  Recent Flowsheet Documentation  Taken 1/1/2025 2000 by Fercho Metzger RN  Family/Support System Care: support provided  Goal: Absence of Bleeding  Outcome: Progressing  Goal: Blood Glucose Level Within Target Range  Outcome: Progressing  Goal: Absence of Infection Signs and Symptoms  Outcome: Progressing  Intervention: Promote Recovery  Recent Flowsheet Documentation  Taken 1/2/2025 0400 by Fercho Metzger RN  Activity Management: bedrest  Goal: Optimal Nutrition Delivery  Outcome: Progressing     Problem: Noninvasive Ventilation Acute  Goal: Effective Unassisted Ventilation and Oxygenation  Outcome: Progressing     Problem: Comorbidity Management  Goal: Maintenance of Heart Failure Symptom Control  Outcome: Progressing  Intervention: Maintain Heart Failure Management  Recent Flowsheet Documentation  Taken 1/2/2025 0600 by Fercho Metzger RN  Medication Review/Management: medications reviewed  Taken 1/2/2025 0500 by Fercho Metzger RN  Medication Review/Management: medications reviewed  Taken 1/2/2025 0400 by Fercho Metzger RN  Medication Review/Management: medications reviewed  Taken 1/2/2025 0300 by Fercho Metzger RN  Medication Review/Management: medications reviewed  Taken 1/2/2025 0000 by Fercho Metzger RN  Medication Review/Management: medications reviewed  Taken 1/1/2025 2300 by Fercho Metzger RN  Medication Review/Management: medications reviewed  Taken 1/1/2025 2200 by Fercho Metzger RN  Medication Review/Management: medications reviewed  Taken 1/1/2025 2100 by Fercho Metzger RN  Medication Review/Management: medications reviewed  Taken 1/1/2025 1900 by Fercho Metzger RN  Medication Review/Management: medications reviewed  Goal: Blood Pressure in Desired Range  Outcome: Progressing  Intervention: Maintain Blood Pressure Management  Recent  Flowsheet Documentation  Taken 1/2/2025 0600 by Fercho Metzger RN  Medication Review/Management: medications reviewed  Taken 1/2/2025 0500 by Fercho Metzger RN  Medication Review/Management: medications reviewed  Taken 1/2/2025 0400 by Fercho Metzger RN  Medication Review/Management: medications reviewed  Taken 1/2/2025 0300 by Fercho Metzger RN  Medication Review/Management: medications reviewed  Taken 1/2/2025 0000 by Fercho Metzger RN  Medication Review/Management: medications reviewed  Taken 1/1/2025 2300 by Fercho Metzger RN  Medication Review/Management: medications reviewed  Taken 1/1/2025 2200 by Fercho Metzger RN  Medication Review/Management: medications reviewed  Taken 1/1/2025 2100 by Fercho Metzger RN  Medication Review/Management: medications reviewed  Taken 1/1/2025 1900 by Fercho Metzger RN  Medication Review/Management: medications reviewed     Problem: Fall Injury Risk  Goal: Absence of Fall and Fall-Related Injury  Outcome: Progressing  Intervention: Identify and Manage Contributors  Recent Flowsheet Documentation  Taken 1/2/2025 0600 by Fercho Metzger RN  Medication Review/Management: medications reviewed  Taken 1/2/2025 0500 by Fercho Metzger RN  Medication Review/Management: medications reviewed  Taken 1/2/2025 0400 by Fercho Metzger RN  Medication Review/Management: medications reviewed  Taken 1/2/2025 0300 by Fercho Metzger RN  Medication Review/Management: medications reviewed  Taken 1/2/2025 0000 by Fercho Metzger RN  Medication Review/Management: medications reviewed  Taken 1/1/2025 2300 by Fercho Metzger RN  Medication Review/Management: medications reviewed  Taken 1/1/2025 2200 by Fercho Metzger RN  Medication Review/Management: medications reviewed  Taken 1/1/2025 2100 by Fercho Metzger RN  Medication Review/Management: medications reviewed  Taken 1/1/2025 1900 by Fercho Metzger RN  Medication Review/Management: medications reviewed  Intervention: Promote Injury-Free  Environment  Recent Flowsheet Documentation  Taken 1/2/2025 0600 by Fercho Metzger RN  Safety Promotion/Fall Prevention: safety round/check completed  Taken 1/2/2025 0500 by Fercho Metzger RN  Safety Promotion/Fall Prevention: safety round/check completed  Taken 1/2/2025 0400 by Fercho Metzger RN  Safety Promotion/Fall Prevention: safety round/check completed  Taken 1/2/2025 0300 by Fercho Metzger RN  Safety Promotion/Fall Prevention: safety round/check completed  Taken 1/2/2025 0000 by Fercho Metzger RN  Safety Promotion/Fall Prevention: safety round/check completed  Taken 1/1/2025 2300 by Fercho Metzger RN  Safety Promotion/Fall Prevention: safety round/check completed  Taken 1/1/2025 2200 by Fercho Metzger RN  Safety Promotion/Fall Prevention: safety round/check completed  Taken 1/1/2025 2100 by Fercho Metzger RN  Safety Promotion/Fall Prevention: safety round/check completed  Taken 1/1/2025 2000 by Fercho Metzger RN  Safety Promotion/Fall Prevention: safety round/check completed  Taken 1/1/2025 1900 by Fercho Metzger RN  Safety Promotion/Fall Prevention: safety round/check completed

## 2025-01-02 NOTE — PROGRESS NOTES
[facility]  INPATIENT PROGRESS NOTE    Patient Name: Mar Rodriguez  Date of Admission: 12/31/2024  Today's Date: 01/02/25  Length of Stay:  LOS: 2 days   Primary Care Physician: Vandana Churchill MD  Next of Kin: Primary Emergency Contact: Zamzam Mracus, Christ Phone: [phone]      Subjective   Chief Complaint: respiratory failure  HPI   84 year old female with PMH of anxiety, cerebral palsy, depression, DM, thyroid disease, hyperglycemia, hyperlipidemia, HTN, and lymphedema admitted after presenting to ED from SNF with complaint of retaining fluid and shortness of breath. She is prescribed 40 mg po lasix daily, but she believed this was not helping. She does not typically wear O2, but did have to start wearing oxygen yesterday.     ED course workup was significant for: proBNP 2789, HS troponin T 26 and repeat 19.  BUN 47 creatinine 2.12.  Glucose 236.  AST/ALT 37/55.  WBC 12.4.   CXR: Mild pulmonary vascular congestion    Patient was initially admitted to the floor and held in ED as there was no bed availability. She was started on lasix gtt. However, intensivist team was later consulted by covering family practice PA, Rocio Sykes, for worsening oxygenation. She was placed on bipap and remained on lasix infusion after being transferred to ICU for closer monitoring. She was also placed on nitro gtt.     Interval history:  1/1/25: Patient has since been weaned off nitro gtt. Her UOP was not as robust as anticipated on lasix gtt. Lasix gtt was stopped and a one-time dose of 100 mg lasix was given.  She went for CT of chest today which showed mild bronchial wall thickening with mild mosaic attenuation throughout the lungs, which could be reactive airway disease such as asthma or bronchitis.  Additionally, respiratory panel revealed patient is positive for coronavirus OC43.  Appropriate precautions are now in place.  Echocardiogram was performed  which shows EF 61 6 5%, normal right ventricular cavity size and systolic function, and no significant valvular dysfunction.    1/2/25: Patient is feeling better overall today.  She remains on 2 L nasal cannula.  She remains off of any drips.  She has no complaints for me this morning.    Review of Systems   Reason unable to perform ROS: Patient is a poor historian.  Patient's sister did help with some of her history.      All pertinent negatives and positives are as above. All other systems have been reviewed and are negative unless otherwise stated.     Past Medical and Past Surgical History   Active and Resolved Problems  Active Hospital Problems    Diagnosis  POA    **Pulmonary vascular congestion [R09.89]  Yes      Resolved Hospital Problems   No resolved problems to display.       Past Medical History:   Past Medical History:   Diagnosis Date    Abnormality of gait and mobility     Anemia     Anxiety     Cerebral palsy     Cognitive communication deficit     Depression     Diabetes mellitus     Disease of thyroid gland     Hyperglycemia     Hyperlipidemia     Hypertension     Hypokalemia     Insomnia     Lymphedema     Neuropathy     Urge incontinence        Past Surgical History: History reviewed. No pertinent surgical history.    Social and Family History     Family History:  family history is not on file.    Tobacco/Social History:  reports that she has never smoked. She has been exposed to tobacco smoke. She does not have any smokeless tobacco history on file. She reports that she does not drink alcohol and does not use drugs.    Allergies   Allergies:   She is allergic to nsaids.    Objective    Temp:  [96.9 °F (36.1 °C)-98 °F (36.7 °C)] 97.3 °F (36.3 °C)  Heart Rate:  [] 91  Resp:  [21-24] 21  BP: (114-157)/() 132/73  Physical Exam  Vitals and nursing note reviewed.   Constitutional:       General: She is not in acute distress.     Appearance: She is obese. She is ill-appearing. She is not  toxic-appearing or diaphoretic.   HENT:      Head: Atraumatic.      Nose: Nose normal.      Mouth/Throat:      Mouth: Mucous membranes are moist.   Eyes:      Extraocular Movements: Extraocular movements intact.      Pupils: Pupils are equal, round, and reactive to light.   Cardiovascular:      Rate and Rhythm: Normal rate and regular rhythm.      Pulses: Normal pulses.      Heart sounds: No murmur heard.  Pulmonary:      Effort: Pulmonary effort is normal.      Comments: Diminished in bilateral bases, otherwise clear  Abdominal:      General: Abdomen is flat. Bowel sounds are normal. There is no distension.      Palpations: Abdomen is soft.      Tenderness: There is no abdominal tenderness.   Musculoskeletal:         General: Normal range of motion.      Cervical back: Normal range of motion.   Skin:     General: Skin is warm.      Capillary Refill: Capillary refill takes less than 2 seconds.   Neurological:      General: No focal deficit present.      Mental Status: She is alert. Mental status is at baseline.   Psychiatric:         Mood and Affect: Mood is anxious.       Inpatient Medications   Medications: Scheduled Meds:apixaban, 2.5 mg, Oral, Q12H  atorvastatin, 40 mg, Oral, Nightly  azithromycin, 500 mg, Oral, Q24H  busPIRone, 7.5 mg, Oral, BID  cefTRIAXone, 2,000 mg, Intravenous, Q24H  Chlorhexidine Gluconate Cloth, 1 Application, Topical, Q24H  ferrous sulfate, 325 mg, Oral, Daily With Breakfast  gabapentin, 300 mg, Oral, Nightly  insulin lispro, 2-7 Units, Subcutaneous, 4x Daily AC & at Bedtime  ipratropium-albuterol, 3 mL, Nebulization, Q6H While Awake - RT  levothyroxine, 200 mcg, Oral, Nightly  metoprolol tartrate, 25 mg, Oral, BID  mupirocin, 1 Application, Each Nare, BID  senna-docusate sodium, 2 tablet, Oral, BID  sodium chloride, 10 mL, Intravenous, Q12H  vitamin B-12, 1,000 mcg, Oral, Daily      Continuous Infusions:     PRN Meds:.  acetaminophen    senna-docusate sodium **AND** polyethylene glycol  **AND** bisacodyl **AND** bisacodyl    dextrose    dextrose    glucagon (human recombinant)    labetalol    nitroglycerin    [COMPLETED] Insert Peripheral IV **AND** sodium chloride    sodium chloride    sodium chloride    I have reviewed the patient's current medications.   Outpatient Medications     Current Outpatient Medications   Medication Instructions    acetaminophen (TYLENOL) 650 mg, Oral, Every 6 Hours PRN    apixaban (ELIQUIS) 2.5 mg, Oral, Every 12 Hours Scheduled    atorvastatin (LIPITOR) 40 mg, Nightly    busPIRone (BUSPAR) 7.5 mg, Oral, 2 Times Daily, For anxiety    cloNIDine (CATAPRES) 0.1 mg, Oral, Every 8 Hours PRN    colestipol (COLESTID) 1 g, 2 Times Daily    dextrose (GLUTOSE) 15 g, Oral, As Needed    ferrous sulfate 324 mg, 2 Times Daily With Meals    furosemide (LASIX) 40 mg, Daily    gabapentin (NEURONTIN) 300 mg, Oral, Nightly    glucagon (GLUCAGEN) 1 mg, Subcutaneous, Once As Needed    guaiFENesin (MUCINEX) 1,200 mg, 2 Times Daily    ipratropium-albuterol (DUO-NEB) 0.5-2.5 mg/3 ml nebulizer 3 mL, Nebulization, Every 6 Hours PRN    levothyroxine (SYNTHROID, LEVOTHROID) 200 mcg, Nightly    Menthol-Zinc Oxide (Calmoseptine) 0.44-20.6 % ointment 1 Application, Topical, 3 times daily, Both buttocks    metoprolol tartrate (LOPRESSOR) 25 mg, 2 Times Daily    nystatin (MYCOSTATIN) 349459 UNIT/GM powder 1 Application, Topical, Every 12 Hours, Abd folds    phenylephrine-mineral oil-petrolatum (PREPARATION H) 0.25-14-74.9 % ointment hemorrhoidal ointment 1 Application, Rectal, Every 6 Hours PRN    Tresiba FlexTouch 6 Units, Nightly    vitamin B-12 (CYANOCOBALAMIN) 1,000 mcg, Daily       Current Antibiotics   azithromycin - 250 MG  cefTRIAXone (ROCEPHIN) 2000 mg IVPB in 100 mL NS (MBP)    Results:   CBC:      Lab 01/02/25  0143 01/01/25  0239 12/31/24  1213   WBC 10.38 13.30* 12.45*   HEMOGLOBIN 12.4 12.8 13.4   HEMATOCRIT 41.0 39.4 44.2   PLATELETS 243 223 244   NEUTROS ABS 6.90 10.68* 10.46*    IMMATURE GRANS (ABS) 0.07* 0.12* 0.05   LYMPHS ABS 1.87 1.27 0.97   MONOS ABS 1.17* 1.12* 0.88   EOS ABS 0.32 0.08 0.05   .5* 98.0* 105.0*       LIVER FUNCTION TESTS:      Lab 01/02/25  0143 01/01/25  0239 12/31/24  1213   TOTAL PROTEIN 7.0 6.9 7.5   ALBUMIN 3.2* 3.2* 3.5   GLOBULIN 3.8 3.7 4.0   ALT (SGPT) 55* 47* 55*   AST (SGOT) 39* 30 37*   BILIRUBIN 0.2 0.3 0.3   ALK PHOS 184* 183* 201*       ABG:      Lab 01/02/25  0143 01/01/25  0642 01/01/25 0239 12/31/24  1213   PH, ARTERIAL  --  7.374  --   --    PO2 ART  --  93.8  --   --    PCO2, ARTERIAL  --  47.6*  --   --    HCO3 ART  --  27.7*  --   --    ANION GAP 13.0  --  15.0 12.0       BMP/MG/PHOS:      Lab 01/02/25  0143 01/01/25 0239 12/31/24  1213   SODIUM 139 138 139   POTASSIUM 4.3 4.5 4.8   CHLORIDE 100 100 102   BUN 59* 52* 47*   CREATININE 2.36* 2.22* 2.12*   CALCIUM 8.7 8.7 9.1   MAGNESIUM 2.1  --  2.0   PHOSPHORUS 5.6*  --   --        IMAGING STUDIES:  XR Chest 1 View    Result Date: 1/2/2025   No acute findings.  This report was signed and finalized on 1/2/2025 6:58 AM by Dr. Amrit Collins MD.      CT Chest Without Contrast Diagnostic    Result Date: 1/1/2025   Mild bronchial wall thickening with mild mosaic attenuation throughout the lungs which can be seen with reactive airway disease such as asthma or bronchitis.    This report was signed and finalized on 1/1/2025 10:50 AM by Brad Barbour.      XR Chest 1 View    Result Date: 1/1/2025   Mildly increased perihilar vascular prominence which can be seen with vascular congestion.  No alessio edema.    This report was signed and finalized on 1/1/2025 8:33 AM by Brad Barbour.      XR Chest 1 View    Result Date: 12/31/2024   1.  Cardiomegaly and mild pulmonary vascular congestion. No alessio edema.  2.  No definite acute cardiopulmonary process.    This report was signed and finalized on 12/31/2024 12:57 PM by Dr. Deny Alcantar MD.          Assessment/Plan   84-year-old female with past  "medical history of cerebral palsy, hypertension, thyroid disease, among others admitted after presenting to ED from SNF with dyspnea.  She was thought to have some volume overload and was started on Lasix drip and BiPAP.  She was also started on nitro drip.  Lasix and nitro drips have been discontinued.  She is no longer requiring BiPAP either.  She was found to be positive for coronavirus OC43.  Precautions are in place.  She remains on empiric treatment with Rocephin and Azithromycin.    Acute respiratory failure with hypoxia  -Improving  -In setting of coronavirus OC43  -Thought to possibly be due to volume overload/pulmonary vascular congestion  -No longer requiring BiPAP to maintain adequate oxygenation  -Patient has now been weaned to 2 L NC  -Monitor closely  -No longer requiring Lasix or nitro infusions  -Remains on empiric treatment with Rocephin and Azithromycin  -CT chest indicative of reactive airway disease such as bronchitis or asthma     Mild pulmonary vascular congestion  -s/p lasix gtt  -Monitor UOP  -ECHO showed EF 61-65% with normal RV cavity size and systolic function with no significant valvular abnormalities    Hypertension  -Continue lopressor as scheduled  -VS per ICU protocol    Diabetes  -Starting low dose SSI, frequent accu-checks, and hypoglycemia protocols    Thyroid disease  -Continue synthroid  -Check TSH with morning labs    Anxiety  -Continue buspar    7. CHRISTOPHER on CKD  -BUN 59, Cr 2.36. Slightly worse from yesterday  -Baseline Cr appears to be 1.7-2.0  -Continue to monitor UOP closely  -Avoid hypotension and neprhotoxins  -Daily BMP      CODE STATUS: Full, I had an extensive conversation with the patient and her sister in regards to CODE STATUS.  At this time, they wish to continue full code, but the patient and her sister state that the patient wishes to \"not remain on life support for a long time\".    VTE prophylaxis: Eliquis    Antibiotics: Rocephin, azithromycin     Disposition: " Patient is now suitable for transition to medical floor.  She will need to go back to SNF placement once discharged from the hospital.    Please see MDM section and the rest of the note for further information on patient assessment and treatment.    Part of this note may be an electronic transcription/translation of spoken language to printed text using the Dragon Dictation System    Electronically signed by López Pacheco PA-C on 1/2/2025 at 10:56 CST

## 2025-01-02 NOTE — PROGRESS NOTES
RT EQUIPMENT DEVICE RELATED - SKIN ASSESSMENT    Hayes Score:  Hayes Score: 14     RT Medical Equipment/Device:     NIV Mask:  Under-the-nose   size:  b    Skin Assessment:      Cheek:  Intact    Device Skin Pressure Protection:  Skin-to-device areas padded:  None Required    Nurse Notification:  Mellisa Solares, RRT

## 2025-01-02 NOTE — CASE MANAGEMENT/SOCIAL WORK
Continued Stay Note   [place]     Patient Name: Mar Rodriguez  MRN: [id number]  Today's Date: 1/2/2025    Admit Date: 12/31/2024        Discharge Plan       Row Name 01/02/25 0953       Plan    Plan Comments spoke to Tracy with [facility] patient is skilled level with a bedhold                   Discharge Codes    No documentation.                       Elidia Wilks RN

## 2025-01-02 NOTE — PROGRESS NOTES
RT EQUIPMENT DEVICE RELATED - SKIN ASSESSMENT    Hayes Score:  Hayes Score: 14     RT Medical Equipment/Device:     NIV Mask:  Under-the-nose   size:  B    Skin Assessment:      Nose:  Intact    Device Skin Pressure Protection:  Skin-to-device areas padded:  None Required    Nurse Notification:  Mellisa Churchill, RRT

## 2025-01-03 ENCOUNTER — APPOINTMENT (OUTPATIENT)
Dept: GENERAL RADIOLOGY | Facility: HOSPITAL | Age: 85
DRG: 640 | End: 2025-01-03
Payer: MEDICARE

## 2025-01-03 LAB
ALBUMIN SERPL-MCNC: 3 G/DL (ref 3.5–5.2)
ALBUMIN/GLOB SERPL: 0.8 G/DL
ALP SERPL-CCNC: 178 U/L (ref 39–117)
ALT SERPL W P-5'-P-CCNC: 49 U/L (ref 1–33)
ANION GAP SERPL CALCULATED.3IONS-SCNC: 14 MMOL/L (ref 5–15)
AST SERPL-CCNC: 27 U/L (ref 1–32)
BASOPHILS # BLD AUTO: 0.05 10*3/MM3 (ref 0–0.2)
BASOPHILS NFR BLD AUTO: 0.6 % (ref 0–1.5)
BILIRUB SERPL-MCNC: 0.2 MG/DL (ref 0–1.2)
BUN SERPL-MCNC: 66 MG/DL (ref 8–23)
BUN/CREAT SERPL: 27.7 (ref 7–25)
CALCIUM SPEC-SCNC: 9.3 MG/DL (ref 8.6–10.5)
CHLORIDE SERPL-SCNC: 101 MMOL/L (ref 98–107)
CO2 SERPL-SCNC: 25 MMOL/L (ref 22–29)
CREAT SERPL-MCNC: 2.38 MG/DL (ref 0.57–1)
DEPRECATED RDW RBC AUTO: 50.4 FL (ref 37–54)
EGFRCR SERPLBLD CKD-EPI 2021: 19.8 ML/MIN/1.73
EOSINOPHIL # BLD AUTO: 0.29 10*3/MM3 (ref 0–0.4)
EOSINOPHIL NFR BLD AUTO: 3.3 % (ref 0.3–6.2)
ERYTHROCYTE [DISTWIDTH] IN BLOOD BY AUTOMATED COUNT: 13.1 % (ref 12.3–15.4)
GLOBULIN UR ELPH-MCNC: 4 GM/DL
GLUCOSE BLDC GLUCOMTR-MCNC: 120 MG/DL (ref 70–130)
GLUCOSE BLDC GLUCOMTR-MCNC: 146 MG/DL (ref 70–130)
GLUCOSE BLDC GLUCOMTR-MCNC: 154 MG/DL (ref 70–130)
GLUCOSE BLDC GLUCOMTR-MCNC: 157 MG/DL (ref 70–130)
GLUCOSE SERPL-MCNC: 123 MG/DL (ref 65–99)
HCT VFR BLD AUTO: 43.9 % (ref 34–46.6)
HGB BLD-MCNC: 13.4 G/DL (ref 12–15.9)
IMM GRANULOCYTES # BLD AUTO: 0.1 10*3/MM3 (ref 0–0.05)
IMM GRANULOCYTES NFR BLD AUTO: 1.1 % (ref 0–0.5)
LYMPHOCYTES # BLD AUTO: 1.8 10*3/MM3 (ref 0.7–3.1)
LYMPHOCYTES NFR BLD AUTO: 20.4 % (ref 19.6–45.3)
MAGNESIUM SERPL-MCNC: 2.5 MG/DL (ref 1.6–2.4)
MCH RBC QN AUTO: 31.9 PG (ref 26.6–33)
MCHC RBC AUTO-ENTMCNC: 30.5 G/DL (ref 31.5–35.7)
MCV RBC AUTO: 104.5 FL (ref 79–97)
MONOCYTES # BLD AUTO: 0.74 10*3/MM3 (ref 0.1–0.9)
MONOCYTES NFR BLD AUTO: 8.4 % (ref 5–12)
NEUTROPHILS NFR BLD AUTO: 5.84 10*3/MM3 (ref 1.7–7)
NEUTROPHILS NFR BLD AUTO: 66.2 % (ref 42.7–76)
NRBC BLD AUTO-RTO: 0 /100 WBC (ref 0–0.2)
PHOSPHATE SERPL-MCNC: 4.4 MG/DL (ref 2.5–4.5)
PLATELET # BLD AUTO: 229 10*3/MM3 (ref 140–450)
PMV BLD AUTO: 10.4 FL (ref 6–12)
POTASSIUM SERPL-SCNC: 4 MMOL/L (ref 3.5–5.2)
PROT SERPL-MCNC: 7 G/DL (ref 6–8.5)
RBC # BLD AUTO: 4.2 10*6/MM3 (ref 3.77–5.28)
SODIUM SERPL-SCNC: 140 MMOL/L (ref 136–145)
WBC NRBC COR # BLD AUTO: 8.82 10*3/MM3 (ref 3.4–10.8)

## 2025-01-03 PROCEDURE — 94664 DEMO&/EVAL PT USE INHALER: CPT

## 2025-01-03 PROCEDURE — 94799 UNLISTED PULMONARY SVC/PX: CPT

## 2025-01-03 PROCEDURE — 25010000002 CEFTRIAXONE PER 250 MG

## 2025-01-03 PROCEDURE — 83735 ASSAY OF MAGNESIUM: CPT | Performed by: PHYSICIAN ASSISTANT

## 2025-01-03 PROCEDURE — 82948 REAGENT STRIP/BLOOD GLUCOSE: CPT

## 2025-01-03 PROCEDURE — 94660 CPAP INITIATION&MGMT: CPT

## 2025-01-03 PROCEDURE — 71045 X-RAY EXAM CHEST 1 VIEW: CPT

## 2025-01-03 PROCEDURE — 63710000001 INSULIN LISPRO (HUMAN) PER 5 UNITS: Performed by: PHYSICIAN ASSISTANT

## 2025-01-03 PROCEDURE — 80053 COMPREHEN METABOLIC PANEL: CPT

## 2025-01-03 PROCEDURE — 94761 N-INVAS EAR/PLS OXIMETRY MLT: CPT

## 2025-01-03 PROCEDURE — 84100 ASSAY OF PHOSPHORUS: CPT | Performed by: PHYSICIAN ASSISTANT

## 2025-01-03 PROCEDURE — 85025 COMPLETE CBC W/AUTO DIFF WBC: CPT | Performed by: PHYSICIAN ASSISTANT

## 2025-01-03 RX ADMIN — CEFTRIAXONE SODIUM 2000 MG: 2 INJECTION, POWDER, FOR SOLUTION INTRAMUSCULAR; INTRAVENOUS at 21:19

## 2025-01-03 RX ADMIN — APIXABAN 2.5 MG: 2.5 TABLET, FILM COATED ORAL at 08:07

## 2025-01-03 RX ADMIN — CYANOCOBALAMIN TAB 500 MCG 1000 MCG: 500 TAB at 08:07

## 2025-01-03 RX ADMIN — IPRATROPIUM BROMIDE AND ALBUTEROL SULFATE 3 ML: 2.5; .5 SOLUTION RESPIRATORY (INHALATION) at 19:26

## 2025-01-03 RX ADMIN — APIXABAN 2.5 MG: 2.5 TABLET, FILM COATED ORAL at 21:19

## 2025-01-03 RX ADMIN — GABAPENTIN 300 MG: 300 CAPSULE ORAL at 21:19

## 2025-01-03 RX ADMIN — Medication 10 ML: at 08:08

## 2025-01-03 RX ADMIN — Medication 10 MG: at 21:34

## 2025-01-03 RX ADMIN — ATORVASTATIN CALCIUM 40 MG: 40 TABLET, FILM COATED ORAL at 21:19

## 2025-01-03 RX ADMIN — IPRATROPIUM BROMIDE AND ALBUTEROL SULFATE 3 ML: 2.5; .5 SOLUTION RESPIRATORY (INHALATION) at 06:07

## 2025-01-03 RX ADMIN — Medication 1 APPLICATION: at 08:07

## 2025-01-03 RX ADMIN — METOPROLOL TARTRATE 25 MG: 25 TABLET, FILM COATED ORAL at 08:07

## 2025-01-03 RX ADMIN — CHLORHEXIDINE GLUCONATE 1 APPLICATION: 500 CLOTH TOPICAL at 06:33

## 2025-01-03 RX ADMIN — Medication 1 APPLICATION: at 21:19

## 2025-01-03 RX ADMIN — BUSPIRONE HYDROCHLORIDE 7.5 MG: 5 TABLET ORAL at 08:07

## 2025-01-03 RX ADMIN — BUSPIRONE HYDROCHLORIDE 7.5 MG: 5 TABLET ORAL at 21:19

## 2025-01-03 RX ADMIN — LEVOTHYROXINE SODIUM 200 MCG: 100 TABLET ORAL at 21:19

## 2025-01-03 RX ADMIN — FERROUS SULFATE TAB 325 MG (65 MG ELEMENTAL FE) 325 MG: 325 (65 FE) TAB at 08:07

## 2025-01-03 RX ADMIN — IPRATROPIUM BROMIDE AND ALBUTEROL SULFATE 3 ML: 2.5; .5 SOLUTION RESPIRATORY (INHALATION) at 14:06

## 2025-01-03 RX ADMIN — METOPROLOL TARTRATE 25 MG: 25 TABLET, FILM COATED ORAL at 21:19

## 2025-01-03 RX ADMIN — Medication 10 ML: at 21:19

## 2025-01-03 RX ADMIN — INSULIN LISPRO 2 UNITS: 100 INJECTION, SOLUTION INTRAVENOUS; SUBCUTANEOUS at 21:34

## 2025-01-03 RX ADMIN — INSULIN LISPRO 2 UNITS: 100 INJECTION, SOLUTION INTRAVENOUS; SUBCUTANEOUS at 12:11

## 2025-01-03 NOTE — PLAN OF CARE
Goal Outcome Evaluation:Patient wore Bipap about 5 hours tonight. Patient currently on nasal cannula.  Problem: Noninvasive Ventilation Acute  Goal: Effective Unassisted Ventilation and Oxygenation  Outcome: Progressing  Intervention: Monitor and Manage Noninvasive Ventilation  Recent Flowsheet Documentation  Taken 1/2/2025 2200 by Leigh Churchill, RRT  Airway/Ventilation Management: airway patency maintained  NPPV/CPAP Maintenance:   adjusted   proper fit/secure     Problem: Skin Injury Risk Increased  Goal: Skin Health and Integrity  Intervention: Optimize Skin Protection  Recent Flowsheet Documentation  Taken 1/2/2025 2200 by Leigh Churchill, RRT  Head of Bed (HOB) Positioning: HOB at 30 degrees     Problem: Sepsis/Septic Shock  Goal: Absence of Infection Signs and Symptoms  Intervention: Promote Recovery  Recent Flowsheet Documentation  Taken 1/2/2025 2200 by Leigh Churchill, RRT  Airway/Ventilation Management: airway patency maintained

## 2025-01-03 NOTE — PLAN OF CARE
Problem: Adult Inpatient Plan of Care  Goal: Plan of Care Review  Outcome: Progressing  Flowsheets (Taken 1/2/2025 1802)  Progress: improving  Plan of Care Reviewed With:   patient   sibling  Goal: Patient-Specific Goal (Individualized)  Outcome: Progressing  Goal: Absence of Hospital-Acquired Illness or Injury  Outcome: Progressing  Intervention: Identify and Manage Fall Risk  Recent Flowsheet Documentation  Taken 1/2/2025 1800 by Haleigh Boyce RN  Safety Promotion/Fall Prevention: safety round/check completed  Taken 1/2/2025 1700 by Haleigh Boyce RN  Safety Promotion/Fall Prevention: safety round/check completed  Taken 1/2/2025 1600 by Haleigh Boyce RN  Safety Promotion/Fall Prevention: safety round/check completed  Taken 1/2/2025 1500 by Haleigh Boyce RN  Safety Promotion/Fall Prevention: safety round/check completed  Taken 1/2/2025 1400 by Haleigh Boyce RN  Safety Promotion/Fall Prevention: safety round/check completed  Taken 1/2/2025 1300 by Haleigh Boyce RN  Safety Promotion/Fall Prevention: safety round/check completed  Taken 1/2/2025 1200 by Haleigh Boyce RN  Safety Promotion/Fall Prevention: safety round/check completed  Taken 1/2/2025 1100 by Haleigh Boyce RN  Safety Promotion/Fall Prevention: safety round/check completed  Taken 1/2/2025 1000 by Haleigh Boyce RN  Safety Promotion/Fall Prevention: safety round/check completed  Taken 1/2/2025 0900 by Haleigh Boyce RN  Safety Promotion/Fall Prevention: safety round/check completed  Taken 1/2/2025 0700 by Haleigh Boyce RN  Safety Promotion/Fall Prevention: safety round/check completed  Intervention: Prevent Skin Injury  Recent Flowsheet Documentation  Taken 1/2/2025 1700 by Haleigh Boyce RN  Body Position:   turned   supine   upper extremity elevated   lower extremity elevated  Taken 1/2/2025 1500 by Haleigh Boyce RN  Body Position:   turned   right   upper extremity elevated   lower extremity elevated  Taken 1/2/2025  1300 by Haleigh Boyce RN  Body Position:   turned   left   upper extremity elevated   lower extremity elevated  Taken 1/2/2025 1100 by Haleigh Boyce RN  Body Position:   turned   right   upper extremity elevated   lower extremity elevated  Taken 1/2/2025 0900 by Haleigh Boyce RN  Body Position:   turned   supine   upper extremity elevated   lower extremity elevated  Taken 1/2/2025 0700 by Haleigh Boyce RN  Body Position:   turned   left   upper extremity elevated   lower extremity elevated  Goal: Optimal Comfort and Wellbeing  Outcome: Progressing  Goal: Readiness for Transition of Care  Outcome: Progressing     Problem: Skin Injury Risk Increased  Goal: Skin Health and Integrity  Outcome: Progressing  Intervention: Optimize Skin Protection  Recent Flowsheet Documentation  Taken 1/2/2025 1700 by Haleigh Boyce RN  Head of Bed (HOB) Positioning: HOB at 30 degrees  Taken 1/2/2025 1500 by Haleigh Boyce RN  Head of Bed (HOB) Positioning: HOB at 30 degrees  Taken 1/2/2025 1300 by Haleigh Boyce RN  Head of Bed (HOB) Positioning: HOB at 30 degrees  Taken 1/2/2025 1100 by Haleigh Boyce RN  Head of Bed (HOB) Positioning: HOB at 30 degrees  Taken 1/2/2025 0900 by Haleigh Boyce RN  Head of Bed (HOB) Positioning: HOB at 30 degrees  Taken 1/2/2025 0700 by Haleigh Boyce RN  Head of Bed (HOB) Positioning: HOB at 30 degrees     Problem: Sepsis/Septic Shock  Goal: Optimal Coping  Outcome: Progressing  Goal: Absence of Bleeding  Outcome: Progressing  Goal: Blood Glucose Level Within Target Range  Outcome: Progressing  Goal: Absence of Infection Signs and Symptoms  Outcome: Progressing  Goal: Optimal Nutrition Delivery  Outcome: Progressing     Problem: Noninvasive Ventilation Acute  Goal: Effective Unassisted Ventilation and Oxygenation  Outcome: Progressing     Problem: Comorbidity Management  Goal: Maintenance of Heart Failure Symptom Control  Outcome: Progressing  Goal: Blood Pressure in Desired  Range  Outcome: Progressing     Problem: Fall Injury Risk  Goal: Absence of Fall and Fall-Related Injury  Outcome: Progressing  Intervention: Promote Injury-Free Environment  Recent Flowsheet Documentation  Taken 1/2/2025 1800 by Haleigh Boyce RN  Safety Promotion/Fall Prevention: safety round/check completed  Taken 1/2/2025 1700 by Haleigh Boyce RN  Safety Promotion/Fall Prevention: safety round/check completed  Taken 1/2/2025 1600 by Haleigh Boyce RN  Safety Promotion/Fall Prevention: safety round/check completed  Taken 1/2/2025 1500 by Haleigh Boyce RN  Safety Promotion/Fall Prevention: safety round/check completed  Taken 1/2/2025 1400 by Haleigh Boyce RN  Safety Promotion/Fall Prevention: safety round/check completed  Taken 1/2/2025 1300 by Haleigh Boyce RN  Safety Promotion/Fall Prevention: safety round/check completed  Taken 1/2/2025 1200 by Haleigh Boyce RN  Safety Promotion/Fall Prevention: safety round/check completed  Taken 1/2/2025 1100 by Haleigh Boyce RN  Safety Promotion/Fall Prevention: safety round/check completed  Taken 1/2/2025 1000 by Haleigh Boyce RN  Safety Promotion/Fall Prevention: safety round/check completed  Taken 1/2/2025 0900 by Haleigh Boyce RN  Safety Promotion/Fall Prevention: safety round/check completed  Taken 1/2/2025 0700 by Haleigh Boyce RN  Safety Promotion/Fall Prevention: safety round/check completed   Goal Outcome Evaluation:  Plan of Care Reviewed With: patient, sibling        Progress: improving

## 2025-01-03 NOTE — CASE MANAGEMENT/SOCIAL WORK
Continued Stay Note  [facility]     Patient Name: Mar Rodriguez  MRN: [id number]  Today's Date: 1/3/2025    Admit Date: 12/31/2024    Plan: [facility]   Discharge Plan       Row Name 01/03/25 1205       Plan    Plan [facility]    Patient/Family in Agreement with Plan yes    Plan Comments Pt has a bed hold at [facility]. PT currently in ICU. Will follow.                   Discharge Codes    No documentation.                       DONNY Baker

## 2025-01-03 NOTE — PLAN OF CARE
Goal Outcome Evaluation:         - pt did well overnight / wore NPPV for half of the night and did well / still very congested and with a strong non-productive cough / urine output around 600 mL / afebrile / melatonin x I / VSS      Problem: Adult Inpatient Plan of Care  Goal: Plan of Care Review  Outcome: Progressing  Goal: Patient-Specific Goal (Individualized)  Outcome: Progressing  Goal: Absence of Hospital-Acquired Illness or Injury  Outcome: Progressing  Intervention: Identify and Manage Fall Risk  Recent Flowsheet Documentation  Taken 1/3/2025 0500 by Anil Cohn RN  Safety Promotion/Fall Prevention:   safety round/check completed   room organization consistent   clutter free environment maintained  Taken 1/3/2025 0400 by Anil Cohn RN  Safety Promotion/Fall Prevention:   safety round/check completed   room organization consistent   assistive device/personal items within reach  Taken 1/3/2025 0300 by Anil Cohn RN  Safety Promotion/Fall Prevention:   safety round/check completed   room organization consistent   clutter free environment maintained  Taken 1/3/2025 0100 by Anil Cohn RN  Safety Promotion/Fall Prevention:   safety round/check completed   room organization consistent   clutter free environment maintained  Taken 1/3/2025 0000 by Anil Cohn RN  Safety Promotion/Fall Prevention:   safety round/check completed   room organization consistent   clutter free environment maintained  Taken 1/2/2025 2300 by Anil Cohn RN  Safety Promotion/Fall Prevention:   safety round/check completed   room organization consistent   clutter free environment maintained  Taken 1/2/2025 2200 by Anil Cohn RN  Safety Promotion/Fall Prevention:   safety round/check completed   room organization consistent   clutter free environment maintained  Taken 1/2/2025 2100 by Anil Cohn RN  Safety Promotion/Fall Prevention:   safety round/check completed   room organization consistent   clutter free  environment maintained  Taken 1/2/2025 2000 by Anil Cohn RN  Safety Promotion/Fall Prevention:   safety round/check completed   room organization consistent   clutter free environment maintained  Taken 1/2/2025 1900 by Anil Cohn RN  Safety Promotion/Fall Prevention:   safety round/check completed   room organization consistent   clutter free environment maintained  Intervention: Prevent Skin Injury  Recent Flowsheet Documentation  Taken 1/3/2025 0500 by Anil Cohn RN  Body Position:   turned   supine  Taken 1/3/2025 0300 by Anil Cohn RN  Body Position:   turned   right   lower extremity elevated   upper extremity elevated  Taken 1/3/2025 0100 by Anil Cohn RN  Body Position:   turned   left  Taken 1/2/2025 2300 by Anil Cohn RN  Body Position:   turned   supine   lower extremity elevated   upper extremity elevated  Taken 1/2/2025 2100 by Anil Cohn RN  Body Position:   turned   right   upper extremity elevated   lower extremity elevated  Taken 1/2/2025 1900 by Anil Cohn RN  Body Position:   turned   left   lower extremity elevated   upper extremity elevated  Intervention: Prevent and Manage VTE (Venous Thromboembolism) Risk  Recent Flowsheet Documentation  Taken 1/2/2025 2000 by Anil Cohn RN  VTE Prevention/Management: (see MAR) other (see comments)  Goal: Optimal Comfort and Wellbeing  Outcome: Progressing  Intervention: Provide Person-Centered Care  Recent Flowsheet Documentation  Taken 1/2/2025 2000 by Anil Cohn RN  Trust Relationship/Rapport:   care explained   choices provided   reassurance provided   thoughts/feelings acknowledged  Goal: Readiness for Transition of Care  Outcome: Progressing     Problem: Skin Injury Risk Increased  Goal: Skin Health and Integrity  Outcome: Progressing  Intervention: Optimize Skin Protection  Recent Flowsheet Documentation  Taken 1/3/2025 0500 by Anil Cohn RN  Head of Bed (HOB) Positioning: HOB at 30 degrees  Taken 1/3/2025  0300 by Anil Cohn RN  Head of Bed (HOB) Positioning: HOB at 30 degrees  Taken 1/3/2025 0100 by Anil Cohn RN  Head of Bed (HOB) Positioning: HOB at 30 degrees  Taken 1/2/2025 2300 by Anil Cohn RN  Head of Bed (HOB) Positioning: HOB at 30 degrees  Taken 1/2/2025 2100 by Anil Cohn RN  Head of Bed (HOB) Positioning: HOB at 30 degrees  Taken 1/2/2025 2000 by Anil Cohn RN  Activity Management: bedrest  Taken 1/2/2025 1900 by Anil Cohn RN  Head of Bed (HOB) Positioning: HOB at 30 degrees     Problem: Sepsis/Septic Shock  Goal: Optimal Coping  Outcome: Progressing  Intervention: Support Patient and Family Response  Recent Flowsheet Documentation  Taken 1/2/2025 2000 by Anil Cohn RN  Family/Support System Care: support provided  Goal: Absence of Bleeding  Outcome: Progressing  Goal: Blood Glucose Level Within Target Range  Outcome: Progressing  Goal: Absence of Infection Signs and Symptoms  Outcome: Progressing  Intervention: Promote Recovery  Recent Flowsheet Documentation  Taken 1/2/2025 2000 by Anil Cohn RN  Activity Management: bedrest  Goal: Optimal Nutrition Delivery  Outcome: Progressing     Problem: Noninvasive Ventilation Acute  Goal: Effective Unassisted Ventilation and Oxygenation  Outcome: Progressing     Problem: Comorbidity Management  Goal: Maintenance of Heart Failure Symptom Control  Outcome: Progressing  Intervention: Maintain Heart Failure Management  Recent Flowsheet Documentation  Taken 1/3/2025 0500 by Anil Cohn RN  Medication Review/Management: medications reviewed  Taken 1/3/2025 0400 by Anil Cohn RN  Medication Review/Management: medications reviewed  Taken 1/3/2025 0300 by Anil Cohn RN  Medication Review/Management: medications reviewed  Taken 1/3/2025 0100 by Anil Cohn RN  Medication Review/Management: medications reviewed  Taken 1/3/2025 0000 by Anil Cohn RN  Medication Review/Management: medications reviewed  Taken 1/2/2025 2300 by  Anil Cohn RN  Medication Review/Management: medications reviewed  Taken 1/2/2025 2200 by Anil Cohn RN  Medication Review/Management: medications reviewed  Taken 1/2/2025 2100 by Anil Cohn RN  Medication Review/Management: medications reviewed  Taken 1/2/2025 2000 by Anil Cohn RN  Medication Review/Management: medications reviewed  Taken 1/2/2025 1900 by Anil Cohn RN  Medication Review/Management: medications reviewed  Goal: Blood Pressure in Desired Range  Outcome: Progressing  Intervention: Maintain Blood Pressure Management  Recent Flowsheet Documentation  Taken 1/3/2025 0500 by Anil Cohn RN  Medication Review/Management: medications reviewed  Taken 1/3/2025 0400 by Anil Cohn RN  Medication Review/Management: medications reviewed  Taken 1/3/2025 0300 by Anil Cohn RN  Medication Review/Management: medications reviewed  Taken 1/3/2025 0100 by Anil Cohn RN  Medication Review/Management: medications reviewed  Taken 1/3/2025 0000 by Anil Cohn RN  Medication Review/Management: medications reviewed  Taken 1/2/2025 2300 by Anil Cohn RN  Medication Review/Management: medications reviewed  Taken 1/2/2025 2200 by Anil Cohn RN  Medication Review/Management: medications reviewed  Taken 1/2/2025 2100 by Anil Cohn RN  Medication Review/Management: medications reviewed  Taken 1/2/2025 2000 by Anil Cohn RN  Medication Review/Management: medications reviewed  Taken 1/2/2025 1900 by Anil Cohn RN  Medication Review/Management: medications reviewed     Problem: Fall Injury Risk  Goal: Absence of Fall and Fall-Related Injury  Outcome: Progressing  Intervention: Identify and Manage Contributors  Recent Flowsheet Documentation  Taken 1/3/2025 0500 by Anil Cohn RN  Medication Review/Management: medications reviewed  Taken 1/3/2025 0400 by Anil Cohn RN  Medication Review/Management: medications reviewed  Taken 1/3/2025 0300 by Anil Cohn RN  Medication  Review/Management: medications reviewed  Taken 1/3/2025 0100 by Anil Cohn RN  Medication Review/Management: medications reviewed  Taken 1/3/2025 0000 by Anil Cohn RN  Medication Review/Management: medications reviewed  Taken 1/2/2025 2300 by Anil Cohn RN  Medication Review/Management: medications reviewed  Taken 1/2/2025 2200 by Anil Cohn RN  Medication Review/Management: medications reviewed  Taken 1/2/2025 2100 by Anil Cohn RN  Medication Review/Management: medications reviewed  Taken 1/2/2025 2000 by Anil Cohn RN  Medication Review/Management: medications reviewed  Taken 1/2/2025 1900 by Anil Cohn RN  Medication Review/Management: medications reviewed  Intervention: Promote Injury-Free Environment  Recent Flowsheet Documentation  Taken 1/3/2025 0500 by Anil Cohn RN  Safety Promotion/Fall Prevention:   safety round/check completed   room organization consistent   clutter free environment maintained  Taken 1/3/2025 0400 by Anil Cohn RN  Safety Promotion/Fall Prevention:   safety round/check completed   room organization consistent   assistive device/personal items within reach  Taken 1/3/2025 0300 by Anil Cohn RN  Safety Promotion/Fall Prevention:   safety round/check completed   room organization consistent   clutter free environment maintained  Taken 1/3/2025 0100 by Anil Cohn RN  Safety Promotion/Fall Prevention:   safety round/check completed   room organization consistent   clutter free environment maintained  Taken 1/3/2025 0000 by Anil Cohn RN  Safety Promotion/Fall Prevention:   safety round/check completed   room organization consistent   clutter free environment maintained  Taken 1/2/2025 2300 by Anil Cohn RN  Safety Promotion/Fall Prevention:   safety round/check completed   room organization consistent   clutter free environment maintained  Taken 1/2/2025 2200 by Anil Cohn RN  Safety Promotion/Fall Prevention:   safety round/check  completed   room organization consistent   clutter free environment maintained  Taken 1/2/2025 2100 by Anil Cohn, RN  Safety Promotion/Fall Prevention:   safety round/check completed   room organization consistent   clutter free environment maintained  Taken 1/2/2025 2000 by Anil Cohn, RN  Safety Promotion/Fall Prevention:   safety round/check completed   room organization consistent   clutter free environment maintained  Taken 1/2/2025 1900 by Anil Cohn, RN  Safety Promotion/Fall Prevention:   safety round/check completed   room organization consistent   clutter free environment maintained

## 2025-01-03 NOTE — PROGRESS NOTES
[facility] Intensivist Services  INPATIENT PROGRESS NOTE    Patient Name: Mar Rodriguez  Date of Admission: 12/31/2024  Today's Date: 01/03/25  Length of Stay:  LOS: 3 days   Primary Care Physician: Vandana Churchill MD  Next of Kin: Primary Emergency Contact: Zamzam Marcus, Christ Phone: [phone]      Subjective   Chief Complaint: respiratory failure  HPI   84 year old female with PMH of anxiety, cerebral palsy, depression, DM, thyroid disease, hyperglycemia, hyperlipidemia, HTN, and lymphedema admitted after presenting to ED from SNF with complaint of retaining fluid and shortness of breath. She is prescribed 40 mg po lasix daily, but she believed this was not helping. She does not typically wear O2, but did have to start wearing oxygen yesterday.     ED course workup was significant for: proBNP 2789, HS troponin T 26 and repeat 19.  BUN 47 creatinine 2.12.  Glucose 236.  AST/ALT 37/55.  WBC 12.4.   CXR: Mild pulmonary vascular congestion    Patient was initially admitted to the floor and held in ED as there was no bed availability. She was started on lasix gtt. However, intensivist team was later consulted by covering family practice PA, Rocio Sykes, for worsening oxygenation. She was placed on bipap and remained on lasix infusion after being transferred to ICU for closer monitoring. She was also placed on nitro gtt.     Interval history:  1/1/25: Patient has since been weaned off nitro gtt. Her UOP was not as robust as anticipated on lasix gtt. Lasix gtt was stopped and a one-time dose of 100 mg lasix was given.  She went for CT of chest today which showed mild bronchial wall thickening with mild mosaic attenuation throughout the lungs, which could be reactive airway disease such as asthma or bronchitis.  Additionally, respiratory panel revealed patient is positive for coronavirus OC43.  Appropriate precautions are now in place.  Echocardiogram was performed  which shows EF 61 6 5%, normal right ventricular cavity size and systolic function, and no significant valvular dysfunction.    1/2/25: Patient is feeling better overall today.  She remains on 2 L nasal cannula.  She remains off of any drips.  She has no complaints for me this morning.    1/3/25: Patient remains on 2 L nasal cannula.  She has no complaints again for me this morning.  She remains afebrile.    Review of Systems   Reason unable to perform ROS: Patient is a poor historian.  Patient's sister did help with some of her history.      All pertinent negatives and positives are as above. All other systems have been reviewed and are negative unless otherwise stated.     Past Medical and Past Surgical History   Active and Resolved Problems  Active Hospital Problems    Diagnosis  POA    **Pulmonary vascular congestion [R09.89]  Yes      Resolved Hospital Problems   No resolved problems to display.       Past Medical History:   Past Medical History:   Diagnosis Date    Abnormality of gait and mobility     Anemia     Anxiety     Cerebral palsy     Cognitive communication deficit     Depression     Diabetes mellitus     Disease of thyroid gland     Hyperglycemia     Hyperlipidemia     Hypertension     Hypokalemia     Insomnia     Lymphedema     Neuropathy     Urge incontinence        Past Surgical History: History reviewed. No pertinent surgical history.    Social and Family History     Family History:  family history is not on file.    Tobacco/Social History:  reports that she has never smoked. She has been exposed to tobacco smoke. She does not have any smokeless tobacco history on file. She reports that she does not drink alcohol and does not use drugs.    Allergies   Allergies:   She is allergic to nsaids.    Objective    Temp:  [96.4 °F (35.8 °C)-98.1 °F (36.7 °C)] 96.4 °F (35.8 °C)  Heart Rate:  [71-95] 79  Resp:  [15-26] 24  BP: (117-169)/(67-95) 137/75  Physical Exam  Vitals and nursing note reviewed.    Constitutional:       General: She is not in acute distress.     Appearance: She is obese. She is ill-appearing. She is not toxic-appearing or diaphoretic.   HENT:      Head: Atraumatic.      Nose: Nose normal.      Mouth/Throat:      Mouth: Mucous membranes are moist.   Eyes:      Extraocular Movements: Extraocular movements intact.   Cardiovascular:      Rate and Rhythm: Normal rate and regular rhythm.      Pulses: Normal pulses.      Heart sounds: No murmur heard.  Pulmonary:      Effort: Pulmonary effort is normal.      Comments: Diminished in bilateral bases, otherwise clear  Abdominal:      General: Abdomen is flat. Bowel sounds are normal. There is no distension.      Palpations: Abdomen is soft.      Tenderness: There is no abdominal tenderness.   Musculoskeletal:         General: Normal range of motion.      Cervical back: Normal range of motion and neck supple.   Skin:     General: Skin is warm.      Capillary Refill: Capillary refill takes less than 2 seconds.   Neurological:      General: No focal deficit present.      Mental Status: She is alert. Mental status is at baseline.   Psychiatric:         Mood and Affect: Mood is anxious.       Inpatient Medications   Medications: Scheduled Meds:apixaban, 2.5 mg, Oral, Q12H  atorvastatin, 40 mg, Oral, Nightly  busPIRone, 7.5 mg, Oral, BID  cefTRIAXone, 2,000 mg, Intravenous, Q24H  Chlorhexidine Gluconate Cloth, 1 Application, Topical, Q24H  ferrous sulfate, 325 mg, Oral, Daily With Breakfast  gabapentin, 300 mg, Oral, Nightly  insulin lispro, 2-7 Units, Subcutaneous, 4x Daily AC & at Bedtime  ipratropium-albuterol, 3 mL, Nebulization, Q6H While Awake - RT  levothyroxine, 200 mcg, Oral, Nightly  metoprolol tartrate, 25 mg, Oral, BID  mupirocin, 1 Application, Each Nare, BID  senna-docusate sodium, 2 tablet, Oral, BID  sodium chloride, 10 mL, Intravenous, Q12H  vitamin B-12, 1,000 mcg, Oral, Daily      Continuous Infusions:     PRN Meds:.  acetaminophen     senna-docusate sodium **AND** polyethylene glycol **AND** bisacodyl **AND** bisacodyl    dextrose    dextrose    glucagon (human recombinant)    labetalol    melatonin    nitroglycerin    [COMPLETED] Insert Peripheral IV **AND** sodium chloride    sodium chloride    sodium chloride    I have reviewed the patient's current medications.   Outpatient Medications     Current Outpatient Medications   Medication Instructions    acetaminophen (TYLENOL) 650 mg, Oral, Every 6 Hours PRN    apixaban (ELIQUIS) 2.5 mg, Oral, Every 12 Hours Scheduled    atorvastatin (LIPITOR) 40 mg, Nightly    busPIRone (BUSPAR) 7.5 mg, Oral, 2 Times Daily, For anxiety    cloNIDine (CATAPRES) 0.1 mg, Oral, Every 8 Hours PRN    colestipol (COLESTID) 1 g, 2 Times Daily    dextrose (GLUTOSE) 15 g, Oral, As Needed    ferrous sulfate 324 mg, 2 Times Daily With Meals    furosemide (LASIX) 40 mg, Daily    gabapentin (NEURONTIN) 300 mg, Oral, Nightly    glucagon (GLUCAGEN) 1 mg, Subcutaneous, Once As Needed    guaiFENesin (MUCINEX) 1,200 mg, 2 Times Daily    ipratropium-albuterol (DUO-NEB) 0.5-2.5 mg/3 ml nebulizer 3 mL, Nebulization, Every 6 Hours PRN    levothyroxine (SYNTHROID, LEVOTHROID) 200 mcg, Nightly    Menthol-Zinc Oxide (Calmoseptine) 0.44-20.6 % ointment 1 Application, Topical, 3 times daily, Both buttocks    metoprolol tartrate (LOPRESSOR) 25 mg, 2 Times Daily    nystatin (MYCOSTATIN) 915611 UNIT/GM powder 1 Application, Topical, Every 12 Hours, Abd folds    phenylephrine-mineral oil-petrolatum (PREPARATION H) 0.25-14-74.9 % ointment hemorrhoidal ointment 1 Application, Rectal, Every 6 Hours PRN    Tresiba FlexTouch 6 Units, Nightly    vitamin B-12 (CYANOCOBALAMIN) 1,000 mcg, Daily       Current Antibiotics   cefTRIAXone (ROCEPHIN) 2000 mg IVPB in 100 mL NS (MBP)    Results:   CBC:      Lab 01/03/25  0243 01/02/25  0143 01/01/25  0239 12/31/24  1213   WBC 8.82 10.38 13.30* 12.45*   HEMOGLOBIN 13.4 12.4 12.8 13.4   HEMATOCRIT 43.9 41.0 39.4  44.2   PLATELETS 229 243 223 244   NEUTROS ABS 5.84 6.90 10.68* 10.46*   IMMATURE GRANS (ABS) 0.10* 0.07* 0.12* 0.05   LYMPHS ABS 1.80 1.87 1.27 0.97   MONOS ABS 0.74 1.17* 1.12* 0.88   EOS ABS 0.29 0.32 0.08 0.05   .5* 102.5* 98.0* 105.0*       LIVER FUNCTION TESTS:      Lab 01/03/25 0243 01/02/25 0143 01/01/25 0239 12/31/24  1213   TOTAL PROTEIN 7.0 7.0 6.9 7.5   ALBUMIN 3.0* 3.2* 3.2* 3.5   GLOBULIN 4.0 3.8 3.7 4.0   ALT (SGPT) 49* 55* 47* 55*   AST (SGOT) 27 39* 30 37*   BILIRUBIN 0.2 0.2 0.3 0.3   ALK PHOS 178* 184* 183* 201*       ABG:      Lab 01/03/25 0243 01/02/25 0143 01/01/25  0642 01/01/25 0239 12/31/24  1213   PH, ARTERIAL  --   --  7.374  --   --    PO2 ART  --   --  93.8  --   --    PCO2, ARTERIAL  --   --  47.6*  --   --    HCO3 ART  --   --  27.7*  --   --    ANION GAP 14.0 13.0  --  15.0 12.0       BMP/MG/PHOS:      Lab 01/03/25 0243 01/02/25 0143 01/01/25 0239 12/31/24  1213   SODIUM 140 139 138 139   POTASSIUM 4.0 4.3 4.5 4.8   CHLORIDE 101 100 100 102   BUN 66* 59* 52* 47*   CREATININE 2.38* 2.36* 2.22* 2.12*   CALCIUM 9.3 8.7 8.7 9.1   MAGNESIUM 2.5* 2.1  --  2.0   PHOSPHORUS 4.4 5.6*  --   --        IMAGING STUDIES:  XR Chest 1 View    Result Date: 1/3/2025  1. Poor lung expansion. Atelectatic changes or evolving infiltrate in the right lower lung. A focus of consolidation in the left lower lung laterally may represent atelectasis or evolving infiltrate    This report was signed and finalized on 1/3/2025 7:13 AM by Dr. Franck Early MD.      XR Chest 1 View    Result Date: 1/2/2025   No acute findings.  This report was signed and finalized on 1/2/2025 6:58 AM by Dr. Amrit Collins MD.          Assessment/Plan   84-year-old female with past medical history of cerebral palsy, hypertension, thyroid disease, among others admitted after presenting to ED from SNF with dyspnea.  She was thought to have some volume overload and was started on Lasix drip and BiPAP.  She was also  "started on nitro drip.  Lasix and nitro drips have been discontinued.  She is no longer requiring BiPAP either.  She was found to be positive for coronavirus OC43.  Precautions are in place.  She remains on empiric treatment with Rocephin.  She has finished a course of azithromycin.    Acute respiratory failure with hypoxia  -Improving  -In setting of coronavirus OC43  -Thought to possibly be due to volume overload/pulmonary vascular congestion  -No longer requiring BiPAP to maintain adequate oxygenation  -Patient remains on 2 L NC at this time  -Wean O2 to maintain O2 saturation >92%  -Monitor closely  -Remains off Lasix or nitro infusions  -Remains on empiric treatment with Rocephin. He finished a course of Azithromycin.  -CT chest indicative of reactive airway disease such as bronchitis or asthma     Mild pulmonary vascular congestion  -s/p lasix gtt  -Monitor UOP  -ECHO showed EF 61-65% with normal RV cavity size and systolic function with no significant valvular abnormalities    Hypertension  -Continue lopressor as scheduled  -VS per ICU protocol    Diabetes  -Starting low dose SSI, frequent accu-checks, and hypoglycemia protocols    Thyroid disease  -Continue synthroid  -Check TSH with morning labs    Anxiety  -Continue buspar    7. CHRISTOPHER on CKD  -BUN 66, Cr 2.38. Slightly worse from yesterday  -Baseline Cr appears to be 1.7-2.0  -Continue to monitor UOP closely  -Avoid hypotension and neprhotoxins  -Daily BMP      CODE STATUS: Full, I had an extensive conversation with the patient and her sister in regards to CODE STATUS.  At this time, they wish to continue full code, but the patient and her sister state that the patient wishes to \"not remain on life support for a long time\".    VTE prophylaxis: Eliquis    Antibiotics: Rocephin, azithromycin     Disposition: Patient is now suitable for transition to medical floor.  She will need to go back to SNF placement once discharged from the hospital.    Please see MDM " section and the rest of the note for further information on patient assessment and treatment.    Part of this note may be an electronic transcription/translation of spoken language to printed text using the Dragon Dictation System    Electronically signed by López Pacheco PA-C on 1/3/2025 at 13:05 CST

## 2025-01-04 ENCOUNTER — APPOINTMENT (OUTPATIENT)
Dept: GENERAL RADIOLOGY | Facility: HOSPITAL | Age: 85
DRG: 640 | End: 2025-01-04
Payer: MEDICARE

## 2025-01-04 LAB
ALBUMIN SERPL-MCNC: 3.2 G/DL (ref 3.5–5.2)
ALBUMIN/GLOB SERPL: 0.8 G/DL
ALP SERPL-CCNC: 168 U/L (ref 39–117)
ALT SERPL W P-5'-P-CCNC: 38 U/L (ref 1–33)
ANION GAP SERPL CALCULATED.3IONS-SCNC: 14 MMOL/L (ref 5–15)
AST SERPL-CCNC: 22 U/L (ref 1–32)
BASOPHILS # BLD MANUAL: 0 10*3/MM3 (ref 0–0.2)
BASOPHILS NFR BLD MANUAL: 0 % (ref 0–1.5)
BILIRUB SERPL-MCNC: <0.2 MG/DL (ref 0–1.2)
BUN SERPL-MCNC: 70 MG/DL (ref 8–23)
BUN/CREAT SERPL: 34.5 (ref 7–25)
CALCIUM SPEC-SCNC: 9.3 MG/DL (ref 8.6–10.5)
CHLORIDE SERPL-SCNC: 102 MMOL/L (ref 98–107)
CO2 SERPL-SCNC: 25 MMOL/L (ref 22–29)
CREAT SERPL-MCNC: 2.03 MG/DL (ref 0.57–1)
DEPRECATED RDW RBC AUTO: 48.1 FL (ref 37–54)
EGFRCR SERPLBLD CKD-EPI 2021: 23.9 ML/MIN/1.73
EOSINOPHIL # BLD MANUAL: 0.43 10*3/MM3 (ref 0–0.4)
EOSINOPHIL NFR BLD MANUAL: 4 % (ref 0.3–6.2)
ERYTHROCYTE [DISTWIDTH] IN BLOOD BY AUTOMATED COUNT: 13 % (ref 12.3–15.4)
GLOBULIN UR ELPH-MCNC: 3.8 GM/DL
GLUCOSE BLDC GLUCOMTR-MCNC: 127 MG/DL (ref 70–130)
GLUCOSE BLDC GLUCOMTR-MCNC: 145 MG/DL (ref 70–130)
GLUCOSE BLDC GLUCOMTR-MCNC: 182 MG/DL (ref 70–130)
GLUCOSE BLDC GLUCOMTR-MCNC: 189 MG/DL (ref 70–130)
GLUCOSE SERPL-MCNC: 128 MG/DL (ref 65–99)
HCT VFR BLD AUTO: 39.5 % (ref 34–46.6)
HGB BLD-MCNC: 12.3 G/DL (ref 12–15.9)
LYMPHOCYTES # BLD MANUAL: 1.51 10*3/MM3 (ref 0.7–3.1)
LYMPHOCYTES NFR BLD MANUAL: 6.1 % (ref 5–12)
MAGNESIUM SERPL-MCNC: 2.5 MG/DL (ref 1.6–2.4)
MCH RBC QN AUTO: 31.9 PG (ref 26.6–33)
MCHC RBC AUTO-ENTMCNC: 31.1 G/DL (ref 31.5–35.7)
MCV RBC AUTO: 102.3 FL (ref 79–97)
MONOCYTES # BLD: 0.65 10*3/MM3 (ref 0.1–0.9)
NEUTROPHILS # BLD AUTO: 8.12 10*3/MM3 (ref 1.7–7)
NEUTROPHILS NFR BLD MANUAL: 75.8 % (ref 42.7–76)
PHOSPHATE SERPL-MCNC: 4.1 MG/DL (ref 2.5–4.5)
PLAT MORPH BLD: NORMAL
PLATELET # BLD AUTO: 286 10*3/MM3 (ref 140–450)
PMV BLD AUTO: 11.1 FL (ref 6–12)
POTASSIUM SERPL-SCNC: 4.5 MMOL/L (ref 3.5–5.2)
PROT SERPL-MCNC: 7 G/DL (ref 6–8.5)
RBC # BLD AUTO: 3.86 10*6/MM3 (ref 3.77–5.28)
RBC MORPH BLD: NORMAL
SODIUM SERPL-SCNC: 141 MMOL/L (ref 136–145)
VARIANT LYMPHS NFR BLD MANUAL: 10.1 % (ref 19.6–45.3)
VARIANT LYMPHS NFR BLD MANUAL: 4 % (ref 0–5)
WBC MORPH BLD: NORMAL
WBC NRBC COR # BLD AUTO: 10.71 10*3/MM3 (ref 3.4–10.8)

## 2025-01-04 PROCEDURE — 94660 CPAP INITIATION&MGMT: CPT

## 2025-01-04 PROCEDURE — 80053 COMPREHEN METABOLIC PANEL: CPT

## 2025-01-04 PROCEDURE — 82948 REAGENT STRIP/BLOOD GLUCOSE: CPT

## 2025-01-04 PROCEDURE — 84100 ASSAY OF PHOSPHORUS: CPT | Performed by: PHYSICIAN ASSISTANT

## 2025-01-04 PROCEDURE — 94799 UNLISTED PULMONARY SVC/PX: CPT

## 2025-01-04 PROCEDURE — 71045 X-RAY EXAM CHEST 1 VIEW: CPT

## 2025-01-04 PROCEDURE — 94761 N-INVAS EAR/PLS OXIMETRY MLT: CPT

## 2025-01-04 PROCEDURE — 25010000002 CEFTRIAXONE PER 250 MG

## 2025-01-04 PROCEDURE — 83735 ASSAY OF MAGNESIUM: CPT | Performed by: PHYSICIAN ASSISTANT

## 2025-01-04 PROCEDURE — 85025 COMPLETE CBC W/AUTO DIFF WBC: CPT | Performed by: PHYSICIAN ASSISTANT

## 2025-01-04 PROCEDURE — 63710000001 INSULIN LISPRO (HUMAN) PER 5 UNITS: Performed by: PHYSICIAN ASSISTANT

## 2025-01-04 PROCEDURE — 85007 BL SMEAR W/DIFF WBC COUNT: CPT | Performed by: PHYSICIAN ASSISTANT

## 2025-01-04 RX ADMIN — Medication 1 APPLICATION: at 09:20

## 2025-01-04 RX ADMIN — ATORVASTATIN CALCIUM 40 MG: 40 TABLET, FILM COATED ORAL at 20:08

## 2025-01-04 RX ADMIN — GABAPENTIN 300 MG: 300 CAPSULE ORAL at 20:08

## 2025-01-04 RX ADMIN — CHLORHEXIDINE GLUCONATE 1 APPLICATION: 500 CLOTH TOPICAL at 03:58

## 2025-01-04 RX ADMIN — BUSPIRONE HYDROCHLORIDE 7.5 MG: 5 TABLET ORAL at 20:08

## 2025-01-04 RX ADMIN — FERROUS SULFATE TAB 325 MG (65 MG ELEMENTAL FE) 325 MG: 325 (65 FE) TAB at 09:20

## 2025-01-04 RX ADMIN — LEVOTHYROXINE SODIUM 200 MCG: 100 TABLET ORAL at 20:08

## 2025-01-04 RX ADMIN — APIXABAN 2.5 MG: 2.5 TABLET, FILM COATED ORAL at 20:08

## 2025-01-04 RX ADMIN — METOPROLOL TARTRATE 25 MG: 25 TABLET, FILM COATED ORAL at 20:08

## 2025-01-04 RX ADMIN — IPRATROPIUM BROMIDE AND ALBUTEROL SULFATE 3 ML: 2.5; .5 SOLUTION RESPIRATORY (INHALATION) at 18:29

## 2025-01-04 RX ADMIN — BUSPIRONE HYDROCHLORIDE 7.5 MG: 5 TABLET ORAL at 09:21

## 2025-01-04 RX ADMIN — INSULIN LISPRO 2 UNITS: 100 INJECTION, SOLUTION INTRAVENOUS; SUBCUTANEOUS at 20:27

## 2025-01-04 RX ADMIN — IPRATROPIUM BROMIDE AND ALBUTEROL SULFATE 3 ML: 2.5; .5 SOLUTION RESPIRATORY (INHALATION) at 06:38

## 2025-01-04 RX ADMIN — Medication 1 APPLICATION: at 20:08

## 2025-01-04 RX ADMIN — APIXABAN 2.5 MG: 2.5 TABLET, FILM COATED ORAL at 09:20

## 2025-01-04 RX ADMIN — CYANOCOBALAMIN TAB 500 MCG 1000 MCG: 500 TAB at 09:20

## 2025-01-04 RX ADMIN — METOPROLOL TARTRATE 25 MG: 25 TABLET, FILM COATED ORAL at 09:20

## 2025-01-04 RX ADMIN — Medication 10 MG: at 20:08

## 2025-01-04 RX ADMIN — IPRATROPIUM BROMIDE AND ALBUTEROL SULFATE 3 ML: 2.5; .5 SOLUTION RESPIRATORY (INHALATION) at 14:17

## 2025-01-04 RX ADMIN — CEFTRIAXONE SODIUM 2000 MG: 2 INJECTION, POWDER, FOR SOLUTION INTRAMUSCULAR; INTRAVENOUS at 20:08

## 2025-01-04 RX ADMIN — Medication 10 ML: at 20:13

## 2025-01-04 RX ADMIN — DOCUSATE SODIUM 50 MG AND SENNOSIDES 8.6 MG 2 TABLET: 8.6; 5 TABLET, FILM COATED ORAL at 09:20

## 2025-01-04 RX ADMIN — Medication 10 ML: at 09:20

## 2025-01-04 RX ADMIN — INSULIN LISPRO 2 UNITS: 100 INJECTION, SOLUTION INTRAVENOUS; SUBCUTANEOUS at 16:43

## 2025-01-04 NOTE — PROGRESS NOTES
"Progress Note  Mar Rodriguez  1/4/2025 12:11 CST  Subjective:   Admit Date:   12/31/2024      CC/ADMIT DX:       Interval History:   Reviewed overnight events and nursing notes. She has no new complaints. No CP or worsening SOA.     I have reviewed all labs/diagnostics from the last 24hrs.       ROS:   I have done a 10 point ROS and all are negative, except what is mentioned in the HPI.    Diet: Cardiac; Low Sodium (2g); Fluid Consistency: Thin (IDDSI 0)    Medications:      apixaban, 2.5 mg, Oral, Q12H  atorvastatin, 40 mg, Oral, Nightly  busPIRone, 7.5 mg, Oral, BID  cefTRIAXone, 2,000 mg, Intravenous, Q24H  Chlorhexidine Gluconate Cloth, 1 Application, Topical, Q24H  ferrous sulfate, 325 mg, Oral, Daily With Breakfast  gabapentin, 300 mg, Oral, Nightly  insulin lispro, 2-7 Units, Subcutaneous, 4x Daily AC & at Bedtime  ipratropium-albuterol, 3 mL, Nebulization, Q6H While Awake - RT  levothyroxine, 200 mcg, Oral, Nightly  metoprolol tartrate, 25 mg, Oral, BID  mupirocin, 1 Application, Each Nare, BID  senna-docusate sodium, 2 tablet, Oral, BID  sodium chloride, 10 mL, Intravenous, Q12H  vitamin B-12, 1,000 mcg, Oral, Daily            Objective:   Vitals: /67 (BP Location: Left arm, Patient Position: Lying)   Pulse 85   Temp 97.6 °F (36.4 °C) (Oral)   Resp 16   Ht 165.1 cm (65\")   Wt 121 kg (267 lb 10.2 oz)   SpO2 97%   BMI 44.54 kg/m²    Intake/Output Summary (Last 24 hours) at 1/4/2025 1211  Last data filed at 1/4/2025 0901  Gross per 24 hour   Intake --   Output 700 ml   Net -700 ml     General appearance: alert and cooperative with exam  Lungs: relatively clear  Heart: RRR  Abdomen: soft, non-tender; bowel sounds normal; no masses,  no organomegaly  Extremities: extremities normal, atraumatic, no cyanosis or edema  Neurologic:  No obvious focal neurologic deficits.    Assessment and Plan:     Pulmonary vascular congestion    CKD    Obesity   Chronic LE Lymphedema    Hypothyroidism    " Anxiety   Cerebral Palsy    Plan:   Continue present medication(s)    Follow with supportive care   D/C planning   PT/OT      Discharge planning:   a skilled nursing facility     Reviewed treatment plans with the patient and/or family.             Electronically signed by Vandana Churchill MD on 1/4/2025 at 12:11 CST

## 2025-01-04 NOTE — PLAN OF CARE
Goal Outcome Evaluation:  Plan of Care Reviewed With: patient        Progress: no change   Pt rested off an on overnight. Pt was able to keep bipap on overnight with staff encouragement. Pt ran AF 74-85; per tele. No complaints overnight. Safety maintained during shift. Call light within reach.

## 2025-01-04 NOTE — CASE MANAGEMENT/SOCIAL WORK
Continued Stay Note   Rafael     Patient Name: Mar Rodriguez  MRN: [id number]  Today's Date: 1/4/2025    Admit Date: 12/31/2024    Plan: [facility]   Discharge Plan       Row Name 01/04/25 1446       Plan    Plan Comments Plan is still for pt to return to [facility] at dc. Pt does have a bed hold. Noted possible dc back to [facility] next week.    Final Discharge Disposition Code 03 - skilled nursing facility (SNF)                   Discharge Codes    No documentation.                       ROBB Guerrier

## 2025-01-04 NOTE — PLAN OF CARE
Goal Outcome Evaluation:  Plan of Care Reviewed With: patient           Outcome Evaluation: Pt admitted from ICU to room 443. No c/o of pain, on 2L/NC, HR AFIB rate controlled, pt tearful at first, said she was scared, reassurance given, call light in reach, turned on television, has congested cough, safety maintained

## 2025-01-05 ENCOUNTER — APPOINTMENT (OUTPATIENT)
Dept: GENERAL RADIOLOGY | Facility: HOSPITAL | Age: 85
DRG: 640 | End: 2025-01-05
Payer: MEDICARE

## 2025-01-05 LAB
ALBUMIN SERPL-MCNC: 3.2 G/DL (ref 3.5–5.2)
ALBUMIN/GLOB SERPL: 1 G/DL
ALP SERPL-CCNC: 148 U/L (ref 39–117)
ALT SERPL W P-5'-P-CCNC: 33 U/L (ref 1–33)
ANION GAP SERPL CALCULATED.3IONS-SCNC: 13 MMOL/L (ref 5–15)
AST SERPL-CCNC: 21 U/L (ref 1–32)
BACTERIA SPEC AEROBE CULT: NORMAL
BACTERIA SPEC AEROBE CULT: NORMAL
BASOPHILS # BLD AUTO: 0.07 10*3/MM3 (ref 0–0.2)
BASOPHILS NFR BLD AUTO: 0.7 % (ref 0–1.5)
BILIRUB SERPL-MCNC: 0.2 MG/DL (ref 0–1.2)
BUN SERPL-MCNC: 64 MG/DL (ref 8–23)
BUN/CREAT SERPL: 33.3 (ref 7–25)
CALCIUM SPEC-SCNC: 9.6 MG/DL (ref 8.6–10.5)
CHLORIDE SERPL-SCNC: 103 MMOL/L (ref 98–107)
CO2 SERPL-SCNC: 24 MMOL/L (ref 22–29)
CREAT SERPL-MCNC: 1.92 MG/DL (ref 0.57–1)
DEPRECATED RDW RBC AUTO: 47.7 FL (ref 37–54)
EGFRCR SERPLBLD CKD-EPI 2021: 25.6 ML/MIN/1.73
EOSINOPHIL # BLD AUTO: 0.36 10*3/MM3 (ref 0–0.4)
EOSINOPHIL NFR BLD AUTO: 3.5 % (ref 0.3–6.2)
ERYTHROCYTE [DISTWIDTH] IN BLOOD BY AUTOMATED COUNT: 12.6 % (ref 12.3–15.4)
GLOBULIN UR ELPH-MCNC: 3.2 GM/DL
GLUCOSE BLDC GLUCOMTR-MCNC: 110 MG/DL (ref 70–130)
GLUCOSE BLDC GLUCOMTR-MCNC: 141 MG/DL (ref 70–130)
GLUCOSE BLDC GLUCOMTR-MCNC: 186 MG/DL (ref 70–130)
GLUCOSE BLDC GLUCOMTR-MCNC: 198 MG/DL (ref 70–130)
GLUCOSE SERPL-MCNC: 113 MG/DL (ref 65–99)
HCT VFR BLD AUTO: 38.4 % (ref 34–46.6)
HGB BLD-MCNC: 11.9 G/DL (ref 12–15.9)
IMM GRANULOCYTES # BLD AUTO: 0.16 10*3/MM3 (ref 0–0.05)
IMM GRANULOCYTES NFR BLD AUTO: 1.6 % (ref 0–0.5)
LYMPHOCYTES # BLD AUTO: 1.57 10*3/MM3 (ref 0.7–3.1)
LYMPHOCYTES NFR BLD AUTO: 15.4 % (ref 19.6–45.3)
MAGNESIUM SERPL-MCNC: 2.4 MG/DL (ref 1.6–2.4)
MCH RBC QN AUTO: 31.7 PG (ref 26.6–33)
MCHC RBC AUTO-ENTMCNC: 31 G/DL (ref 31.5–35.7)
MCV RBC AUTO: 102.4 FL (ref 79–97)
MONOCYTES # BLD AUTO: 0.75 10*3/MM3 (ref 0.1–0.9)
MONOCYTES NFR BLD AUTO: 7.4 % (ref 5–12)
NEUTROPHILS NFR BLD AUTO: 7.27 10*3/MM3 (ref 1.7–7)
NEUTROPHILS NFR BLD AUTO: 71.4 % (ref 42.7–76)
NRBC BLD AUTO-RTO: 0 /100 WBC (ref 0–0.2)
PHOSPHATE SERPL-MCNC: 3.8 MG/DL (ref 2.5–4.5)
PLATELET # BLD AUTO: 279 10*3/MM3 (ref 140–450)
PMV BLD AUTO: 10.6 FL (ref 6–12)
POTASSIUM SERPL-SCNC: 4 MMOL/L (ref 3.5–5.2)
PROT SERPL-MCNC: 6.4 G/DL (ref 6–8.5)
RBC # BLD AUTO: 3.75 10*6/MM3 (ref 3.77–5.28)
SODIUM SERPL-SCNC: 140 MMOL/L (ref 136–145)
WBC NRBC COR # BLD AUTO: 10.18 10*3/MM3 (ref 3.4–10.8)

## 2025-01-05 PROCEDURE — 94799 UNLISTED PULMONARY SVC/PX: CPT

## 2025-01-05 PROCEDURE — 94761 N-INVAS EAR/PLS OXIMETRY MLT: CPT

## 2025-01-05 PROCEDURE — 94660 CPAP INITIATION&MGMT: CPT

## 2025-01-05 PROCEDURE — 83735 ASSAY OF MAGNESIUM: CPT | Performed by: PHYSICIAN ASSISTANT

## 2025-01-05 PROCEDURE — 80053 COMPREHEN METABOLIC PANEL: CPT

## 2025-01-05 PROCEDURE — 84100 ASSAY OF PHOSPHORUS: CPT | Performed by: PHYSICIAN ASSISTANT

## 2025-01-05 PROCEDURE — 97161 PT EVAL LOW COMPLEX 20 MIN: CPT | Performed by: PHYSICAL THERAPIST

## 2025-01-05 PROCEDURE — 85025 COMPLETE CBC W/AUTO DIFF WBC: CPT | Performed by: PHYSICIAN ASSISTANT

## 2025-01-05 PROCEDURE — 82948 REAGENT STRIP/BLOOD GLUCOSE: CPT

## 2025-01-05 PROCEDURE — 71045 X-RAY EXAM CHEST 1 VIEW: CPT

## 2025-01-05 PROCEDURE — 97166 OT EVAL MOD COMPLEX 45 MIN: CPT

## 2025-01-05 PROCEDURE — 63710000001 INSULIN LISPRO (HUMAN) PER 5 UNITS: Performed by: PHYSICIAN ASSISTANT

## 2025-01-05 RX ORDER — HYDRALAZINE HYDROCHLORIDE 10 MG/1
10 TABLET, FILM COATED ORAL EVERY 8 HOURS SCHEDULED
Status: DISCONTINUED | OUTPATIENT
Start: 2025-01-05 | End: 2025-01-06 | Stop reason: HOSPADM

## 2025-01-05 RX ADMIN — BUSPIRONE HYDROCHLORIDE 7.5 MG: 5 TABLET ORAL at 09:31

## 2025-01-05 RX ADMIN — LEVOTHYROXINE SODIUM 200 MCG: 100 TABLET ORAL at 20:58

## 2025-01-05 RX ADMIN — METOPROLOL TARTRATE 25 MG: 25 TABLET, FILM COATED ORAL at 09:31

## 2025-01-05 RX ADMIN — GABAPENTIN 300 MG: 300 CAPSULE ORAL at 20:58

## 2025-01-05 RX ADMIN — IPRATROPIUM BROMIDE AND ALBUTEROL SULFATE 3 ML: 2.5; .5 SOLUTION RESPIRATORY (INHALATION) at 14:12

## 2025-01-05 RX ADMIN — Medication 1 APPLICATION: at 09:31

## 2025-01-05 RX ADMIN — METOPROLOL TARTRATE 25 MG: 25 TABLET, FILM COATED ORAL at 20:58

## 2025-01-05 RX ADMIN — HYDRALAZINE HYDROCHLORIDE 10 MG: 10 TABLET ORAL at 13:07

## 2025-01-05 RX ADMIN — Medication 10 ML: at 09:34

## 2025-01-05 RX ADMIN — INSULIN LISPRO 2 UNITS: 100 INJECTION, SOLUTION INTRAVENOUS; SUBCUTANEOUS at 21:27

## 2025-01-05 RX ADMIN — IPRATROPIUM BROMIDE AND ALBUTEROL SULFATE 3 ML: 2.5; .5 SOLUTION RESPIRATORY (INHALATION) at 06:24

## 2025-01-05 RX ADMIN — FERROUS SULFATE TAB 325 MG (65 MG ELEMENTAL FE) 325 MG: 325 (65 FE) TAB at 09:31

## 2025-01-05 RX ADMIN — IPRATROPIUM BROMIDE AND ALBUTEROL SULFATE 3 ML: 2.5; .5 SOLUTION RESPIRATORY (INHALATION) at 19:14

## 2025-01-05 RX ADMIN — INSULIN LISPRO 2 UNITS: 100 INJECTION, SOLUTION INTRAVENOUS; SUBCUTANEOUS at 13:07

## 2025-01-05 RX ADMIN — CYANOCOBALAMIN TAB 500 MCG 1000 MCG: 500 TAB at 09:31

## 2025-01-05 RX ADMIN — ATORVASTATIN CALCIUM 40 MG: 40 TABLET, FILM COATED ORAL at 20:58

## 2025-01-05 RX ADMIN — HYDRALAZINE HYDROCHLORIDE 10 MG: 10 TABLET ORAL at 21:00

## 2025-01-05 RX ADMIN — Medication 10 MG: at 20:57

## 2025-01-05 RX ADMIN — APIXABAN 2.5 MG: 2.5 TABLET, FILM COATED ORAL at 09:31

## 2025-01-05 RX ADMIN — BUSPIRONE HYDROCHLORIDE 7.5 MG: 5 TABLET ORAL at 20:57

## 2025-01-05 RX ADMIN — APIXABAN 2.5 MG: 2.5 TABLET, FILM COATED ORAL at 20:58

## 2025-01-05 RX ADMIN — Medication 10 ML: at 20:58

## 2025-01-05 NOTE — PLAN OF CARE
Goal Outcome Evaluation:     Problem: Noninvasive Ventilation Acute  Goal: Effective Unassisted Ventilation and Oxygenation  Intervention: Monitor and Manage Noninvasive Ventilation  Recent Flowsheet Documentation  Taken 1/5/2025 0001 by Carolee Winters, RRT  Airway/Ventilation Management: airway patency maintained  NPPV/CPAP Maintenance: proper fit/secure

## 2025-01-05 NOTE — PLAN OF CARE
Goal Outcome Evaluation:  Plan of Care Reviewed With: patient        Progress: no change  Outcome Evaluation: The patient presents alert and oriented x4 lying in bed. She typically transfers with assist of 2 and sits in the wheelchair most of the day. She demonstrates no focal strength deficits today. She does sit with a L lean and she is unaware of the lean. She was able to stand with assist of 2, but was unable to attempt to transfer or move up in the bed due to fatigue. She will benefit from continued PT to work on strengthening, balance, and transfers. Recommend discharge back to SNF.    Anticipated Discharge Disposition (PT): skilled nursing facility

## 2025-01-05 NOTE — PROGRESS NOTES
"Progress Note  Mar Rodriguez  1/5/2025 02:43 CST  Subjective:   Admit Date:   12/31/2024      CC/ADMIT DX:       Interval History:   Reviewed overnight events and nursing notes. She has had no new issues per chart.     I have reviewed all labs/diagnostics from the last 24hrs.       ROS:   I have done a 10 point ROS and all are negative, except what is mentioned in the HPI.    Diet: Cardiac; Low Sodium (2g); Fluid Consistency: Thin (IDDSI 0)    Medications:      apixaban, 2.5 mg, Oral, Q12H  atorvastatin, 40 mg, Oral, Nightly  busPIRone, 7.5 mg, Oral, BID  ferrous sulfate, 325 mg, Oral, Daily With Breakfast  gabapentin, 300 mg, Oral, Nightly  insulin lispro, 2-7 Units, Subcutaneous, 4x Daily AC & at Bedtime  ipratropium-albuterol, 3 mL, Nebulization, Q6H While Awake - RT  levothyroxine, 200 mcg, Oral, Nightly  metoprolol tartrate, 25 mg, Oral, BID  mupirocin, 1 Application, Each Nare, BID  senna-docusate sodium, 2 tablet, Oral, BID  sodium chloride, 10 mL, Intravenous, Q12H  vitamin B-12, 1,000 mcg, Oral, Daily            Objective:   Vitals: /70 (BP Location: Left arm, Patient Position: Lying)   Pulse 83   Temp 98.1 °F (36.7 °C) (Oral)   Resp 16   Ht 165.1 cm (65\")   Wt 121 kg (267 lb 10.2 oz)   SpO2 95%   BMI 44.54 kg/m²    Intake/Output Summary (Last 24 hours) at 1/5/2025 0243  Last data filed at 1/4/2025 2017  Gross per 24 hour   Intake --   Output 1000 ml   Net -1000 ml     General appearance: resting ib CPAP  Lungs: relatively clear  Heart: RRR  Abdomen: soft, non-tender; bowel sounds normal; no masses,  no organomegaly  Extremities: extremities normal, atraumatic, no cyanosis or edema  Neurologic:  No obvious focal neurologic deficits.    Assessment and Plan:     Pulmonary vascular congestion    CKD    Obesity   Chronic LE Lymphedema    Hypothyroidism    Anxiety   Cerebral Palsy    Plan:   Continue present medication(s)    Follow with supportive care   Hopefully d/c back to SNF in next 1-2 " days   PT/OT      Discharge planning:   a skilled nursing facility     Reviewed treatment plans with the patient and/or family.             Electronically signed by Vandana Churchill MD on 1/5/2025 at 02:43 CST

## 2025-01-05 NOTE — PROGRESS NOTES
RT EQUIPMENT DEVICE RELATED - SKIN ASSESSMENT    Hayes Score:  Hayes Score: 16     RT Medical Equipment/Device:     NIV Mask:  Under-the-nose   size:  A    Skin Assessment:      Nose:  Intact  Mouth:  Intact    Device Skin Pressure Protection:  Skin-to-device areas padded:  None Required    Nurse Notification:  Mellisa Winters, RRT

## 2025-01-05 NOTE — THERAPY EVALUATION
Acute Care - Occupational Therapy Initial Evaluation  [facility]     Patient Name: Mar Rodriguez  : 1940  MRN: [id number]  Today's Date: 2025     Date of Referral to OT: 25       Admit Date: 2024       ICD-10-CM ICD-9-CM   1. Pulmonary vascular congestion  R09.89 514   2. Impaired mobility [Z74.09]  Z74.09 799.89     Patient Active Problem List   Diagnosis    New onset atrial fibrillation    Pulmonary vascular congestion     Past Medical History:   Diagnosis Date    Abnormality of gait and mobility     Anemia     Anxiety     Cerebral palsy     Cognitive communication deficit     Depression     Diabetes mellitus     Disease of thyroid gland     Hyperglycemia     Hyperlipidemia     Hypertension     Hypokalemia     Insomnia     Lymphedema     Neuropathy     Urge incontinence      History reviewed. No pertinent surgical history.      OT ASSESSMENT FLOWSHEET (Last 12 Hours)       OT Evaluation and Treatment       Row Name 25 1300                   OT Time and Intention    Subjective Information no complaints  -EC        Document Type evaluation  -EC        Mode of Treatment occupational therapy;co-treatment  -EC           General Information    Patient Profile Reviewed yes  -EC        Prior Level of Function independent:;feeding;grooming;max assist:;transfer;dressing;bathing  -EC        Pertinent History of Current Functional Problem pulmonary congestion, acute respiratory failure, chronic BLE edema  -EC        Existing Precautions/Restrictions fall  -EC        Barriers to Rehab medically complex;previous functional deficit;physical barrier  -EC           Living Environment    Current Living Arrangements residential facility  -EC        People in Home facility resident  -EC           Pain Assessment    Pretreatment Pain Rating 0/10 - no pain  -EC        Posttreatment Pain Rating 0/10 - no pain  -EC           Cognition    Orientation Status (Cognition) oriented x 4  -EC           Range  of Motion Comprehensive    Comment, General Range of Motion no formal assessment but reaches with BUE during transfers and bed mobility  -EC           Strength Comprehensive (MMT)    Comment, General Manual Muscle Testing (MMT) Assessment BUE 4/5 functionally  -EC           Activities of Daily Living    BADL Assessment/Intervention lower body dressing;toileting  -EC           Lower Body Dressing Assessment/Training    [place] Level (Lower Body Dressing) don;doff;socks;maximum assist (25% patient effort)  -EC        Position (Lower Body Dressing) edge of bed sitting  -EC           Toileting Assessment/Training    [place] Level (Toileting) adjust/manage clothing;change pad/brief;perform perineal hygiene;maximum assist (25% patient effort)  -EC        Position (Toileting) supine  -EC           BADL Safety/Performance    Impairments, BADL Safety/Performance balance;endurance/activity tolerance;trunk/postural control;shortness of breath  -EC           Bed Mobility    Bed Mobility supine-sit;sit-supine;scooting/bridging;rolling right;rolling left  -EC        Rolling Left [place] (Bed Mobility) minimum assist (75% patient effort);verbal cues;nonverbal cues (demo/gesture)  -EC        Rolling Right [place] (Bed Mobility) verbal cues;nonverbal cues (demo/gesture);minimum assist (75% patient effort)  -EC        Scooting/Bridging [place] (Bed Mobility) dependent (less than 25% patient effort);2 person assist  -EC        Supine-Sit [place] (Bed Mobility) verbal cues;nonverbal cues (demo/gesture);minimum assist (75% patient effort)  -EC        Sit-Supine [place] (Bed Mobility) verbal cues;nonverbal cues (demo/gesture);moderate assist (50% patient effort);2 person assist  -EC        Assistive Device (Bed Mobility) bed rails;head of bed elevated;repositioning sheet  -EC           Transfer Assessment/Treatment    Transfers sit-stand transfer;stand-sit transfer  -EC           Sit-Stand Transfer     Sit-Stand [place] (Transfers) verbal cues;nonverbal cues (demo/gesture);moderate assist (50% patient effort);2 person assist  -EC        Assistive Device (Sit-Stand Transfers) walker, front-wheeled  -EC        Comment, (Sit-Stand Transfer) stood twice for ~10 seconds  -EC           Stand-Sit Transfer    Stand-Sit [place] (Transfers) minimum assist (75% patient effort);2 person assist  -EC        Assistive Device (Stand-Sit Transfers) walker, front-wheeled  -EC           Safety Issues/Impairments Affecting Functional Mobility    Impairments Affecting Function (Mobility) balance;endurance/activity tolerance;postural/trunk control;shortness of breath  -EC           Balance    Balance Assessment sitting static balance;sitting dynamic balance;standing static balance  -EC        Static Sitting Balance contact guard  -EC        Dynamic Sitting Balance verbal cues;minimal assist  -EC        Position, Sitting Balance unsupported;sitting edge of bed  -EC        Static Standing Balance non-verbal cues (demo/gesture);verbal cues;moderate assist  -EC        Position/Device Used, Standing Balance supported;walker, rolling  -EC        Comment, Balance unaware of L lean and unable to correct  -EC           Plan of Care Review    Plan of Care Reviewed With patient  -EC        Progress no change  -EC        Outcome Evaluation OT eval completed. Pt presents A&Ox4 in wlers, agreeable to eval with encouragement. At baseline, she transfers with assist of 2. Once EOB, she demos a lateral lean that she cannot correct. MaxA for LBD d/t this. She was able to stand twice with modA for ~10 seconds each for a bed linen change. Once in bed, she rolled multiple times with Linda. MaxA to change her brief and for griselda care. Pt would benefit from skilled OT to address deficits and progress act jami. Rec discharge back to SNF.  -EC           Positioning and Restraints    Pre-Treatment Position in bed  -EC        Post Treatment Position bed   -EC        In Bed fowlers;call light within reach;encouraged to call for assist;side rails up x3;heels elevated  -EC           Therapy Assessment/Plan (OT)    Date of Referral to OT 01/04/25  -EC        OT Diagnosis decreased ADLs  -EC        Rehab Potential (OT) good  -EC        Criteria for Skilled Therapeutic Interventions Met (OT) yes;meets criteria;skilled treatment is necessary  -EC        Therapy Frequency (OT) 5 times/wk  -EC        Predicted Duration of Therapy Intervention (OT) 10 days  -EC        Planned Therapy Interventions (OT) activity tolerance training;adaptive equipment training;BADL retraining;functional balance retraining;occupation/activity based interventions;patient/caregiver education/training;ROM/therapeutic exercise;strengthening exercise;transfer/mobility retraining  -EC           Therapy Plan Review/Discharge Plan (OT)    Anticipated Discharge Disposition (OT) skilled nursing facility  -EC           OT Goals    Transfer Goal Selection (OT) transfer, OT goal 1  -EC        Dressing Goal Selection (OT) dressing, OT goal 1  -EC        Toileting Goal Selection (OT) toileting, OT goal 1  -EC        Problem Specific Goal Selection (OT) --  -EC           Transfer Goal 1 (OT)    Activity/Assistive Device (Transfer Goal 1, OT) sit-to-stand/stand-to-sit;bed-to-chair/chair-to-bed;wheelchair transfer;commode, bedside without drop arms  -EC        [place] Level/Cues Needed (Transfer Goal 1, OT) minimum assist (75% or more patient effort)  -EC        Time Frame (Transfer Goal 1, OT) long term goal (LTG)  -EC        Progress/Outcome (Transfer Goal 1, OT) new goal  -EC           Dressing Goal 1 (OT)    Activity/Device (Dressing Goal 1, OT) dressing skills, all  -EC        [place]/Cues Needed (Dressing Goal 1, OT) minimum assist (75% or more patient effort)  -EC        Time Frame (Dressing Goal 1, OT) long term goal (LTG)  -EC        Progress/Outcome (Dressing Goal 1, OT) new goal  -EC            Toileting Goal 1 (OT)    Activity/Device (Toileting Goal 1, OT) toileting skills, all  -EC        [place] Level/Cues Needed (Toileting Goal 1, OT) minimum assist (75% or more patient effort)  -EC        Time Frame (Toileting Goal 1, OT) long term goal (LTG)  -EC        Progress/Outcome (Toileting Goal 1, OT) new goal  -EC           Problem Specific Goal 1 (OT)    Problem Specific Goal 1 (OT) --  -EC        Time Frame (Problem Specific Goal 1, OT) --  -EC        Progress/Outcome (Problem Specific Goal 1, OT) --  -EC                  User Key  (r) = Recorded By, (t) = Taken By, (c) = Cosigned By      Initials Name Effective Dates    Haleigh Montero OTR/L 10/13/23 -                      Occupational Therapy Education       Title: PT OT SLP Therapies (In Progress)       Topic: Occupational Therapy (In Progress)       Point: ADL training (Done)       Description:   Instruct learner(s) on proper safety adaptation and remediation techniques during self care or transfers.   Instruct in proper use of assistive devices.                  Learning Progress Summary            Patient Acceptance, E, VU,NR by  at 1/5/2025 142                      Point: Home exercise program (Not Started)       Description:   Instruct learner(s) on appropriate technique for monitoring, assisting and/or progressing therapeutic exercises/activities.                  Learner Progress:  Not documented in this visit.              Point: Precautions (Not Started)       Description:   Instruct learner(s) on prescribed precautions during self-care and functional transfers.                  Learner Progress:  Not documented in this visit.              Point: Body mechanics (Done)       Description:   Instruct learner(s) on proper positioning and spine alignment during self-care, functional mobility activities and/or exercises.                  Learning Progress Summary            Patient Acceptance, E, VU,NR by  at 1/5/2025 2176                                       User Key       Initials Effective Dates Name Provider Type Discipline     10/13/23 -  Haleigh Colon, OTR/L Occupational Therapist OT                      OT Recommendation and Plan  Planned Therapy Interventions (OT): activity tolerance training, adaptive equipment training, BADL retraining, functional balance retraining, occupation/activity based interventions, patient/caregiver education/training, ROM/therapeutic exercise, strengthening exercise, transfer/mobility retraining  Therapy Frequency (OT): 5 times/wk  Plan of Care Review  Plan of Care Reviewed With: patient  Progress: no change  Outcome Evaluation: OT eval completed. Pt presents A&Ox4 in fowlers, agreeable to eval with encouragement. At baseline, she transfers with assist of 2. Once EOB, she demos a lateral lean that she cannot correct. MaxA for LBD d/t this. She was able to stand twice with modA for ~10 seconds each for a bed linen change. Once in bed, she rolled multiple times with Linda. MaxA to change her brief and for griselda care. Pt would benefit from skilled OT to address deficits and progress act jami. Rec discharge back to SNF.  Plan of Care Reviewed With: patient  Outcome Evaluation: OT eval completed. Pt presents A&Ox4 in fowlers, agreeable to eval with encouragement. At baseline, she transfers with assist of 2. Once EOB, she demos a lateral lean that she cannot correct. MaxA for LBD d/t this. She was able to stand twice with modA for ~10 seconds each for a bed linen change. Once in bed, she rolled multiple times with Linda. MaxA to change her brief and for griselda care. Pt would benefit from skilled OT to address deficits and progress act jami. Rec discharge back to SNF.     Outcome Measures       Row Name 01/05/25 1400             How much help from another is currently needed...    Putting on and taking off regular lower body clothing? 1  -EC      Bathing (including washing, rinsing, and drying) 1  -EC      Toileting  (which includes using toilet bed pan or urinal) 1  -EC      Putting on and taking off regular upper body clothing 3  -EC      Taking care of personal grooming (such as brushing teeth) 3  -EC      Eating meals 4  -EC      AM-PAC 6 Clicks Score (OT) 13  -EC         Functional Assessment    Outcome Measure Options AM-PAC 6 Clicks Daily Activity (OT)  -EC                User Key  (r) = Recorded By, (t) = Taken By, (c) = Cosigned By      Initials Name Provider Type    Haleigh Montero OTR/L Occupational Therapist                    Time Calculation:    Time Calculation- OT       Row Name 01/05/25 1342             Time Calculation- OT    OT Start Time 1303  -EC      OT Stop Time 1341  -EC      OT Time Calculation (min) 38 min  -EC                User Key  (r) = Recorded By, (t) = Taken By, (c) = Cosigned By      Initials Name Provider Type    Haleigh Montero OTR/L Occupational Therapist                           MAVIS Reid/JENNY  1/5/2025

## 2025-01-05 NOTE — THERAPY EVALUATION
Patient Name: Mar Rodriguez  : 1940    MRN: [id number]                              Today's Date: 2025       Admit Date: 2024    Visit Dx:     ICD-10-CM ICD-9-CM   1. Pulmonary vascular congestion  R09.89 514   2. Impaired mobility [Z74.09]  Z74.09 799.89     Patient Active Problem List   Diagnosis    New onset atrial fibrillation    Pulmonary vascular congestion     Past Medical History:   Diagnosis Date    Abnormality of gait and mobility     Anemia     Anxiety     Cerebral palsy     Cognitive communication deficit     Depression     Diabetes mellitus     Disease of thyroid gland     Hyperglycemia     Hyperlipidemia     Hypertension     Hypokalemia     Insomnia     Lymphedema     Neuropathy     Urge incontinence      History reviewed. No pertinent surgical history.   General Information       Row Name 25 1301          Physical Therapy Time and Intention    Document Type evaluation  pulmonary vascular congestion, chronic LE lymphedema, anxiety, CP  -MS     Mode of Treatment physical therapy;co-treatment  -MS       Row Name 25 1301          General Information    Patient Profile Reviewed yes  -MS     Prior Level of Function independent:;feeding;grooming;max assist:;transfer;dressing;bathing  -MS     Existing Precautions/Restrictions fall  -MS     Barriers to Rehab medically complex;previous functional deficit;physical barrier  -MS       Row Name 25 1301          Living Environment    People in Home facility resident  -MS       Row Name 25 1301          Cognition    Orientation Status (Cognition) oriented x 4  -MS       Row Name 25 1301          Safety Issues/Impairments Affecting Functional Mobility    Impairments Affecting Function (Mobility) balance;endurance/activity tolerance;postural/trunk control;shortness of breath  -MS               User Key  (r) = Recorded By, (t) = Taken By, (c) = Cosigned By      Initials Name Provider Type    Solange Turcios, PT,  DPT, NCS Physical Therapist                   Mobility       Row Name 01/05/25 1301          Bed Mobility    Bed Mobility rolling left;rolling right;scooting/bridging;supine-sit;sit-supine  -MS     Rolling Left [place] (Bed Mobility) minimum assist (75% patient effort);verbal cues;nonverbal cues (demo/gesture)  -MS     Rolling Right [place] (Bed Mobility) minimum assist (75% patient effort);verbal cues;nonverbal cues (demo/gesture)  -MS     Scooting/Bridging [place] (Bed Mobility) dependent (less than 25% patient effort);2 person assist;verbal cues;nonverbal cues (demo/gesture)  -MS     Supine-Sit [place] (Bed Mobility) minimum assist (75% patient effort);verbal cues;nonverbal cues (demo/gesture)  -MS     Sit-Supine [place] (Bed Mobility) moderate assist (50% patient effort);2 person assist;nonverbal cues (demo/gesture);verbal cues  -MS     Assistive Device (Bed Mobility) bed rails;head of bed elevated;repositioning sheet  -MS       Row Name 01/05/25 1301          Sit-Stand Transfer    Sit-Stand [place] (Transfers) moderate assist (50% patient effort);2 person assist;verbal cues;nonverbal cues (demo/gesture)  -MS     Assistive Device (Sit-Stand Transfers) walker, front-wheeled  -MS     Comment, (Sit-Stand Transfer) sit to stand performed x2, patient able to stand for about 10 seconds each time  -MS               User Key  (r) = Recorded By, (t) = Taken By, (c) = Cosigned By      Initials Name Provider Type    MS Solange Marcus R, PT, DPT, NCS Physical Therapist                   Obj/Interventions       Row Name 01/05/25 1301          Range of Motion Comprehensive    General Range of Motion bilateral upper extremity ROM WFL  -MS     Comment, General Range of Motion B LE limited by lymphedema swelling for PROM for knee flexion, otherwise WFL, AROM impaired 50% grossly  -MS       Row Name 01/05/25 1301          Strength Comprehensive (MMT)    Comment, General Manual Muscle Testing (MMT)  Assessment B LE grossly 3-/5  -MS       Row Name 01/05/25 1301          Balance    Balance Assessment sitting static balance;sitting dynamic balance;standing static balance  -MS     Static Sitting Balance contact guard  L lean, unable to correct and pt unaware of lean  -MS     Dynamic Sitting Balance minimal assist;verbal cues;non-verbal cues (demo/gesture)  -MS     Position, Sitting Balance unsupported;sitting edge of bed  -MS     Static Standing Balance verbal cues;non-verbal cues (demo/gesture);moderate assist  -MS     Position/Device Used, Standing Balance supported;walker, rolling  -MS               User Key  (r) = Recorded By, (t) = Taken By, (c) = Cosigned By      Initials Name Provider Type    Solange Turcios R, PT, DPT, NCS Physical Therapist                   Goals/Plan       Row Name 01/05/25 1301          Bed Mobility Goal 1 (PT)    Activity/Assistive Device (Bed Mobility Goal 1, PT) bed mobility activities, all  -MS     [place] Level/Cues Needed (Bed Mobility Goal 1, PT) contact guard required;tactile cues required;verbal cues required  -MS     Time Frame (Bed Mobility Goal 1, PT) long term goal (LTG);by discharge  -MS     Progress/Outcomes (Bed Mobility Goal 1, PT) new goal  -MS       Row Name 01/05/25 1301          Transfer Goal 1 (PT)    Activity/Assistive Device (Transfer Goal 1, PT) sit-to-stand/stand-to-sit;bed-to-chair/chair-to-bed;wheelchair transfer;walker, rolling  -MS     [place] Level/Cues Needed (Transfer Goal 1, PT) minimum assist (75% or more patient effort);tactile cues required;verbal cues required  -MS     Time Frame (Transfer Goal 1, PT) long term goal (LTG);by discharge  -MS     Progress/Outcome (Transfer Goal 1, PT) new goal  -MS       Row Name 01/05/25 1301          Therapy Assessment/Plan (PT)    Planned Therapy Interventions (PT) balance training;bed mobility training;patient/family education;strengthening;transfer training  -MS               User Key  (r) = Recorded  By, (t) = Taken By, (c) = Cosigned By      Initials Name Provider Type    Solange Turcios TYLER, PT, DPT, NCS Physical Therapist                   Clinical Impression       Row Name 01/05/25 1301          Pain    Pretreatment Pain Rating 0/10 - no pain  -MS     Posttreatment Pain Rating 0/10 - no pain  -MS       Row Name 01/05/25 1301          Plan of Care Review    Plan of Care Reviewed With patient  -MS     Progress no change  -MS     Outcome Evaluation The patient presents alert and oriented x4 lying in bed. She typically transfers with assist of 2 and sits in the wheelchair most of the day. She demonstrates no focal strength deficits today. She does sit with a L lean and she is unaware of the lean. She was able to stand with assist of 2, but was unable to attempt to transfer or move up in the bed due to fatigue. She will benefit from continued PT to work on strengthening, balance, and transfers. Recommend discharge back to SNF.  -MS       Row Name 01/05/25 1301          Therapy Assessment/Plan (PT)    Patient/Family Therapy Goals Statement (PT) go back to SNF  -MS     Rehab Potential (PT) good  -MS     Criteria for Skilled Interventions Met (PT) yes;skilled treatment is necessary;meets criteria  -MS     Therapy Frequency (PT) 2 times/day  -MS     Predicted Duration of Therapy Intervention (PT) until discharge  -MS       Row Name 01/05/25 1301          Positioning and Restraints    Post Treatment Position bed  -MS     In Bed fowlers;call light within reach;encouraged to call for assist;side rails up x2;R heel elevated;L heel elevated  -MS               User Key  (r) = Recorded By, (t) = Taken By, (c) = Cosigned By      Initials Name Provider Type    Solange Turcios TYLER, PT, DPT, NCS Physical Therapist                   Outcome Measures       Row Name 01/05/25 1301          How much help from another person do you currently need...    Turning from your back to your side while in flat bed without using bedrails? 2   -MS     Moving from lying on back to sitting on the side of a flat bed without bedrails? 2  -MS     Moving to and from a bed to a chair (including a wheelchair)? 1  -MS     Standing up from a chair using your arms (e.g., wheelchair, bedside chair)? 2  -MS     Climbing 3-5 steps with a railing? 1  -MS     To walk in hospital room? 1  -MS     AM-PAC 6 Clicks Score (PT) 9  -MS     Highest Level of Mobility Goal 3 --> Sit at edge of bed  -MS       Row Name 01/05/25 1301          Functional Assessment    Outcome Measure Options AM-PAC 6 Clicks Basic Mobility (PT)  -MS               User Key  (r) = Recorded By, (t) = Taken By, (c) = Cosigned By      Initials Name Provider Type    MS Solange Marcus, PT, DPT, NCS Physical Therapist                                 Physical Therapy Education       Title: PT OT SLP Therapies (In Progress)       Topic: Physical Therapy (In Progress)       Point: Mobility training (Done)       Learning Progress Summary            Patient Acceptance, E, VU by MS at 1/5/2025 9436    Comment: role of PT in her care                      Point: Home exercise program (Not Started)       Learner Progress:  Not documented in this visit.              Point: Body mechanics (Not Started)       Learner Progress:  Not documented in this visit.              Point: Precautions (Not Started)       Learner Progress:  Not documented in this visit.                              User Key       Initials Effective Dates Name Provider Type Discipline    MS 07/11/23 -  Solange Marcus, PT, DPT, NCS Physical Therapist PT                  PT Recommendation and Plan  Planned Therapy Interventions (PT): balance training, bed mobility training, patient/family education, strengthening, transfer training  Progress: no change  Outcome Evaluation: The patient presents alert and oriented x4 lying in bed. She typically transfers with assist of 2 and sits in the wheelchair most of the day. She demonstrates no focal strength  deficits today. She does sit with a L lean and she is unaware of the lean. She was able to stand with assist of 2, but was unable to attempt to transfer or move up in the bed due to fatigue. She will benefit from continued PT to work on strengthening, balance, and transfers. Recommend discharge back to SNF.     Time Calculation:         PT Charges       Row Name 01/05/25 1301             Time Calculation    Start Time 1300  -MS      Stop Time 1338  -MS      Time Calculation (min) 38 min  -MS      PT Received On 01/05/25  -MS      PT Goal Re-Cert Due Date 01/15/25  -MS         Untimed Charges    PT Eval/Re-eval Minutes 38  -MS         Total Minutes    Untimed Charges Total Minutes 38  -MS       Total Minutes 38  -MS                User Key  (r) = Recorded By, (t) = Taken By, (c) = Cosigned By      Initials Name Provider Type    Solange Turcios PT, DPT, NCS Physical Therapist                      PT G-Codes  Outcome Measure Options: AM-PAC 6 Clicks Basic Mobility (PT)  AM-PAC 6 Clicks Score (PT): 9  PT Discharge Summary  Anticipated Discharge Disposition (PT): skilled nursing facility    Solange Macrus, PT, DPT, NCS  1/5/2025

## 2025-01-05 NOTE — PLAN OF CARE
Goal Outcome Evaluation:  Plan of Care Reviewed With: patient        Progress: improving  Outcome Evaluation: VSS.  AF 64-88, per tele.  No c/o pain.  RA/Bipap HS.  [place]wick in place, adequate UOP.  Safety maintained.

## 2025-01-06 ENCOUNTER — APPOINTMENT (OUTPATIENT)
Dept: GENERAL RADIOLOGY | Facility: HOSPITAL | Age: 85
DRG: 640 | End: 2025-01-06
Payer: MEDICARE

## 2025-01-06 VITALS
TEMPERATURE: 98 F | SYSTOLIC BLOOD PRESSURE: 158 MMHG | WEIGHT: 267.64 LBS | HEART RATE: 72 BPM | OXYGEN SATURATION: 96 % | HEIGHT: 65 IN | BODY MASS INDEX: 44.59 KG/M2 | DIASTOLIC BLOOD PRESSURE: 81 MMHG | RESPIRATION RATE: 18 BRPM

## 2025-01-06 LAB
ALBUMIN SERPL-MCNC: 3.2 G/DL (ref 3.5–5.2)
ALBUMIN/GLOB SERPL: 0.9 G/DL
ALP SERPL-CCNC: 151 U/L (ref 39–117)
ALT SERPL W P-5'-P-CCNC: 32 U/L (ref 1–33)
ANION GAP SERPL CALCULATED.3IONS-SCNC: 9 MMOL/L (ref 5–15)
AST SERPL-CCNC: 20 U/L (ref 1–32)
BASOPHILS # BLD AUTO: 0.06 10*3/MM3 (ref 0–0.2)
BASOPHILS NFR BLD AUTO: 0.5 % (ref 0–1.5)
BILIRUB SERPL-MCNC: 0.3 MG/DL (ref 0–1.2)
BUN SERPL-MCNC: 55 MG/DL (ref 8–23)
BUN/CREAT SERPL: 36.7 (ref 7–25)
CALCIUM SPEC-SCNC: 9.5 MG/DL (ref 8.6–10.5)
CHLORIDE SERPL-SCNC: 105 MMOL/L (ref 98–107)
CO2 SERPL-SCNC: 23 MMOL/L (ref 22–29)
CREAT SERPL-MCNC: 1.5 MG/DL (ref 0.57–1)
DEPRECATED RDW RBC AUTO: 46.5 FL (ref 37–54)
EGFRCR SERPLBLD CKD-EPI 2021: 34.4 ML/MIN/1.73
EOSINOPHIL # BLD AUTO: 0.59 10*3/MM3 (ref 0–0.4)
EOSINOPHIL NFR BLD AUTO: 5.4 % (ref 0.3–6.2)
ERYTHROCYTE [DISTWIDTH] IN BLOOD BY AUTOMATED COUNT: 12.7 % (ref 12.3–15.4)
GLOBULIN UR ELPH-MCNC: 3.6 GM/DL
GLUCOSE BLDC GLUCOMTR-MCNC: 116 MG/DL (ref 70–130)
GLUCOSE BLDC GLUCOMTR-MCNC: 172 MG/DL (ref 70–130)
GLUCOSE SERPL-MCNC: 132 MG/DL (ref 65–99)
HCT VFR BLD AUTO: 39.1 % (ref 34–46.6)
HGB BLD-MCNC: 12.4 G/DL (ref 12–15.9)
IMM GRANULOCYTES # BLD AUTO: 0.2 10*3/MM3 (ref 0–0.05)
IMM GRANULOCYTES NFR BLD AUTO: 1.8 % (ref 0–0.5)
LYMPHOCYTES # BLD AUTO: 1.77 10*3/MM3 (ref 0.7–3.1)
LYMPHOCYTES NFR BLD AUTO: 16.2 % (ref 19.6–45.3)
MAGNESIUM SERPL-MCNC: 2.2 MG/DL (ref 1.6–2.4)
MCH RBC QN AUTO: 31.6 PG (ref 26.6–33)
MCHC RBC AUTO-ENTMCNC: 31.7 G/DL (ref 31.5–35.7)
MCV RBC AUTO: 99.7 FL (ref 79–97)
MONOCYTES # BLD AUTO: 0.73 10*3/MM3 (ref 0.1–0.9)
MONOCYTES NFR BLD AUTO: 6.7 % (ref 5–12)
NEUTROPHILS NFR BLD AUTO: 69.4 % (ref 42.7–76)
NEUTROPHILS NFR BLD AUTO: 7.58 10*3/MM3 (ref 1.7–7)
NRBC BLD AUTO-RTO: 0 /100 WBC (ref 0–0.2)
PHOSPHATE SERPL-MCNC: 3.4 MG/DL (ref 2.5–4.5)
PLATELET # BLD AUTO: 299 10*3/MM3 (ref 140–450)
PMV BLD AUTO: 10.4 FL (ref 6–12)
POTASSIUM SERPL-SCNC: 4.1 MMOL/L (ref 3.5–5.2)
PROT SERPL-MCNC: 6.8 G/DL (ref 6–8.5)
RBC # BLD AUTO: 3.92 10*6/MM3 (ref 3.77–5.28)
SODIUM SERPL-SCNC: 137 MMOL/L (ref 136–145)
WBC NRBC COR # BLD AUTO: 10.93 10*3/MM3 (ref 3.4–10.8)

## 2025-01-06 PROCEDURE — 71045 X-RAY EXAM CHEST 1 VIEW: CPT

## 2025-01-06 PROCEDURE — 83735 ASSAY OF MAGNESIUM: CPT | Performed by: PHYSICIAN ASSISTANT

## 2025-01-06 PROCEDURE — 84100 ASSAY OF PHOSPHORUS: CPT | Performed by: PHYSICIAN ASSISTANT

## 2025-01-06 PROCEDURE — 63710000001 INSULIN LISPRO (HUMAN) PER 5 UNITS: Performed by: PHYSICIAN ASSISTANT

## 2025-01-06 PROCEDURE — 82948 REAGENT STRIP/BLOOD GLUCOSE: CPT

## 2025-01-06 PROCEDURE — 94799 UNLISTED PULMONARY SVC/PX: CPT

## 2025-01-06 PROCEDURE — 85025 COMPLETE CBC W/AUTO DIFF WBC: CPT | Performed by: PHYSICIAN ASSISTANT

## 2025-01-06 PROCEDURE — 94664 DEMO&/EVAL PT USE INHALER: CPT

## 2025-01-06 PROCEDURE — 94660 CPAP INITIATION&MGMT: CPT

## 2025-01-06 PROCEDURE — 80053 COMPREHEN METABOLIC PANEL: CPT

## 2025-01-06 PROCEDURE — 97535 SELF CARE MNGMENT TRAINING: CPT

## 2025-01-06 RX ORDER — AMOXICILLIN 250 MG
2 CAPSULE ORAL 2 TIMES DAILY
Qty: 1 TABLET | Refills: 0 | Status: SHIPPED | OUTPATIENT
Start: 2025-01-06 | End: 2025-01-16 | Stop reason: HOSPADM

## 2025-01-06 RX ORDER — FUROSEMIDE 40 MG/1
20 TABLET ORAL DAILY PRN
Qty: 1 TABLET | Refills: 0 | Status: CANCELLED | OUTPATIENT
Start: 2025-01-06

## 2025-01-06 RX ORDER — POLYETHYLENE GLYCOL 3350 17 G/17G
17 POWDER, FOR SOLUTION ORAL DAILY PRN
Qty: 1 EACH | Refills: 0 | Status: SHIPPED | OUTPATIENT
Start: 2025-01-06

## 2025-01-06 RX ORDER — HYDRALAZINE HYDROCHLORIDE 10 MG/1
10 TABLET, FILM COATED ORAL EVERY 8 HOURS SCHEDULED
Qty: 1 TABLET | Refills: 0 | Status: ON HOLD | OUTPATIENT
Start: 2025-01-06 | End: 2025-01-14

## 2025-01-06 RX ADMIN — INSULIN LISPRO 2 UNITS: 100 INJECTION, SOLUTION INTRAVENOUS; SUBCUTANEOUS at 12:40

## 2025-01-06 RX ADMIN — FERROUS SULFATE TAB 325 MG (65 MG ELEMENTAL FE) 325 MG: 325 (65 FE) TAB at 08:42

## 2025-01-06 RX ADMIN — HYDRALAZINE HYDROCHLORIDE 10 MG: 10 TABLET ORAL at 05:03

## 2025-01-06 RX ADMIN — METOPROLOL TARTRATE 25 MG: 25 TABLET, FILM COATED ORAL at 08:42

## 2025-01-06 RX ADMIN — CYANOCOBALAMIN TAB 500 MCG 1000 MCG: 500 TAB at 11:13

## 2025-01-06 RX ADMIN — HYDRALAZINE HYDROCHLORIDE 10 MG: 10 TABLET ORAL at 14:46

## 2025-01-06 RX ADMIN — Medication 10 ML: at 08:43

## 2025-01-06 RX ADMIN — BUSPIRONE HYDROCHLORIDE 7.5 MG: 5 TABLET ORAL at 08:42

## 2025-01-06 RX ADMIN — IPRATROPIUM BROMIDE AND ALBUTEROL SULFATE 3 ML: 2.5; .5 SOLUTION RESPIRATORY (INHALATION) at 05:54

## 2025-01-06 RX ADMIN — IPRATROPIUM BROMIDE AND ALBUTEROL SULFATE 3 ML: 2.5; .5 SOLUTION RESPIRATORY (INHALATION) at 11:25

## 2025-01-06 RX ADMIN — APIXABAN 2.5 MG: 2.5 TABLET, FILM COATED ORAL at 08:42

## 2025-01-06 NOTE — PLAN OF CARE
Problem: Adult Inpatient Plan of Care  Goal: Plan of Care Review  Outcome: Adequate for Care Transition  Goal: Patient-Specific Goal (Individualized)  Outcome: Adequate for Care Transition  Goal: Absence of Hospital-Acquired Illness or Injury  Outcome: Adequate for Care Transition  Intervention: Identify and Manage Fall Risk  Description: Perform standard risk assessment on admission using a validated tool or comprehensive approach appropriate to the patient; reassess fall risk frequently, with change in status or transfer to another level of care.  Communicate risk to interprofessional healthcare team; ensure fall risk visible cue.  Determine need for increased observation, equipment and environmental modification, as well as use of supportive, nonskid footwear.  Adjust safety measures to individual needs and identified risk factors.  Reinforce the importance of active participation with fall risk prevention, safety, and physical activity with the patient and family.  Perform regular intentional rounding to assess need for position change, pain assessment and personal needs, including assistance with toileting.  Recent Flowsheet Documentation  Taken 1/6/2025 1553 by Radha Woods RN  Safety Promotion/Fall Prevention:   safety round/check completed   fall prevention program maintained   clutter free environment maintained   assistive device/personal items within reach  Taken 1/6/2025 1455 by Radha Woods RN  Safety Promotion/Fall Prevention:   safety round/check completed   fall prevention program maintained   assistive device/personal items within reach   clutter free environment maintained  Taken 1/6/2025 1400 by Radha Woods RN  Safety Promotion/Fall Prevention:   safety round/check completed   fall prevention program maintained   assistive device/personal items within reach   clutter free environment maintained  Taken 1/6/2025 1300 by Radha Woods RN  Safety Promotion/Fall  Prevention:   safety round/check completed   fall prevention program maintained   clutter free environment maintained   assistive device/personal items within reach  Taken 1/6/2025 1200 by Radha Woods RN  Safety Promotion/Fall Prevention:   safety round/check completed   fall prevention program maintained   assistive device/personal items within reach   clutter free environment maintained  Taken 1/6/2025 1100 by Radha Woods RN  Safety Promotion/Fall Prevention:   safety round/check completed   fall prevention program maintained   assistive device/personal items within reach   clutter free environment maintained  Taken 1/6/2025 1000 by Radha Woods RN  Safety Promotion/Fall Prevention:   safety round/check completed   fall prevention program maintained   assistive device/personal items within reach   clutter free environment maintained  Taken 1/6/2025 0900 by Radha Woods RN  Safety Promotion/Fall Prevention:   safety round/check completed   fall prevention program maintained   clutter free environment maintained   assistive device/personal items within reach  Taken 1/6/2025 0820 by Radha Woods RN  Safety Promotion/Fall Prevention:   safety round/check completed   fall prevention program maintained   assistive device/personal items within reach   clutter free environment maintained  Intervention: Prevent Skin Injury  Description: Perform a screening for skin injury risk, such as pressure or moisture-associated skin damage on admission and at regular intervals throughout hospital stay.  Keep all areas of skin (especially folds) clean and dry.  Maintain adequate skin hydration.  Relieve and redistribute pressure and protect bony prominences and skin at risk for injury; implement measures based on patient-specific risk factors.  Match turning and repositioning schedule to clinical condition.  Encourage weight shift frequently; assist with reposition if unable to complete  independently.  Float heels off bed; avoid pressure on the Achilles tendon.  Keep skin free from extended contact with medical devices.  Optimize nutrition and hydration.  Encourage functional activity and mobility, as early as tolerated.  Use aids (e.g., slide boards, mechanical lift) during transfer.  Recent Flowsheet Documentation  Taken 1/6/2025 1455 by Radha Woods RN  Body Position:   turned   neutral head position   neutral body alignment  Taken 1/6/2025 1300 by Radha Woods RN  Body Position:   turned   right  Taken 1/6/2025 1100 by Radha Woods RN  Body Position:   turned   left  Taken 1/6/2025 0900 by Radha Woods RN  Body Position:   turned   right  Taken 1/6/2025 0820 by Radha Woods RN  Body Position:   turned   neutral head position   neutral body alignment   lower extremity elevated  Skin Protection:   pulse oximeter probe site changed   silicone foam dressing in place  Goal: Optimal Comfort and Wellbeing  Outcome: Adequate for Care Transition  Intervention: Provide Person-Centered Care  Description: Use a family-focused approach to care; encourage support system presence and participation.  Develop trust and rapport by proactively providing information, encouraging questions, addressing concerns and offering reassurance.  Acknowledge emotional response to hospitalization.  Recognize and utilize personal coping strategies and strengths; develop goals via shared decision-making.  Honor spiritual and cultural preferences.  Recent Flowsheet Documentation  Taken 1/6/2025 0820 by Radha Woods RN  Trust Relationship/Rapport:   care explained   choices provided  Goal: Readiness for Transition of Care  Outcome: Adequate for Care Transition     Problem: Skin Injury Risk Increased  Goal: Skin Health and Integrity  Outcome: Adequate for Care Transition  Intervention: Optimize Skin Protection  Description: Perform a full pressure injury risk assessment, as indicated  by screening, upon admission to care unit.  Reassess skin (full inspection and injury risk, including skin temperature, consistency and color) frequently (e.g., scheduled interval, with change in condition) to provide optimal early detection and prevention.  Maintain adequate tissue perfusion (e.g., encourage fluid balance; avoid crossing legs, constrictive clothing or devices) to promote tissue oxygenation.  Maintain head of bed at lowest degree of elevation tolerated, considering medical condition and other restrictions. Use positioning supports to prevent sliding and friction. Consider low friction textiles.  Avoid positioning onto an area that remains reddened or on bony prominences.  Minimize incontinence and moisture (e.g., toileting schedule; moisture-wicking pad, diaper or incontinence collection device; skin moisture barrier).  Cleanse skin promptly and gently, when soiled, utilizing a pH-balanced cleanser.  Relieve and redistribute pressure (e.g., scheduled position changes, weight shifts, use of support surface, medical device repositioning, protective dressing application, use of positioning device, microclimate control, use of pressure-injury-monitor  Encourage increased activity, such as sitting in a chair at the bedside or early mobilization, when able to tolerate. Avoid prolonged sitting.  Recent Flowsheet Documentation  Taken 1/6/2025 1553 by Radha Woods RN  Activity Management: activity encouraged  Taken 1/6/2025 1455 by Radha Woods RN  Activity Management: activity encouraged  Taken 1/6/2025 1400 by Radha Woods RN  Activity Management: activity encouraged  Taken 1/6/2025 1300 by Radha Woods RN  Activity Management: activity encouraged  Taken 1/6/2025 1100 by Radha Woods RN  Activity Management: activity encouraged  Head of Bed (HOB) Positioning: HOB at 30-45 degrees  Taken 1/6/2025 1000 by Radha Woods RN  Activity Management: activity  encouraged  Taken 1/6/2025 0900 by Radha Woods RN  Activity Management: activity encouraged  Head of Bed (HOB) Positioning: HOB at 30-45 degrees  Taken 1/6/2025 0820 by Radha Woods, RN  Activity Management: activity encouraged  Pressure Reduction Techniques:   weight shift assistance provided   heels elevated off bed  Pressure Reduction Devices:   chair cushion utilized   foam padding utilized  Skin Protection:   pulse oximeter probe site changed   silicone foam dressing in place     Problem: Sepsis/Septic Shock  Goal: Optimal Coping  Outcome: Adequate for Care Transition  Goal: Absence of Bleeding  Outcome: Adequate for Care Transition  Goal: Blood Glucose Level Within Target Range  Outcome: Adequate for Care Transition  Intervention: Optimize Glycemic Control  Description: Establish target blood glucose levels based on patient-specific factors, such as illness severity and comorbidity.  Document blood glucose levels and monitor trend.  If elevated blood glucose level is not within desired range, initiate insulin therapy using an insulin management protocol.  If change in mental or cognitive status, check blood glucose level and trend.  Manage hypoglycemia with oral fast-acting (pure) carbohydrate. If not alert and unable to swallow safely, administer IV dextrose or glucagon.  Document potential cause of the hypoglycemia event to avoid reoccurrence.  Prevent hypoglycemic episodes by proactively adjusting insulin therapy if there is a change in condition, treatment, illness severity, medication or nutrition support therapy.  Recent Flowsheet Documentation  Taken 1/6/2025 0820 by Radha Woods, RN  Hypoglycemia Management: blood glucose monitored  Goal: Absence of Infection Signs and Symptoms  Outcome: Adequate for Care Transition  Intervention: Promote Recovery  Description: Encourage pulmonary hygiene, such as cough-enhancement and airway-clearance techniques, that may include use of  incentive spirometry, deep breathing and cough.  Encourage early rehabilitation and physical activity to optimize functional ability and activity tolerance, as well as minimize delirium.  Promote energy conservation; minimize oxygen demand and consumption by adjusting environment, decreasing stimulation, maintaining normothermia and treating pain.  Optimize fluid balance, nutrition intake, sleep and glycemic control to maintain tissue perfusion and enhance immune response.  Recent Flowsheet Documentation  Taken 1/6/2025 1553 by Radha Woods RN  Activity Management: activity encouraged  Taken 1/6/2025 1455 by Radha Woods RN  Activity Management: activity encouraged  Taken 1/6/2025 1400 by Radha Woods RN  Activity Management: activity encouraged  Taken 1/6/2025 1300 by Radha Woods RN  Activity Management: activity encouraged  Taken 1/6/2025 1100 by Radha Woods RN  Activity Management: activity encouraged  Taken 1/6/2025 1000 by Radha Woods RN  Activity Management: activity encouraged  Taken 1/6/2025 0900 by Radha Woods RN  Activity Management: activity encouraged  Taken 1/6/2025 0820 by Radha Woods RN  Activity Management: activity encouraged  Goal: Optimal Nutrition Delivery  Outcome: Adequate for Care Transition     Problem: Noninvasive Ventilation Acute  Goal: Effective Unassisted Ventilation and Oxygenation  Outcome: Adequate for Care Transition     Problem: Comorbidity Management  Goal: Maintenance of Heart Failure Symptom Control  Outcome: Adequate for Care Transition  Goal: Blood Pressure in Desired Range  Outcome: Adequate for Care Transition     Problem: Fall Injury Risk  Goal: Absence of Fall and Fall-Related Injury  Outcome: Adequate for Care Transition  Intervention: Promote Injury-Free Environment  Description: Provide a safe, barrier-free environment that encourages independent activity.  Keep care area uncluttered and  well-lighted.  Determine need for increased observation or monitoring.  Avoid use of devices that minimize mobility, such as restraints or indwelling urinary catheter.  Recent Flowsheet Documentation  Taken 1/6/2025 1553 by Radha Woods RN  Safety Promotion/Fall Prevention:   safety round/check completed   fall prevention program maintained   clutter free environment maintained   assistive device/personal items within reach  Taken 1/6/2025 1455 by Radha Woods RN  Safety Promotion/Fall Prevention:   safety round/check completed   fall prevention program maintained   assistive device/personal items within reach   clutter free environment maintained  Taken 1/6/2025 1400 by Radha Woods RN  Safety Promotion/Fall Prevention:   safety round/check completed   fall prevention program maintained   assistive device/personal items within reach   clutter free environment maintained  Taken 1/6/2025 1300 by Radha Woods RN  Safety Promotion/Fall Prevention:   safety round/check completed   fall prevention program maintained   clutter free environment maintained   assistive device/personal items within reach  Taken 1/6/2025 1200 by Radha Woods RN  Safety Promotion/Fall Prevention:   safety round/check completed   fall prevention program maintained   assistive device/personal items within reach   clutter free environment maintained  Taken 1/6/2025 1100 by Radha Woods RN  Safety Promotion/Fall Prevention:   safety round/check completed   fall prevention program maintained   assistive device/personal items within reach   clutter free environment maintained  Taken 1/6/2025 1000 by Radha Woods RN  Safety Promotion/Fall Prevention:   safety round/check completed   fall prevention program maintained   assistive device/personal items within reach   clutter free environment maintained  Taken 1/6/2025 0900 by Radha Woods RN  Safety Promotion/Fall Prevention:   safety  round/check completed   fall prevention program maintained   clutter free environment maintained   assistive device/personal items within reach  Taken 1/6/2025 0820 by Radha Woods RN  Safety Promotion/Fall Prevention:   safety round/check completed   fall prevention program maintained   assistive device/personal items within reach   clutter free environment maintained   Goal Outcome Evaluation:

## 2025-01-06 NOTE — PLAN OF CARE
Goal Outcome Evaluation:        Problem: Noninvasive Ventilation Acute  Goal: Effective Unassisted Ventilation and Oxygenation  Outcome: Progressing  Intervention: Monitor and Manage Noninvasive Ventilation  Recent Flowsheet Documentation  Taken 1/5/2025 2345 by Carolee Winters, PASCUAL  Airway/Ventilation Management: airway patency maintained  NPPV/CPAP Maintenance: proper fit/secure

## 2025-01-06 NOTE — CASE MANAGEMENT/SOCIAL WORK
Continued Stay Note   [place]     Patient Name: Mar Rodriguez  MRN: [id number]  Today's Date: 1/6/2025    Admit Date: 12/31/2024    Plan: [facility]   Discharge Plan       Row Name 01/06/25 1325       Plan    Plan [facility]    Patient/Family in Agreement with Plan yes    Final Discharge Disposition Code 03 - skilled nursing facility (SNF)    Final [facility], Tracy from facility aware and pt will be admitted at SNF level care.  POA in room and aware of d/c today. Staff to inform SW if assist needed with transport.  [facility] [phone]                   Discharge Codes    No documentation.                 Expected Discharge Date and Time       Expected Discharge Date Expected Discharge Time    Jan 6, 2025               RORY VazquezW

## 2025-01-06 NOTE — DISCHARGE SUMMARY
Hospital Discharge Summary    Mar Rodriguez  :  1940  MRN:  [id number]    Admit date:  2024  Discharge date:      Admitting Physician:    Vandana Churchill MD    Discharge Diagnoses:      Pulmonary vascular congestion    Acute on Chronic Resp Failure    DM2    HTN    CKD    Morbid Obesity   Cerebral Palsy    Viral Illness with Coronavirus OC43    Hospital Course:   She was admitted with above. She was initially treated by Critical Care in ICU. Please see their notes for care in ICU. She has been doing well after I took over care. She is currently off of O2, and using CPAP for sleeping. Her VS are stable. She is stable for d/c back to SNF.     Discharge Medications:         Discharge Medications        New Medications        Instructions Start Date   hydrALAZINE 10 MG tablet  Commonly known as: APRESOLINE   10 mg, Oral, Every 8 Hours Scheduled      melatonin 5 MG tablet tablet   10 mg, Oral, Nightly PRN      polyethylene glycol 17 g packet  Commonly known as: MIRALAX   17 g, Oral, Daily PRN      sennosides-docusate 8.6-50 MG per tablet  Commonly known as: PERICOLACE   2 tablets, Oral, 2 Times Daily             Continue These Medications        Instructions Start Date   acetaminophen 325 MG tablet  Commonly known as: TYLENOL   650 mg, Oral, Every 6 Hours PRN      apixaban 2.5 MG tablet tablet  Commonly known as: ELIQUIS   2.5 mg, Oral, Every 12 Hours Scheduled      atorvastatin 40 MG tablet  Commonly known as: LIPITOR   40 mg, Nightly      busPIRone 7.5 MG tablet  Commonly known as: BUSPAR   7.5 mg, Oral, 2 Times Daily, For anxiety      Calmoseptine 0.44-20.6 % ointment  Generic drug: Menthol-Zinc Oxide   1 Application, Topical, 3 times daily, Both buttocks      dextrose 40 % gel  Commonly known as: GLUTOSE   15 g, Oral, As Needed      ferrous sulfate 324 (65 Fe) MG tablet delayed-release EC tablet   324 mg, 2 Times Daily With Meals      furosemide 40 MG tablet  Commonly known as: LASIX   40  mg, Daily      gabapentin 300 MG capsule  Commonly known as: NEURONTIN   300 mg, Oral, Nightly      glucagon 1 MG injection  Commonly known as: GLUCAGEN   1 mg, Subcutaneous, Once As Needed      guaiFENesin 600 MG 12 hr tablet  Commonly known as: MUCINEX   1,200 mg, 2 Times Daily      ipratropium-albuterol 0.5-2.5 mg/3 ml nebulizer  Commonly known as: DUO-NEB   3 mL, Nebulization, Every 6 Hours PRN      levothyroxine 200 MCG tablet  Commonly known as: SYNTHROID, LEVOTHROID   200 mcg, Nightly      metoprolol tartrate 25 MG tablet  Commonly known as: LOPRESSOR   25 mg, 2 Times Daily      nystatin [id number] UNIT/GM powder  Commonly known as: MYCOSTATIN   1 Application, Topical, Every 12 Hours, Abd folds      phenylephrine-mineral oil-petrolatum 0.25-14-74.9 % ointment hemorrhoidal ointment  Commonly known as: PREPARATION H   1 Application, Rectal, Every 6 Hours PRN      Tresiba FlexTouch 100 UNIT/ML solution pen-injector injection  Generic drug: insulin degludec   6 Units, Nightly      vitamin B-12 1000 MCG tablet  Commonly known as: CYANOCOBALAMIN   1,000 mcg, Daily             Stop These Medications      cloNIDine 0.1 MG tablet  Commonly known as: CATAPRES     colestipol 1 g tablet  Commonly known as: COLESTID              Consults:  Critical Care    Significant Diagnostic Studies:  see complete admission record      Disposition:   back to SNF in stable condition      Diet: carb control    Activity: as tolerated    Special Instructions: BMP in 1 week  Accucheck Q AC and QHS, let me know if < 70 or > 250, continue CPAP with sleeping, use O2 to keep sat > or equal to 88%      The patient or family are to call or return if there are any problems, questions, concerns or change in her condition.     Signed:  Vandana Churchill MD MD  1/6/2025, 12:47 CST

## 2025-01-06 NOTE — THERAPY TREATMENT NOTE
Patient Name: Mar Rodriguez  : 1940    MRN: [id number]                              Today's Date: 2025       Admit Date: 2024    Visit Dx:     ICD-10-CM ICD-9-CM   1. Pulmonary vascular congestion  R09.89 514   2. Impaired mobility [Z74.09]  Z74.09 799.89     Patient Active Problem List   Diagnosis    New onset atrial fibrillation    Pulmonary vascular congestion     Past Medical History:   Diagnosis Date    Abnormality of gait and mobility     Anemia     Anxiety     Cerebral palsy     Cognitive communication deficit     Depression     Diabetes mellitus     Disease of thyroid gland     Hyperglycemia     Hyperlipidemia     Hypertension     Hypokalemia     Insomnia     Lymphedema     Neuropathy     Urge incontinence      History reviewed. No pertinent surgical history.   General Information       Row Name 25 0849          OT Time and Intention    Subjective Information no complaints  -LS     Document Type therapy note (daily note)  -LS     Mode of Treatment occupational therapy  -LS     Patient Effort good  -LS       Row Name 25 0849          General Information    Existing Precautions/Restrictions fall  -       Row Name 25 0849          Cognition    Orientation Status (Cognition) oriented x 4  -       Row Name 25 0849          Safety Issues/Impairments Affecting Functional Mobility    Impairments Affecting Function (Mobility) balance;endurance/activity tolerance;shortness of breath;strength  -               User Key  (r) = Recorded By, (t) = Taken By, (c) = Cosigned By      Initials Name Provider Type    LS Debra Crook OTR/L Occupational Therapist                     Mobility/ADL's       Row Name 25 0849          Bed Mobility    Bed Mobility rolling left;rolling right;supine-sit  -LS     Rolling Left [place] (Bed Mobility) standby assist;verbal cues  -LS     Rolling Right [place] (Bed Mobility) standby assist;verbal cues  -LS     Supine-Sit  [place] (Bed Mobility) standby assist;verbal cues  -LS     Sit-Supine [place] (Bed Mobility) standby assist;verbal cues  -LS     Assistive Device (Bed Mobility) bed rails;head of bed elevated  -       Row Name 01/06/25 0849          Activities of Daily Living    BADL Assessment/Intervention toileting;grooming  -       Row Name 01/06/25 0849          Toileting Assessment/Training    [place] Level (Toileting) toileting skills;change pad/brief;perform perineal hygiene;dependent (less than 25% patient effort)  -     Position (Toileting) supine  -       Row Name 01/06/25 0849          Grooming Assessment/Training    [place] Level (Grooming) grooming skills;hair care, combing/brushing;wash face, hands;standby assist  -     Position (Grooming) edge of bed sitting  -               User Key  (r) = Recorded By, (t) = Taken By, (c) = Cosigned By      Initials Name Provider Type    Debra Cedeno, OTR/L Occupational Therapist                   Obj/Interventions    No documentation.                  Goals/Plan    No documentation.                  Clinical Impression       Row Name 01/06/25 0849          Pain Assessment    Pretreatment Pain Rating 0/10 - no pain  -     Posttreatment Pain Rating 0/10 - no pain  -       Row Name 01/06/25 0849          Plan of Care Review    Plan of Care Reviewed With patient  -LS     Progress improving  -     Outcome Evaluation OT tx completed. Pt in fowlers upon therapist arrival; A&Ox4; No c/o pain. Pt found soiled due to having incontinent episode of bowel. Pt rolled to B sides utilizing bedrail with SBA and verbal cues for sequencing and body mechanics. Pt dependent for griselda hygiene and changing of brief while supine/sidelying. Pt performed supine<>sit utilizing bedrail with HOB elevated with SBA. Pt performed face washing and hair grooming tasks while seated EOB with SBA. Cont OT POC. Recommend SNF at discharge.  -       Row Name 01/06/25 0849           Therapy Plan Review/Discharge Plan (OT)    Anticipated Discharge Disposition (OT) skilled nursing facility  -       Row Name 01/06/25 0849          Positioning and Restraints    Pre-Treatment Position in bed  -LS     Post Treatment Position bed  -LS     In Bed fowlers;side rails up x3;call light within reach;encouraged to call for assist;exit alarm on  -LS               User Key  (r) = Recorded By, (t) = Taken By, (c) = Cosigned By      Initials Name Provider Type    Debra Cedeno OTR/L Occupational Therapist                   Outcome Measures       Row Name 01/06/25 0849          How much help from another is currently needed...    Putting on and taking off regular lower body clothing? 1  -LS     Bathing (including washing, rinsing, and drying) 2  -LS     Toileting (which includes using toilet bed pan or urinal) 1  -LS     Putting on and taking off regular upper body clothing 3  -LS     Taking care of personal grooming (such as brushing teeth) 3  -LS     Eating meals 4  -LS     AM-PAC 6 Clicks Score (OT) 14  -LS       Row Name 01/06/25 0820          How much help from another person do you currently need...    Turning from your back to your side while in flat bed without using bedrails? 3  -CH     Moving from lying on back to sitting on the side of a flat bed without bedrails? 2  -CH     Moving to and from a bed to a chair (including a wheelchair)? 1  -CH     Standing up from a chair using your arms (e.g., wheelchair, bedside chair)? 2  -CH     Climbing 3-5 steps with a railing? 1  -CH     To walk in hospital room? 1  -CH     AM-PAC 6 Clicks Score (PT) 10  -CH     Highest Level of Mobility Goal 4 --> Transfer to chair/commode  -       Row Name 01/06/25 0849          Functional Assessment    Outcome Measure Options AM-PAC 6 Clicks Daily Activity (OT)  -LS               User Key  (r) = Recorded By, (t) = Taken By, (c) = Cosigned By      Initials Name Provider Type    Radha Armando RN Registered  Nurse     Debra Crook OTR/L Occupational Therapist                    Occupational Therapy Education       Title: PT OT SLP Therapies (In Progress)       Topic: Occupational Therapy (In Progress)       Point: ADL training (Done)       Description:   Instruct learner(s) on proper safety adaptation and remediation techniques during self care or transfers.   Instruct in proper use of assistive devices.                  Learning Progress Summary            Patient Acceptance, E, VU,NR by  at 1/6/2025 1035    Acceptance, E, VU,NR by  at 1/5/2025 1425                      Point: Home exercise program (Not Started)       Description:   Instruct learner(s) on appropriate technique for monitoring, assisting and/or progressing therapeutic exercises/activities.                  Learner Progress:  Not documented in this visit.              Point: Precautions (Done)       Description:   Instruct learner(s) on prescribed precautions during self-care and functional transfers.                  Learning Progress Summary            Patient Acceptance, E, VU,NR by  at 1/6/2025 1035                      Point: Body mechanics (Done)       Description:   Instruct learner(s) on proper positioning and spine alignment during self-care, functional mobility activities and/or exercises.                  Learning Progress Summary            Patient Acceptance, E, VU,NR by  at 1/6/2025 1035    Acceptance, E, VU,NR by  at 1/5/2025 1425                                      User Key       Initials Effective Dates Name Provider Type Discipline     06/20/22 -  Debra Crook OTR/L Occupational Therapist OT     10/13/23 -  Haleigh Colon OTR/L Occupational Therapist OT                  OT Recommendation and Plan     Plan of Care Review  Plan of Care Reviewed With: patient  Progress: improving  Outcome Evaluation: OT tx completed. Pt in fowlers upon therapist arrival; A&Ox4; No c/o pain. Pt found soiled due to having incontinent  episode of bowel. Pt rolled to B sides utilizing bedrail with SBA and verbal cues for sequencing and body mechanics. Pt dependent for grsielda hygiene and changing of brief while supine/sidelying. Pt performed supine<>sit utilizing bedrail with HOB elevated with SBA. Pt performed face washing and hair grooming tasks while seated EOB with SBA. Cont OT POC. Recommend SNF at discharge.     Time Calculation:         Time Calculation- OT       Row Name 01/06/25 0849             Time Calculation- OT    OT Start Time 0849  -      OT Stop Time 0917  -      OT Time Calculation (min) 28 min  -      Total Timed Code Minutes- OT 28 minute(s)  -      OT Received On 01/06/25  -                User Key  (r) = Recorded By, (t) = Taken By, (c) = Cosigned By      Initials Name Provider Type    Debra Cedeno OTR/L Occupational Therapist                  Therapy Charges for Today       Code Description Service Date Service Provider Modifiers Qty    [id number] HC OT SELF CARE/MGMT/TRAIN EA 15 MIN 1/6/2025 Debra Crook OTR/L GO 2                 MAVIS Jay/JENNY  1/6/2025

## 2025-01-06 NOTE — PLAN OF CARE
Goal Outcome Evaluation:  Plan of Care Reviewed With: patient        Progress: improving  Outcome Evaluation: OT tx completed. Pt in fowlers upon therapist arrival; A&Ox4; No c/o pain. Pt found soiled due to having incontinent episode of bowel. Pt rolled to B sides utilizing bedrail with SBA and verbal cues for sequencing and body mechanics. Pt dependent for griselda hygiene and changing of brief while supine/sidelying. Pt performed supine<>sit utilizing bedrail with HOB elevated with SBA. Pt performed face washing and hair grooming tasks while seated EOB with SBA. Cont OT POC. Recommend SNF at discharge.    Anticipated Discharge Disposition (OT): skilled nursing facility

## 2025-01-06 NOTE — PLAN OF CARE
Goal Outcome Evaluation:  Plan of Care Reviewed With: patient        Progress: improving  Outcome Evaluation: VSS.  AF 66-86, per tele.  No c/o pain.  Pt is very eager to d/c back to her facility.  Safety maintained.

## 2025-01-06 NOTE — PROGRESS NOTES
RT EQUIPMENT DEVICE RELATED - SKIN ASSESSMENT    Hayes Score:  Hayes Score: 16     RT Medical Equipment/Device:     NIV Mask:  Under-the-nose   size:  A    Skin Assessment:      Neck:  Intact  Nose:  Intact  Mouth:  Intact    Device Skin Pressure Protection:  Skin-to-device areas padded:  None Required    Nurse Notification:  Mellisa Guerrero, CRT

## 2025-01-07 NOTE — THERAPY DISCHARGE NOTE
Acute Care - Occupational Therapy Discharge Summary  [facility]     Patient Name: Mar Rodriguez  : 1940  MRN: [id number]    Today's Date: 2025       Date of Referral to OT: 25         Admit Date: 2024        OT Recommendation and Plan    Visit Dx:    ICD-10-CM ICD-9-CM   1. Pulmonary vascular congestion  R09.89 514   2. Impaired mobility [Z74.09]  Z74.09 799.89                OT Rehab Goals       Row Name 25 1200             Transfer Goal 1 (OT)    Activity/Assistive Device (Transfer Goal 1, OT) sit-to-stand/stand-to-sit;bed-to-chair/chair-to-bed;wheelchair transfer;commode, bedside without drop arms  -LS      [place] Level/Cues Needed (Transfer Goal 1, OT) minimum assist (75% or more patient effort)  -LS      Time Frame (Transfer Goal 1, OT) long term goal (LTG)  -LS      Progress/Outcome (Transfer Goal 1, OT) goal not met  -LS         Dressing Goal 1 (OT)    Activity/Device (Dressing Goal 1, OT) dressing skills, all  -LS      [place]/Cues Needed (Dressing Goal 1, OT) minimum assist (75% or more patient effort)  -LS      Time Frame (Dressing Goal 1, OT) long term goal (LTG)  -LS      Progress/Outcome (Dressing Goal 1, OT) goal not met  -LS         Toileting Goal 1 (OT)    Activity/Device (Toileting Goal 1, OT) toileting skills, all  -LS      [place] Level/Cues Needed (Toileting Goal 1, OT) minimum assist (75% or more patient effort)  -LS      Time Frame (Toileting Goal 1, OT) long term goal (LTG)  -LS      Progress/Outcome (Toileting Goal 1, OT) goal not met  -LS                User Key  (r) = Recorded By, (t) = Taken By, (c) = Cosigned By      Initials Name Provider Type Discipline    Chinyere Nowak COTA Occupational Therapist Assistant THERAPIES                     Outcome Measures       Row Name 25 1400             How much help from another is currently needed...    Putting on and taking off regular lower body clothing? 1  -EC      Bathing (including  washing, rinsing, and drying) 1  -EC      Toileting (which includes using toilet bed pan or urinal) 1  -EC      Putting on and taking off regular upper body clothing 3  -EC      Taking care of personal grooming (such as brushing teeth) 3  -EC      Eating meals 4  -EC      AM-PAC 6 Clicks Score (OT) 13  -EC         Functional Assessment    Outcome Measure Options AM-PAC 6 Clicks Daily Activity (OT)  -EC                User Key  (r) = Recorded By, (t) = Taken By, (c) = Cosigned By      Initials Name Provider Type    EC Haleigh Colon, OTR/L Occupational Therapist                            OT Discharge Summary  Anticipated Discharge Disposition (OT): skilled nursing facility  Reason for Discharge: Discharge from facility  Outcomes Achieved: Refer to plan of care for updates on goals achieved  Discharge Destination: SNF      ARIEL Philippe  1/7/2025

## 2025-01-07 NOTE — THERAPY DISCHARGE NOTE
Acute Care - Physical Therapy Discharge Summary  [facility]       Patient Name: Mar Rodriguez  : 1940  MRN: [id number]    Today's Date: 2025                 Admit Date: 2024      PT Recommendation and Plan    Visit Dx:    ICD-10-CM ICD-9-CM   1. Pulmonary vascular congestion  R09.89 514   2. Impaired mobility [Z74.09]  Z74.09 799.89        Outcome Measures       Row Name 25 1400             How much help from another is currently needed...    Putting on and taking off regular lower body clothing? 1  -EC      Bathing (including washing, rinsing, and drying) 1  -EC      Toileting (which includes using toilet bed pan or urinal) 1  -EC      Putting on and taking off regular upper body clothing 3  -EC      Taking care of personal grooming (such as brushing teeth) 3  -EC      Eating meals 4  -EC      AM-PAC 6 Clicks Score (OT) 13  -EC         Functional Assessment    Outcome Measure Options AM-PAC 6 Clicks Daily Activity (OT)  -EC                User Key  (r) = Recorded By, (t) = Taken By, (c) = Cosigned By      Initials Name Provider Type    EC Haleigh Colon, OTR/L Occupational Therapist                         PT Rehab Goals       Row Name 25 1100             Bed Mobility Goal 1 (PT)    Activity/Assistive Device (Bed Mobility Goal 1, PT) bed mobility activities, all  -AB      [place] Level/Cues Needed (Bed Mobility Goal 1, PT) contact guard required;tactile cues required;verbal cues required  -AB      Time Frame (Bed Mobility Goal 1, PT) long term goal (LTG);by discharge  -AB      Progress/Outcomes (Bed Mobility Goal 1, PT) goal not met  -AB         Transfer Goal 1 (PT)    Activity/Assistive Device (Transfer Goal 1, PT) sit-to-stand/stand-to-sit;bed-to-chair/chair-to-bed;wheelchair transfer;walker, rolling  -AB      [place] Level/Cues Needed (Transfer Goal 1, PT) minimum assist (75% or more patient effort);tactile cues required;verbal cues required  -AB      Time Frame  (Transfer Goal 1, PT) long term goal (LTG);by discharge  -AB      Progress/Outcome (Transfer Goal 1, PT) goal not met  -AB                User Key  (r) = Recorded By, (t) = Taken By, (c) = Cosigned By      Initials Name Provider Type Discipline    Karrie Hernandez, PTA Physical Therapist Assistant PT                        PT Discharge Summary  Anticipated Discharge Disposition (PT): skilled nursing facility  Reason for Discharge: Discharge from facility  Outcomes Achieved: Refer to plan of care for updates on goals achieved  Discharge Destination: SNF      Karrie Chavez PTA   1/7/2025

## 2025-01-10 NOTE — PROGRESS NOTES
"Enter Query Response Below      Query Response: Pulmonary Vascular Congestion  Electronically signed by Vandana Churchill MD, 01/10/25, 1:18 AM CST.              If applicable, please update the problem list.     Patient: Mar Rodriguez        : 1940  Account: [id number]           Admit Date:         How to Respond to this query:       a. Click New Note     b. Answer query within the yellow box.                c. Update the Problem List, if applicable.      If you have any questions about this query contact me at: [email or web address]     ,    84 year old female admitted  with \"acute respiratory failure with hypoxia and pulmonary vascular congestion. No documented hx of heart failure\" per the H&P.     BNP 2789 (). Chest x-ray  reported \"Cardiomegaly and mild pulmonary vascular congestion. No alessio edema.\" Per chest x-ray , \"Mild improving pulmonary edema/pneumonitis. Questionable very small left pleural effusion.\"     Per the progress notes  - 1/3, echocardiogram () showed EF 61-65% \"with normal RV cavity size and systolic function with no significant valvular abnormalities.\"     Treatment: IV Lasix drip -, IV Lasix injections x2 , x1 .     Please clarify the condition treated/monitored:    Acute diastolic heart failure/HFpEF  Acute non-cardiogenic pulmonary edema  Pulmonary vascular congestion  Other- specify_________    By submitting this query, we are merely seeking further clarification of documentation to accurately reflect all conditions that you are monitoring, evaluating, treating or that extend the hospitalization or utilize additional resources of care. Please utilize your independent clinical judgment when addressing the question(s) above.     This query and your response, once completed, will be entered into the legal medical record.    Sincerely,  Aga Spangler RN CCDS  Clinical Documentation Integrity Program     "

## 2025-01-13 ENCOUNTER — APPOINTMENT (OUTPATIENT)
Dept: GENERAL RADIOLOGY | Facility: HOSPITAL | Age: 85
End: 2025-01-13
Payer: MEDICARE

## 2025-01-13 ENCOUNTER — HOSPITAL ENCOUNTER (OUTPATIENT)
Facility: HOSPITAL | Age: 85
Setting detail: OBSERVATION
Discharge: NURSING, REHAB OR LONG-TERM CARE | End: 2025-01-16
Admitting: FAMILY MEDICINE
Payer: MEDICARE

## 2025-01-13 DIAGNOSIS — Z74.09 IMPAIRED MOBILITY: ICD-10-CM

## 2025-01-13 DIAGNOSIS — E87.5 ACUTE HYPERKALEMIA: Primary | ICD-10-CM

## 2025-01-13 DIAGNOSIS — D72.829 LEUKOCYTOSIS, UNSPECIFIED TYPE: ICD-10-CM

## 2025-01-13 DIAGNOSIS — E11.65 HYPERGLYCEMIA DUE TO DIABETES MELLITUS: ICD-10-CM

## 2025-01-13 DIAGNOSIS — L50.9 URTICARIAL RASH: ICD-10-CM

## 2025-01-13 DIAGNOSIS — N18.9 CHRONIC KIDNEY DISEASE, UNSPECIFIED CKD STAGE: ICD-10-CM

## 2025-01-13 LAB
ALBUMIN SERPL-MCNC: 3.4 G/DL (ref 3.5–5.2)
ALBUMIN/GLOB SERPL: 0.9 G/DL
ALP SERPL-CCNC: 153 U/L (ref 39–117)
ALT SERPL W P-5'-P-CCNC: 24 U/L (ref 1–33)
ANION GAP SERPL CALCULATED.3IONS-SCNC: 12 MMOL/L (ref 5–15)
AST SERPL-CCNC: 14 U/L (ref 1–32)
B PARAPERT DNA SPEC QL NAA+PROBE: NOT DETECTED
B PERT DNA SPEC QL NAA+PROBE: NOT DETECTED
BASOPHILS # BLD AUTO: 0.1 10*3/MM3 (ref 0–0.2)
BASOPHILS NFR BLD AUTO: 0.4 % (ref 0–1.5)
BILIRUB SERPL-MCNC: 0.2 MG/DL (ref 0–1.2)
BILIRUB UR QL STRIP: NEGATIVE
BUN SERPL-MCNC: 59 MG/DL (ref 8–23)
BUN/CREAT SERPL: 31.7 (ref 7–25)
C PNEUM DNA NPH QL NAA+NON-PROBE: NOT DETECTED
CALCIUM SPEC-SCNC: 9.1 MG/DL (ref 8.6–10.5)
CHLORIDE SERPL-SCNC: 101 MMOL/L (ref 98–107)
CLARITY UR: CLEAR
CO2 SERPL-SCNC: 21 MMOL/L (ref 22–29)
COLOR UR: YELLOW
CREAT SERPL-MCNC: 1.86 MG/DL (ref 0.57–1)
CRP SERPL-MCNC: 1.86 MG/DL (ref 0–0.5)
D-LACTATE SERPL-SCNC: 1.9 MMOL/L (ref 0.5–2)
DEPRECATED RDW RBC AUTO: 47.9 FL (ref 37–54)
EGFRCR SERPLBLD CKD-EPI 2021: 26.6 ML/MIN/1.73
EOSINOPHIL # BLD AUTO: 0.06 10*3/MM3 (ref 0–0.4)
EOSINOPHIL NFR BLD AUTO: 0.3 % (ref 0.3–6.2)
ERYTHROCYTE [DISTWIDTH] IN BLOOD BY AUTOMATED COUNT: 13.3 % (ref 12.3–15.4)
FLUAV SUBTYP SPEC NAA+PROBE: NOT DETECTED
FLUBV RNA ISLT QL NAA+PROBE: NOT DETECTED
GLOBULIN UR ELPH-MCNC: 4 GM/DL
GLUCOSE SERPL-MCNC: 287 MG/DL (ref 65–99)
GLUCOSE UR STRIP-MCNC: NEGATIVE MG/DL
HADV DNA SPEC NAA+PROBE: NOT DETECTED
HCOV 229E RNA SPEC QL NAA+PROBE: NOT DETECTED
HCOV HKU1 RNA SPEC QL NAA+PROBE: NOT DETECTED
HCOV NL63 RNA SPEC QL NAA+PROBE: NOT DETECTED
HCOV OC43 RNA SPEC QL NAA+PROBE: NOT DETECTED
HCT VFR BLD AUTO: 41.3 % (ref 34–46.6)
HGB BLD-MCNC: 13.3 G/DL (ref 12–15.9)
HGB UR QL STRIP.AUTO: NEGATIVE
HMPV RNA NPH QL NAA+NON-PROBE: NOT DETECTED
HOLD SPECIMEN: NORMAL
HPIV1 RNA ISLT QL NAA+PROBE: NOT DETECTED
HPIV2 RNA SPEC QL NAA+PROBE: NOT DETECTED
HPIV3 RNA NPH QL NAA+PROBE: NOT DETECTED
HPIV4 P GENE NPH QL NAA+PROBE: NOT DETECTED
IMM GRANULOCYTES # BLD AUTO: 0.58 10*3/MM3 (ref 0–0.05)
IMM GRANULOCYTES NFR BLD AUTO: 2.5 % (ref 0–0.5)
KETONES UR QL STRIP: NEGATIVE
LEUKOCYTE ESTERASE UR QL STRIP.AUTO: NEGATIVE
LYMPHOCYTES # BLD AUTO: 1.2 10*3/MM3 (ref 0.7–3.1)
LYMPHOCYTES NFR BLD AUTO: 5.1 % (ref 19.6–45.3)
M PNEUMO IGG SER IA-ACNC: NOT DETECTED
MAGNESIUM SERPL-MCNC: 2 MG/DL (ref 1.6–2.4)
MCH RBC QN AUTO: 31.9 PG (ref 26.6–33)
MCHC RBC AUTO-ENTMCNC: 32.2 G/DL (ref 31.5–35.7)
MCV RBC AUTO: 99 FL (ref 79–97)
MONOCYTES # BLD AUTO: 0.99 10*3/MM3 (ref 0.1–0.9)
MONOCYTES NFR BLD AUTO: 4.2 % (ref 5–12)
NEUTROPHILS NFR BLD AUTO: 20.42 10*3/MM3 (ref 1.7–7)
NEUTROPHILS NFR BLD AUTO: 87.5 % (ref 42.7–76)
NITRITE UR QL STRIP: NEGATIVE
NRBC BLD AUTO-RTO: 0 /100 WBC (ref 0–0.2)
NT-PROBNP SERPL-MCNC: 4059 PG/ML (ref 0–1800)
PH UR STRIP.AUTO: 5.5 [PH] (ref 5–8)
PLATELET # BLD AUTO: 352 10*3/MM3 (ref 140–450)
PMV BLD AUTO: 10.7 FL (ref 6–12)
POTASSIUM SERPL-SCNC: 5.5 MMOL/L (ref 3.5–5.2)
PROCALCITONIN SERPL-MCNC: 0.11 NG/ML (ref 0–0.25)
PROT SERPL-MCNC: 7.4 G/DL (ref 6–8.5)
PROT UR QL STRIP: ABNORMAL
RBC # BLD AUTO: 4.17 10*6/MM3 (ref 3.77–5.28)
RHINOVIRUS RNA SPEC NAA+PROBE: NOT DETECTED
RSV RNA NPH QL NAA+NON-PROBE: NOT DETECTED
S PYO AG THROAT QL: NEGATIVE
SARS-COV-2 RNA RESP QL NAA+PROBE: NOT DETECTED
SODIUM SERPL-SCNC: 134 MMOL/L (ref 136–145)
SP GR UR STRIP: 1.02 (ref 1–1.03)
UROBILINOGEN UR QL STRIP: ABNORMAL
WBC NRBC COR # BLD AUTO: 23.35 10*3/MM3 (ref 3.4–10.8)
WHOLE BLOOD HOLD COAG: NORMAL
WHOLE BLOOD HOLD SPECIMEN: NORMAL

## 2025-01-13 PROCEDURE — 71045 X-RAY EXAM CHEST 1 VIEW: CPT

## 2025-01-13 PROCEDURE — 81003 URINALYSIS AUTO W/O SCOPE: CPT | Performed by: NURSE PRACTITIONER

## 2025-01-13 PROCEDURE — 0202U NFCT DS 22 TRGT SARS-COV-2: CPT | Performed by: NURSE PRACTITIONER

## 2025-01-13 PROCEDURE — 87040 BLOOD CULTURE FOR BACTERIA: CPT | Performed by: NURSE PRACTITIONER

## 2025-01-13 PROCEDURE — 93005 ELECTROCARDIOGRAM TRACING: CPT | Performed by: NURSE PRACTITIONER

## 2025-01-13 PROCEDURE — 96375 TX/PRO/DX INJ NEW DRUG ADDON: CPT

## 2025-01-13 PROCEDURE — P9612 CATHETERIZE FOR URINE SPEC: HCPCS

## 2025-01-13 PROCEDURE — 83735 ASSAY OF MAGNESIUM: CPT | Performed by: NURSE PRACTITIONER

## 2025-01-13 PROCEDURE — G0378 HOSPITAL OBSERVATION PER HR: HCPCS

## 2025-01-13 PROCEDURE — 93010 ELECTROCARDIOGRAM REPORT: CPT | Performed by: STUDENT IN AN ORGANIZED HEALTH CARE EDUCATION/TRAINING PROGRAM

## 2025-01-13 PROCEDURE — 25810000003 LACTATED RINGERS SOLUTION: Performed by: NURSE PRACTITIONER

## 2025-01-13 PROCEDURE — 86140 C-REACTIVE PROTEIN: CPT | Performed by: NURSE PRACTITIONER

## 2025-01-13 PROCEDURE — 87081 CULTURE SCREEN ONLY: CPT | Performed by: NURSE PRACTITIONER

## 2025-01-13 PROCEDURE — 83605 ASSAY OF LACTIC ACID: CPT | Performed by: NURSE PRACTITIONER

## 2025-01-13 PROCEDURE — 99285 EMERGENCY DEPT VISIT HI MDM: CPT

## 2025-01-13 PROCEDURE — 84145 PROCALCITONIN (PCT): CPT | Performed by: NURSE PRACTITIONER

## 2025-01-13 PROCEDURE — 36415 COLL VENOUS BLD VENIPUNCTURE: CPT

## 2025-01-13 PROCEDURE — 80053 COMPREHEN METABOLIC PANEL: CPT | Performed by: NURSE PRACTITIONER

## 2025-01-13 PROCEDURE — 85025 COMPLETE CBC W/AUTO DIFF WBC: CPT | Performed by: NURSE PRACTITIONER

## 2025-01-13 PROCEDURE — 83880 ASSAY OF NATRIURETIC PEPTIDE: CPT | Performed by: NURSE PRACTITIONER

## 2025-01-13 PROCEDURE — 25010000002 DIPHENHYDRAMINE PER 50 MG: Performed by: NURSE PRACTITIONER

## 2025-01-13 PROCEDURE — 96374 THER/PROPH/DIAG INJ IV PUSH: CPT

## 2025-01-13 PROCEDURE — 87880 STREP A ASSAY W/OPTIC: CPT | Performed by: NURSE PRACTITIONER

## 2025-01-13 PROCEDURE — 25010000002 DEXAMETHASONE PER 1 MG: Performed by: NURSE PRACTITIONER

## 2025-01-13 RX ORDER — DIPHENHYDRAMINE HYDROCHLORIDE 50 MG/ML
25 INJECTION INTRAMUSCULAR; INTRAVENOUS ONCE
Status: COMPLETED | OUTPATIENT
Start: 2025-01-13 | End: 2025-01-13

## 2025-01-13 RX ORDER — FAMOTIDINE 10 MG/ML
20 INJECTION, SOLUTION INTRAVENOUS ONCE
Status: COMPLETED | OUTPATIENT
Start: 2025-01-13 | End: 2025-01-13

## 2025-01-13 RX ORDER — SODIUM CHLORIDE 0.9 % (FLUSH) 0.9 %
10 SYRINGE (ML) INJECTION AS NEEDED
Status: DISCONTINUED | OUTPATIENT
Start: 2025-01-13 | End: 2025-01-14

## 2025-01-13 RX ORDER — DEXAMETHASONE SODIUM PHOSPHATE 10 MG/ML
10 INJECTION INTRAMUSCULAR; INTRAVENOUS ONCE
Status: COMPLETED | OUTPATIENT
Start: 2025-01-13 | End: 2025-01-13

## 2025-01-13 RX ORDER — SODIUM CHLORIDE 0.9 % (FLUSH) 0.9 %
10 SYRINGE (ML) INJECTION AS NEEDED
Status: DISCONTINUED | OUTPATIENT
Start: 2025-01-13 | End: 2025-01-16 | Stop reason: HOSPADM

## 2025-01-13 RX ADMIN — FAMOTIDINE 20 MG: 10 INJECTION INTRAVENOUS at 20:41

## 2025-01-13 RX ADMIN — SODIUM CHLORIDE, POTASSIUM CHLORIDE, SODIUM LACTATE AND CALCIUM CHLORIDE 250 ML: 600; 310; 30; 20 INJECTION, SOLUTION INTRAVENOUS at 20:35

## 2025-01-13 RX ADMIN — DEXAMETHASONE SODIUM PHOSPHATE 10 MG: 10 INJECTION INTRAMUSCULAR; INTRAVENOUS at 20:36

## 2025-01-13 RX ADMIN — DIPHENHYDRAMINE HYDROCHLORIDE 25 MG: 50 INJECTION, SOLUTION INTRAMUSCULAR; INTRAVENOUS at 20:41

## 2025-01-13 NOTE — Clinical Note
Level of Care: Telemetry [5]   Diagnosis: Acute hyperkalemia [949583]   Admitting Physician: RAMEZ SALGADO [[id number]]   Attending Physician: RAMEZ SALGADO [[id number]]

## 2025-01-14 LAB
ALBUMIN SERPL-MCNC: 3.5 G/DL (ref 3.5–5.2)
ALBUMIN/GLOB SERPL: 0.9 G/DL
ALP SERPL-CCNC: 150 U/L (ref 39–117)
ALT SERPL W P-5'-P-CCNC: 20 U/L (ref 1–33)
ANION GAP SERPL CALCULATED.3IONS-SCNC: 10 MMOL/L (ref 5–15)
AST SERPL-CCNC: 12 U/L (ref 1–32)
BILIRUB SERPL-MCNC: 0.2 MG/DL (ref 0–1.2)
BUN SERPL-MCNC: 57 MG/DL (ref 8–23)
BUN/CREAT SERPL: 37 (ref 7–25)
CALCIUM SPEC-SCNC: 9.4 MG/DL (ref 8.6–10.5)
CHLORIDE SERPL-SCNC: 103 MMOL/L (ref 98–107)
CO2 SERPL-SCNC: 22 MMOL/L (ref 22–29)
CREAT SERPL-MCNC: 1.54 MG/DL (ref 0.57–1)
EGFRCR SERPLBLD CKD-EPI 2021: 33.4 ML/MIN/1.73
GLOBULIN UR ELPH-MCNC: 3.8 GM/DL
GLUCOSE BLDC GLUCOMTR-MCNC: 162 MG/DL (ref 70–130)
GLUCOSE BLDC GLUCOMTR-MCNC: 202 MG/DL (ref 70–130)
GLUCOSE BLDC GLUCOMTR-MCNC: 215 MG/DL (ref 70–130)
GLUCOSE BLDC GLUCOMTR-MCNC: 228 MG/DL (ref 70–130)
GLUCOSE BLDC GLUCOMTR-MCNC: 232 MG/DL (ref 70–130)
GLUCOSE SERPL-MCNC: 266 MG/DL (ref 65–99)
POTASSIUM SERPL-SCNC: 5.6 MMOL/L (ref 3.5–5.2)
PROT SERPL-MCNC: 7.3 G/DL (ref 6–8.5)
QT INTERVAL: 356 MS
QTC INTERVAL: 400 MS
SODIUM SERPL-SCNC: 135 MMOL/L (ref 136–145)

## 2025-01-14 PROCEDURE — 80053 COMPREHEN METABOLIC PANEL: CPT | Performed by: FAMILY MEDICINE

## 2025-01-14 PROCEDURE — 36415 COLL VENOUS BLD VENIPUNCTURE: CPT | Performed by: FAMILY MEDICINE

## 2025-01-14 PROCEDURE — 25010000002 DIPHENHYDRAMINE PER 50 MG: Performed by: FAMILY MEDICINE

## 2025-01-14 PROCEDURE — 96376 TX/PRO/DX INJ SAME DRUG ADON: CPT

## 2025-01-14 PROCEDURE — G0378 HOSPITAL OBSERVATION PER HR: HCPCS

## 2025-01-14 PROCEDURE — 82948 REAGENT STRIP/BLOOD GLUCOSE: CPT

## 2025-01-14 PROCEDURE — 63710000001 PREDNISONE PER 1 MG: Performed by: FAMILY MEDICINE

## 2025-01-14 PROCEDURE — 63710000001 INSULIN LISPRO (HUMAN) PER 5 UNITS: Performed by: FAMILY MEDICINE

## 2025-01-14 PROCEDURE — 63710000001 INSULIN GLARGINE PER 5 UNITS: Performed by: FAMILY MEDICINE

## 2025-01-14 RX ORDER — IPRATROPIUM BROMIDE AND ALBUTEROL SULFATE 2.5; .5 MG/3ML; MG/3ML
3 SOLUTION RESPIRATORY (INHALATION) EVERY 6 HOURS PRN
Status: DISCONTINUED | OUTPATIENT
Start: 2025-01-14 | End: 2025-01-16 | Stop reason: HOSPADM

## 2025-01-14 RX ORDER — PREDNISONE 10 MG/1
10 TABLET ORAL 2 TIMES DAILY PRN
COMMUNITY
Start: 2025-01-09 | End: 2025-01-16 | Stop reason: HOSPADM

## 2025-01-14 RX ORDER — BETAMETHASONE DIPROPIONATE 0.5 MG/G
1 CREAM TOPICAL DAILY
COMMUNITY

## 2025-01-14 RX ORDER — CEPHALEXIN 500 MG/1
500 CAPSULE ORAL 2 TIMES DAILY
COMMUNITY
Start: 2025-01-12 | End: 2025-01-16 | Stop reason: HOSPADM

## 2025-01-14 RX ORDER — NYSTATIN 100000 [USP'U]/G
1 POWDER TOPICAL EVERY 12 HOURS
Status: DISCONTINUED | OUTPATIENT
Start: 2025-01-15 | End: 2025-01-16 | Stop reason: HOSPADM

## 2025-01-14 RX ORDER — INSULIN LISPRO 100 [IU]/ML
2-7 INJECTION, SOLUTION INTRAVENOUS; SUBCUTANEOUS
Status: DISCONTINUED | OUTPATIENT
Start: 2025-01-14 | End: 2025-01-16 | Stop reason: HOSPADM

## 2025-01-14 RX ORDER — DEXTROSE MONOHYDRATE 25 G/50ML
25 INJECTION, SOLUTION INTRAVENOUS
Status: DISCONTINUED | OUTPATIENT
Start: 2025-01-14 | End: 2025-01-16 | Stop reason: HOSPADM

## 2025-01-14 RX ORDER — LEVOCETIRIZINE DIHYDROCHLORIDE 5 MG/1
5 TABLET, FILM COATED ORAL EVERY EVENING
COMMUNITY
End: 2025-01-16 | Stop reason: HOSPADM

## 2025-01-14 RX ORDER — PREDNISONE 20 MG/1
20 TABLET ORAL 2 TIMES DAILY WITH MEALS
Status: DISCONTINUED | OUTPATIENT
Start: 2025-01-14 | End: 2025-01-16 | Stop reason: HOSPADM

## 2025-01-14 RX ORDER — BUSPIRONE HYDROCHLORIDE 5 MG/1
7.5 TABLET ORAL EVERY 12 HOURS SCHEDULED
Status: DISCONTINUED | OUTPATIENT
Start: 2025-01-14 | End: 2025-01-16 | Stop reason: HOSPADM

## 2025-01-14 RX ORDER — FAMOTIDINE 10 MG/ML
20 INJECTION, SOLUTION INTRAVENOUS DAILY
Status: DISCONTINUED | OUTPATIENT
Start: 2025-01-14 | End: 2025-01-16

## 2025-01-14 RX ORDER — NICOTINE POLACRILEX 4 MG
15 LOZENGE BUCCAL
Status: DISCONTINUED | OUTPATIENT
Start: 2025-01-14 | End: 2025-01-16 | Stop reason: HOSPADM

## 2025-01-14 RX ORDER — FUROSEMIDE 40 MG/1
40 TABLET ORAL DAILY
Status: DISCONTINUED | OUTPATIENT
Start: 2025-01-14 | End: 2025-01-16 | Stop reason: HOSPADM

## 2025-01-14 RX ORDER — LEVOTHYROXINE SODIUM 100 UG/1
200 TABLET ORAL NIGHTLY
Status: DISCONTINUED | OUTPATIENT
Start: 2025-01-14 | End: 2025-01-16 | Stop reason: HOSPADM

## 2025-01-14 RX ORDER — PHENOL 1.4 %
1 AEROSOL, SPRAY (ML) MUCOUS MEMBRANE DAILY PRN
COMMUNITY

## 2025-01-14 RX ORDER — DIPHENHYDRAMINE HYDROCHLORIDE 50 MG/ML
25 INJECTION INTRAMUSCULAR; INTRAVENOUS EVERY 6 HOURS PRN
Status: DISCONTINUED | OUTPATIENT
Start: 2025-01-14 | End: 2025-01-16 | Stop reason: HOSPADM

## 2025-01-14 RX ORDER — BUMETANIDE 1 MG/1
1 TABLET ORAL DAILY
COMMUNITY
End: 2025-01-16 | Stop reason: HOSPADM

## 2025-01-14 RX ORDER — ACETAMINOPHEN 325 MG/1
650 TABLET ORAL EVERY 6 HOURS PRN
Status: DISCONTINUED | OUTPATIENT
Start: 2025-01-14 | End: 2025-01-16 | Stop reason: HOSPADM

## 2025-01-14 RX ORDER — MULTIVITAMIN WITH IRON
1000 TABLET ORAL DAILY
Status: DISCONTINUED | OUTPATIENT
Start: 2025-01-14 | End: 2025-01-16 | Stop reason: HOSPADM

## 2025-01-14 RX ORDER — COLESTIPOL HYDROCHLORIDE 1 G/1
1 TABLET ORAL 2 TIMES DAILY
COMMUNITY
End: 2025-01-16 | Stop reason: HOSPADM

## 2025-01-14 RX ORDER — NYSTATIN 100000 [USP'U]/G
1 POWDER TOPICAL EVERY 12 HOURS
Status: DISCONTINUED | OUTPATIENT
Start: 2025-01-14 | End: 2025-01-14

## 2025-01-14 RX ORDER — ATORVASTATIN CALCIUM 40 MG/1
40 TABLET, FILM COATED ORAL NIGHTLY
Status: DISCONTINUED | OUTPATIENT
Start: 2025-01-14 | End: 2025-01-16 | Stop reason: HOSPADM

## 2025-01-14 RX ORDER — METOPROLOL TARTRATE 25 MG/1
25 TABLET, FILM COATED ORAL 2 TIMES DAILY
Status: DISCONTINUED | OUTPATIENT
Start: 2025-01-14 | End: 2025-01-16 | Stop reason: HOSPADM

## 2025-01-14 RX ORDER — GABAPENTIN 300 MG/1
300 CAPSULE ORAL NIGHTLY
Status: DISCONTINUED | OUTPATIENT
Start: 2025-01-14 | End: 2025-01-16 | Stop reason: HOSPADM

## 2025-01-14 RX ORDER — FAMOTIDINE 40 MG/1
40 TABLET, FILM COATED ORAL DAILY
COMMUNITY
Start: 2025-01-07 | End: 2025-01-17

## 2025-01-14 RX ADMIN — APIXABAN 2.5 MG: 5 TABLET, FILM COATED ORAL at 08:35

## 2025-01-14 RX ADMIN — METOPROLOL TARTRATE 25 MG: 25 TABLET, FILM COATED ORAL at 20:38

## 2025-01-14 RX ADMIN — NYSTATIN 1 APPLICATION: 100000 POWDER TOPICAL at 14:44

## 2025-01-14 RX ADMIN — METOPROLOL TARTRATE 25 MG: 25 TABLET, FILM COATED ORAL at 14:43

## 2025-01-14 RX ADMIN — INSULIN LISPRO 3 UNITS: 100 INJECTION, SOLUTION INTRAVENOUS; SUBCUTANEOUS at 20:34

## 2025-01-14 RX ADMIN — FUROSEMIDE 40 MG: 40 TABLET ORAL at 08:35

## 2025-01-14 RX ADMIN — INSULIN LISPRO 3 UNITS: 100 INJECTION, SOLUTION INTRAVENOUS; SUBCUTANEOUS at 11:52

## 2025-01-14 RX ADMIN — APIXABAN 2.5 MG: 5 TABLET, FILM COATED ORAL at 20:34

## 2025-01-14 RX ADMIN — DIPHENHYDRAMINE HYDROCHLORIDE 25 MG: 50 INJECTION, SOLUTION INTRAMUSCULAR; INTRAVENOUS at 17:31

## 2025-01-14 RX ADMIN — Medication 2.5 MG: at 20:34

## 2025-01-14 RX ADMIN — CYANOCOBALAMIN TAB 500 MCG 1000 MCG: 500 TAB at 14:43

## 2025-01-14 RX ADMIN — INSULIN LISPRO 3 UNITS: 100 INJECTION, SOLUTION INTRAVENOUS; SUBCUTANEOUS at 08:55

## 2025-01-14 RX ADMIN — DIPHENHYDRAMINE HYDROCHLORIDE 25 MG: 50 INJECTION, SOLUTION INTRAMUSCULAR; INTRAVENOUS at 10:42

## 2025-01-14 RX ADMIN — SODIUM ZIRCONIUM CYCLOSILICATE 10 G: 10 POWDER, FOR SUSPENSION ORAL at 10:42

## 2025-01-14 RX ADMIN — FAMOTIDINE 20 MG: 10 INJECTION INTRAVENOUS at 08:34

## 2025-01-14 RX ADMIN — ATORVASTATIN CALCIUM 40 MG: 40 TABLET, FILM COATED ORAL at 20:33

## 2025-01-14 RX ADMIN — BUSPIRONE HYDROCHLORIDE 7.5 MG: 5 TABLET ORAL at 20:33

## 2025-01-14 RX ADMIN — INSULIN LISPRO 2 UNITS: 100 INJECTION, SOLUTION INTRAVENOUS; SUBCUTANEOUS at 17:31

## 2025-01-14 RX ADMIN — PREDNISONE 20 MG: 20 TABLET ORAL at 17:31

## 2025-01-14 RX ADMIN — INSULIN GLARGINE 10 UNITS: 100 INJECTION, SOLUTION SUBCUTANEOUS at 20:34

## 2025-01-14 RX ADMIN — GABAPENTIN 300 MG: 300 CAPSULE ORAL at 20:34

## 2025-01-14 RX ADMIN — BUSPIRONE HYDROCHLORIDE 7.5 MG: 5 TABLET ORAL at 08:35

## 2025-01-14 RX ADMIN — LEVOTHYROXINE SODIUM 200 MCG: 100 TABLET ORAL at 20:33

## 2025-01-14 NOTE — ED PROVIDER NOTES
"Subjective   History of Present Illness  84-year-old female patient presents to the ED via EMS from [facility] with complaints of an elevated potassium level. She is unaware of the potassium value, she states \"they just said that it was critical\". She denies any vomiting or diarrhea. She also has a generalized pruritic rash, that she reports developed the day after being discharged from this facility and starting a new medication, hydralazine.  Chart review indicates that the patient was admitted on December 31 for pulmonary vascular congestion with acute-on-chronic respiratory failure; and subsequently discharged on January 6 with prescriptions for hydralazine, melatonin, and MiraLAX.  Patient states that she has been taking these as prescribed.  She has previously taken melatonin and MiraLAX without incident and equates the rash to the hydralazine.  She states that the rash began on her back.  She denies a history of similar symptoms in the past.  She denies any pain or additional symptoms. She denies chest pain, SOA, fever, and chills. Current SpO2 97% on RA.       ED Triage Vitals   Temp Heart Rate Resp BP SpO2   01/13/25 1759 01/13/25 1759 01/13/25 1759 01/13/25 1759 01/13/25 1759   97.7 °F (36.5 °C) 82 16 165/84 97 %      Temp src Heart Rate Source Patient Position BP Location FiO2 (%)   01/13/25 2102 01/13/25 2102 01/13/25 2102 01/13/25 2102 --   Oral Monitor Lying Right arm          Review of Systems   Constitutional:  Negative for chills and fever.   HENT:  Negative for sore throat.    Respiratory:  Negative for shortness of breath.    Cardiovascular:  Negative for chest pain.   Gastrointestinal:  Negative for abdominal pain, diarrhea, nausea and vomiting.   Musculoskeletal:  Negative for arthralgias and myalgias.   Skin:  Positive for rash.       Past Medical History:   Diagnosis Date    Abnormality of gait and mobility     Anemia     Anxiety     Cerebral palsy     " Cognitive communication deficit     Depression     Diabetes mellitus     Disease of thyroid gland     Hyperglycemia     Hyperlipidemia     Hypertension     Hypokalemia     Insomnia     Lymphedema     Neuropathy     Urge incontinence        Allergies   Allergen Reactions    Nsaids Other (See Comments)     No NSAIDS r/t renal disease       History reviewed. No pertinent surgical history.    History reviewed. No pertinent family history.    Social History     Socioeconomic History    Marital status: Single   Tobacco Use    Smoking status: Never     Passive exposure: Past   Vaping Use    Vaping status: Never Used   Substance and Sexual Activity    Alcohol use: Never    Drug use: Never    Sexual activity: Not Currently           Objective   Physical Exam  Vitals and nursing note reviewed.   Constitutional:       General: She is awake. She is not in acute distress.     Appearance: She is not ill-appearing, toxic-appearing or diaphoretic.   HENT:      Head: Normocephalic and atraumatic.      Right Ear: Tympanic membrane, ear canal and external ear normal.      Left Ear: Tympanic membrane, ear canal and external ear normal.      Nose: Nose normal.      Mouth/Throat:      Lips: Pink. No lesions.      Mouth: Mucous membranes are moist. No oral lesions.      Tongue: No lesions. Tongue does not deviate from midline.      Palate: No mass and lesions.      Pharynx: Oropharynx is clear. Posterior oropharyngeal erythema (mild, posterior pharynx) present. No pharyngeal swelling, oropharyngeal exudate or uvula swelling.      Tonsils: No tonsillar exudate or tonsillar abscesses.   Eyes:      Extraocular Movements: Extraocular movements intact.      Conjunctiva/sclera: Conjunctivae normal.      Pupils: Pupils are equal, round, and reactive to light.   Cardiovascular:      Rate and Rhythm: Normal rate and regular rhythm.      Heart sounds: Normal heart sounds.   Pulmonary:      Effort: Pulmonary effort is normal. No respiratory  distress.      Breath sounds: Normal breath sounds. No stridor.   Abdominal:      General: Bowel sounds are normal.      Palpations: Abdomen is soft.      Tenderness: There is no abdominal tenderness.   Musculoskeletal:      Cervical back: Normal range of motion and neck supple.   Lymphadenopathy:      Cervical: No cervical adenopathy.   Skin:     Capillary Refill: Capillary refill takes less than 2 seconds.      Findings: Rash present. Rash is macular, papular and urticarial.      Comments: Generalized erythematous confluent maculopapular rash to her face and upper chest with an urticarial presentation to her bilateral upper and lower extremities.    Neurological:      General: No focal deficit present.      Mental Status: She is alert and oriented to person, place, and time.   Psychiatric:         Mood and Affect: Mood normal.         Behavior: Behavior normal. Behavior is cooperative.         Procedures       Lab Results (last 24 hours)       Procedure Component Value Units Date/Time    CBC & Differential [[id number]]  (Abnormal) Collected: 01/13/25 1807    Specimen: Blood Updated: 01/13/25 1825    Narrative:      The following orders were created for panel order CBC & Differential.  Procedure                               Abnormality         Status                     ---------                               -----------         ------                     CBC Auto Differential[[id number]]        Abnormal            Final result                 Please view results for these tests on the individual orders.    Comprehensive Metabolic Panel [[id number]]  (Abnormal) Collected: 01/13/25 1807    Specimen: Blood Updated: 01/13/25 1852     Glucose 287 mg/dL      BUN 59 mg/dL      Creatinine 1.86 mg/dL      Sodium 134 mmol/L      Potassium 5.5 mmol/L      Comment: Slight hemolysis detected by analyzer. Result may be falsely elevated.        Chloride 101 mmol/L      CO2 21.0 mmol/L      Calcium 9.1 mg/dL      Total Protein 7.4  g/dL      Albumin 3.4 g/dL      ALT (SGPT) 24 U/L      AST (SGOT) 14 U/L      Comment: Slight hemolysis detected by analyzer. Result may be falsely elevated.        Alkaline Phosphatase 153 U/L      Total Bilirubin 0.2 mg/dL      Globulin 4.0 gm/dL      A/G Ratio 0.9 g/dL      BUN/Creatinine Ratio 31.7     Anion Gap 12.0 mmol/L      eGFR 26.6 mL/min/1.73     Narrative:      GFR Categories in Chronic Kidney Disease (CKD)      GFR Category          GFR (mL/min/1.73)    Interpretation  G1                     90 or greater         Normal or high (1)  G2                      60-89                Mild decrease (1)  G3a                   45-59                Mild to moderate decrease  G3b                   30-44                Moderate to severe decrease  G4                    15-29                Severe decrease  G5                    14 or less           Kidney failure          (1)In the absence of evidence of kidney disease, neither GFR category G1 or G2 fulfill the criteria for CKD.    eGFR calculation 2021 CKD-EPI creatinine equation, which does not include race as a factor    Lactic Acid, Plasma [[id number]]  (Normal) Collected: 01/13/25 1807    Specimen: Blood Updated: 01/13/25 1837     Lactate 1.9 mmol/L     Magnesium [[id number]]  (Normal) Collected: 01/13/25 1807    Specimen: Blood Updated: 01/13/25 1852     Magnesium 2.0 mg/dL     CBC Auto Differential [[id number]]  (Abnormal) Collected: 01/13/25 1807    Specimen: Blood Updated: 01/13/25 1825     WBC 23.35 10*3/mm3      RBC 4.17 10*6/mm3      Hemoglobin 13.3 g/dL      Hematocrit 41.3 %      MCV 99.0 fL      MCH 31.9 pg      MCHC 32.2 g/dL      RDW 13.3 %      RDW-SD 47.9 fl      MPV 10.7 fL      Platelets 352 10*3/mm3      Neutrophil % 87.5 %      Lymphocyte % 5.1 %      Monocyte % 4.2 %      Eosinophil % 0.3 %      Basophil % 0.4 %      Immature Grans % 2.5 %      Neutrophils, Absolute 20.42 10*3/mm3      Lymphocytes, Absolute 1.20 10*3/mm3      Monocytes,  Absolute 0.99 10*3/mm3      Eosinophils, Absolute 0.06 10*3/mm3      Basophils, Absolute 0.10 10*3/mm3      Immature Grans, Absolute 0.58 10*3/mm3      nRBC 0.0 /100 WBC     Rapid Strep A Screen - Swab, Throat [[id number]]  (Normal) Collected: 01/13/25 2031    Specimen: Swab from Throat Updated: 01/13/25 2048     Strep A Ag Negative    Beta Strep Culture, Throat - Swab, Throat [[id number]] Collected: 01/13/25 2031    Specimen: Swab from Throat Updated: 01/13/25 2048    Urinalysis With Culture If Indicated - Straight Cath [[id number]]  (Abnormal) Collected: 01/13/25 2043    Specimen: Urine from Straight Cath Updated: 01/13/25 2054     Color, UA Yellow     Appearance, UA Clear     pH, UA 5.5     Specific Gravity, UA 1.017     Glucose, UA Negative     Ketones, UA Negative     Bilirubin, UA Negative     Blood, UA Negative     Protein, UA Trace     Leuk Esterase, UA Negative     Nitrite, UA Negative     Urobilinogen, UA 0.2 E.U./dL    Narrative:      In absence of clinical symptoms, the presence of pyuria, bacteria, and/or nitrites on the urinalysis result does not correlate with infection.  Urine microscopic not indicated.                        ED Course  ED Course as of 01/14/25 0034   Mon Jan 13, 2025 1917 ECG 12 Lead Electrolyte Imbalance  Atrial fibrillation 76 bpm  When compared with ECG of 31-Dec-2024 12:13,  No significant change was found   [TD]   1920 CBC & Differential(!)  WBC 23.35, elevated.  WBC 7 days ago was 10.93. Patient cannot recall if she has had recent steroid use.  [TD]   1920 Comprehensive Metabolic Panel(!)  Potassium elevated at 5.5. Abnormal glucose (287), BUN (59), creatinine (1.86), GFR (26.6), and alkaline phosphatase (153) with similar trends. Patient has a history of T2D and CKD.  [TD]   1920 Lactic Acid, Plasma  1.9, normal [TD]   1921 Magnesium  2.0 normal.  [TD]   1926 Potassium(!): 5.5 [SG]   1926 ED attending (Keith) consulted regarding presentation and work-up. Physician to  "bedside. Meds and labs ordered in an attempt to find source of elevated WBC.  [TD]   2117 XR Chest 1 View  IMPRESSION:  1.  No acute process in the chest. No acute lung infiltrate.   [TD]   2120 Patient reports improvement in the itching, but the rash is still present without any changes.  [TD]   2134 ED attending (Keith) consulted. Plan: Blood cultures and consult Hospitalist for admission. ED  notified.  [TD]   2155 Hospitalist (Regulo) consulted. Plan: add Procalcitonin, CRP, and Respiratory Panel. Orders entered.  [TD]   2226 Hospitalist wished for Intensivist consult due to potential for anaphylaxis. ED  notified. ED  explained that patient is cared for by Dr. Churchill. Dr. Churchill consulted. Okay to admit for hyperkalemia. Leukocytosis is likely related to increased steroid use in an attempt to treat the rash. Plan: Okay to admit. Patient followed by Nephrology for CKD. Consult Nephrology while inpatient. Inpatient consult entered. Patient stable. Physician not consulted while patient in the ED. To be consulted in the AM.     Hospitalist admission canceled.  [TD]      ED Course User Index  [SG] Morris Parker MD  [TD] Laurence Hua APRN                                                       Medical Decision Making  84-year-old female patient presents to the ED via EMS from [facility] with complaints of an elevated potassium level. She is unaware of the potassium value, she states \"they just said that it was critical\". She denies any vomiting or diarrhea. She also has a generalized pruritic rash, that she reports developed the day after being discharged from this facility and starting a new medication, hydralazine.  Chart review indicates that the patient was admitted on December 31 for pulmonary vascular congestion with acute-on-chronic respiratory failure; and subsequently discharged on January 6 with prescriptions for hydralazine, melatonin, and MiraLAX.  " Patient states that she has been taking these as prescribed.  She has previously taken melatonin and MiraLAX without incident and equates the rash to the hydralazine.  She states that the rash began on her back.  She denies a history of similar symptoms in the past.  She denies any pain or additional symptoms. She denies chest pain, SOA, fever, and chills. Current SpO2 97% on RA.     Based on the patient's symptoms and examination findings, potential differential diagnoses include but are not limited to:  hyperkalemia, electrolyte imbalance, drug-induced hypersensitivity reaction, pruritus rash associated with other dermatological conditions (contact dermatitis, infectious rash, autoimmune-related rash)    Course of treatment in the ED:  Labs Reviewed  COMPREHENSIVE METABOLIC PANEL - Abnormal; Notable for the following components:     Glucose                       287 (*)                BUN                           59 (*)                 Creatinine                    1.86 (*)               Sodium                        134 (*)                Potassium                     5.5 (*)                CO2                           21.0 (*)               Albumin                       3.4 (*)                Alkaline Phosphatase          153 (*)                BUN/Creatinine Ratio          31.7 (*)               eGFR                          26.6 (*)            All other components within normal limits         Narrative: GFR Categories in Chronic Kidney Disease (CKD)                                      GFR Category          GFR (mL/min/1.73)    Interpretation                  G1                     90 or greater         Normal or high (1)                  G2                      60-89                Mild decrease (1)                  G3a                   45-59                Mild to moderate decrease                  G3b                   30-44                Moderate to severe decrease                  G4                     15-29                Severe decrease                  G5                    14 or less           Kidney failure                                          (1)In the absence of evidence of kidney disease, neither GFR category G1 or G2 fulfill the criteria for CKD.                                    eGFR calculation 2021 CKD-EPI creatinine equation, which does not include race as a factor  CBC WITH AUTO DIFFERENTIAL - Abnormal; Notable for the following components:     WBC                           23.35 (*)               MCV                           99.0 (*)               Neutrophil %                  87.5 (*)               Lymphocyte %                  5.1 (*)                Monocyte %                    4.2 (*)                Immature Grans %              2.5 (*)                Neutrophils, Absolute         20.42 (*)               Monocytes, Absolute           0.99 (*)               Immature Grans, Absolute      0.58 (*)            All other components within normal limits  URINALYSIS W/ CULTURE IF INDICATED - Abnormal; Notable for the following components:     Protein, UA                   Trace (*)            All other components within normal limits         Narrative: In absence of clinical symptoms, the presence of pyuria, bacteria, and/or nitrites on the urinalysis result does not correlate with infection.                  Urine microscopic not indicated.  RAPID STREP A SCREEN - Normal  LACTIC ACID, PLASMA - Normal  MAGNESIUM - Normal  BETA HEMOLYTIC STREP CULTURE, THROAT  BLOOD CULTURE  BLOOD CULTURE  RESPIRATORY PANEL PCR W/ COVID-19 (SARS-COV-2), NP SWAB IN UTM/VTP, 2 HR TAT  RAINBOW DRAW         Narrative: The following orders were created for panel order [place] Draw.                  Procedure                               Abnormality         Status                                     ---------                               -----------         ------                                     Green Top  (Gel)[[id number]]                                  Final result                               Lavender Top[[id number]]                                     Final result                               Red Top[[id number]]                                          Final result                               Garcia Top[[id number]]                                         Final result                               Light Blue Top[[id number]]                                   Final result                                                 Please view results for these tests on the individual orders.  PROCALCITONIN  C-REACTIVE PROTEIN  CBC AND DIFFERENTIAL         Narrative: The following orders were created for panel order CBC & Differential.                  Procedure                               Abnormality         Status                                     ---------                               -----------         ------                                     CBC Auto Differential[[id number]]        Abnormal            Final result                                                 Please view results for these tests on the individual orders.  GREEN TOP  LAVENDER TOP  RED TOP  GRAY TOP  LIGHT BLUE TOP    Medications  sodium chloride 0.9 % flush 10 mL (has no administration in time range)  sodium chloride 0.9 % flush 10 mL (has no administration in time range)  lactated ringers bolus 250 mL (0 mL Intravenous Stopped 1/13/25 2105)  dexAMETHasone (DECADRON) injection 10 mg (10 mg Intravenous Given 1/13/25 2036)  diphenhydrAMINE (BENADRYL) injection 25 mg (25 mg Intravenous Given 1/13/25 2041)  famotidine (PEPCID) injection 20 mg (20 mg Intravenous Given 1/13/25 2041)    XR Chest 1 View   Final Result    1.  No acute process in the chest. No acute lung infiltrate.         This report was signed and finalized on 1/13/2025 8:07 PM by Dr Philipp Hoyos.         The patient, an 84-year-old female with a history of chronic kidney disease and type  2 diabetes mellitus, presents with hyperkalemia (serum potassium 5.5, pending confirmation due to hemolysis), leukocytosis (WBC 23.35), and a pruritic rash likely due to a hypersensitivity reaction to hydralazine, which was recently initiated. She also exhibits acute worsening of renal function with a BUN of 59, creatinine of 1.86, and an GFR of 26.6, potentially due to dehydration or medication effects. Admission is necessary to monitor and manage hyperkalemia, address the leukocytosis and possible systemic inflammatory response, provide supportive care for CKD, maintain blood glucose levels, and evaluate the drug reaction. A multidisciplinary approach, including nephrology consultation, is indicated for comprehensive management. The patient is stable but requires inpatient care for further diagnostic evaluation and therapeutic interventions.    Problems Addressed:  Acute hyperkalemia: complicated acute illness or injury  Chronic kidney disease, unspecified CKD stage: chronic illness or injury  Hyperglycemia due to diabetes mellitus: chronic illness or injury  Leukocytosis, unspecified type: complicated acute illness or injury  Urticarial rash: complicated acute illness or injury    Amount and/or Complexity of Data Reviewed  Independent Historian: caregiver     Details: Sister at bedside   External Data Reviewed: labs, radiology and notes.     Details: Previous admission   Labs: ordered. Decision-making details documented in ED Course.  Radiology: ordered. Decision-making details documented in ED Course.  ECG/medicine tests: ordered and independent interpretation performed. Decision-making details documented in ED Course.    Risk  Prescription drug management.  Decision regarding hospitalization.        Final diagnoses:   Urticarial rash   Leukocytosis, unspecified type   Acute hyperkalemia   Chronic kidney disease, unspecified CKD stage   Hyperglycemia due to diabetes mellitus       ED Disposition  ED Disposition        ED Disposition   Decision to Admit    Condition   --    Comment   Level of Care: Telemetry [5]   Diagnosis: Acute hyperkalemia [684700]   Admitting Physician: SOMMER ANDRADE [[id number]]   Attending Physician: SOMMER ANDRADE [[id number]]                 No follow-up provider specified.       Medication List      No changes were made to your prescriptions during this visit.            Laurence Hua, APRN  01/14/25 0034

## 2025-01-14 NOTE — H&P
History and Physical      CHIEF COMPLAINT:  Rash    Reason for Admission:  Rash, CKD, Hyperkalemia    History Obtained From:  patient, chart    HISTORY OF PRESENT ILLNESS:      The patient is a 84 y.o. female who was admitted from ER with about 1 week h/o diffuse, itchy rash. She was started on Hydralazine on her last Hospital visit for HTN. She developed the rash the day after d/c. Her Sister says it is improved. She denies any CP or SOA. No issues with eating or swallowing. No fever.    Past Medical History:    Past Medical History:   Diagnosis Date    Abnormality of gait and mobility     Anemia     Anxiety     Cerebral palsy     Cognitive communication deficit     Depression     Diabetes mellitus     Disease of thyroid gland     Hyperglycemia     Hyperlipidemia     Hypertension     Hypokalemia     Insomnia     Lymphedema     Neuropathy     Urge incontinence      Past Surgical History:    History reviewed. No pertinent surgical history.      Medications Prior to Admission:    Medications Prior to Admission   Medication Sig Dispense Refill Last Dose/Taking    acetaminophen (TYLENOL) 325 MG tablet Take 2 tablets by mouth Every 6 (Six) Hours As Needed for Mild Pain or Fever.       apixaban (ELIQUIS) 2.5 MG tablet tablet Take 1 tablet by mouth Every 12 (Twelve) Hours. Indications: Atrial Fibrillation 60 tablet 0     atorvastatin (LIPITOR) 40 MG tablet Take 1 tablet by mouth Every Night.       busPIRone (BUSPAR) 7.5 MG tablet Take 1 tablet by mouth 2 (Two) Times a Day. For anxiety       dextrose (GLUTOSE) 40 % gel Take 15 g by mouth As Needed for Low Blood Sugar (every 15 minutes).       ferrous sulfate 324 (65 Fe) MG tablet delayed-release EC tablet Take 1 tablet by mouth 2 (Two) Times a Day With Meals. For anemia       furosemide (LASIX) 40 MG tablet Take 1 tablet by mouth Daily.       gabapentin (NEURONTIN) 300 MG capsule Take 1 capsule by mouth Every Night. 30 capsule 0     glucagon (GLUCAGEN) 1 MG injection  Inject 1 mg under the skin into the appropriate area as directed 1 (One) Time As Needed for Low Blood Sugar.       guaiFENesin (MUCINEX) 600 MG 12 hr tablet Take 2 tablets by mouth 2 (Two) Times a Day.       hydrALAZINE (APRESOLINE) 10 MG tablet Take 1 tablet by mouth Every 8 (Eight) Hours. 1 tablet 0     insulin degludec (Tresiba FlexTouch) 100 UNIT/ML solution pen-injector injection Inject 6 Units under the skin into the appropriate area as directed Every Night.       ipratropium-albuterol (DUO-NEB) 0.5-2.5 mg/3 ml nebulizer Take 3 mL by nebulization Every 6 (Six) Hours As Needed for Shortness of Air.       levothyroxine (SYNTHROID, LEVOTHROID) 200 MCG tablet Take 1 tablet by mouth Every Night. For hypothyroidism       melatonin 5 MG tablet tablet Take 2 tablets by mouth At Night As Needed (sleep).       Menthol-Zinc Oxide (Calmoseptine) 0.44-20.6 % ointment Apply 1 Application topically to the appropriate area as directed 3 times a day. Both buttocks       metoprolol tartrate (LOPRESSOR) 25 MG tablet Take 1 tablet by mouth 2 (Two) Times a Day.       nystatin (MYCOSTATIN) 000947 UNIT/GM powder Apply 1 Application topically to the appropriate area as directed Every 12 (Twelve) Hours. Abd folds       phenylephrine-mineral oil-petrolatum (PREPARATION H) 0.25-14-74.9 % ointment hemorrhoidal ointment Insert 1 Application into the rectum Every 6 (Six) Hours As Needed (hemmorhoids).       polyethylene glycol (MIRALAX) 17 g packet Take 17 g by mouth Daily As Needed (Use if senna-docusate is ineffective). 1 each 0     sennosides-docusate (PERICOLACE) 8.6-50 MG per tablet Take 2 tablets by mouth 2 (Two) Times a Day. 1 tablet 0     vitamin B-12 (CYANOCOBALAMIN) 1000 MCG tablet Take 1 tablet by mouth Daily.          Allergies:  Nsaids    Social History:   TOBACCO:   reports that she has never smoked. She has been exposed to tobacco smoke. She does not have any smokeless tobacco history on file.  ETOH:   reports no history of  "alcohol use.  DRUGS:   reports no history of drug use.  MARITAL STATUS:  not   OCCUPATION:  not working  Patient currently lives at a SNF      Family History:   History reviewed. No pertinent family history.  REVIEW OF SYSTEMS:  Constitutional: Negative   CV: Negative  Pulmonary: Negative  GI: Negative  : Negative  Psych: Negative  Neuro: Negative  Skin: rash  MusculoSkeletal: Negative  HEENT: Negative  Joints: Negative  Vitals:  /85 (BP Location: Right arm, Patient Position: Lying)   Pulse 69   Temp 98 °F (36.7 °C)   Resp 18   Ht 165.1 cm (65\")   Wt 121 kg (266 lb 12.1 oz)   SpO2 94%   BMI 44.39 kg/m²     PHYSICAL EXAM:  Gen: NAD, alert, pleasant  HEENT: WNL  Lymph: no LAD  Neck: no JVD or masses  Chest: CTA bilaterally  CV: RRR  Abdomen: NT/ND  Extrem: no C/C/E  Neuro: Nonfocal  Skin: diffuse rash, redness/dry skin, spares feet, upper legs, genital region, part of abdomen, face  Joints: no redness  DATA:  I have reviewed the admission labs and imaging tests.    ASSESSMENT AND PLAN:      Rash----suspect drug reaction, continue supportive care  CKD  Hyperkalemia---given Lokelma, follow labs  H/O A Fib, CHF---continue medical treatment, stable      Vandana Churchill MD  12:34 CST 1/14/2025  "

## 2025-01-14 NOTE — CONSULTS
Nephrology ([facility]) Consult Note      Patient:  Mar Rodriguez  YOB: 1940  Date of Service: 1/14/2025  MRN: [id number]   Acct: [id number]   Primary Care Physician: Vandana Churchill MD  Advance Directive:   There are no questions and answers to display.     Admit Date: 1/13/2025       Hospital Day: 0  Referring Provider: No Known Provider      Patient personally seen and examined.  Complete chart including Consults, Notes, Operative Reports, Labs, Cardiology, and Radiology studies reviewed as able.        Subjective:  Mar Rodriguez is a 84 y.o. female for whom we were consulted for evaluation and treatment of hyperkalemia. Baseline chronic kidney disease stage 4. At time of last visit in our office in September 2024, creatinine was 1.7.  history includes cerebral palsy, type 2 diabetes, hypertension, lymphedema. Presented to ER after outpatient labs showed elevated potassium level. She has no complaints aside from a recent pruritic rash. No pain or dyspnea. Labs in ER showed potassium of 5.5 and creatinine 1.86. Denies recent illness. No n/v/d. Urine output nonoliguric. She has received Lokelma for the hyperkalemia.    Allergies:  Nsaids    Home Meds:  (Not in a hospital admission)      Medicines:  Current Facility-Administered Medications   Medication Dose Route Frequency Provider Last Rate Last Admin    acetaminophen (TYLENOL) tablet 650 mg  650 mg Oral Q6H PRN Vandana Churchill MD        apixaban (ELIQUIS) tablet 2.5 mg  2.5 mg Oral Q12H Vandana Churchill MD   2.5 mg at 01/14/25 0835    busPIRone (BUSPAR) tablet 7.5 mg  7.5 mg Oral Q12H Vandana Churchill MD   7.5 mg at 01/14/25 0835    dextrose (D50W) (25 g/50 mL) IV injection 25 g  25 g Intravenous Q15 Min PRN Vandana Churchill MD        dextrose (GLUTOSE) oral gel 15 g  15 g Oral Q15 Min PRN Vandana Churchill MD        diphenhydrAMINE (BENADRYL) injection 25 mg  25 mg Intravenous  Q6H PRN Vandana Churchill MD   25 mg at 01/14/25 1042    famotidine (PEPCID) injection 20 mg  20 mg Intravenous Daily Vandana Churchill MD   20 mg at 01/14/25 0834    furosemide (LASIX) tablet 40 mg  40 mg Oral Daily Vandana Churchill MD   40 mg at 01/14/25 0835    gabapentin (NEURONTIN) capsule 300 mg  300 mg Oral Nightly Vandana Churchill MD        glucagon (GLUCAGEN) injection 1 mg  1 mg Intramuscular Q15 Min PRN Vandana Churchill MD        Insulin Lispro (humaLOG) injection 2-7 Units  2-7 Units Subcutaneous 4x Daily AC & at Bedtime Vandana Churchill MD   3 Units at 01/14/25 0855    sodium chloride 0.9 % flush 10 mL  10 mL Intravenous PRN Laurence Hua APRN         Current Outpatient Medications   Medication Sig Dispense Refill    acetaminophen (TYLENOL) 325 MG tablet Take 2 tablets by mouth Every 6 (Six) Hours As Needed for Mild Pain or Fever.      apixaban (ELIQUIS) 2.5 MG tablet tablet Take 1 tablet by mouth Every 12 (Twelve) Hours. Indications: Atrial Fibrillation 60 tablet 0    atorvastatin (LIPITOR) 40 MG tablet Take 1 tablet by mouth Every Night.      busPIRone (BUSPAR) 7.5 MG tablet Take 1 tablet by mouth 2 (Two) Times a Day. For anxiety      dextrose (GLUTOSE) 40 % gel Take 15 g by mouth As Needed for Low Blood Sugar (every 15 minutes).      ferrous sulfate 324 (65 Fe) MG tablet delayed-release EC tablet Take 1 tablet by mouth 2 (Two) Times a Day With Meals. For anemia      furosemide (LASIX) 40 MG tablet Take 1 tablet by mouth Daily.      gabapentin (NEURONTIN) 300 MG capsule Take 1 capsule by mouth Every Night. 30 capsule 0    glucagon (GLUCAGEN) 1 MG injection Inject 1 mg under the skin into the appropriate area as directed 1 (One) Time As Needed for Low Blood Sugar.      guaiFENesin (MUCINEX) 600 MG 12 hr tablet Take 2 tablets by mouth 2 (Two) Times a Day.      hydrALAZINE (APRESOLINE) 10 MG tablet Take 1 tablet by mouth Every 8 (Eight) Hours. 1 tablet 0    insulin degludec  (Tresiba FlexTouch) 100 UNIT/ML solution pen-injector injection Inject 6 Units under the skin into the appropriate area as directed Every Night.      ipratropium-albuterol (DUO-NEB) 0.5-2.5 mg/3 ml nebulizer Take 3 mL by nebulization Every 6 (Six) Hours As Needed for Shortness of Air.      levothyroxine (SYNTHROID, LEVOTHROID) 200 MCG tablet Take 1 tablet by mouth Every Night. For hypothyroidism      melatonin 5 MG tablet tablet Take 2 tablets by mouth At Night As Needed (sleep).      Menthol-Zinc Oxide (Calmoseptine) 0.44-20.6 % ointment Apply 1 Application topically to the appropriate area as directed 3 times a day. Both buttocks      metoprolol tartrate (LOPRESSOR) 25 MG tablet Take 1 tablet by mouth 2 (Two) Times a Day.      nystatin (MYCOSTATIN) 988435 UNIT/GM powder Apply 1 Application topically to the appropriate area as directed Every 12 (Twelve) Hours. Abd folds      phenylephrine-mineral oil-petrolatum (PREPARATION H) 0.25-14-74.9 % ointment hemorrhoidal ointment Insert 1 Application into the rectum Every 6 (Six) Hours As Needed (hemmorhoids).      polyethylene glycol (MIRALAX) 17 g packet Take 17 g by mouth Daily As Needed (Use if senna-docusate is ineffective). 1 each 0    sennosides-docusate (PERICOLACE) 8.6-50 MG per tablet Take 2 tablets by mouth 2 (Two) Times a Day. 1 tablet 0    vitamin B-12 (CYANOCOBALAMIN) 1000 MCG tablet Take 1 tablet by mouth Daily.         Past Medical History:  Past Medical History:   Diagnosis Date    Abnormality of gait and mobility     Anemia     Anxiety     Cerebral palsy     Cognitive communication deficit     Depression     Diabetes mellitus     Disease of thyroid gland     Hyperglycemia     Hyperlipidemia     Hypertension     Hypokalemia     Insomnia     Lymphedema     Neuropathy     Urge incontinence        Past Surgical History:  History reviewed. No pertinent surgical history.    Family History  History reviewed. No pertinent family history.    Social History  Social  "History     Socioeconomic History    Marital status: Single   Tobacco Use    Smoking status: Never     Passive exposure: Past   Vaping Use    Vaping status: Never Used   Substance and Sexual Activity    Alcohol use: Never    Drug use: Never    Sexual activity: Not Currently         Review of Systems:  History obtained from chart review and the patient  General ROS: No fever or chills  Respiratory ROS: No cough, shortness of breath, wheezing  Cardiovascular ROS: No chest pain or palpitations  Gastrointestinal ROS: No abdominal pain or melena  Genito-Urinary ROS: No dysuria or hematuria  Psych ROS: No anxiety and depression  14 point ROS reviewed with the patient and negative except as noted above and in the HPI unless unable to obtain.      Objective:  Patient Vitals for the past 24 hrs:   BP Temp Temp src Pulse Resp SpO2 Height Weight   01/14/25 0718 161/93 98 °F (36.7 °C) Oral 96 18 -- -- --   01/14/25 0600 141/93 -- -- 77 -- 99 % -- --   01/14/25 0500 145/87 -- -- 97 -- 97 % -- --   01/14/25 0400 -- 98 °F (36.7 °C) Oral -- -- -- -- --   01/14/25 0300 141/100 -- -- 73 -- 94 % -- --   01/14/25 0200 163/74 -- -- 84 -- 96 % -- --   01/14/25 0100 165/98 -- -- 81 -- 95 % -- --   01/14/25 0000 (!) 182/89 -- -- 78 -- 97 % -- --   01/13/25 2246 178/96 -- -- 84 18 97 % -- --   01/13/25 2102 153/75 97.8 °F (36.6 °C) Oral 82 17 100 % 165.1 cm (65\") 121 kg (266 lb 12.1 oz)   01/13/25 2101 153/75 -- -- 82 -- 100 % -- --   01/13/25 2046 169/87 -- -- 86 -- 96 % -- --   01/13/25 2031 165/85 -- -- 105 -- 98 % -- --   01/13/25 2001 171/89 -- -- 85 -- 96 % -- --   01/13/25 1946 153/76 -- -- 77 -- 96 % -- --   01/13/25 1931 158/87 -- -- 86 -- 96 % -- --   01/13/25 1759 165/84 97.7 °F (36.5 °C) -- 82 16 97 % 165.1 cm (65\") 121 kg (266 lb 12.1 oz)       Intake/Output Summary (Last 24 hours) at 1/14/2025 1113  Last data filed at 1/14/2025 0745  Gross per 24 hour   Intake 450 ml   Output 800 ml   Net -350 ml     General: awake/alert " "  Chest:  clear to auscultation bilaterally without respiratory distress  CVS: regular rate and rhythm  Abdominal: soft, nontender, positive bowel sounds  Extremities: no cyanosis or edema  Skin: warm and dry without rash      Labs:  Results from last 7 days   Lab Units 01/13/25  1807   WBC 10*3/mm3 23.35*   HEMOGLOBIN g/dL 13.3   HEMATOCRIT % 41.3   PLATELETS 10*3/mm3 352         Results from last 7 days   Lab Units 01/14/25  0924 01/13/25  1807   SODIUM mmol/L 135* 134*   POTASSIUM mmol/L 5.6* 5.5*   CHLORIDE mmol/L 103 101   CO2 mmol/L 22.0 21.0*   BUN mg/dL 57* 59*   CREATININE mg/dL 1.54* 1.86*   CALCIUM mg/dL 9.4 9.1   EGFR mL/min/1.73 33.4* 26.6*   BILIRUBIN mg/dL 0.2 0.2   ALK PHOS U/L 150* 153*   ALT (SGPT) U/L 20 24   AST (SGOT) U/L 12 14   GLUCOSE mg/dL 266* 287*       Radiology:   Imaging Results (Last 72 Hours)       Procedure Component Value Units Date/Time    XR Chest 1 View [[id number]] Collected: 01/13/25 2006     Updated: 01/13/25 2010    Narrative:      XR CHEST 1 VW- 1/13/2025 6:31 PM     HISTORY: elevated WBC       COMPARISON: Chest x-ray dated 1/6/2025     FINDINGS:  Upright frontal radiograph of the chest was obtained     No lung consolidation. No pleural effusion or pneumothorax. The  cardiomediastinal silhouette and pulmonary vascularity are within normal  limits. The osseous structures and surrounding soft tissues demonstrate  no acute abnormality.       Impression:      1.  No acute process in the chest. No acute lung infiltrate.     This report was signed and finalized on 1/13/2025 8:07 PM by Dr Philipp Hoyos.               Culture:  No results found for: \"BLOODCX\", \"URINECX\", \"WOUNDCX\", \"MRSACX\", \"RESPCX\", \"STOOLCX\"      Assessment    Chronic kidney disease stage 4  Mild hyperkalemia  Type 2 diabetes  Hypertension  Rash     Plan:   Agree with management of hyperkalemia so far  Renal function is at baseline  Further assessment and plan pending Dr Arroyo's evaluation of patient      Thank you " for the consult, we appreciate the opportunity to provide care to your patients.  Feel free to contact me if I can be of any further assistance.      Willis Gary, APRN  1/14/2025  11:13 CST

## 2025-01-14 NOTE — CASE MANAGEMENT/SOCIAL WORK
Discharge Planning Assessment  [facility]     Patient Name: Mar Rodriguez  MRN: [id number]  Today's Date: 1/14/2025    Admit Date: 1/13/2025        Discharge Needs Assessment       Row Name 01/14/25 1553       Living Environment    People in Home facility resident    Current Living Arrangements extended care facility    Potentially Unsafe Housing Conditions none    Able to Return to Prior Arrangements yes       Transition Planning    Patient/Family Anticipates Transition to long-term care facility    Patient/Family Anticipated Services at Transition skilled nursing    Transportation Anticipated health plan transportation       Discharge Needs Assessment    Readmission Within the Last 30 Days no previous admission in last 30 days    Outpatient/Agency/Support Group Needs skilled nursing facility    Discharge Facility/Level of Care Needs nursing facility, skilled    Discharge Coordination/Progress Pt is from [facility]. Left a message for Tracy [phone] to check bed hold status. Pharmacy updated in [facility]. Will follow.                   Discharge Plan    No documentation.                 Continued Care and Services - Admitted Since 1/13/2025    No active coordination exists for this encounter.       Selected Continued Care - Prior Encounters Includes continued care and service providers with selected services from prior encounters from 10/15/2024 to 1/14/2025      Discharged on 1/6/2025 Admission date: 12/31/2024 - Discharge disposition: Skilled Nursing Facility (DC - External)      Destination       Service Provider Services Address Phone Fax Patient Preferred    [facility] [address]  , [place] [address] [phone] --                             Demographic Summary    No documentation.                  Functional Status    No documentation.                  Psychosocial    No documentation.                  Abuse/Neglect    No  documentation.                  Legal    No documentation.                  Substance Abuse    No documentation.                  Patient Forms    No documentation.                     DONNY Baker

## 2025-01-15 LAB
ALBUMIN SERPL-MCNC: 2.9 G/DL (ref 3.5–5.2)
ALBUMIN/GLOB SERPL: 0.9 G/DL
ALP SERPL-CCNC: 118 U/L (ref 39–117)
ALT SERPL W P-5'-P-CCNC: 18 U/L (ref 1–33)
ANION GAP SERPL CALCULATED.3IONS-SCNC: 11 MMOL/L (ref 5–15)
AST SERPL-CCNC: 11 U/L (ref 1–32)
BILIRUB SERPL-MCNC: 0.2 MG/DL (ref 0–1.2)
BUN SERPL-MCNC: 64 MG/DL (ref 8–23)
BUN/CREAT SERPL: 33.7 (ref 7–25)
CALCIUM SPEC-SCNC: 8.9 MG/DL (ref 8.6–10.5)
CHLORIDE SERPL-SCNC: 104 MMOL/L (ref 98–107)
CO2 SERPL-SCNC: 22 MMOL/L (ref 22–29)
CREAT SERPL-MCNC: 1.9 MG/DL (ref 0.57–1)
EGFRCR SERPLBLD CKD-EPI 2021: 25.9 ML/MIN/1.73
GLOBULIN UR ELPH-MCNC: 3.2 GM/DL
GLUCOSE BLDC GLUCOMTR-MCNC: 149 MG/DL (ref 70–130)
GLUCOSE BLDC GLUCOMTR-MCNC: 154 MG/DL (ref 70–130)
GLUCOSE BLDC GLUCOMTR-MCNC: 227 MG/DL (ref 70–130)
GLUCOSE BLDC GLUCOMTR-MCNC: 336 MG/DL (ref 70–130)
GLUCOSE SERPL-MCNC: 198 MG/DL (ref 65–99)
POTASSIUM SERPL-SCNC: 5.7 MMOL/L (ref 3.5–5.2)
PROT SERPL-MCNC: 6.1 G/DL (ref 6–8.5)
SODIUM SERPL-SCNC: 137 MMOL/L (ref 136–145)

## 2025-01-15 PROCEDURE — G0378 HOSPITAL OBSERVATION PER HR: HCPCS

## 2025-01-15 PROCEDURE — 63710000001 PREDNISONE PER 1 MG: Performed by: FAMILY MEDICINE

## 2025-01-15 PROCEDURE — 97161 PT EVAL LOW COMPLEX 20 MIN: CPT | Performed by: PHYSICAL THERAPIST

## 2025-01-15 PROCEDURE — 80053 COMPREHEN METABOLIC PANEL: CPT | Performed by: FAMILY MEDICINE

## 2025-01-15 PROCEDURE — 63710000001 INSULIN LISPRO (HUMAN) PER 5 UNITS: Performed by: FAMILY MEDICINE

## 2025-01-15 PROCEDURE — 82948 REAGENT STRIP/BLOOD GLUCOSE: CPT

## 2025-01-15 PROCEDURE — 96376 TX/PRO/DX INJ SAME DRUG ADON: CPT

## 2025-01-15 PROCEDURE — 36415 COLL VENOUS BLD VENIPUNCTURE: CPT | Performed by: FAMILY MEDICINE

## 2025-01-15 PROCEDURE — 63710000001 INSULIN GLARGINE PER 5 UNITS: Performed by: FAMILY MEDICINE

## 2025-01-15 RX ADMIN — PREDNISONE 20 MG: 20 TABLET ORAL at 17:07

## 2025-01-15 RX ADMIN — INSULIN LISPRO 3 UNITS: 100 INJECTION, SOLUTION INTRAVENOUS; SUBCUTANEOUS at 17:07

## 2025-01-15 RX ADMIN — INSULIN LISPRO 2 UNITS: 100 INJECTION, SOLUTION INTRAVENOUS; SUBCUTANEOUS at 08:20

## 2025-01-15 RX ADMIN — FUROSEMIDE 40 MG: 40 TABLET ORAL at 08:20

## 2025-01-15 RX ADMIN — INSULIN GLARGINE 10 UNITS: 100 INJECTION, SOLUTION SUBCUTANEOUS at 20:30

## 2025-01-15 RX ADMIN — APIXABAN 2.5 MG: 5 TABLET, FILM COATED ORAL at 20:30

## 2025-01-15 RX ADMIN — LEVOTHYROXINE SODIUM 200 MCG: 100 TABLET ORAL at 20:29

## 2025-01-15 RX ADMIN — METOPROLOL TARTRATE 25 MG: 25 TABLET, FILM COATED ORAL at 20:30

## 2025-01-15 RX ADMIN — FAMOTIDINE 20 MG: 10 INJECTION INTRAVENOUS at 08:20

## 2025-01-15 RX ADMIN — CYANOCOBALAMIN TAB 500 MCG 1000 MCG: 500 TAB at 08:20

## 2025-01-15 RX ADMIN — Medication 2.5 MG: at 20:30

## 2025-01-15 RX ADMIN — METOPROLOL TARTRATE 25 MG: 25 TABLET, FILM COATED ORAL at 08:23

## 2025-01-15 RX ADMIN — INSULIN LISPRO 5 UNITS: 100 INJECTION, SOLUTION INTRAVENOUS; SUBCUTANEOUS at 20:30

## 2025-01-15 RX ADMIN — NYSTATIN 1 APPLICATION: 100000 POWDER TOPICAL at 08:21

## 2025-01-15 RX ADMIN — ATORVASTATIN CALCIUM 40 MG: 40 TABLET, FILM COATED ORAL at 20:30

## 2025-01-15 RX ADMIN — SODIUM ZIRCONIUM CYCLOSILICATE 10 G: 10 POWDER, FOR SUSPENSION ORAL at 20:30

## 2025-01-15 RX ADMIN — PREDNISONE 20 MG: 20 TABLET ORAL at 08:21

## 2025-01-15 RX ADMIN — SODIUM ZIRCONIUM CYCLOSILICATE 10 G: 10 POWDER, FOR SUSPENSION ORAL at 10:32

## 2025-01-15 RX ADMIN — BUSPIRONE HYDROCHLORIDE 7.5 MG: 5 TABLET ORAL at 08:21

## 2025-01-15 RX ADMIN — APIXABAN 2.5 MG: 5 TABLET, FILM COATED ORAL at 08:21

## 2025-01-15 RX ADMIN — NYSTATIN 1 APPLICATION: 100000 POWDER TOPICAL at 20:35

## 2025-01-15 RX ADMIN — BUSPIRONE HYDROCHLORIDE 7.5 MG: 5 TABLET ORAL at 20:29

## 2025-01-15 RX ADMIN — GABAPENTIN 300 MG: 300 CAPSULE ORAL at 20:29

## 2025-01-15 NOTE — PLAN OF CARE
Goal Outcome Evaluation:  Plan of Care Reviewed With: patient, sibling           Outcome Evaluation: A&OX4, VSS. No c/o pain, pure-wick in use. ACHS accuchecks with SSI. On room air, AFIB on telemetry, assist x1/2 walker. Generalized rash improving per pt. Lokelma given once for Potassium 5.7, low potassium added to diet. Sister at bedside. Eliquis for VTE prevention. Alarms set, call light in reach. Safety maintained.

## 2025-01-15 NOTE — PLAN OF CARE
Goal Outcome Evaluation:  Plan of Care Reviewed With: patient        Progress: improving  Outcome Evaluation: Pt A&O x4. VSS. No c/o pain. Room air. Rash covering most of the body. Benadryl given PRN. Purwick in place. Safety maintained.

## 2025-01-15 NOTE — PROGRESS NOTES
"Progress Note  Mar Rodriguez  1/15/2025 13:31 CST  Subjective:   Admit Date:   1/13/2025      CC/ADMIT DX:       Interval History:   Reviewed overnight events and nursing notes. She has had no new issues.     I have reviewed all labs/diagnostics from the last 24hrs.       ROS:   I have done a 10 point ROS and all are negative, except what is mentioned in the HPI.    Diet: Diabetic, Renal; Consistent Carbohydrate; Low Potassium; Fluid Consistency: Thin (IDDSI 0)    Medications:      apixaban, 2.5 mg, Oral, Q12H  atorvastatin, 40 mg, Oral, Nightly  busPIRone, 7.5 mg, Oral, Q12H  famotidine, 20 mg, Intravenous, Daily  furosemide, 40 mg, Oral, Daily  gabapentin, 300 mg, Oral, Nightly  insulin glargine, 10 Units, Subcutaneous, Nightly  insulin lispro, 2-7 Units, Subcutaneous, 4x Daily AC & at Bedtime  levothyroxine, 200 mcg, Oral, Nightly  metoprolol tartrate, 25 mg, Oral, BID  nystatin, 1 Application, Topical, Q12H  predniSONE, 20 mg, Oral, BID With Meals  vitamin B-12, 1,000 mcg, Oral, Daily            Objective:   Vitals: /82 (BP Location: Right arm, Patient Position: Sitting)   Pulse 76   Temp 97.5 °F (36.4 °C) (Oral)   Resp 18   Ht 165.1 cm (65\")   Wt 121 kg (266 lb 12.1 oz)   SpO2 95%   BMI 44.39 kg/m²    Intake/Output Summary (Last 24 hours) at 1/15/2025 1331  Last data filed at 1/15/2025 0859  Gross per 24 hour   Intake 580 ml   Output 1000 ml   Net -420 ml     General appearance: alert and cooperative with exam  Lungs: clear to auscultation bilaterally  Heart: regular rate and rhythm, S1, S2 normal, no murmur, click, rub or gallop  Abdomen: soft, non-tender; bowel sounds normal; no masses,  no organomegaly  Extremities: extremities normal, atraumatic, no cyanosis or edema  Neurologic:  No obvious focal neurologic deficits.    Assessment and Plan:     Acute hyperkalemia    CKD    Rash, drug reaction    HTN    Cerebral Palsy    Plan:   Continue present medication(s)    Follow with Nephrology   " Follow labs   Supportive care for rash      Discharge planning:   a skilled nursing facility     Reviewed treatment plans with the patient and/or family.             Electronically signed by Vandana Churchill MD on 1/15/2025 at 13:31 CST

## 2025-01-15 NOTE — PROGRESS NOTES
Nephrology ([facility]) Progress Note      Patient:  Mar Rodriguez  YOB: 1940  Date of Service: 1/15/2025  MRN: [id number]   Acct: [id number]   Primary Care Physician: Vandana Churchill MD  Advance Directive:   There are no questions and answers to display.     Admit Date: 1/13/2025       Hospital Day: 0  Referring Provider: No Known Provider      Patient personally seen and examined.  Complete chart including Consults, Notes, Operative Reports, Labs, Cardiology, and Radiology studies reviewed as able.        Subjective:  Mar Rodriguez is a 84 y.o. female for whom we were consulted for evaluation and treatment of hyperkalemia. Baseline chronic kidney disease stage 4. At time of last visit in our office in September 2024, creatinine was 1.7. history includes cerebral palsy, type 2 diabetes, hypertension, lymphedema. Presented to ER for evaluation of rash and after outpatient labs showed elevated potassium level. She had no complaints aside from the recent pruritic rash. No pain or dyspnea. Labs in ER showed potassium of 5.5 and creatinine 1.86. Denied recent illness. No n/v/d. Urine output nonoliguric. She received Lokelma for the hyperkalemia.     Today is awake and alert. No new complaints. Urine output nonoliguric    Allergies:  Nsaids    Home Meds:  Medications Prior to Admission   Medication Sig Dispense Refill Last Dose/Taking    apixaban (ELIQUIS) 2.5 MG tablet tablet Take 1 tablet by mouth Every 12 (Twelve) Hours. Indications: Atrial Fibrillation 60 tablet 0 Taking    atorvastatin (LIPITOR) 40 MG tablet Take 1 tablet by mouth Every Night.   Taking    betamethasone dipropionate (DIPROSONE) 0.05 % cream Apply 1 Application topically to the appropriate area as directed Daily.   Apply to rash every shift   Taking    bumetanide (BUMEX) 1 MG tablet Take 1 tablet by mouth Daily.   Taking    busPIRone (BUSPAR) 7.5 MG tablet Take 1 tablet by mouth 2 (Two)  Times a Day. For anxiety   Taking    cephalexin (KEFLEX) 500 MG capsule Take 1 capsule by mouth 2 (Two) Times a Day.   Taking    colestipol (COLESTID) 1 g tablet Take 1 tablet by mouth 2 (Two) Times a Day.   Taking    famotidine (PEPCID) 40 MG tablet Take 1 tablet by mouth Daily.   Taking    ferrous sulfate 324 (65 Fe) MG tablet delayed-release EC tablet Take 1 tablet by mouth 2 (Two) Times a Day With Meals. For anemia   Taking    furosemide (LASIX) 40 MG tablet Take 1 tablet by mouth Daily.   Taking    gabapentin (NEURONTIN) 300 MG capsule Take 1 capsule by mouth Every Night. 30 capsule 0 Taking    levocetirizine (XYZAL) 5 MG tablet Take 1 tablet by mouth Every Evening.   Taking    [] predniSONE (DELTASONE) 10 MG tablet Take 1 tablet by mouth 2 (Two) Times a Day As Needed (rash).   Taking As Needed    acetaminophen (TYLENOL) 325 MG tablet Take 2 tablets by mouth Every 6 (Six) Hours As Needed for Mild Pain or Fever.       dextrose (GLUTOSE) 40 % gel Take 15 g by mouth As Needed for Low Blood Sugar (every 15 minutes).       glucagon (GLUCAGEN) 1 MG injection Inject 1 mg under the skin into the appropriate area as directed 1 (One) Time As Needed for Low Blood Sugar.       guaiFENesin (MUCINEX) 600 MG 12 hr tablet Take 2 tablets by mouth 2 (Two) Times a Day.       insulin degludec (Tresiba FlexTouch) 100 UNIT/ML solution pen-injector injection Inject 6 Units under the skin into the appropriate area as directed Every Night.       ipratropium-albuterol (DUO-NEB) 0.5-2.5 mg/3 ml nebulizer Take 3 mL by nebulization Every 6 (Six) Hours As Needed for Shortness of Air.       levothyroxine (SYNTHROID, LEVOTHROID) 200 MCG tablet Take 1 tablet by mouth Every Night. For hypothyroidism       Melatonin 10 MG tablet Take 1 tablet by mouth Daily As Needed (sleep).       metoprolol tartrate (LOPRESSOR) 25 MG tablet Take 1 tablet by mouth 2 (Two) Times a Day.       nystatin (MYCOSTATIN) 717543 UNIT/GM powder Apply 1 Application  topically to the appropriate area as directed Every 12 (Twelve) Hours. Abd folds       phenylephrine-mineral oil-petrolatum (PREPARATION H) 0.25-14-74.9 % ointment hemorrhoidal ointment Insert 1 Application into the rectum Every 6 (Six) Hours As Needed (hemmorhoids).       polyethylene glycol (MIRALAX) 17 g packet Take 17 g by mouth Daily As Needed (Use if senna-docusate is ineffective). 1 each 0     sennosides-docusate (PERICOLACE) 8.6-50 MG per tablet Take 2 tablets by mouth 2 (Two) Times a Day. 1 tablet 0     vitamin B-12 (CYANOCOBALAMIN) 1000 MCG tablet Take 1 tablet by mouth Daily.          Medicines:  Current Facility-Administered Medications   Medication Dose Route Frequency Provider Last Rate Last Admin    acetaminophen (TYLENOL) tablet 650 mg  650 mg Oral Q6H PRN Vandana Churchill MD        apixaban (ELIQUIS) tablet 2.5 mg  2.5 mg Oral Q12H Vandana Churchill MD   2.5 mg at 01/15/25 0821    atorvastatin (LIPITOR) tablet 40 mg  40 mg Oral Nightly Vandana Churchill MD   40 mg at 01/14/25 2033    busPIRone (BUSPAR) tablet 7.5 mg  7.5 mg Oral Q12H Vandana Churchill MD   7.5 mg at 01/15/25 0821    dextrose (D50W) (25 g/50 mL) IV injection 25 g  25 g Intravenous Q15 Min PRN Vandana Churchill MD        dextrose (GLUTOSE) oral gel 15 g  15 g Oral Q15 Min PRN Vandana Churchill MD        diphenhydrAMINE (BENADRYL) injection 25 mg  25 mg Intravenous Q6H PRN Vandana Churchill MD   25 mg at 01/14/25 1731    famotidine (PEPCID) injection 20 mg  20 mg Intravenous Daily Vandana Churchill MD   20 mg at 01/15/25 0820    furosemide (LASIX) tablet 40 mg  40 mg Oral Daily Vandana Churchill MD   40 mg at 01/15/25 0820    gabapentin (NEURONTIN) capsule 300 mg  300 mg Oral Nightly Vandana Churchill MD   300 mg at 01/14/25 2034    glucagon (GLUCAGEN) injection 1 mg  1 mg Intramuscular Q15 Min PRVandana Crowell MD        insulin glargine (LANTUS, SEMGLEE) injection 10 Units  10 Units  Subcutaneous Nightly Vandana Churchill MD   10 Units at 01/14/25 2034    Insulin Lispro (humaLOG) injection 2-7 Units  2-7 Units Subcutaneous 4x Daily AC & at Bedtime Vandana Churchill MD   2 Units at 01/15/25 0820    ipratropium-albuterol (DUO-NEB) nebulizer solution 3 mL  3 mL Nebulization Q6H PRN Vandana Churchill MD        levothyroxine (SYNTHROID, LEVOTHROID) tablet 200 mcg  200 mcg Oral Nightly Vandana Churchill MD   200 mcg at 01/14/25 2033    melatonin tablet 2.5 mg  2.5 mg Oral Nightly PRN Vandana Churchill MD   2.5 mg at 01/14/25 2034    metoprolol tartrate (LOPRESSOR) tablet 25 mg  25 mg Oral BID Vandana Churchill MD   25 mg at 01/15/25 0823    nystatin (MYCOSTATIN) powder 1 Application  1 Application Topical Q12H Vandana Churchill MD   1 Application at 01/15/25 0821    predniSONE (DELTASONE) tablet 20 mg  20 mg Oral BID With Meals Vandana Churchill MD   20 mg at 01/15/25 0821    sodium chloride 0.9 % flush 10 mL  10 mL Intravenous PRN Laurence Hua APRN        sodium zirconium cyclosilicate (LOKELMA) packet 10 g  10 g Oral Once Vandana Churchill MD        vitamin B-12 (CYANOCOBALAMIN) tablet 1,000 mcg  1,000 mcg Oral Daily Vandana Churchill MD   1,000 mcg at 01/15/25 0820       Past Medical History:  Past Medical History:   Diagnosis Date    Abnormality of gait and mobility     Anemia     Anxiety     Cerebral palsy     Cognitive communication deficit     Depression     Diabetes mellitus     Disease of thyroid gland     Hyperglycemia     Hyperlipidemia     Hypertension     Hypokalemia     Insomnia     Lymphedema     Neuropathy     Urge incontinence        Past Surgical History:  History reviewed. No pertinent surgical history.    Family History  History reviewed. No pertinent family history.    Social History  Social History     Socioeconomic History    Marital status: Single   Tobacco Use    Smoking status: Never     Passive exposure: Past   Vaping Use    Vaping  status: Never Used   Substance and Sexual Activity    Alcohol use: Never    Drug use: Never    Sexual activity: Not Currently       Review of Systems:  History obtained from chart review and the patient  General ROS: No fever or chills  Respiratory ROS: No cough, shortness of breath, wheezing  Cardiovascular ROS: No chest pain or palpitations  Gastrointestinal ROS: No abdominal pain or melena  Genito-Urinary ROS: No dysuria or hematuria  Psych ROS: No anxiety and depression  14 point ROS reviewed with the patient and negative except as noted above and in the HPI unless unable to obtain.    Objective:  Patient Vitals for the past 24 hrs:   BP Temp Temp src Pulse Resp SpO2   01/15/25 0735 151/68 97.5 °F (36.4 °C) Oral 57 18 98 %   01/15/25 0616 127/76 97.4 °F (36.3 °C) Oral 72 18 97 %   01/14/25 2246 115/67 97.1 °F (36.2 °C) Oral 75 16 95 %   01/14/25 1956 151/65 97.6 °F (36.4 °C) Oral 80 20 97 %   01/14/25 1625 128/82 97.7 °F (36.5 °C) Oral 77 18 96 %   01/14/25 1214 164/85 98 °F (36.7 °C) -- 69 18 94 %       Intake/Output Summary (Last 24 hours) at 1/15/2025 1000  Last data filed at 1/15/2025 0859  Gross per 24 hour   Intake 580 ml   Output 1000 ml   Net -420 ml     General: awake/alert   Chest:  clear to auscultation bilaterally without respiratory distress  CVS: regular rate and rhythm  Abdominal: soft, nontender, positive bowel sounds  Extremities: no cyanosis or edema  Skin:  widespread rash      Labs:  Results from last 7 days   Lab Units 01/13/25  1807   WBC 10*3/mm3 23.35*   HEMOGLOBIN g/dL 13.3   HEMATOCRIT % 41.3   PLATELETS 10*3/mm3 352         Results from last 7 days   Lab Units 01/15/25  0459 01/14/25  0924 01/13/25  1807   SODIUM mmol/L 137 135* 134*   POTASSIUM mmol/L 5.7* 5.6* 5.5*   CHLORIDE mmol/L 104 103 101   CO2 mmol/L 22.0 22.0 21.0*   BUN mg/dL 64* 57* 59*   CREATININE mg/dL 1.90* 1.54* 1.86*   CALCIUM mg/dL 8.9 9.4 9.1   EGFR mL/min/1.73 25.9* 33.4* 26.6*   BILIRUBIN mg/dL 0.2 0.2 0.2   ALK  PHOS U/L 118* 150* 153*   ALT (SGPT) U/L 18 20 24   AST (SGOT) U/L 11 12 14   GLUCOSE mg/dL 198* 266* 287*       Radiology:   Imaging Results (Last 72 Hours)       Procedure Component Value Units Date/Time    XR Chest 1 View [[id number]] Collected: 01/13/25 2006     Updated: 01/13/25 2010    Narrative:      XR CHEST 1 VW- 1/13/2025 6:31 PM     HISTORY: elevated WBC       COMPARISON: Chest x-ray dated 1/6/2025     FINDINGS:  Upright frontal radiograph of the chest was obtained     No lung consolidation. No pleural effusion or pneumothorax. The  cardiomediastinal silhouette and pulmonary vascularity are within normal  limits. The osseous structures and surrounding soft tissues demonstrate  no acute abnormality.       Impression:      1.  No acute process in the chest. No acute lung infiltrate.     This report was signed and finalized on 1/13/2025 8:07 PM by Dr Philipp Hoyos.               Culture:  Blood Culture   Date Value Ref Range Status   01/13/2025 No growth at 24 hours  Preliminary   01/13/2025 No growth at 24 hours  Preliminary         Assessment    Chronic kidney disease stage 4  Hyperkalemia  Type 2 diabetes  History of cerebral palsy  Hypertension  Rash     Plan:   Repeat lokelma. Low potassium diet  Monitor labs      Willis Gary, APRN  1/15/2025  10:00 CST

## 2025-01-15 NOTE — PLAN OF CARE
Goal Outcome Evaluation:  Plan of Care Reviewed With: patient        Progress: no change  Outcome Evaluation: Pt A&Ox4. Resting well after prn melatonin. No c/o pain voiced. Turning q2h. Voiding via external cath. Rash present to arms, legs, torso--no c/o itching through the night. K+:5.7 this AM. Safety maintained.

## 2025-01-15 NOTE — THERAPY EVALUATION
Patient Name: Mar Rodriguez  : 1940    MRN: [id number]                              Today's Date: 1/15/2025       Admit Date: 2025    Visit Dx:     ICD-10-CM ICD-9-CM   1. Acute hyperkalemia  E87.5 276.7   2. Urticarial rash  L50.9 708.9   3. Leukocytosis, unspecified type  D72.829 288.60   4. Chronic kidney disease, unspecified CKD stage  N18.9 585.9   5. Hyperglycemia due to diabetes mellitus  E11.65 250.02   6. Impaired mobility [Z74.09]  Z74.09 799.89     Patient Active Problem List   Diagnosis    New onset atrial fibrillation    Pulmonary vascular congestion    Acute hyperkalemia     Past Medical History:   Diagnosis Date    Abnormality of gait and mobility     Anemia     Anxiety     Cerebral palsy     Cognitive communication deficit     Depression     Diabetes mellitus     Disease of thyroid gland     Hyperglycemia     Hyperlipidemia     Hypertension     Hypokalemia     Insomnia     Lymphedema     Neuropathy     Urge incontinence      History reviewed. No pertinent surgical history.   General Information       Row Name 01/15/25 1300          Physical Therapy Time and Intention    Document Type evaluation  hyperkalemia, rash, CP  -MS     Mode of Treatment physical therapy;individual therapy  -MS       Row Name 01/15/25 1300          General Information    Patient Profile Reviewed yes  -MS     Prior Level of Function mod assist:;transfer;dressing;independent:;feeding;dependent:;bathing  -MS     Existing Precautions/Restrictions fall  -MS     Barriers to Rehab previous functional deficit  -MS       Row Name 01/15/25 1300          Living Environment    People in Home facility resident  -MS       Row Name 01/15/25 1300          Cognition    Orientation Status (Cognition) oriented x 4  -MS       Row Name 01/15/25 1300          Safety Issues/Impairments Affecting Functional Mobility    Impairments Affecting Function (Mobility) balance;endurance/activity tolerance  -MS               User Key  (r) =  Recorded By, (t) = Taken By, (c) = Cosigned By      Initials Name Provider Type    Solange Turcios TYLER, PT, DPT, NCS Physical Therapist                   Mobility       Row Name 01/15/25 1300          Bed Mobility    Bed Mobility rolling left;rolling right;scooting/bridging  -MS     Rolling Left [place] (Bed Mobility) modified independence  -MS     Rolling Right [place] (Bed Mobility) modified independence  -MS     Scooting/Bridging [place] (Bed Mobility) modified independence  -MS     Assistive Device (Bed Mobility) bed rails  -MS       Row Name 01/15/25 1300          Sit-Stand Transfer    Sit-Stand [place] (Transfers) moderate assist (50% patient effort);verbal cues;nonverbal cues (demo/gesture)  -MS       Row Name 01/15/25 1300          Gait/Stairs (Locomotion)    [place] Level (Gait) minimum assist (75% patient effort);verbal cues;nonverbal cues (demo/gesture)  -MS     Assistive Device (Gait) walker, front-wheeled  -MS     Distance in Feet (Gait) 1  -MS     Comment, (Gait/Stairs) side steps toward HOB  -MS               User Key  (r) = Recorded By, (t) = Taken By, (c) = Cosigned By      Initials Name Provider Type    Solange Turcios TYLER, PT, DPT, NCS Physical Therapist                   Obj/Interventions       Row Name 01/15/25 1300          Range of Motion Comprehensive    General Range of Motion bilateral upper extremity ROM WFL  -MS     Comment, General Range of Motion B LE limited 25% due to soft tissue approximation  -MS       Row Name 01/15/25 1300          Strength Comprehensive (MMT)    Comment, General Manual Muscle Testing (MMT) Assessment B UE 5/5, B LE 5/5 within available range  -MS       Row Name 01/15/25 1300          Balance    Balance Assessment sitting static balance;sitting dynamic balance;standing static balance;standing dynamic balance  -MS     Static Sitting Balance independent  -MS     Dynamic Sitting Balance standby assist  -MS     Position, Sitting Balance  unsupported;sitting edge of bed  -MS     Static Standing Balance contact guard  -MS     Dynamic Standing Balance contact guard  -MS     Position/Device Used, Standing Balance walker, front-wheeled  -MS               User Key  (r) = Recorded By, (t) = Taken By, (c) = Cosigned By      Initials Name Provider Type    Solange Turcios, PT, DPT, NCS Physical Therapist                   Goals/Plan       Row Name 01/15/25 1300          Bed Mobility Goal 1 (PT)    Activity/Assistive Device (Bed Mobility Goal 1, PT) bed mobility activities, all  -MS     [place] Level/Cues Needed (Bed Mobility Goal 1, PT) independent  -MS     Time Frame (Bed Mobility Goal 1, PT) long term goal (LTG);by discharge  -MS     Progress/Outcomes (Bed Mobility Goal 1, PT) goal ongoing  -MS       Row Name 01/15/25 1300          Transfer Goal 1 (PT)    Activity/Assistive Device (Transfer Goal 1, PT) sit-to-stand/stand-to-sit;bed-to-chair/chair-to-bed;walker, rolling  -MS     [place] Level/Cues Needed (Transfer Goal 1, PT) minimum assist (75% or more patient effort);tactile cues required;verbal cues required  -MS     Time Frame (Transfer Goal 1, PT) long term goal (LTG);by discharge  -MS     Progress/Outcome (Transfer Goal 1, PT) goal ongoing  -MS       Row Name 01/15/25 1300          Therapy Assessment/Plan (PT)    Planned Therapy Interventions (PT) balance training;bed mobility training;patient/family education;strengthening;transfer training  -MS               User Key  (r) = Recorded By, (t) = Taken By, (c) = Cosigned By      Initials Name Provider Type    Solange Turcios, PT, DPT, NCS Physical Therapist                   Clinical Impression       Row Name 01/15/25 1300          Pain    Pretreatment Pain Rating 0/10 - no pain  -MS     Posttreatment Pain Rating 0/10 - no pain  -MS       Row Name 01/15/25 1300          Plan of Care Review    Plan of Care Reviewed With patient;sibling  -MS     Progress improving  -MS     Outcome  Evaluation The patient presents alert and oriented x4 lying in bed with sister present in the room. The patient typically requires assist of 2 to transfer from the bed to the wheelchair. Today she demonstrates ability to perfrom bed mobility with use of bed rails and sit EOB. She was able to stand with Mod A and use of RW and take a few side steps toward the HOB. She will benefit from continued PT to work on strengthening and transfers. Recommend discharge back to SNF.  -MS       Row Name 01/15/25 1300          Therapy Assessment/Plan (PT)    Patient/Family Therapy Goals Statement (PT) get stronger  -MS     Rehab Potential (PT) good  -MS     Criteria for Skilled Interventions Met (PT) yes;meets criteria;skilled treatment is necessary  -MS     Therapy Frequency (PT) 2 times/day  -MS     Predicted Duration of Therapy Intervention (PT) until discharge  -MS       Row Name 01/15/25 1300          Positioning and Restraints    Post Treatment Position bed  -MS     In Bed fowlers;call light within reach;encouraged to call for assist;with family/caregiver;side rails up x2  -MS               User Key  (r) = Recorded By, (t) = Taken By, (c) = Cosigned By      Initials Name Provider Type    MS Solange Marcus R, PT, DPT, NCS Physical Therapist                   Outcome Measures       Row Name 01/15/25 1300 01/15/25 0820       How much help from another person do you currently need...    Turning from your back to your side while in flat bed without using bedrails? 4  -MS 3  -KW    Moving from lying on back to sitting on the side of a flat bed without bedrails? 4  -MS 3  -KW    Moving to and from a bed to a chair (including a wheelchair)? 1  -MS 3  -KW    Standing up from a chair using your arms (e.g., wheelchair, bedside chair)? 2  -MS 2  -KW    Climbing 3-5 steps with a railing? 1  -MS 1  -KW    To walk in hospital room? 1  -MS 2  -KW    AM-PAC 6 Clicks Score (PT) 13  -MS 14  -KW    Highest Level of Mobility Goal 4 --> Transfer  to chair/commode  -MS 4 --> Transfer to chair/commode  -KW      Row Name 01/15/25 1300          Functional Assessment    Outcome Measure Options AM-PAC 6 Clicks Basic Mobility (PT)  -MS               User Key  (r) = Recorded By, (t) = Taken By, (c) = Cosigned By      Initials Name Provider Type    MS Amrit Solange SCOTT, PT, DPT, NCS Physical Therapist    Diane Torres, RN Registered Nurse                                 Physical Therapy Education       Title: PT OT SLP Therapies (In Progress)       Topic: Physical Therapy (In Progress)       Point: Mobility training (Done)       Learning Progress Summary            Patient Acceptance, E, VU by MS at 1/15/2025 5964    Comment: role of PT in her care                      Point: Home exercise program (Not Started)       Learner Progress:  Not documented in this visit.              Point: Body mechanics (Not Started)       Learner Progress:  Not documented in this visit.              Point: Precautions (Not Started)       Learner Progress:  Not documented in this visit.                              User Key       Initials Effective Dates Name Provider Type Discipline    MS 07/11/23 -  Solange Marcus, PT, DPT, NCS Physical Therapist PT                  PT Recommendation and Plan  Planned Therapy Interventions (PT): balance training, bed mobility training, patient/family education, strengthening, transfer training  Progress: improving  Outcome Evaluation: The patient presents alert and oriented x4 lying in bed with sister present in the room. The patient typically requires assist of 2 to transfer from the bed to the wheelchair. Today she demonstrates ability to perfrom bed mobility with use of bed rails and sit EOB. She was able to stand with Mod A and use of RW and take a few side steps toward the HOB. She will benefit from continued PT to work on strengthening and transfers. Recommend discharge back to SNF.     Time Calculation:         PT Charges       Row Name  01/15/25 1300             Time Calculation    Start Time 1300  -MS      Stop Time 1325  -MS      Time Calculation (min) 25 min  -MS      PT Received On 01/15/25  -MS      PT Goal Re-Cert Due Date 01/25/25  -MS         Untimed Charges    PT Eval/Re-eval Minutes 25  -MS         Total Minutes    Untimed Charges Total Minutes 25  -MS       Total Minutes 25  -MS                User Key  (r) = Recorded By, (t) = Taken By, (c) = Cosigned By      Initials Name Provider Type    Solange Turcios, PT, DPT, NCS Physical Therapist                      PT G-Codes  Outcome Measure Options: AM-PAC 6 Clicks Basic Mobility (PT)  AM-PAC 6 Clicks Score (PT): 13  PT Discharge Summary  Anticipated Discharge Disposition (PT): skilled nursing facility    Solange Marcus, PT, DPT, NCS  1/15/2025

## 2025-01-15 NOTE — PLAN OF CARE
Goal Outcome Evaluation:  Plan of Care Reviewed With: patient, sibling        Progress: improving  Outcome Evaluation: The patient presents alert and oriented x4 lying in bed with sister present in the room. The patient typically requires assist of 2 to transfer from the bed to the wheelchair. Today she demonstrates ability to perfrom bed mobility with use of bed rails and sit EOB. She was able to stand with Mod A and use of RW and take a few side steps toward the HOB. She will benefit from continued PT to work on strengthening and transfers. Recommend discharge back to SNF.    Anticipated Discharge Disposition (PT): skilled nursing facility

## 2025-01-16 VITALS
TEMPERATURE: 97.7 F | BODY MASS INDEX: 44.44 KG/M2 | HEIGHT: 65 IN | HEART RATE: 85 BPM | SYSTOLIC BLOOD PRESSURE: 139 MMHG | WEIGHT: 266.76 LBS | OXYGEN SATURATION: 95 % | RESPIRATION RATE: 16 BRPM | DIASTOLIC BLOOD PRESSURE: 85 MMHG

## 2025-01-16 LAB
ALBUMIN SERPL-MCNC: 3.1 G/DL (ref 3.5–5.2)
ALBUMIN/GLOB SERPL: 1 G/DL
ALP SERPL-CCNC: 111 U/L (ref 39–117)
ALT SERPL W P-5'-P-CCNC: 18 U/L (ref 1–33)
ANION GAP SERPL CALCULATED.3IONS-SCNC: 11 MMOL/L (ref 5–15)
AST SERPL-CCNC: 11 U/L (ref 1–32)
BACTERIA SPEC AEROBE CULT: NORMAL
BASOPHILS # BLD AUTO: 0.04 10*3/MM3 (ref 0–0.2)
BASOPHILS NFR BLD AUTO: 0.3 % (ref 0–1.5)
BILIRUB SERPL-MCNC: 0.2 MG/DL (ref 0–1.2)
BUN SERPL-MCNC: 68 MG/DL (ref 8–23)
BUN/CREAT SERPL: 36 (ref 7–25)
CALCIUM SPEC-SCNC: 9.4 MG/DL (ref 8.6–10.5)
CHLORIDE SERPL-SCNC: 102 MMOL/L (ref 98–107)
CO2 SERPL-SCNC: 23 MMOL/L (ref 22–29)
CREAT SERPL-MCNC: 1.89 MG/DL (ref 0.57–1)
DEPRECATED RDW RBC AUTO: 49 FL (ref 37–54)
EGFRCR SERPLBLD CKD-EPI 2021: 26.1 ML/MIN/1.73
EOSINOPHIL # BLD AUTO: 0.01 10*3/MM3 (ref 0–0.4)
EOSINOPHIL NFR BLD AUTO: 0.1 % (ref 0.3–6.2)
ERYTHROCYTE [DISTWIDTH] IN BLOOD BY AUTOMATED COUNT: 13.3 % (ref 12.3–15.4)
GLOBULIN UR ELPH-MCNC: 3.2 GM/DL
GLUCOSE BLDC GLUCOMTR-MCNC: 127 MG/DL (ref 70–130)
GLUCOSE BLDC GLUCOMTR-MCNC: 157 MG/DL (ref 70–130)
GLUCOSE SERPL-MCNC: 206 MG/DL (ref 65–99)
HCT VFR BLD AUTO: 39.6 % (ref 34–46.6)
HGB BLD-MCNC: 12.6 G/DL (ref 12–15.9)
IMM GRANULOCYTES # BLD AUTO: 0.26 10*3/MM3 (ref 0–0.05)
IMM GRANULOCYTES NFR BLD AUTO: 1.8 % (ref 0–0.5)
LYMPHOCYTES # BLD AUTO: 1.36 10*3/MM3 (ref 0.7–3.1)
LYMPHOCYTES NFR BLD AUTO: 9.7 % (ref 19.6–45.3)
MCH RBC QN AUTO: 31.9 PG (ref 26.6–33)
MCHC RBC AUTO-ENTMCNC: 31.8 G/DL (ref 31.5–35.7)
MCV RBC AUTO: 100.3 FL (ref 79–97)
MONOCYTES # BLD AUTO: 0.7 10*3/MM3 (ref 0.1–0.9)
MONOCYTES NFR BLD AUTO: 5 % (ref 5–12)
NEUTROPHILS NFR BLD AUTO: 11.7 10*3/MM3 (ref 1.7–7)
NEUTROPHILS NFR BLD AUTO: 83.1 % (ref 42.7–76)
NRBC BLD AUTO-RTO: 0 /100 WBC (ref 0–0.2)
PLATELET # BLD AUTO: 323 10*3/MM3 (ref 140–450)
PMV BLD AUTO: 10.2 FL (ref 6–12)
POTASSIUM SERPL-SCNC: 4.8 MMOL/L (ref 3.5–5.2)
PROT SERPL-MCNC: 6.3 G/DL (ref 6–8.5)
RBC # BLD AUTO: 3.95 10*6/MM3 (ref 3.77–5.28)
SODIUM SERPL-SCNC: 136 MMOL/L (ref 136–145)
WBC NRBC COR # BLD AUTO: 14.07 10*3/MM3 (ref 3.4–10.8)

## 2025-01-16 PROCEDURE — 96376 TX/PRO/DX INJ SAME DRUG ADON: CPT

## 2025-01-16 PROCEDURE — G0378 HOSPITAL OBSERVATION PER HR: HCPCS

## 2025-01-16 PROCEDURE — 63710000001 INSULIN LISPRO (HUMAN) PER 5 UNITS: Performed by: FAMILY MEDICINE

## 2025-01-16 PROCEDURE — 97530 THERAPEUTIC ACTIVITIES: CPT

## 2025-01-16 PROCEDURE — 85025 COMPLETE CBC W/AUTO DIFF WBC: CPT | Performed by: INTERNAL MEDICINE

## 2025-01-16 PROCEDURE — 63710000001 PREDNISONE PER 1 MG: Performed by: FAMILY MEDICINE

## 2025-01-16 PROCEDURE — 82948 REAGENT STRIP/BLOOD GLUCOSE: CPT

## 2025-01-16 PROCEDURE — 80053 COMPREHEN METABOLIC PANEL: CPT | Performed by: FAMILY MEDICINE

## 2025-01-16 RX ORDER — PREDNISONE 10 MG/1
TABLET ORAL
Qty: 15 TABLET | Refills: 0 | Status: SHIPPED | OUTPATIENT
Start: 2025-01-16 | End: 2025-01-26

## 2025-01-16 RX ORDER — FAMOTIDINE 20 MG/1
20 TABLET, FILM COATED ORAL
Status: DISCONTINUED | OUTPATIENT
Start: 2025-01-16 | End: 2025-01-16

## 2025-01-16 RX ORDER — FAMOTIDINE 20 MG/1
20 TABLET, FILM COATED ORAL DAILY
Status: DISCONTINUED | OUTPATIENT
Start: 2025-01-17 | End: 2025-01-16 | Stop reason: HOSPADM

## 2025-01-16 RX ADMIN — PREDNISONE 20 MG: 20 TABLET ORAL at 09:53

## 2025-01-16 RX ADMIN — METOPROLOL TARTRATE 25 MG: 25 TABLET, FILM COATED ORAL at 09:53

## 2025-01-16 RX ADMIN — FAMOTIDINE 20 MG: 10 INJECTION INTRAVENOUS at 09:53

## 2025-01-16 RX ADMIN — INSULIN LISPRO 2 UNITS: 100 INJECTION, SOLUTION INTRAVENOUS; SUBCUTANEOUS at 09:53

## 2025-01-16 RX ADMIN — SODIUM ZIRCONIUM CYCLOSILICATE 10 G: 10 POWDER, FOR SUSPENSION ORAL at 09:53

## 2025-01-16 RX ADMIN — BUSPIRONE HYDROCHLORIDE 7.5 MG: 5 TABLET ORAL at 09:53

## 2025-01-16 RX ADMIN — NYSTATIN 1 APPLICATION: 100000 POWDER TOPICAL at 09:56

## 2025-01-16 RX ADMIN — FUROSEMIDE 40 MG: 40 TABLET ORAL at 09:56

## 2025-01-16 RX ADMIN — CYANOCOBALAMIN TAB 500 MCG 1000 MCG: 500 TAB at 09:53

## 2025-01-16 RX ADMIN — APIXABAN 2.5 MG: 5 TABLET, FILM COATED ORAL at 09:53

## 2025-01-16 NOTE — PROGRESS NOTES
Pharmacy Dosing Service  Automatic IV to PO Conversion  famotidine    Assessment/Action/Plan:  Patient meets criteria listed below. Famotidine 20 mg IV every 24 hours has been changed to famotidine 20 mg PO every 24 hours per protocol.       Subjective:  Mar Rodriguez is a 84 y.o. female who meets the following criteria for IV to PO therapy conversion     Policy Criteria:  Tolerating oral fluids or 40ml/hour of enteral nutrition and oral route not otherwise compromised  Receiving other oral medications on a scheduled basis    Additional Factors Considered:  Anti-emetic usage  Patient disposition per documentation  Disease states or conditions contraindicating oral conversion    Objective:  Diet Order   Procedures    Diet: Diabetic, Renal; Consistent Carbohydrate; Low Potassium; Fluid Consistency: Thin (IDDSI 0)       Francisco J Garvin PharmTEA  01/16/2513:37 CST

## 2025-01-16 NOTE — CASE MANAGEMENT/SOCIAL WORK
Continued Stay Note   [place]     Patient Name: Mar Rodriguez  MRN: [id number]  Today's Date: 1/16/2025    Admit Date: 1/13/2025    Plan: [facility]   Discharge Plan       Row Name 01/16/25 1456       Plan    Plan [facility]    Patient/Family in Agreement with Plan yes    Final Discharge Disposition Code 03 - skilled nursing facility (SNF)    Final Note Pt is being d/c'ed to [facility] [phone] today. They will pull the d/c summary from [facility]. Pt will go by [facility] -[phone] or [facility] -[phone]. Pt's SANDEE Wyman [phone] is aware of d/c.                    Discharge Codes    No documentation.                 Expected Discharge Date and Time       Expected Discharge Date Expected Discharge Time    Jan 16, 2025               DONNY Baker

## 2025-01-16 NOTE — THERAPY TREATMENT NOTE
Acute Care - Physical Therapy Treatment Note  [facility]     Patient Name: Mar Rodriguez  : 1940  MRN: [id number]  Today's Date: 2025      Visit Dx:     ICD-10-CM ICD-9-CM   1. Acute hyperkalemia  E87.5 276.7   2. Urticarial rash  L50.9 708.9   3. Leukocytosis, unspecified type  D72.829 288.60   4. Chronic kidney disease, unspecified CKD stage  N18.9 585.9   5. Hyperglycemia due to diabetes mellitus  E11.65 250.02   6. Impaired mobility [Z74.09]  Z74.09 799.89     Patient Active Problem List   Diagnosis    New onset atrial fibrillation    Pulmonary vascular congestion    Acute hyperkalemia     Past Medical History:   Diagnosis Date    Abnormality of gait and mobility     Anemia     Anxiety     Cerebral palsy     Cognitive communication deficit     Depression     Diabetes mellitus     Disease of thyroid gland     Hyperglycemia     Hyperlipidemia     Hypertension     Hypokalemia     Insomnia     Lymphedema     Neuropathy     Urge incontinence      History reviewed. No pertinent surgical history.  PT Assessment (Last 12 Hours)       PT Evaluation and Treatment       Row Name 25 0907          Physical Therapy Time and Intention    Subjective Information no complaints  -     Document Type therapy note (daily note)  -     Mode of Treatment physical therapy  -       Row Name 25 0907          General Information    Existing Precautions/Restrictions fall  -       Row Name 25 0907          Pain    Pretreatment Pain Rating 0/10 - no pain  -     Posttreatment Pain Rating 0/10 - no pain  -[facility] Name 25 0907          Bed Mobility    Supine-Sit [place] (Bed Mobility) supervision  -     Sit-Supine [place] (Bed Mobility) supervision  -     Assistive Device (Bed Mobility) head of bed elevated;bed rails  -       Row Name 25 0907          Transfers    Comment, (Transfers) pt declined to attempt to stand. She did scoot herself along the EOB with her arms  to getting to HOB.  -       Row Name 01/16/25 0907          Balance    Comment, Balance Independent with static sitting balance. Worked on trunk weight shifting in all directions.  -       Row Name 01/16/25 0907          Shoulder (Therapeutic Exercise)    Shoulder (Therapeutic Exercise) AROM (active range of motion)  -     Shoulder AROM (Therapeutic Exercise) bilateral;flexion;sitting;15 repititions  -       Row Name 01/16/25 0907          Elbow/Forearm (Therapeutic Exercise)    Elbow/Forearm (Therapeutic Exercise) AROM (active range of motion)  -     Elbow/Forearm AROM (Therapeutic Exercise) bilateral;flexion;extension;sitting;15 repititions  -[facility] Name 01/16/25 0907          Hip (Therapeutic Exercise)    Hip (Therapeutic Exercise) AROM (active range of motion)  -     Hip AROM (Therapeutic Exercise) bilateral;flexion;sitting;10 repetitions  -[facility] Name 01/16/25 0907          Knee (Therapeutic Exercise)    Knee (Therapeutic Exercise) AROM (active range of motion)  -     Knee AROM (Therapeutic Exercise) bilateral;LAQ (long arc quad);sitting;15 repititions  -[facility] Name 01/16/25 0907          Ankle (Therapeutic Exercise)    Ankle (Therapeutic Exercise) AROM (active range of motion)  -     Ankle AROM (Therapeutic Exercise) bilateral;dorsiflexion;sitting;20 repititions  -       Row Name 01/16/25 0907          Plan of Care Review    Plan of Care Reviewed With patient  -     Progress no change  -     Outcome Evaluation Pt agreeable to therapy with some encouragement. She was Supervision for bed mobility. She sat EOB Independently and participated with LE and UE exercises.  Pt. declined to attempt to stand, but was able to use her arms and scoot herself along the EOB. Pt. is anxious to return back to SNF.  -       Row Name 01/16/25 0907          Positioning and Restraints    Pre-Treatment Position in bed  -     Post Treatment Position bed  -     In Bed fowlers;call light  within reach;encouraged to call for assist;side rails up x2  -MF               User Key  (r) = Recorded By, (t) = Taken By, (c) = Cosigned By      Initials Name Provider Type    Kath Girard, RIAZ Physical Therapist Assistant                    Physical Therapy Education       Title: PT OT SLP Therapies (In Progress)       Topic: Physical Therapy (In Progress)       Point: Mobility training (Done)       Learning Progress Summary            Patient Acceptance, E, VU by MS at 1/15/2025 8766    Comment: role of PT in her care                      Point: Home exercise program (Not Started)       Learner Progress:  Not documented in this visit.              Point: Body mechanics (Not Started)       Learner Progress:  Not documented in this visit.              Point: Precautions (Not Started)       Learner Progress:  Not documented in this visit.                              User Key       Initials Effective Dates Name Provider Type Discipline    MS 07/11/23 -  Solange Marcus, PT, DPT, NCS Physical Therapist PT                  PT Recommendation and Plan     Plan of Care Reviewed With: patient  Progress: no change  Outcome Evaluation: Pt agreeable to therapy with some encouragement. She was Supervision for bed mobility. She sat EOB Independently and participated with LE and UE exercises.  Pt. declined to attempt to stand, but was able to use her arms and scoot herself along the EOB. Pt. is anxious to return back to SNF.   Outcome Measures       Row Name 01/16/25 0907             How much help from another person do you currently need...    Turning from your back to your side while in flat bed without using bedrails? 4  -MF      Moving from lying on back to sitting on the side of a flat bed without bedrails? 4  -MF      Moving to and from a bed to a chair (including a wheelchair)? 1  -MF      Standing up from a chair using your arms (e.g., wheelchair, bedside chair)? 2  -MF      Climbing 3-5 steps with a  railing? 1  -MF      To walk in hospital room? 1  -MF      AM-PAC 6 Clicks Score (PT) 13  -         Functional Assessment    Outcome Measure Options AM-PAC 6 Clicks Basic Mobility (PT)  -MF                User Key  (r) = Recorded By, (t) = Taken By, (c) = Cosigned By      Initials Name Provider Type    Kath Girard PTA Physical Therapist Assistant                     Time Calculation:    PT Charges       Row Name 01/16/25 0934             Time Calculation    Start Time 0907  -MF      Stop Time 0934  -      Time Calculation (min) 27 min  -MF      PT Received On 01/16/25  -         Time Calculation- PT    Total Timed Code Minutes- PT 27 minute(s)  -         Timed Charges    21962 - PT Therapeutic Activity Minutes 27  -MF         Total Minutes    Timed Charges Total Minutes 27  -MF       Total Minutes 27  -MF                User Key  (r) = Recorded By, (t) = Taken By, (c) = Cosigned By      Initials Name Provider Type    Kath Girard PTA Physical Therapist Assistant                  Therapy Charges for Today       Code Description Service Date Service Provider Modifiers Qty    [id number]  PT THERAPEUTIC ACT EA 15 MIN 1/16/2025 Kath Diaz PTA GP 2            PT G-Codes  Outcome Measure Options: AM-PAC 6 Clicks Basic Mobility (PT)  AM-PAC 6 Clicks Score (PT): 13    Kath Diaz PTA  1/16/2025

## 2025-01-16 NOTE — DISCHARGE SUMMARY
Hospital Discharge Summary    Mar Rodriguez  :  1940  MRN:  [id number]    Admit date:  2025  Discharge date:      Admitting Physician:    Vandana Churchill MD    Discharge Diagnoses:      Acute hyperkalemia    CKD    Rash, probable medicine reaction    DM2    HTN      Hospital Course:   She was admitted with hyperkalemia. She was treated with PO Lokelma. Her potassium is improved. She will need labs weekly to monitor potassium. Her rash is improved. Please add Hydralazine to allergy list.     Discharge Medications:         Discharge Medications        New Medications        Instructions Start Date   sodium zirconium cyclosilicate 10 g packet  Commonly known as: LOKELMA   10 g, Oral, Daily, Empty entire contents of the packet(s) into a glass with at least 3 tablespoons (45 mL) of water. Stir well and drink immediately; if powder remains in the glass, add water, stir and drink immediately; repeat until no powder remains. Administer other oral medications at least 2 hours before or 2 hours after dose.             Changes to Medications        Instructions Start Date   predniSONE 10 MG tablet  Commonly known as: DELTASONE  What changed: See the new instructions.   Take 2 tablets by mouth 2 (Two) Times a Day for 1 day, THEN 2 tablets Daily for 3 days, THEN 1 tablet Daily for 3 days, THEN 0.5 tablets Daily for 3 days.   Start Date: 2025            Continue These Medications        Instructions Start Date   acetaminophen 325 MG tablet  Commonly known as: TYLENOL   650 mg, Oral, Every 6 Hours PRN      apixaban 2.5 MG tablet tablet  Commonly known as: ELIQUIS   2.5 mg, Oral, Every 12 Hours Scheduled      atorvastatin 40 MG tablet  Commonly known as: LIPITOR   40 mg, Nightly      betamethasone dipropionate 0.05 % cream  Commonly known as: DIPROSONE   1 Application, Topical, Daily,  Apply to rash every shift      busPIRone 7.5 MG tablet  Commonly known as: BUSPAR   7.5 mg, 2 Times Daily       dextrose 40 % gel  Commonly known as: GLUTOSE   15 g, Oral, As Needed      famotidine 40 MG tablet  Commonly known as: PEPCID   40 mg, Oral, Daily      furosemide 40 MG tablet  Commonly known as: LASIX   40 mg, Daily      gabapentin 300 MG capsule  Commonly known as: NEURONTIN   300 mg, Oral, Nightly      ipratropium-albuterol 0.5-2.5 mg/3 ml nebulizer  Commonly known as: DUO-NEB   3 mL, Nebulization, Every 6 Hours PRN      levothyroxine 200 MCG tablet  Commonly known as: SYNTHROID, LEVOTHROID   200 mcg, Nightly      Melatonin 10 MG tablet   1 tablet, Oral, Daily PRN      metoprolol tartrate 25 MG tablet  Commonly known as: LOPRESSOR   25 mg, 2 Times Daily      nystatin 020112 UNIT/GM powder  Commonly known as: MYCOSTATIN   1 Application, Topical, Every 12 Hours, Abd folds      phenylephrine-mineral oil-petrolatum 0.25-14-74.9 % ointment hemorrhoidal ointment  Commonly known as: PREPARATION H   1 Application, Rectal, Every 6 Hours PRN      polyethylene glycol 17 g packet  Commonly known as: MIRALAX   17 g, Oral, Daily PRN      Tresiba FlexTouch 100 UNIT/ML solution pen-injector injection  Generic drug: insulin degludec   6 Units, Nightly      vitamin B-12 1000 MCG tablet  Commonly known as: CYANOCOBALAMIN   1,000 mcg, Daily             Stop These Medications      bumetanide 1 MG tablet  Commonly known as: BUMEX     cephalexin 500 MG capsule  Commonly known as: KEFLEX     colestipol 1 g tablet  Commonly known as: COLESTID     ferrous sulfate 324 (65 Fe) MG tablet delayed-release EC tablet     glucagon 1 MG injection  Commonly known as: GLUCAGEN     guaiFENesin 600 MG 12 hr tablet  Commonly known as: MUCINEX     levocetirizine 5 MG tablet  Commonly known as: XYZAL     sennosides-docusate 8.6-50 MG per tablet  Commonly known as: PERICOLACE              Consults:  Nephrology    Significant Diagnostic Studies:  see complete admission record      Disposition:   back to SNF in stable condition  Follow up with Nephrology  as they recommend    Diet: low potassium, carb control    Activity: as tolerated    Special Instructions: BMP every Monday  Accuchecks Q AC and QHS and send to me weekly      The patient or family are to call or return if there are any problems, questions, concerns or change in her condition.     Signed:  Vandana Churchill MD MD  1/16/2025, 13:34 CST

## 2025-01-16 NOTE — PLAN OF CARE
Goal Outcome Evaluation:  Plan of Care Reviewed With: patient      Progress: no change     Pt A&O x4. No c/o pain this shift thus far. Voiding per ext cath. Accu check w/ SSI. Afib per tele. Pt resting well. VSS. Safety maintained.

## 2025-01-16 NOTE — PROGRESS NOTES
Nephrology ([facility]) Progress Note      Patient:  Mar Rodriguez  YOB: 1940  Date of Service: 1/16/2025  MRN: [id number]   Acct: [id number]   Primary Care Physician: Vandana Churchill MD  Advance Directive:   There are no questions and answers to display.     Admit Date: 1/13/2025       Hospital Day: 0  Referring Provider: No Known Provider      Patient personally seen and examined.  Complete chart including Consults, Notes, Operative Reports, Labs, Cardiology, and Radiology studies reviewed as able.        Subjective:  Mar Rodriguez is a 84 y.o. female for whom we were consulted for evaluation and treatment of hyperkalemia. Baseline chronic kidney disease stage 4. At time of last visit in our office in September 2024, creatinine was 1.7. history includes cerebral palsy, type 2 diabetes, hypertension, lymphedema. Presented to ER for evaluation of rash and after outpatient labs showed elevated potassium level. She had no complaints aside from the recent pruritic rash. No pain or dyspnea. Labs in ER showed potassium of 5.5 and creatinine 1.86. Denied recent illness. No n/v/d. Urine output nonoliguric. She received Lokelma for the hyperkalemia.     Today is awake and alert. No new complaints. No new overnight issues. Urine output nonoliguric    Allergies:  Nsaids    Home Meds:  Medications Prior to Admission   Medication Sig Dispense Refill Last Dose/Taking    apixaban (ELIQUIS) 2.5 MG tablet tablet Take 1 tablet by mouth Every 12 (Twelve) Hours. Indications: Atrial Fibrillation 60 tablet 0 Taking    atorvastatin (LIPITOR) 40 MG tablet Take 1 tablet by mouth Every Night.   Taking    betamethasone dipropionate (DIPROSONE) 0.05 % cream Apply 1 Application topically to the appropriate area as directed Daily.   Apply to rash every shift   Taking    bumetanide (BUMEX) 1 MG tablet Take 1 tablet by mouth Daily.   Taking    busPIRone (BUSPAR) 7.5 MG tablet Take 1  tablet by mouth 2 (Two) Times a Day. For anxiety   Taking    cephalexin (KEFLEX) 500 MG capsule Take 1 capsule by mouth 2 (Two) Times a Day.   Taking    colestipol (COLESTID) 1 g tablet Take 1 tablet by mouth 2 (Two) Times a Day.   Taking    famotidine (PEPCID) 40 MG tablet Take 1 tablet by mouth Daily.   Taking    ferrous sulfate 324 (65 Fe) MG tablet delayed-release EC tablet Take 1 tablet by mouth 2 (Two) Times a Day With Meals. For anemia   Taking    furosemide (LASIX) 40 MG tablet Take 1 tablet by mouth Daily.   Taking    gabapentin (NEURONTIN) 300 MG capsule Take 1 capsule by mouth Every Night. 30 capsule 0 Taking    levocetirizine (XYZAL) 5 MG tablet Take 1 tablet by mouth Every Evening.   Taking    [] predniSONE (DELTASONE) 10 MG tablet Take 1 tablet by mouth 2 (Two) Times a Day As Needed (rash).   Taking As Needed    acetaminophen (TYLENOL) 325 MG tablet Take 2 tablets by mouth Every 6 (Six) Hours As Needed for Mild Pain or Fever.       dextrose (GLUTOSE) 40 % gel Take 15 g by mouth As Needed for Low Blood Sugar (every 15 minutes).       glucagon (GLUCAGEN) 1 MG injection Inject 1 mg under the skin into the appropriate area as directed 1 (One) Time As Needed for Low Blood Sugar.       guaiFENesin (MUCINEX) 600 MG 12 hr tablet Take 2 tablets by mouth 2 (Two) Times a Day.       insulin degludec (Tresiba FlexTouch) 100 UNIT/ML solution pen-injector injection Inject 6 Units under the skin into the appropriate area as directed Every Night.       ipratropium-albuterol (DUO-NEB) 0.5-2.5 mg/3 ml nebulizer Take 3 mL by nebulization Every 6 (Six) Hours As Needed for Shortness of Air.       levothyroxine (SYNTHROID, LEVOTHROID) 200 MCG tablet Take 1 tablet by mouth Every Night. For hypothyroidism       Melatonin 10 MG tablet Take 1 tablet by mouth Daily As Needed (sleep).       metoprolol tartrate (LOPRESSOR) 25 MG tablet Take 1 tablet by mouth 2 (Two) Times a Day.       nystatin (MYCOSTATIN) 259231 UNIT/GM  powder Apply 1 Application topically to the appropriate area as directed Every 12 (Twelve) Hours. Abd folds       phenylephrine-mineral oil-petrolatum (PREPARATION H) 0.25-14-74.9 % ointment hemorrhoidal ointment Insert 1 Application into the rectum Every 6 (Six) Hours As Needed (hemmorhoids).       polyethylene glycol (MIRALAX) 17 g packet Take 17 g by mouth Daily As Needed (Use if senna-docusate is ineffective). 1 each 0     sennosides-docusate (PERICOLACE) 8.6-50 MG per tablet Take 2 tablets by mouth 2 (Two) Times a Day. 1 tablet 0     vitamin B-12 (CYANOCOBALAMIN) 1000 MCG tablet Take 1 tablet by mouth Daily.          Medicines:  Current Facility-Administered Medications   Medication Dose Route Frequency Provider Last Rate Last Admin    acetaminophen (TYLENOL) tablet 650 mg  650 mg Oral Q6H PRN Vandana Churchill MD        apixaban (ELIQUIS) tablet 2.5 mg  2.5 mg Oral Q12H Vandana Churchill MD   2.5 mg at 01/16/25 0953    atorvastatin (LIPITOR) tablet 40 mg  40 mg Oral Nightly Vandana Churchill MD   40 mg at 01/15/25 2030    busPIRone (BUSPAR) tablet 7.5 mg  7.5 mg Oral Q12H Vandana Churchill MD   7.5 mg at 01/16/25 0953    dextrose (D50W) (25 g/50 mL) IV injection 25 g  25 g Intravenous Q15 Min PRN Vandana Churchill MD        dextrose (GLUTOSE) oral gel 15 g  15 g Oral Q15 Min PRN Vandana Churchill MD        diphenhydrAMINE (BENADRYL) injection 25 mg  25 mg Intravenous Q6H PRN Vandana Churchill MD   25 mg at 01/14/25 1731    famotidine (PEPCID) injection 20 mg  20 mg Intravenous Daily Vandana Churchill MD   20 mg at 01/16/25 0953    furosemide (LASIX) tablet 40 mg  40 mg Oral Daily Vandana Churchill MD   40 mg at 01/16/25 0956    gabapentin (NEURONTIN) capsule 300 mg  300 mg Oral Nightly Vandana Churchill MD   300 mg at 01/15/25 2029    glucagon (GLUCAGEN) injection 1 mg  1 mg Intramuscular Q15 Min Vandana Donnelly MD        insulin glargine (LANTUS, SEMGLEE)  injection 10 Units  10 Units Subcutaneous Nightly Vandana Churchill MD   10 Units at 01/15/25 2030    Insulin Lispro (humaLOG) injection 2-7 Units  2-7 Units Subcutaneous 4x Daily AC & at Bedtime Vandana Churchill MD   2 Units at 01/16/25 0953    ipratropium-albuterol (DUO-NEB) nebulizer solution 3 mL  3 mL Nebulization Q6H PRN Vandana Churchill MD        levothyroxine (SYNTHROID, LEVOTHROID) tablet 200 mcg  200 mcg Oral Nightly Vandana Churchill MD   200 mcg at 01/15/25 2029    melatonin tablet 2.5 mg  2.5 mg Oral Nightly PRN Vandana Churchill MD   2.5 mg at 01/15/25 2030    metoprolol tartrate (LOPRESSOR) tablet 25 mg  25 mg Oral BID Vandana Churchill MD   25 mg at 01/16/25 0953    nystatin (MYCOSTATIN) powder 1 Application  1 Application Topical Q12H Vandana Churchill MD   1 Application at 01/16/25 0956    predniSONE (DELTASONE) tablet 20 mg  20 mg Oral BID With Meals Vandana Churchill MD   20 mg at 01/16/25 0953    sodium chloride 0.9 % flush 10 mL  10 mL Intravenous PRN Laurence Hua APRN        sodium zirconium cyclosilicate (LOKELMA) packet 10 g  10 g Oral BID Orlando Arroyo MD   10 g at 01/16/25 0953    vitamin B-12 (CYANOCOBALAMIN) tablet 1,000 mcg  1,000 mcg Oral Daily Vandana Churchill MD   1,000 mcg at 01/16/25 0953       Past Medical History:  Past Medical History:   Diagnosis Date    Abnormality of gait and mobility     Anemia     Anxiety     Cerebral palsy     Cognitive communication deficit     Depression     Diabetes mellitus     Disease of thyroid gland     Hyperglycemia     Hyperlipidemia     Hypertension     Hypokalemia     Insomnia     Lymphedema     Neuropathy     Urge incontinence        Past Surgical History:  History reviewed. No pertinent surgical history.    Family History  History reviewed. No pertinent family history.    Social History  Social History     Socioeconomic History    Marital status: Single   Tobacco Use    Smoking status: Never      Passive exposure: Past   Vaping Use    Vaping status: Never Used   Substance and Sexual Activity    Alcohol use: Never    Drug use: Never    Sexual activity: Not Currently       Review of Systems:  History obtained from chart review and the patient  General ROS: No fever or chills  Respiratory ROS: No cough, shortness of breath, wheezing  Cardiovascular ROS: No chest pain or palpitations  Gastrointestinal ROS: No abdominal pain or melena  Genito-Urinary ROS: No dysuria or hematuria  Psych ROS: No anxiety and depression  14 point ROS reviewed with the patient and negative except as noted above and in the HPI unless unable to obtain.    Objective:  Patient Vitals for the past 24 hrs:   BP Temp Temp src Pulse Resp SpO2   01/16/25 0746 135/94 97.9 °F (36.6 °C) Oral 111 18 96 %   01/16/25 0417 159/60 97.5 °F (36.4 °C) Oral 67 18 97 %   01/15/25 2011 155/75 98.1 °F (36.7 °C) Oral 91 18 95 %   01/15/25 1534 157/82 97.7 °F (36.5 °C) Oral 78 18 95 %   01/15/25 1143 153/82 97.5 °F (36.4 °C) Oral 76 18 95 %       Intake/Output Summary (Last 24 hours) at 1/16/2025 1041  Last data filed at 1/16/2025 0919  Gross per 24 hour   Intake 720 ml   Output 2150 ml   Net -1430 ml     General: awake/alert   Chest:  clear to auscultation bilaterally without respiratory distress  CVS: regular rate and rhythm  Abdominal: soft, nontender, positive bowel sounds  Extremities: no cyanosis or edema  Skin:  widespread rash      Labs:  Results from last 7 days   Lab Units 01/16/25  0422 01/13/25  1807   WBC 10*3/mm3 14.07* 23.35*   HEMOGLOBIN g/dL 12.6 13.3   HEMATOCRIT % 39.6 41.3   PLATELETS 10*3/mm3 323 352         Results from last 7 days   Lab Units 01/16/25  0422 01/15/25  0459 01/14/25  0924   SODIUM mmol/L 136 137 135*   POTASSIUM mmol/L 4.8 5.7* 5.6*   CHLORIDE mmol/L 102 104 103   CO2 mmol/L 23.0 22.0 22.0   BUN mg/dL 68* 64* 57*   CREATININE mg/dL 1.89* 1.90* 1.54*   CALCIUM mg/dL 9.4 8.9 9.4   EGFR mL/min/1.73 26.1* 25.9* 33.4*    BILIRUBIN mg/dL 0.2 0.2 0.2   ALK PHOS U/L 111 118* 150*   ALT (SGPT) U/L 18 18 20   AST (SGOT) U/L 11 11 12   GLUCOSE mg/dL 206* 198* 266*       Radiology:   Imaging Results (Last 72 Hours)       Procedure Component Value Units Date/Time    XR Chest 1 View [[id number]] Collected: 01/13/25 2006     Updated: 01/13/25 2010    Narrative:      XR CHEST 1 VW- 1/13/2025 6:31 PM     HISTORY: elevated WBC       COMPARISON: Chest x-ray dated 1/6/2025     FINDINGS:  Upright frontal radiograph of the chest was obtained     No lung consolidation. No pleural effusion or pneumothorax. The  cardiomediastinal silhouette and pulmonary vascularity are within normal  limits. The osseous structures and surrounding soft tissues demonstrate  no acute abnormality.       Impression:      1.  No acute process in the chest. No acute lung infiltrate.     This report was signed and finalized on 1/13/2025 8:07 PM by Dr Philipp Hoyos.               Culture:  Blood Culture   Date Value Ref Range Status   01/13/2025 No growth at 24 hours  Preliminary   01/13/2025 No growth at 24 hours  Preliminary         Assessment    Chronic kidney disease stage 4  Hyperkalemia--improved  Type 2 diabetes  History of cerebral palsy  Hypertension  Rash     Plan:   Continue Lokelma as ordered  Monitor labs      Willis Gary, APRN  1/16/2025  10:41 CST

## 2025-01-17 NOTE — THERAPY DISCHARGE NOTE
Acute Care - Physical Therapy Discharge Summary  [facility]       Patient Name: Mar Rodriguez  : 1940  MRN: [id number]    Today's Date: 2025                 Admit Date: 2025      PT Recommendation and Plan    Visit Dx:    ICD-10-CM ICD-9-CM   1. Acute hyperkalemia  E87.5 276.7   2. Urticarial rash  L50.9 708.9   3. Leukocytosis, unspecified type  D72.829 288.60   4. Chronic kidney disease, unspecified CKD stage  N18.9 585.9   5. Hyperglycemia due to diabetes mellitus  E11.65 250.02   6. Impaired mobility [Z74.09]  Z74.09 799.89        Outcome Measures       Row Name 25 0907             How much help from another person do you currently need...    Turning from your back to your side while in flat bed without using bedrails? 4  -MF      Moving from lying on back to sitting on the side of a flat bed without bedrails? 4  -MF      Moving to and from a bed to a chair (including a wheelchair)? 1  -MF      Standing up from a chair using your arms (e.g., wheelchair, bedside chair)? 2  -MF      Climbing 3-5 steps with a railing? 1  -MF      To walk in hospital room? 1  -MF      AM-PAC 6 Clicks Score (PT) 13  -MF         Functional Assessment    Outcome Measure Options AM-PAC 6 Clicks Basic Mobility (PT)  -MF                User Key  (r) = Recorded By, (t) = Taken By, (c) = Cosigned By      Initials Name Provider Type    Kath Girard PTA Physical Therapist Assistant                         PT Rehab Goals       Row Name 25 1000             Bed Mobility Goal 1 (PT)    Activity/Assistive Device (Bed Mobility Goal 1, PT) bed mobility activities, all  -AB      [place] Level/Cues Needed (Bed Mobility Goal 1, PT) independent  -AB      Time Frame (Bed Mobility Goal 1, PT) long term goal (LTG);by discharge  -AB      Progress/Outcomes (Bed Mobility Goal 1, PT) goal not met  -AB         Transfer Goal 1 (PT)    Activity/Assistive Device (Transfer Goal 1, PT)  sit-to-stand/stand-to-sit;bed-to-chair/chair-to-bed;walker, rolling  -AB      [place] Level/Cues Needed (Transfer Goal 1, PT) minimum assist (75% or more patient effort);tactile cues required;verbal cues required  -AB      Time Frame (Transfer Goal 1, PT) long term goal (LTG);by discharge  -AB      Progress/Outcome (Transfer Goal 1, PT) goal not met  -AB                User Key  (r) = Recorded By, (t) = Taken By, (c) = Cosigned By      Initials Name Provider Type Discipline    Karrie Hernandez, PTA Physical Therapist Assistant PT                        PT Discharge Summary  Anticipated Discharge Disposition (PT): skilled nursing facility  Reason for Discharge: Discharge from facility  Outcomes Achieved: Refer to plan of care for updates on goals achieved  Discharge Destination: SNF      Karrie Chavez PTA   1/17/2025

## 2025-01-18 LAB
BACTERIA SPEC AEROBE CULT: NORMAL
BACTERIA SPEC AEROBE CULT: NORMAL

## 2025-01-28 NOTE — PROGRESS NOTES
"Enter Query Response Below      Query Response: Volume overload.  Coronavirus was  incidental only.  Not contributing to her current situation.             If applicable, please update the problem list.     Patient: Mar Rodriguez        : 1940  Account: [id number]           Admit Date: 2024        How to Respond to this query:       a. Click New Note     b. Answer query within the yellow box.                c. Update the Problem List, if applicable.      If you have any questions about this query contact me at: [email or web address]     Dr. Mane/Mr. Rodriguez    84 year old female admitted  with pulmonary vascular congestion and \"viral illness with Coronavirus OC43\" per the discharge summary.     CT chest  reported \"Mild bronchial wall thickening with mild mosaic attenuation throughout the lungs which can be seen with reactive airway disease such as asthma or bronchitis.\"    Chest x-ray 1/3 reported \"Atelectatic changes or evolving infiltrate in the right lower lung.A focus of consolidation in the left lower lung laterally may represent atelectasis or evolving infiltrate.\"    Treatment per MAR:   - supplemental oxygen  - IV ceftriaxone -  - Oral Zithromax -    Please clarify if patient treated/monitored for one or more of the following:    Acute upper respiratory infection due to Coronavirus OC43  Acute lower respiratory infection due to Coronavirus OC43  Acute bronchitis due to Coronavirus OC43  Other- specify______    By submitting this query, we are merely seeking further clarification of documentation to accurately reflect all conditions that you are monitoring, evaluating, treating or that extend the hospitalization or utilize additional resources of care. Please utilize your independent clinical judgment when addressing the question(s) above.     This query and your response, once completed, will be entered into the legal medical record.    Sincerely,  Suad" Payan  Clinical Documentation Integrity Program

## 2025-03-18 ENCOUNTER — HOSPITAL ENCOUNTER (INPATIENT)
Facility: HOSPITAL | Age: 85
LOS: 4 days | Discharge: NURSING, REHAB OR LONG-TERM CARE | DRG: 189 | End: 2025-03-22
Attending: FAMILY MEDICINE | Admitting: FAMILY MEDICINE
Payer: MEDICARE

## 2025-03-18 ENCOUNTER — APPOINTMENT (OUTPATIENT)
Dept: GENERAL RADIOLOGY | Facility: HOSPITAL | Age: 85
DRG: 189 | End: 2025-03-18
Payer: MEDICARE

## 2025-03-18 DIAGNOSIS — N18.2 CHRONIC RENAL INSUFFICIENCY, STAGE 2 (MILD): ICD-10-CM

## 2025-03-18 DIAGNOSIS — J18.9 COMMUNITY ACQUIRED BILATERAL LOWER LOBE PNEUMONIA: Primary | ICD-10-CM

## 2025-03-18 DIAGNOSIS — J96.01 ACUTE RESPIRATORY FAILURE WITH HYPOXIA: ICD-10-CM

## 2025-03-18 DIAGNOSIS — N39.0 URINARY TRACT INFECTION WITHOUT HEMATURIA, SITE UNSPECIFIED: ICD-10-CM

## 2025-03-18 DIAGNOSIS — Z74.09 IMPAIRED MOBILITY: ICD-10-CM

## 2025-03-18 DIAGNOSIS — J90 PLEURAL EFFUSION ON LEFT: ICD-10-CM

## 2025-03-18 PROBLEM — J96.91 RESPIRATORY FAILURE WITH HYPOXIA: Status: ACTIVE | Noted: 2025-03-18

## 2025-03-18 LAB
ALBUMIN SERPL-MCNC: 3.6 G/DL (ref 3.5–5.2)
ALBUMIN/GLOB SERPL: 0.9 G/DL
ALP SERPL-CCNC: 146 U/L (ref 39–117)
ALT SERPL W P-5'-P-CCNC: 17 U/L (ref 1–33)
ANION GAP SERPL CALCULATED.3IONS-SCNC: 11 MMOL/L (ref 5–15)
APTT PPP: 32.8 SECONDS (ref 24.5–36)
ARTERIAL PATENCY WRIST A: POSITIVE
ARTERIAL PATENCY WRIST A: POSITIVE
AST SERPL-CCNC: 14 U/L (ref 1–32)
ATMOSPHERIC PRESS: 748 MMHG
ATMOSPHERIC PRESS: 752 MMHG
B PARAPERT DNA SPEC QL NAA+PROBE: NOT DETECTED
B PERT DNA SPEC QL NAA+PROBE: NOT DETECTED
BACTERIA UR QL AUTO: ABNORMAL /HPF
BASE EXCESS BLDA CALC-SCNC: 10.4 MMOL/L (ref 0–2)
BASE EXCESS BLDA CALC-SCNC: 11.1 MMOL/L (ref 0–2)
BASOPHILS # BLD AUTO: 0.05 10*3/MM3 (ref 0–0.2)
BASOPHILS NFR BLD AUTO: 0.4 % (ref 0–1.5)
BDY SITE: ABNORMAL
BDY SITE: ABNORMAL
BILIRUB SERPL-MCNC: 0.3 MG/DL (ref 0–1.2)
BILIRUB UR QL STRIP: NEGATIVE
BODY TEMPERATURE: 37
BODY TEMPERATURE: 37
BUN SERPL-MCNC: 30 MG/DL (ref 8–23)
BUN/CREAT SERPL: 20.5 (ref 7–25)
C PNEUM DNA NPH QL NAA+NON-PROBE: NOT DETECTED
CALCIUM SPEC-SCNC: 9.6 MG/DL (ref 8.6–10.5)
CHLORIDE SERPL-SCNC: 95 MMOL/L (ref 98–107)
CK SERPL-CCNC: 40 U/L (ref 20–180)
CLARITY UR: CLEAR
CO2 SERPL-SCNC: 35 MMOL/L (ref 22–29)
COHGB MFR BLD: 1.8 % (ref 0–5)
COLOR UR: YELLOW
CREAT SERPL-MCNC: 1.46 MG/DL (ref 0.57–1)
D-LACTATE SERPL-SCNC: 1.2 MMOL/L (ref 0.5–2)
DEPRECATED RDW RBC AUTO: 54.6 FL (ref 37–54)
EGFRCR SERPLBLD CKD-EPI 2021: 35.6 ML/MIN/1.73
EOSINOPHIL # BLD AUTO: 0.16 10*3/MM3 (ref 0–0.4)
EOSINOPHIL NFR BLD AUTO: 1.3 % (ref 0.3–6.2)
EPAP: 8
ERYTHROCYTE [DISTWIDTH] IN BLOOD BY AUTOMATED COUNT: 14.8 % (ref 12.3–15.4)
FLUAV SUBTYP SPEC NAA+PROBE: NOT DETECTED
FLUBV RNA ISLT QL NAA+PROBE: NOT DETECTED
GAS FLOW AIRWAY: 3 LPM
GEN 5 1HR TROPONIN T REFLEX: 16 NG/L
GLOBULIN UR ELPH-MCNC: 4.1 GM/DL
GLUCOSE SERPL-MCNC: 139 MG/DL (ref 65–99)
GLUCOSE UR STRIP-MCNC: NEGATIVE MG/DL
HADV DNA SPEC NAA+PROBE: NOT DETECTED
HCO3 BLDA-SCNC: 37.8 MMOL/L (ref 20–26)
HCO3 BLDA-SCNC: 38 MMOL/L (ref 20–26)
HCOV 229E RNA SPEC QL NAA+PROBE: NOT DETECTED
HCOV HKU1 RNA SPEC QL NAA+PROBE: NOT DETECTED
HCOV NL63 RNA SPEC QL NAA+PROBE: NOT DETECTED
HCOV OC43 RNA SPEC QL NAA+PROBE: NOT DETECTED
HCT VFR BLD AUTO: 40 % (ref 34–46.6)
HCT VFR BLD CALC: 38.6 % (ref 38–51)
HGB BLD-MCNC: 12.5 G/DL (ref 12–15.9)
HGB BLDA-MCNC: 12.6 G/DL (ref 12–16)
HGB UR QL STRIP.AUTO: NEGATIVE
HMPV RNA NPH QL NAA+NON-PROBE: NOT DETECTED
HPIV1 RNA ISLT QL NAA+PROBE: NOT DETECTED
HPIV2 RNA SPEC QL NAA+PROBE: NOT DETECTED
HPIV3 RNA NPH QL NAA+PROBE: NOT DETECTED
HPIV4 P GENE NPH QL NAA+PROBE: NOT DETECTED
HYALINE CASTS UR QL AUTO: ABNORMAL /LPF
IMM GRANULOCYTES # BLD AUTO: 0.09 10*3/MM3 (ref 0–0.05)
IMM GRANULOCYTES NFR BLD AUTO: 0.7 % (ref 0–0.5)
INHALED O2 CONCENTRATION: 36 %
INR PPP: 1.08 (ref 0.91–1.09)
IPAP: 18
KETONES UR QL STRIP: NEGATIVE
LEUKOCYTE ESTERASE UR QL STRIP.AUTO: ABNORMAL
LYMPHOCYTES # BLD AUTO: 1.5 10*3/MM3 (ref 0.7–3.1)
LYMPHOCYTES NFR BLD AUTO: 11.8 % (ref 19.6–45.3)
Lab: ABNORMAL
Lab: ABNORMAL
M PNEUMO IGG SER IA-ACNC: NOT DETECTED
MAGNESIUM SERPL-MCNC: 1.9 MG/DL (ref 1.6–2.4)
MCH RBC QN AUTO: 32.1 PG (ref 26.6–33)
MCHC RBC AUTO-ENTMCNC: 31.3 G/DL (ref 31.5–35.7)
MCV RBC AUTO: 102.6 FL (ref 79–97)
METHGB BLD QL: 0.1 % (ref 0–3)
MODALITY: ABNORMAL
MODALITY: ABNORMAL
MONOCYTES # BLD AUTO: 0.51 10*3/MM3 (ref 0.1–0.9)
MONOCYTES NFR BLD AUTO: 4 % (ref 5–12)
NEUTROPHILS NFR BLD AUTO: 10.35 10*3/MM3 (ref 1.7–7)
NEUTROPHILS NFR BLD AUTO: 81.8 % (ref 42.7–76)
NITRITE UR QL STRIP: NEGATIVE
NRBC BLD AUTO-RTO: 0 /100 WBC (ref 0–0.2)
NT-PROBNP SERPL-MCNC: 1459 PG/ML (ref 0–1800)
OXYHGB MFR BLDV: 95.4 % (ref 94–99)
PCO2 BLDA: 58.2 MM HG (ref 35–45)
PCO2 BLDA: 65.6 MM HG (ref 35–45)
PCO2 TEMP ADJ BLD: 58.2 MM HG (ref 35–45)
PCO2 TEMP ADJ BLD: 65.6 MM HG (ref 35–45)
PH BLDA: 7.37 PH UNITS (ref 7.35–7.45)
PH BLDA: 7.42 PH UNITS (ref 7.35–7.45)
PH UR STRIP.AUTO: 5.5 [PH] (ref 5–8)
PH, TEMP CORRECTED: 7.37 PH UNITS (ref 7.35–7.45)
PH, TEMP CORRECTED: 7.42 PH UNITS (ref 7.35–7.45)
PLATELET # BLD AUTO: 323 10*3/MM3 (ref 140–450)
PMV BLD AUTO: 10.4 FL (ref 6–12)
PO2 BLD: 350 MM[HG] (ref 0–500)
PO2 BLDA: 126 MM HG (ref 83–108)
PO2 BLDA: 82.6 MM HG (ref 83–108)
PO2 TEMP ADJ BLD: 126 MM HG (ref 83–108)
PO2 TEMP ADJ BLD: 82.6 MM HG (ref 83–108)
POTASSIUM BLDA-SCNC: 3.4 MMOL/L (ref 3.5–5.2)
POTASSIUM SERPL-SCNC: 3.6 MMOL/L (ref 3.5–5.2)
PROT SERPL-MCNC: 7.7 G/DL (ref 6–8.5)
PROT UR QL STRIP: ABNORMAL
PROTHROMBIN TIME: 14.6 SECONDS (ref 11.8–14.8)
RBC # BLD AUTO: 3.9 10*6/MM3 (ref 3.77–5.28)
RBC # UR STRIP: ABNORMAL /HPF
REF LAB TEST METHOD: ABNORMAL
RHINOVIRUS RNA SPEC NAA+PROBE: NOT DETECTED
RSV RNA NPH QL NAA+NON-PROBE: NOT DETECTED
SAO2 % BLDCOA: 97.2 % (ref 94–99)
SAO2 % BLDCOA: 99.5 % (ref 94–99)
SARS-COV-2 RNA RESP QL NAA+PROBE: NOT DETECTED
SET MECH RESP RATE: 20
SODIUM BLDA-SCNC: 142 MMOL/L (ref 136–145)
SODIUM SERPL-SCNC: 141 MMOL/L (ref 136–145)
SP GR UR STRIP: 1.01 (ref 1–1.03)
SQUAMOUS #/AREA URNS HPF: ABNORMAL /HPF
TROPONIN T % DELTA: -6
TROPONIN T NUMERIC DELTA: -1 NG/L
TROPONIN T SERPL HS-MCNC: 17 NG/L
UROBILINOGEN UR QL STRIP: ABNORMAL
VENTILATOR MODE: ABNORMAL
VENTILATOR MODE: ABNORMAL
WBC # UR STRIP: ABNORMAL /HPF
WBC NRBC COR # BLD AUTO: 12.66 10*3/MM3 (ref 3.4–10.8)

## 2025-03-18 PROCEDURE — 36600 WITHDRAWAL OF ARTERIAL BLOOD: CPT

## 2025-03-18 PROCEDURE — P9612 CATHETERIZE FOR URINE SPEC: HCPCS

## 2025-03-18 PROCEDURE — 85025 COMPLETE CBC W/AUTO DIFF WBC: CPT | Performed by: FAMILY MEDICINE

## 2025-03-18 PROCEDURE — 25010000002 AZITHROMYCIN PER 500 MG: Performed by: FAMILY MEDICINE

## 2025-03-18 PROCEDURE — 94640 AIRWAY INHALATION TREATMENT: CPT

## 2025-03-18 PROCEDURE — 83735 ASSAY OF MAGNESIUM: CPT | Performed by: FAMILY MEDICINE

## 2025-03-18 PROCEDURE — 85730 THROMBOPLASTIN TIME PARTIAL: CPT | Performed by: FAMILY MEDICINE

## 2025-03-18 PROCEDURE — 93005 ELECTROCARDIOGRAM TRACING: CPT | Performed by: FAMILY MEDICINE

## 2025-03-18 PROCEDURE — 80053 COMPREHEN METABOLIC PANEL: CPT | Performed by: FAMILY MEDICINE

## 2025-03-18 PROCEDURE — 25810000003 SODIUM CHLORIDE 0.9 % SOLUTION 250 ML FLEX CONT: Performed by: FAMILY MEDICINE

## 2025-03-18 PROCEDURE — 83880 ASSAY OF NATRIURETIC PEPTIDE: CPT | Performed by: FAMILY MEDICINE

## 2025-03-18 PROCEDURE — 82375 ASSAY CARBOXYHB QUANT: CPT

## 2025-03-18 PROCEDURE — 83605 ASSAY OF LACTIC ACID: CPT | Performed by: FAMILY MEDICINE

## 2025-03-18 PROCEDURE — 94660 CPAP INITIATION&MGMT: CPT

## 2025-03-18 PROCEDURE — 99285 EMERGENCY DEPT VISIT HI MDM: CPT

## 2025-03-18 PROCEDURE — 82550 ASSAY OF CK (CPK): CPT | Performed by: FAMILY MEDICINE

## 2025-03-18 PROCEDURE — 36415 COLL VENOUS BLD VENIPUNCTURE: CPT

## 2025-03-18 PROCEDURE — 94799 UNLISTED PULMONARY SVC/PX: CPT

## 2025-03-18 PROCEDURE — 71045 X-RAY EXAM CHEST 1 VIEW: CPT

## 2025-03-18 PROCEDURE — 82805 BLOOD GASES W/O2 SATURATION: CPT

## 2025-03-18 PROCEDURE — 83050 HGB METHEMOGLOBIN QUAN: CPT

## 2025-03-18 PROCEDURE — 93010 ELECTROCARDIOGRAM REPORT: CPT | Performed by: INTERNAL MEDICINE

## 2025-03-18 PROCEDURE — 85610 PROTHROMBIN TIME: CPT | Performed by: FAMILY MEDICINE

## 2025-03-18 PROCEDURE — 87040 BLOOD CULTURE FOR BACTERIA: CPT | Performed by: FAMILY MEDICINE

## 2025-03-18 PROCEDURE — 81001 URINALYSIS AUTO W/SCOPE: CPT | Performed by: FAMILY MEDICINE

## 2025-03-18 PROCEDURE — 84484 ASSAY OF TROPONIN QUANT: CPT | Performed by: FAMILY MEDICINE

## 2025-03-18 PROCEDURE — 82803 BLOOD GASES ANY COMBINATION: CPT

## 2025-03-18 PROCEDURE — 25010000002 CEFTRIAXONE PER 250 MG: Performed by: FAMILY MEDICINE

## 2025-03-18 PROCEDURE — 0202U NFCT DS 22 TRGT SARS-COV-2: CPT | Performed by: FAMILY MEDICINE

## 2025-03-18 RX ORDER — ONDANSETRON 2 MG/ML
4 INJECTION INTRAMUSCULAR; INTRAVENOUS EVERY 6 HOURS PRN
Status: DISCONTINUED | OUTPATIENT
Start: 2025-03-18 | End: 2025-03-22 | Stop reason: HOSPADM

## 2025-03-18 RX ORDER — METOPROLOL TARTRATE 25 MG/1
25 TABLET, FILM COATED ORAL EVERY 12 HOURS SCHEDULED
Status: DISCONTINUED | OUTPATIENT
Start: 2025-03-19 | End: 2025-03-22 | Stop reason: HOSPADM

## 2025-03-18 RX ORDER — SODIUM CHLORIDE 0.9 % (FLUSH) 0.9 %
10 SYRINGE (ML) INJECTION EVERY 12 HOURS SCHEDULED
Status: DISCONTINUED | OUTPATIENT
Start: 2025-03-18 | End: 2025-03-22 | Stop reason: HOSPADM

## 2025-03-18 RX ORDER — SODIUM CHLORIDE 9 MG/ML
40 INJECTION, SOLUTION INTRAVENOUS AS NEEDED
Status: DISCONTINUED | OUTPATIENT
Start: 2025-03-18 | End: 2025-03-22 | Stop reason: HOSPADM

## 2025-03-18 RX ORDER — ACETAMINOPHEN 325 MG/1
650 TABLET ORAL EVERY 4 HOURS PRN
Status: DISCONTINUED | OUTPATIENT
Start: 2025-03-18 | End: 2025-03-22 | Stop reason: HOSPADM

## 2025-03-18 RX ORDER — SODIUM CHLORIDE 0.9 % (FLUSH) 0.9 %
10 SYRINGE (ML) INJECTION AS NEEDED
Status: DISCONTINUED | OUTPATIENT
Start: 2025-03-18 | End: 2025-03-22 | Stop reason: HOSPADM

## 2025-03-18 RX ORDER — IPRATROPIUM BROMIDE AND ALBUTEROL SULFATE 2.5; .5 MG/3ML; MG/3ML
3 SOLUTION RESPIRATORY (INHALATION)
Status: DISCONTINUED | OUTPATIENT
Start: 2025-03-18 | End: 2025-03-22 | Stop reason: HOSPADM

## 2025-03-18 RX ORDER — ACETAMINOPHEN 160 MG/5ML
650 SOLUTION ORAL EVERY 4 HOURS PRN
Status: DISCONTINUED | OUTPATIENT
Start: 2025-03-18 | End: 2025-03-22 | Stop reason: HOSPADM

## 2025-03-18 RX ORDER — ACETAMINOPHEN 650 MG/1
650 SUPPOSITORY RECTAL EVERY 4 HOURS PRN
Status: DISCONTINUED | OUTPATIENT
Start: 2025-03-18 | End: 2025-03-22 | Stop reason: HOSPADM

## 2025-03-18 RX ADMIN — Medication 10 ML: at 20:03

## 2025-03-18 RX ADMIN — IPRATROPIUM BROMIDE AND ALBUTEROL SULFATE 3 ML: .5; 3 SOLUTION RESPIRATORY (INHALATION) at 19:10

## 2025-03-18 RX ADMIN — AZITHROMYCIN DIHYDRATE 500 MG: 500 INJECTION, POWDER, LYOPHILIZED, FOR SOLUTION INTRAVENOUS at 14:10

## 2025-03-18 RX ADMIN — CEFTRIAXONE SODIUM 2000 MG: 2 INJECTION, POWDER, FOR SOLUTION INTRAMUSCULAR; INTRAVENOUS at 12:25

## 2025-03-18 NOTE — CASE MANAGEMENT/SOCIAL WORK
Discharge Planning Assessment  [facility]     Patient Name: Mar Rodriguez  MRN: [id number]  Today's Date: 3/18/2025    Admit Date: 3/18/2025        Discharge Needs Assessment       Row Name 03/18/25 1553       Living Environment    People in Home facility resident    Current Living Arrangements extended care facility    Potentially Unsafe Housing Conditions none    Primary Care Provided by other (see comments)    Provides Primary Care For no one, unable/limited ability to care for self    Family Caregiver if Needed other relative(s)    Family Caregiver Names Zamzam Marcus (SANDEE)    Quality of Family Relationships helpful;involved    Able to Return to Prior Arrangements yes       Resource/Environmental Concerns    Resource/Environmental Concerns none    Transportation Concerns none       Transition Planning    Patient/Family Anticipates Transition to long-term care facility    Patient/Family Anticipated Services at Transition skilled nursing    Transportation Anticipated health plan transportation;family or friend will provide       Discharge Needs Assessment    Current Outpatient/Agency/Support Group skilled nursing facility    Equipment Currently Used at Home none    Concerns to be Addressed denies needs/concerns at this time    Anticipated Changes Related to Illness none    Equipment Needed After Discharge none    Outpatient/Agency/Support Group Needs skilled nursing facility    Discharge Facility/Level of Care Needs nursing facility, skilled    Discharge Coordination/Progress PT is a resident of [facility]. PT has a bed hold and may return to SNF upon dc. No precert is needed. SNF pharmacy ([facility]) is listed in [facility]enes. [facility] Phone: [phone]  Fax: [phone]                   Discharge Plan    No documentation.                 Selected Continued Care - Prior Encounters Includes continued care and service providers with selected services from prior encounters from 12/18/2024 to  3/18/2025      Discharged on 1/16/2025 Admission date: 1/13/2025 - Discharge disposition: Skilled Nursing Facility (DC - External)      Destination       Service Provider Services Address Phone Fax Patient Preferred    [facility] [address] [facility] , [place] [address] [phone] --                      Discharged on 1/6/2025 Admission date: 12/31/2024 - Discharge disposition: Skilled Nursing Facility (DC - External)      Destination       Service Provider Services Address Phone Fax Patient Preferred    [facility] [address] [facility] , [place] [address] [phone] --                             Demographic Summary    No documentation.                  Functional Status    No documentation.                  Psychosocial    No documentation.                  Abuse/Neglect    No documentation.                  Legal    No documentation.                  Substance Abuse    No documentation.                  Patient Forms    No documentation.                     LEANA Gross

## 2025-03-18 NOTE — ED PROVIDER NOTES
HPI:    Patient is a 84-year-old white female who presents to the emergency room with worsening shortness of breath that got worse since last night.  She is normally has chronic bilateral lower extremity lymphedema.  She is does not usually require oxygen but currently is on 3 L here in the emergency room.  Patient comes from [facility].  While there the facility gave her a breathing treatment but did not show much improvement.  Patient has a history of fluid retention like CHF.  Patient also states that she felt like her abdomen was getting more distended.  However she is not complaining of any nausea vomiting or diarrhea.  There is been no trauma.  No headache or visual changes.  No fevers chills or night sweats.  Patient states she has had a nonproductive cough this been going on less than 24 hours.    REVIEW OF SYSTEMS  CONSTITUTIONAL: Positive for generalized fatigue   EYES:  No complaints of discharge   ENT: No complaints of sore throat or ear pain  CARDIOVASCULAR: Positive for bilateral lower extremity swelling.  RESPIRATORY: Positive for shortness of breath and nonproductive cough GI:  No complaints of abdominal pain, nausea, vomiting, or diarrhea  MUSCULOSKELETAL:  No complaints of back pain  SKIN:  No complaints of rash  NEUROLOGIC:  No complaints of headache, focal weakness, or sensory changes  ENDOCRINE:  No complaints of polyuria or polydipsia  LYMPHATIC:  No complaints of swollen glands  GENITOURINARY: No complaints of urinary frequency or hematuria        PAST MEDICAL HISTORY  Past Medical History:   Diagnosis Date    Abnormality of gait and mobility     Anemia     Anxiety     Cerebral palsy     Cognitive communication deficit     Depression     Diabetes mellitus     Disease of thyroid gland     Hyperglycemia     Hyperlipidemia     Hypertension     Hypokalemia     Insomnia     Lymphedema     Neuropathy     Urge incontinence        FAMILY HISTORY  No family history on  file.    SOCIAL HISTORY  Social History     Socioeconomic History    Marital status: Single   Tobacco Use    Smoking status: Never     Passive exposure: Past   Vaping Use    Vaping status: Never Used   Substance and Sexual Activity    Alcohol use: Never    Drug use: Never    Sexual activity: Not Currently       IMMUNIZATION HISTORY  Deferred to primary care physician.    SURGICAL HISTORY  No past surgical history on file.    CURRENT MEDICATIONS    Current Facility-Administered Medications:     azithromycin (ZITHROMAX) 500 mg in sodium chloride 0.9 % 250 mL IVPB-VTB, 500 mg, Intravenous, Once, Homer Saunders Jr., MD    Current Outpatient Medications:     acetaminophen (TYLENOL) 325 MG tablet, Take 2 tablets by mouth Every 6 (Six) Hours As Needed for Mild Pain or Fever., Disp: , Rfl:     apixaban (ELIQUIS) 2.5 MG tablet tablet, Take 1 tablet by mouth Every 12 (Twelve) Hours. Indications: Atrial Fibrillation, Disp: 60 tablet, Rfl: 0    atorvastatin (LIPITOR) 40 MG tablet, Take 1 tablet by mouth Every Night., Disp: , Rfl:     betamethasone dipropionate (DIPROSONE) 0.05 % cream, Apply 1 Application topically to the appropriate area as directed Daily.  Apply to rash every shift, Disp: , Rfl:     busPIRone (BUSPAR) 7.5 MG tablet, Take 1 tablet by mouth 2 (Two) Times a Day. For anxiety, Disp: , Rfl:     dextrose (GLUTOSE) 40 % gel, Take 15 g by mouth As Needed for Low Blood Sugar (every 15 minutes)., Disp: , Rfl:     furosemide (LASIX) 40 MG tablet, Take 1 tablet by mouth Daily., Disp: , Rfl:     gabapentin (NEURONTIN) 300 MG capsule, Take 1 capsule by mouth Every Night., Disp: 30 capsule, Rfl: 0    insulin degludec (Tresiba FlexTouch) 100 UNIT/ML solution pen-injector injection, Inject 6 Units under the skin into the appropriate area as directed Every Night., Disp: , Rfl:     ipratropium-albuterol (DUO-NEB) 0.5-2.5 mg/3 ml nebulizer, Take 3 mL by nebulization Every 6 (Six) Hours As Needed for Shortness of Air., Disp:  ", Rfl:     levothyroxine (SYNTHROID, LEVOTHROID) 200 MCG tablet, Take 1 tablet by mouth Every Night. For hypothyroidism, Disp: , Rfl:     Melatonin 10 MG tablet, Take 1 tablet by mouth Daily As Needed (sleep)., Disp: , Rfl:     metoprolol tartrate (LOPRESSOR) 25 MG tablet, Take 1 tablet by mouth 2 (Two) Times a Day., Disp: , Rfl:     nystatin (MYCOSTATIN) 613361 UNIT/GM powder, Apply 1 Application topically to the appropriate area as directed Every 12 (Twelve) Hours. Abd folds, Disp: , Rfl:     phenylephrine-mineral oil-petrolatum (PREPARATION H) 0.25-14-74.9 % ointment hemorrhoidal ointment, Insert 1 Application into the rectum Every 6 (Six) Hours As Needed (hemmorhoids)., Disp: , Rfl:     polyethylene glycol (MIRALAX) 17 g packet, Take 17 g by mouth Daily As Needed (Use if senna-docusate is ineffective)., Disp: 1 each, Rfl: 0    vitamin B-12 (CYANOCOBALAMIN) 1000 MCG tablet, Take 1 tablet by mouth Daily., Disp: , Rfl:     ALLERGIES  Allergies   Allergen Reactions    Hydralazine Hcl Hives and Rash    Nsaids Other (See Comments)     No NSAIDS r/t renal disease         Respiratory Exam    VITAL SIGNS:   /82   Pulse 86   Temp 97.6 °F (36.4 °C) (Oral)   Resp 22   Ht 165.1 cm (65\")   Wt 132 kg (291 lb 6.4 oz)   SpO2 94%   BMI 48.49 kg/m²     Constitutional: Patient is alert and in no  distress.  Patient with generalized fatigue and discomfort.    ENT: There is a normal pharynx with no acute erythema or exudate and oral mucosa is moist.  Nose is clear with no drainage.  Tympanic membranes intact and non-erythemic    Cardiovascular: S1-S2 irregularly irregular rate and rhythm with a murmur.  Bilateral lower extremity edema was noted.    Respiratory: Bibasilar rales and decreased breath sounds in both lung fields.    Abdomen: Soft, nontender.  Bowel sounds are normal in all 4 quadrants.  There is no rebound or guarding noted.  There is no abdominal distention or hepatosplenomegaly..    Genitourinary: Patient " is voiding appropriately.    Integument: Chronic venous stasis changes bilateral lower legs to the proximal third of the shins bilaterally    Dillan Coma Scale: Total score 15    Neurological: Patient is alert and oriented x4 and no acute findings noted.  Speech is fluent and cognition is normal.  No evidence of acute CVA.  Cranial nerves II through XII intact.  Patient with normal motor function as well as reflexes and sensation.    Psychiatric: Normal affect and mood      RADIOLOGY/PROCEDURES    XR Chest 1 View   Final Result   1.. Expiratory chest. Patchy bibasilar airspace disease with   differential as above.   2. There is blunting of the left lateral costophrenic angle suggesting   either a small effusion or pleural thickening.       This report was signed and finalized on 3/18/2025 10:52 AM by Dr. Jay Nova MD.                FUTURE APPOINTMENTS     No future appointments.       EKG (reviewed and interpreted by me):  Atrial fibrillation with a ventricular rate of 74.  No acute ST segment elevation or depression noted        COURSE & MEDICAL DECISION MAKING     Patient's partial differential diagnosis can include:    congestive heart failure, asthma exacerbation, viral pneumonia, bacterial pneumonia, pneumonitis, bronchitis, bronchiolitis, COPD exacerbation, pulmonary embolism, pneumothorax, pleural effusion, pulmonary edema, empyema,  atelectasis,viral pneumonia, and others      With patient's location and respiratory hypoxia hypercapnia and the fact the patient is oxygen requirement 3 L that she normally does not require she will need to be admitted to the hospital.  Awaiting the remaining laboratory radiological test.    ABG shows respiratory hypoxia and hypercapnia.  This along with the sounds of her lungs I do feel BiPAP will be appropriate for her.      Chest x-ray shows a bibasilar infiltrate consistent with early pneumonia.  Will give Rocephin and azithromycin IV this supports the need for  admission due to low oxygen requiring and bilateral lower lobe pneumonia.    Patient noted to have a slight leukocytosis with a white blood cell count of 12.66.  Troponin is stable and flat lactic normal.    Discussed case with Victor Manuel Francis who is covering for Dr. Churchill (the patient's PCP).  The patient will be admitted to Dr. Churchill with  Victor Manuel Francis being aware    Patient's level of risk: Moderate        CRITICAL CARE    CRITICAL CARE: No CRITICAL CARE TIME: None      The patient's last clinical visit to PCP in the [facility] electronic old medical record was reviewed by me:     Also Old charts were reviewed per [facility] EMR.  Pertinent details are summarized above.  All laboratory, radiologic, and EKG studies that were performed in the Emergency Department were a necessary part of the evaluation needed to exclude unstable or emergent medical conditions:     Patient was hemodynamically and neurologically stable in the ED.   Pertinent studies were reviewed as above.     Recent Results (from the past 24 hours)   ECG 12 Lead Dyspnea    Collection Time: 03/18/25 10:22 AM   Result Value Ref Range    QT Interval 384 ms    QTC Interval 426 ms   Comprehensive Metabolic Panel    Collection Time: 03/18/25 10:55 AM    Specimen: Blood   Result Value Ref Range    Glucose 139 (H) 65 - 99 mg/dL    BUN 30 (H) 8 - 23 mg/dL    Creatinine 1.46 (H) 0.57 - 1.00 mg/dL    Sodium 141 136 - 145 mmol/L    Potassium 3.6 3.5 - 5.2 mmol/L    Chloride 95 (L) 98 - 107 mmol/L    CO2 35.0 (H) 22.0 - 29.0 mmol/L    Calcium 9.6 8.6 - 10.5 mg/dL    Total Protein 7.7 6.0 - 8.5 g/dL    Albumin 3.6 3.5 - 5.2 g/dL    ALT (SGPT) 17 1 - 33 U/L    AST (SGOT) 14 1 - 32 U/L    Alkaline Phosphatase 146 (H) 39 - 117 U/L    Total Bilirubin 0.3 0.0 - 1.2 mg/dL    Globulin 4.1 gm/dL    A/G Ratio 0.9 g/dL    BUN/Creatinine Ratio 20.5 7.0 - 25.0    Anion Gap 11.0 5.0 - 15.0 mmol/L    eGFR 35.6 (L) >60.0 mL/min/1.73   Protime-INR    Collection Time: 03/18/25 10:55  AM    Specimen: Blood   Result Value Ref Range    Protime 14.6 11.8 - 14.8 Seconds    INR 1.08 0.91 - 1.09   aPTT    Collection Time: 03/18/25 10:55 AM    Specimen: Blood   Result Value Ref Range    PTT 32.8 24.5 - 36.0 seconds   Lactic Acid, Plasma    Collection Time: 03/18/25 10:55 AM    Specimen: Blood   Result Value Ref Range    Lactate 1.2 0.5 - 2.0 mmol/L   High Sensitivity Troponin T    Collection Time: 03/18/25 10:55 AM    Specimen: Blood   Result Value Ref Range    HS Troponin T 17 (H) <14 ng/L   BNP    Collection Time: 03/18/25 10:55 AM    Specimen: Blood   Result Value Ref Range    proBNP 1,459.0 0.0 - 1,800.0 pg/mL   Magnesium    Collection Time: 03/18/25 10:55 AM    Specimen: Blood   Result Value Ref Range    Magnesium 1.9 1.6 - 2.4 mg/dL   CK    Collection Time: 03/18/25 10:55 AM    Specimen: Blood   Result Value Ref Range    Creatine Kinase 40 20 - 180 U/L   CBC Auto Differential    Collection Time: 03/18/25 10:55 AM    Specimen: Blood   Result Value Ref Range    WBC 12.66 (H) 3.40 - 10.80 10*3/mm3    RBC 3.90 3.77 - 5.28 10*6/mm3    Hemoglobin 12.5 12.0 - 15.9 g/dL    Hematocrit 40.0 34.0 - 46.6 %    .6 (H) 79.0 - 97.0 fL    MCH 32.1 26.6 - 33.0 pg    MCHC 31.3 (L) 31.5 - 35.7 g/dL    RDW 14.8 12.3 - 15.4 %    RDW-SD 54.6 (H) 37.0 - 54.0 fl    MPV 10.4 6.0 - 12.0 fL    Platelets 323 140 - 450 10*3/mm3    Neutrophil % 81.8 (H) 42.7 - 76.0 %    Lymphocyte % 11.8 (L) 19.6 - 45.3 %    Monocyte % 4.0 (L) 5.0 - 12.0 %    Eosinophil % 1.3 0.3 - 6.2 %    Basophil % 0.4 0.0 - 1.5 %    Immature Grans % 0.7 (H) 0.0 - 0.5 %    Neutrophils, Absolute 10.35 (H) 1.70 - 7.00 10*3/mm3    Lymphocytes, Absolute 1.50 0.70 - 3.10 10*3/mm3    Monocytes, Absolute 0.51 0.10 - 0.90 10*3/mm3    Eosinophils, Absolute 0.16 0.00 - 0.40 10*3/mm3    Basophils, Absolute 0.05 0.00 - 0.20 10*3/mm3    Immature Grans, Absolute 0.09 (H) 0.00 - 0.05 10*3/mm3    nRBC 0.0 0.0 - 0.2 /100 WBC   Respiratory Panel PCR  w/COVID-19(SARS-CoV-2) [facility]Y In-House, NP Swab in UTM/VTM, 2 HR TAT - Swab, Nasopharynx    Collection Time: 03/18/25 10:59 AM    Specimen: Nasopharynx; Swab   Result Value Ref Range    ADENOVIRUS, PCR Not Detected Not Detected    Coronavirus 229E Not Detected Not Detected    Coronavirus HKU1 Not Detected Not Detected    Coronavirus NL63 Not Detected Not Detected    Coronavirus OC43 Not Detected Not Detected    COVID19 Not Detected Not Detected - Ref. Range    Human Metapneumovirus Not Detected Not Detected    Human Rhinovirus/Enterovirus Not Detected Not Detected    Influenza A PCR Not Detected Not Detected    Influenza B PCR Not Detected Not Detected    Parainfluenza Virus 1 Not Detected Not Detected    Parainfluenza Virus 2 Not Detected Not Detected    Parainfluenza Virus 3 Not Detected Not Detected    Parainfluenza Virus 4 Not Detected Not Detected    RSV, PCR Not Detected Not Detected    Bordetella pertussis pcr Not Detected Not Detected    Bordetella parapertussis PCR Not Detected Not Detected    Chlamydophila pneumoniae PCR Not Detected Not Detected    Mycoplasma pneumo by PCR Not Detected Not Detected   Blood Gas, Arterial With Co-Ox    Collection Time: 03/18/25 11:17 AM    Specimen: Arterial Blood   Result Value Ref Range    Site Arterial Line     Saud's Test Positive     pH, Arterial 7.421 7.350 - 7.450 pH units    pCO2, Arterial 58.2 (H) 35.0 - 45.0 mm Hg    pO2, Arterial 82.6 (L) 83.0 - 108.0 mm Hg    HCO3, Arterial 37.8 (H) 20.0 - 26.0 mmol/L    Base Excess, Arterial 11.1 (H) 0.0 - 2.0 mmol/L    O2 Saturation, Arterial 97.2 94.0 - 99.0 %    Hemoglobin, Blood Gas 12.6 12 - 16 g/dL    Hematocrit, Blood Gas 38.6 38.0 - 51.0 %    Oxyhemoglobin 95.4 94 - 99 %    Methemoglobin 0.10 0.00 - 3.00 %    Carboxyhemoglobin 1.8 0 - 5 %    Temperature 37.0     Sodium, Arterial 142 136 - 145 mmol/L    Potassium, Arterial 3.4 (L) 3.5 - 5.2 mmol/L    Barometric Pressure for Blood Gas 752 mmHg     Modality Nasal Cannula     Flow Rate 3.0 lpm    Ventilator Mode NA     Collected by [id number]     pH, Temp Corrected 7.421 7.350 - 7.450 pH Units    pCO2, Temperature Corrected 58.2 (H) 35 - 45 mm Hg    pO2, Temperature Corrected 82.6 (L) 83 - 108 mm Hg   High Sensitivity Troponin T 1Hr    Collection Time: 03/18/25 12:24 PM    Specimen: Blood   Result Value Ref Range    HS Troponin T 16 (H) <14 ng/L    Troponin T Numeric Delta -1 ng/L    Troponin T % Delta -6 Abnormal if >/= 20%   Urinalysis With Culture If Indicated - Urine, Catheter    Collection Time: 03/18/25 12:46 PM    Specimen: Urine, Catheter   Result Value Ref Range    Color, UA Yellow Yellow, Straw    Appearance, UA Clear Clear    pH, UA 5.5 5.0 - 8.0    Specific Gravity, UA 1.014 1.005 - 1.030    Glucose, UA Negative Negative    Ketones, UA Negative Negative    Bilirubin, UA Negative Negative    Blood, UA Negative Negative    Protein, UA 30 mg/dL (1+) (A) Negative    Leuk Esterase, UA Trace (A) Negative    Nitrite, UA Negative Negative    Urobilinogen, UA 0.2 E.U./dL 0.2 - 1.0 E.U./dL   Urinalysis, Microscopic Only - Urine, Catheter    Collection Time: 03/18/25 12:46 PM    Specimen: Urine, Catheter   Result Value Ref Range    RBC, UA 0-2 None Seen, 0-2 /HPF    WBC, UA 3-5 (A) None Seen, 0-2 /HPF    Bacteria, UA 4+ (A) None Seen /HPF    Squamous Epithelial Cells, UA 0-2 None Seen, 0-2 /HPF    Hyaline Casts, UA None Seen None Seen /LPF    Methodology Automated Microscopy        The patient received:  Medications   azithromycin (ZITHROMAX) 500 mg in sodium chloride 0.9 % 250 mL IVPB-VTB (has no administration in time range)   cefTRIAXone (ROCEPHIN) 2,000 mg in sodium chloride 0.9 % 100 mL MBP (2,000 mg Intravenous New Bag 3/18/25 1225)            ED Disposition       ED Disposition   Decision to Admit    Condition   --    Comment   Level of Care: Med/Surg [1]   Diagnosis: Respiratory failure with hypoxia [388100]   Admitting Physician: SOMMER ANDRADE  [[id number]]   Attending Physician: SOMMER ANDRADE [[id number]]   Certification: I Certify That Inpatient Hospital Services Are Medically Necessary For Greater Than 2 Midnights                     Dragon disclaimer:  Part of this note may be an electronic transcription/translation of spoken language to printed text using the Dragon Dictation System.     I have reviewed the patient’s prescription history via a prescription monitoring program.  This information is consistent with my knowledge of the patient’s controlled substance use history.    FINAL IMPRESSION   Diagnosis Plan   1. Community acquired bilateral lower lobe pneumonia        2. Acute respiratory failure with hypoxia        3. Pleural effusion on left        4. Chronic renal insufficiency, stage 2 (mild)        5. Urinary tract infection without hematuria, site unspecified              MD Chip Yadav Jr, Thomas Mark Jr., MD  03/18/25 1341       Homer Saunders Jr., MD  03/18/25 [id number]

## 2025-03-19 LAB
ALBUMIN SERPL-MCNC: 3 G/DL (ref 3.5–5.2)
ALBUMIN/GLOB SERPL: 0.9 G/DL
ALP SERPL-CCNC: 127 U/L (ref 39–117)
ALT SERPL W P-5'-P-CCNC: 16 U/L (ref 1–33)
ANION GAP SERPL CALCULATED.3IONS-SCNC: 10 MMOL/L (ref 5–15)
ANION GAP SERPL CALCULATED.3IONS-SCNC: 9 MMOL/L (ref 5–15)
ARTERIAL PATENCY WRIST A: POSITIVE
AST SERPL-CCNC: 14 U/L (ref 1–32)
ATMOSPHERIC PRESS: 745 MMHG
BASE EXCESS BLDA CALC-SCNC: 11.4 MMOL/L (ref 0–2)
BASOPHILS # BLD AUTO: 0.03 10*3/MM3 (ref 0–0.2)
BASOPHILS NFR BLD AUTO: 0.2 % (ref 0–1.5)
BDY SITE: ABNORMAL
BILIRUB SERPL-MCNC: 0.4 MG/DL (ref 0–1.2)
BODY TEMPERATURE: 37
BUN SERPL-MCNC: 30 MG/DL (ref 8–23)
BUN SERPL-MCNC: 30 MG/DL (ref 8–23)
BUN/CREAT SERPL: 19.7 (ref 7–25)
BUN/CREAT SERPL: 20.3 (ref 7–25)
CALCIUM SPEC-SCNC: 8.8 MG/DL (ref 8.6–10.5)
CALCIUM SPEC-SCNC: 8.9 MG/DL (ref 8.6–10.5)
CHLORIDE SERPL-SCNC: 96 MMOL/L (ref 98–107)
CHLORIDE SERPL-SCNC: 98 MMOL/L (ref 98–107)
CO2 SERPL-SCNC: 34 MMOL/L (ref 22–29)
CO2 SERPL-SCNC: 36 MMOL/L (ref 22–29)
CREAT SERPL-MCNC: 1.48 MG/DL (ref 0.57–1)
CREAT SERPL-MCNC: 1.52 MG/DL (ref 0.57–1)
DEPRECATED RDW RBC AUTO: 55.8 FL (ref 37–54)
EGFRCR SERPLBLD CKD-EPI 2021: 33.9 ML/MIN/1.73
EGFRCR SERPLBLD CKD-EPI 2021: 35 ML/MIN/1.73
EOSINOPHIL # BLD AUTO: 0.35 10*3/MM3 (ref 0–0.4)
EOSINOPHIL NFR BLD AUTO: 2.2 % (ref 0.3–6.2)
EPAP: 6
ERYTHROCYTE [DISTWIDTH] IN BLOOD BY AUTOMATED COUNT: 14.8 % (ref 12.3–15.4)
GLOBULIN UR ELPH-MCNC: 3.5 GM/DL
GLUCOSE BLDC GLUCOMTR-MCNC: 151 MG/DL (ref 70–130)
GLUCOSE BLDC GLUCOMTR-MCNC: 222 MG/DL (ref 70–130)
GLUCOSE BLDC GLUCOMTR-MCNC: 66 MG/DL (ref 70–130)
GLUCOSE BLDC GLUCOMTR-MCNC: 73 MG/DL (ref 70–130)
GLUCOSE SERPL-MCNC: 63 MG/DL (ref 65–99)
GLUCOSE SERPL-MCNC: 79 MG/DL (ref 65–99)
HCO3 BLDA-SCNC: 37.3 MMOL/L (ref 20–26)
HCT VFR BLD AUTO: 40.4 % (ref 34–46.6)
HGB BLD-MCNC: 12.4 G/DL (ref 12–15.9)
IMM GRANULOCYTES # BLD AUTO: 0.06 10*3/MM3 (ref 0–0.05)
IMM GRANULOCYTES NFR BLD AUTO: 0.4 % (ref 0–0.5)
INHALED O2 CONCENTRATION: 25 %
IPAP: 20
LYMPHOCYTES # BLD AUTO: 0.98 10*3/MM3 (ref 0.7–3.1)
LYMPHOCYTES NFR BLD AUTO: 6.2 % (ref 19.6–45.3)
Lab: ABNORMAL
MCH RBC QN AUTO: 32 PG (ref 26.6–33)
MCHC RBC AUTO-ENTMCNC: 30.7 G/DL (ref 31.5–35.7)
MCV RBC AUTO: 104.4 FL (ref 79–97)
MODALITY: ABNORMAL
MONOCYTES # BLD AUTO: 0.32 10*3/MM3 (ref 0.1–0.9)
MONOCYTES NFR BLD AUTO: 2 % (ref 5–12)
NEUTROPHILS NFR BLD AUTO: 14.13 10*3/MM3 (ref 1.7–7)
NEUTROPHILS NFR BLD AUTO: 89 % (ref 42.7–76)
NRBC BLD AUTO-RTO: 0 /100 WBC (ref 0–0.2)
PCO2 BLDA: 53.8 MM HG (ref 35–45)
PCO2 TEMP ADJ BLD: 53.8 MM HG (ref 35–45)
PH BLDA: 7.45 PH UNITS (ref 7.35–7.45)
PH, TEMP CORRECTED: 7.45 PH UNITS (ref 7.35–7.45)
PLATELET # BLD AUTO: 296 10*3/MM3 (ref 140–450)
PMV BLD AUTO: 10.6 FL (ref 6–12)
PO2 BLD: 382 MM[HG] (ref 0–500)
PO2 BLDA: 95.4 MM HG (ref 83–108)
PO2 TEMP ADJ BLD: 95.4 MM HG (ref 83–108)
POTASSIUM SERPL-SCNC: 3.8 MMOL/L (ref 3.5–5.2)
POTASSIUM SERPL-SCNC: 3.9 MMOL/L (ref 3.5–5.2)
PROT SERPL-MCNC: 6.5 G/DL (ref 6–8.5)
QT INTERVAL: 384 MS
QTC INTERVAL: 426 MS
RBC # BLD AUTO: 3.87 10*6/MM3 (ref 3.77–5.28)
SAO2 % BLDCOA: 98.4 % (ref 94–99)
SET MECH RESP RATE: 20
SODIUM SERPL-SCNC: 139 MMOL/L (ref 136–145)
SODIUM SERPL-SCNC: 144 MMOL/L (ref 136–145)
SODIUM UR-SCNC: 50 MMOL/L
URATE SERPL-MCNC: 9.6 MG/DL (ref 2.4–5.7)
VENTILATOR MODE: ABNORMAL
WBC NRBC COR # BLD AUTO: 15.87 10*3/MM3 (ref 3.4–10.8)

## 2025-03-19 PROCEDURE — 94799 UNLISTED PULMONARY SVC/PX: CPT

## 2025-03-19 PROCEDURE — 84550 ASSAY OF BLOOD/URIC ACID: CPT | Performed by: INTERNAL MEDICINE

## 2025-03-19 PROCEDURE — 82803 BLOOD GASES ANY COMBINATION: CPT

## 2025-03-19 PROCEDURE — 85025 COMPLETE CBC W/AUTO DIFF WBC: CPT | Performed by: INTERNAL MEDICINE

## 2025-03-19 PROCEDURE — 82570 ASSAY OF URINE CREATININE: CPT | Performed by: INTERNAL MEDICINE

## 2025-03-19 PROCEDURE — 84300 ASSAY OF URINE SODIUM: CPT | Performed by: INTERNAL MEDICINE

## 2025-03-19 PROCEDURE — 25010000002 METHYLPREDNISOLONE PER 40 MG: Performed by: FAMILY MEDICINE

## 2025-03-19 PROCEDURE — 63710000001 INSULIN LISPRO (HUMAN) PER 5 UNITS: Performed by: FAMILY MEDICINE

## 2025-03-19 PROCEDURE — 25810000003 SODIUM CHLORIDE 0.9 % SOLUTION 250 ML FLEX CONT: Performed by: INTERNAL MEDICINE

## 2025-03-19 PROCEDURE — 80053 COMPREHEN METABOLIC PANEL: CPT | Performed by: INTERNAL MEDICINE

## 2025-03-19 PROCEDURE — 82948 REAGENT STRIP/BLOOD GLUCOSE: CPT

## 2025-03-19 PROCEDURE — 36600 WITHDRAWAL OF ARTERIAL BLOOD: CPT

## 2025-03-19 PROCEDURE — 25010000002 AZITHROMYCIN PER 500 MG: Performed by: INTERNAL MEDICINE

## 2025-03-19 PROCEDURE — 94761 N-INVAS EAR/PLS OXIMETRY MLT: CPT

## 2025-03-19 PROCEDURE — 82043 UR ALBUMIN QUANTITATIVE: CPT | Performed by: INTERNAL MEDICINE

## 2025-03-19 PROCEDURE — 25010000002 FUROSEMIDE PER 20 MG: Performed by: FAMILY MEDICINE

## 2025-03-19 PROCEDURE — 99222 1ST HOSP IP/OBS MODERATE 55: CPT | Performed by: INTERNAL MEDICINE

## 2025-03-19 PROCEDURE — 25010000002 CEFTRIAXONE PER 250 MG: Performed by: INTERNAL MEDICINE

## 2025-03-19 PROCEDURE — 63710000001 INSULIN GLARGINE PER 5 UNITS: Performed by: FAMILY MEDICINE

## 2025-03-19 PROCEDURE — 94660 CPAP INITIATION&MGMT: CPT

## 2025-03-19 RX ORDER — LISINOPRIL 20 MG/1
20 TABLET ORAL DAILY
Status: ON HOLD | COMMUNITY

## 2025-03-19 RX ORDER — LISINOPRIL 20 MG/1
20 TABLET ORAL DAILY
Status: DISCONTINUED | OUTPATIENT
Start: 2025-03-19 | End: 2025-03-22 | Stop reason: HOSPADM

## 2025-03-19 RX ORDER — METHYLPREDNISOLONE SODIUM SUCCINATE 40 MG/ML
40 INJECTION, POWDER, LYOPHILIZED, FOR SOLUTION INTRAMUSCULAR; INTRAVENOUS EVERY 6 HOURS
Status: DISCONTINUED | OUTPATIENT
Start: 2025-03-19 | End: 2025-03-20

## 2025-03-19 RX ORDER — BUMETANIDE 1 MG/1
3 TABLET ORAL DAILY
Status: ON HOLD | COMMUNITY

## 2025-03-19 RX ORDER — GABAPENTIN 100 MG/1
100 CAPSULE ORAL NIGHTLY
Status: DISCONTINUED | OUTPATIENT
Start: 2025-03-19 | End: 2025-03-22 | Stop reason: HOSPADM

## 2025-03-19 RX ORDER — LEVOTHYROXINE SODIUM 100 UG/1
200 TABLET ORAL
Status: DISCONTINUED | OUTPATIENT
Start: 2025-03-20 | End: 2025-03-22 | Stop reason: HOSPADM

## 2025-03-19 RX ORDER — INSULIN ASPART 100 [IU]/ML
5 INJECTION, SOLUTION INTRAVENOUS; SUBCUTANEOUS
Status: ON HOLD | COMMUNITY

## 2025-03-19 RX ORDER — POLYETHYLENE GLYCOL 3350 17 G/17G
17 POWDER, FOR SOLUTION ORAL DAILY PRN
Status: DISCONTINUED | OUTPATIENT
Start: 2025-03-19 | End: 2025-03-22 | Stop reason: HOSPADM

## 2025-03-19 RX ORDER — BUSPIRONE HYDROCHLORIDE 5 MG/1
7.5 TABLET ORAL
Status: DISCONTINUED | OUTPATIENT
Start: 2025-03-19 | End: 2025-03-22 | Stop reason: HOSPADM

## 2025-03-19 RX ORDER — FUROSEMIDE 10 MG/ML
40 INJECTION INTRAMUSCULAR; INTRAVENOUS ONCE
Status: COMPLETED | OUTPATIENT
Start: 2025-03-19 | End: 2025-03-19

## 2025-03-19 RX ORDER — DIAPER,BRIEF,INFANT-TODD,DISP
1 EACH MISCELLANEOUS NIGHTLY
Status: ON HOLD | COMMUNITY

## 2025-03-19 RX ORDER — INSULIN LISPRO 100 [IU]/ML
4 INJECTION, SOLUTION INTRAVENOUS; SUBCUTANEOUS
Status: DISCONTINUED | OUTPATIENT
Start: 2025-03-19 | End: 2025-03-22 | Stop reason: HOSPADM

## 2025-03-19 RX ORDER — BUMETANIDE 1 MG/1
3 TABLET ORAL DAILY
Status: DISCONTINUED | OUTPATIENT
Start: 2025-03-19 | End: 2025-03-19

## 2025-03-19 RX ORDER — ATORVASTATIN CALCIUM 40 MG/1
40 TABLET, FILM COATED ORAL NIGHTLY
Status: DISCONTINUED | OUTPATIENT
Start: 2025-03-19 | End: 2025-03-22 | Stop reason: HOSPADM

## 2025-03-19 RX ORDER — MULTIVITAMIN WITH IRON
1000 TABLET ORAL DAILY
Status: DISCONTINUED | OUTPATIENT
Start: 2025-03-19 | End: 2025-03-22 | Stop reason: HOSPADM

## 2025-03-19 RX ORDER — BUMETANIDE 1 MG/1
1 TABLET ORAL DAILY
Status: DISCONTINUED | OUTPATIENT
Start: 2025-03-19 | End: 2025-03-21

## 2025-03-19 RX ORDER — CLONIDINE HYDROCHLORIDE 0.1 MG/1
0.1 TABLET ORAL EVERY 8 HOURS PRN
Status: ON HOLD | COMMUNITY

## 2025-03-19 RX ORDER — SCOPOLAMINE 1 MG/3D
1 PATCH, EXTENDED RELEASE TRANSDERMAL
Status: ON HOLD | COMMUNITY

## 2025-03-19 RX ORDER — COLESTIPOL HYDROCHLORIDE 1 G/1
1 TABLET ORAL 2 TIMES DAILY
Status: ON HOLD | COMMUNITY

## 2025-03-19 RX ADMIN — IPRATROPIUM BROMIDE AND ALBUTEROL SULFATE 3 ML: .5; 3 SOLUTION RESPIRATORY (INHALATION) at 06:52

## 2025-03-19 RX ADMIN — Medication 10 ML: at 08:37

## 2025-03-19 RX ADMIN — APIXABAN 2.5 MG: 2.5 TABLET, FILM COATED ORAL at 00:05

## 2025-03-19 RX ADMIN — CYANOCOBALAMIN TAB 500 MCG 1000 MCG: 500 TAB at 15:13

## 2025-03-19 RX ADMIN — APIXABAN 2.5 MG: 2.5 TABLET, FILM COATED ORAL at 09:07

## 2025-03-19 RX ADMIN — LISINOPRIL 20 MG: 20 TABLET ORAL at 15:13

## 2025-03-19 RX ADMIN — BUSPIRONE HYDROCHLORIDE 7.5 MG: 5 TABLET ORAL at 18:30

## 2025-03-19 RX ADMIN — IPRATROPIUM BROMIDE AND ALBUTEROL SULFATE 3 ML: .5; 3 SOLUTION RESPIRATORY (INHALATION) at 20:01

## 2025-03-19 RX ADMIN — METOPROLOL TARTRATE 25 MG: 25 TABLET, FILM COATED ORAL at 00:05

## 2025-03-19 RX ADMIN — Medication 10 ML: at 22:10

## 2025-03-19 RX ADMIN — CEFTRIAXONE SODIUM 2000 MG: 2 INJECTION, POWDER, FOR SOLUTION INTRAMUSCULAR; INTRAVENOUS at 12:18

## 2025-03-19 RX ADMIN — FUROSEMIDE 40 MG: 10 INJECTION, SOLUTION INTRAVENOUS at 08:35

## 2025-03-19 RX ADMIN — IPRATROPIUM BROMIDE AND ALBUTEROL SULFATE 3 ML: .5; 3 SOLUTION RESPIRATORY (INHALATION) at 11:06

## 2025-03-19 RX ADMIN — METHYLPREDNISOLONE SODIUM SUCCINATE 40 MG: 40 INJECTION, POWDER, FOR SOLUTION INTRAMUSCULAR; INTRAVENOUS at 21:57

## 2025-03-19 RX ADMIN — AZITHROMYCIN DIHYDRATE 500 MG: 500 INJECTION, POWDER, LYOPHILIZED, FOR SOLUTION INTRAVENOUS at 15:43

## 2025-03-19 RX ADMIN — INSULIN GLARGINE 20 UNITS: 100 INJECTION, SOLUTION SUBCUTANEOUS at 21:57

## 2025-03-19 RX ADMIN — INSULIN LISPRO 4 UNITS: 100 INJECTION, SOLUTION INTRAVENOUS; SUBCUTANEOUS at 18:30

## 2025-03-19 RX ADMIN — METHYLPREDNISOLONE SODIUM SUCCINATE 40 MG: 40 INJECTION, POWDER, FOR SOLUTION INTRAMUSCULAR; INTRAVENOUS at 15:13

## 2025-03-19 RX ADMIN — BUSPIRONE HYDROCHLORIDE 7.5 MG: 5 TABLET ORAL at 08:34

## 2025-03-19 RX ADMIN — Medication 10 MG: at 21:57

## 2025-03-19 RX ADMIN — METOPROLOL TARTRATE 25 MG: 25 TABLET, FILM COATED ORAL at 09:07

## 2025-03-19 RX ADMIN — BUSPIRONE HYDROCHLORIDE 7.5 MG: 5 TABLET ORAL at 12:15

## 2025-03-19 RX ADMIN — GABAPENTIN 100 MG: 100 CAPSULE ORAL at 21:56

## 2025-03-19 RX ADMIN — BUMETANIDE 1 MG: 1 TABLET ORAL at 15:13

## 2025-03-19 RX ADMIN — IPRATROPIUM BROMIDE AND ALBUTEROL SULFATE 3 ML: .5; 3 SOLUTION RESPIRATORY (INHALATION) at 14:58

## 2025-03-19 RX ADMIN — APIXABAN 2.5 MG: 2.5 TABLET, FILM COATED ORAL at 21:56

## 2025-03-19 RX ADMIN — ATORVASTATIN CALCIUM 40 MG: 40 TABLET, FILM COATED ORAL at 21:56

## 2025-03-19 RX ADMIN — METOPROLOL TARTRATE 25 MG: 25 TABLET, FILM COATED ORAL at 21:56

## 2025-03-19 NOTE — PROGRESS NOTES
RT EQUIPMENT DEVICE RELATED - SKIN ASSESSMENT    Hayes Score:  Hayes Score: 14     RT Medical Equipment/Device:     NIV Mask:  Under-the-nose   size:  B    Skin Assessment:      Cheek:  Intact  Chin:  Intact  Nares:  Intact  Neck:  Intact    Device Skin Pressure Protection:  Positioning supports utilized and Skin-to-device areas padded:  None Required    Nurse Notification:  Mlelisa Franco, RRT

## 2025-03-19 NOTE — CONSULTS
[facility] [place] PULMONARY CONSULT - [facility]    25, 17:43 CDT  Patient Care Team:  Vandana Churchill MD as PCP - General (Family Medicine)  Name: Mar Rodriguez  : 1940  MRN: [id number]  Contact Serial Number [id number]    Chief complaint: shortness of breath  HPI:  We have been consulted by Vandana Churchill MD to see this 84 y.o. female.  She has been admitted with respiratory failure.   She is on bipap, and is not a capable historian at this time.  Apparently she has had a cough.  Other history is as noted per H&P.  We are asked to evaluate the respiratory status  Past Medical History:   has a past medical history of Abnormality of gait and mobility, Anemia, Anxiety, Cerebral palsy, Cognitive communication deficit, Depression, Diabetes mellitus, Disease of thyroid gland, Hyperglycemia, Hyperlipidemia, Hypertension, Hypokalemia, Insomnia, Lymphedema, Neuropathy, and Urge incontinence.   has no past surgical history on file.  Allergies   Allergen Reactions    Hydralazine Hcl Hives and Rash    Nsaids Other (See Comments)     No NSAIDS r/t renal disease     Medications:  apixaban, 2.5 mg, Oral, Q12H  atorvastatin, 40 mg, Oral, Nightly  azithromycin, 500 mg, Intravenous, Q24H  bumetanide, 1 mg, Oral, Daily  busPIRone, 7.5 mg, Oral, TID With Meals  cefTRIAXone, 2,000 mg, Intravenous, Q24H  gabapentin, 100 mg, Oral, Nightly  insulin glargine, 20 Units, Subcutaneous, Nightly  insulin lispro, 4 Units, Subcutaneous, TID With Meals  ipratropium-albuterol, 3 mL, Nebulization, 4x Daily - RT  [START ON 3/20/2025] levothyroxine, 200 mcg, Oral, Q AM  lisinopril, 20 mg, Oral, Daily  melatonin, 10 mg, Oral, Nightly  methylPREDNISolone sodium succinate, 40 mg, Intravenous, Q6H  metoprolol tartrate, 25 mg, Oral, Q12H  sodium chloride, 10 mL, Intravenous, Q12H  sodium zirconium cyclosilicate, 10 g, Oral, Every Other Day  vitamin B-12, 1,000 mcg, Oral, Daily         Family History:  History  reviewed. No pertinent family history.  Social History:   reports that she has never smoked. She has been exposed to tobacco smoke. She does not have any smokeless tobacco history on file. She reports that she does not drink alcohol and does not use drugs.  Review of Systems:  Review of Systems   Unable to perform ROS: Other      Physical Exam:  Temp:  [97.4 °F (36.3 °C)-98.8 °F (37.1 °C)] 97.4 °F (36.3 °C)  Heart Rate:  [] 99  Resp:  [20-24] 20  BP: ()/(54-96) 103/54  Intake/Output Summary (Last 24 hours) at 3/19/2025 1743  Last data filed at 3/19/2025 0600  Gross per 24 hour   Intake 280 ml   Output 150 ml   Net 130 ml         03/18/25  1056 03/19/25  0350   Weight: 132 kg (291 lb 6.4 oz) 129 kg (283 lb 14.4 oz)     SpO2 Percentage    03/19/25 1232 03/19/25 1458 03/19/25 1506   SpO2: 99% 98% 96%     Body mass index is 47.24 kg/m².   Physical Exam  Constitutional:       General: She is not in acute distress.     Appearance: She is well-developed. She is ill-appearing. She is not toxic-appearing.      Interventions: Face mask in place.      Comments: bipap   HENT:      Head: Atraumatic.   Eyes:      General: No scleral icterus.     Conjunctiva/sclera: Conjunctivae normal.   Cardiovascular:      Rate and Rhythm: Normal rate and regular rhythm.      Heart sounds: S1 normal and S2 normal.   Pulmonary:      Effort: No respiratory distress.      Breath sounds: Normal breath sounds. No wheezing.   Abdominal:      General: There is no distension.   Musculoskeletal:         General: No deformity.      Cervical back: Neck supple.   Skin:     Coloration: Skin is not pale.      Findings: No rash.       Result Review  Results from last 7 days   Lab Units 03/19/25  0409 03/18/25  1055   WBC 10*3/mm3 15.87* 12.66*   HEMOGLOBIN g/dL 12.4 12.5   PLATELETS 10*3/mm3 296 323     Results from last 7 days   Lab Units 03/19/25  0808 03/19/25  0409 03/18/25  1117 03/18/25  1055   SODIUM mmol/L 139 144  --  141   SODIUM,  ARTERIAL mmol/L  --   --  142  --    POTASSIUM mmol/L 3.8 3.9  --  3.6   CO2 mmol/L 34.0* 36.0*  --  35.0*   BUN mg/dL 30* 30*  --  30*   CREATININE mg/dL 1.52* 1.48*  --  1.46*   MAGNESIUM mg/dL  --   --   --  1.9   GLUCOSE mg/dL 79 63*  --  139*     Results from last 7 days   Lab Units 03/19/25  0820 03/18/25  1618 03/18/25  1117   PH, ARTERIAL pH units 7.449 7.371 7.421   PCO2, ARTERIAL mm Hg 53.8* 65.6* 58.2*   PO2 ART mm Hg 95.4 126.0* 82.6*   FIO2 % 25 36  --      Microbiology Results (last 10 days)       Procedure Component Value - Date/Time    Blood Culture - Blood, Arm, Right [[id number]]  (Normal) Collected: 03/18/25 1131    Lab Status: Preliminary result Specimen: Blood from Arm, Right Updated: 03/19/25 1200     Blood Culture No growth at 24 hours    Respiratory Panel PCR w/COVID-19(SARS-CoV-2) [facility]METT/ANCA/PAD/COR/COREY In-House, NP Swab in UTM/VTM, 2 HR TAT - Swab, Nasopharynx [[id number]]  (Normal) Collected: 03/18/25 1059    Lab Status: Final result Specimen: Swab from Nasopharynx Updated: 03/18/25 1210     ADENOVIRUS, PCR Not Detected     Coronavirus 229E Not Detected     Coronavirus HKU1 Not Detected     Coronavirus NL63 Not Detected     Coronavirus OC43 Not Detected     COVID19 Not Detected     Human Metapneumovirus Not Detected     Human Rhinovirus/Enterovirus Not Detected     Influenza A PCR Not Detected     Influenza B PCR Not Detected     Parainfluenza Virus 1 Not Detected     Parainfluenza Virus 2 Not Detected     Parainfluenza Virus 3 Not Detected     Parainfluenza Virus 4 Not Detected     RSV, PCR Not Detected     Bordetella pertussis pcr Not Detected     Bordetella parapertussis PCR Not Detected     Chlamydophila pneumoniae PCR Not Detected     Mycoplasma pneumo by PCR Not Detected    Narrative:      In the setting of a positive respiratory panel with a viral infection PLUS a negative procalcitonin without other underlying concern for bacterial infection, consider observing off antibiotics or  discontinuation of antibiotics and continue supportive care. If the respiratory panel is positive for atypical bacterial infection (Bordetella pertussis, Chlamydophila pneumoniae, or Mycoplasma pneumoniae), consider antibiotic de-escalation to target atypical bacterial infection.    Blood Culture - Blood, Arm, Right [[id number]]  (Normal) Collected: 03/18/25 1055    Lab Status: Preliminary result Specimen: Blood from Arm, Right Updated: 03/19/25 1115     Blood Culture No growth at 24 hours          Recent radiology:   Imaging Results (Last 72 Hours)       Procedure Component Value Units Date/Time    XR Chest 1 View [[id number]] Collected: 03/18/25 1051     Updated: 03/18/25 1055    Narrative:      EXAMINATION: XR CHEST 1 VW-  3/18/2025 10:51 AM     HISTORY: Shortness of breath and cough.     FINDINGS: Today's exam is compared to previous study 1/13/2025. The  heart is mildly enlarged. There is patchy bibasilar airspace disease  either related to atelectasis or infiltrate. Blunting of the left  lateral costophrenic angle suggests a small effusion or pleural  thickening. There is no free air beneath the diaphragms.       Impression:      1.. Expiratory chest. Patchy bibasilar airspace disease with  differential as above.  2. There is blunting of the left lateral costophrenic angle suggesting  either a small effusion or pleural thickening.     This report was signed and finalized on 3/18/2025 10:52 AM by Dr. Jay Nova MD.             Personal review of imaging : CXR shows vague basilar infiltrates probably combination of atelectasis and possible consolidation  Other test results (not lab or imaging): Results for orders placed during the hospital encounter of 12/31/24    Adult Transthoracic Echo Complete W/ Cont if Necessary Per Protocol    Interpretation Summary    Left ventricular systolic function is normal. Left ventricular ejection fraction appears to be 61 - 65%.    Normal right ventricular cavity size and  systolic function noted.    There is no significant (greater than mild) valvular dysfunction.     Independent review of ekg: Q waves V1, II, atrial fibrillation    Pulmonary Assessment:    Hypercapnia.  This is probably hypercapnic respiratory failure, chronic with normal pH, needing BiPAP for shortness of breath  Hypothyroidism  Cerebral palsy    Recommend/plan:   Continue BiPAP  Blood gas in the morning  Empiric antibiotics in the event that any of the findings on x-ray could represent pneumonia.  This would be empiric therapy for a presumed left lower lobe pneumonia due to infectious organism.  J18.9    Thank you for this consult.  We will follow along.  Electronically signed by Aaron Brenner MD, 03/19/25, 5:43 PM CDT.

## 2025-03-19 NOTE — CONSULTS
Nephrology ([facility]) Consult Note      Patient:  Mar Rodriguez  YOB: 1940  Date of Service: 3/19/2025  MRN: [id number]   Acct: [id number]   Primary Care Physician: Vandana Churchill MD  Advance Directive:   There are no questions and answers to display.     Admit Date: 3/18/2025       Hospital Day: 1  Referring Provider: No Known Provider      Patient personally seen and examined.  Complete chart including Consults, Notes, Operative Reports, Labs, Cardiology, and Radiology studies reviewed as able.        Subjective:  Mar Rodriguez is a 84 y.o. female for whom we were consulted for evaluation and treatment of chronic kidney disease stage 4. Baseline creatinine 1.6-1.7.  History of cerebral palsy, type 2 diabetes, hypertension, lymphedema. Presented to ER with dyspnea. Labs showed creatinine 1.48. Admitted to medical floor with diagnosis of pneumonia. Started on IV antibiotics. Currently she is awake and alert, no complaints at time of exam. Denies n/v/d. Denies fever/chills. No cough. Lower extremity swelling is at her baseline level. Urine output nonoliguric with no recent changes in urination.    Allergies:  Hydralazine hcl and Nsaids    Home Meds:  Medications Prior to Admission   Medication Sig Dispense Refill Last Dose/Taking    acetaminophen (TYLENOL) 325 MG tablet Take 2 tablets by mouth Every 6 (Six) Hours As Needed for Mild Pain or Fever.   Taking As Needed    apixaban (ELIQUIS) 2.5 MG tablet tablet Take 1 tablet by mouth Every 12 (Twelve) Hours. Indications: Atrial Fibrillation 60 tablet 0 Taking    atorvastatin (LIPITOR) 40 MG tablet Take 1 tablet by mouth Every Night.   Taking    betamethasone dipropionate (DIPROSONE) 0.05 % cream Apply 1 Application topically to the appropriate area as directed Daily.   Apply to rash every shift   Taking    bumetanide (BUMEX) 1 MG tablet Take 3 tablets by mouth Daily.   Taking    busPIRone (BUSPAR) 7.5 MG  tablet Take 1 tablet by mouth 3 (Three) Times a Day. For anxiety   Taking    cloNIDine (CATAPRES) 0.1 MG tablet Take 1 tablet by mouth Every 8 (Eight) Hours As Needed for High Blood Pressure (SBP >160 or DBP >90).   Taking As Needed    colestipol (COLESTID) 1 g tablet Take 1 tablet by mouth 2 (Two) Times a Day.   Taking    dextrose (GLUTOSE) 40 % gel Take 15 g by mouth As Needed for Low Blood Sugar (every 15 minutes).   Taking As Needed    furosemide (LASIX) 40 MG tablet Take 1 tablet by mouth Daily.   Taking    gabapentin (NEURONTIN) 300 MG capsule Take 1 capsule by mouth Every Night. 30 capsule 0 Taking    Insulin Aspart (novoLOG) 100 UNIT/ML injection Inject 8 Units under the skin into the appropriate area as directed 3 (Three) Times a Day Before Meals. Hold for BS <100   Taking    insulin degludec (Tresiba FlexTouch) 100 UNIT/ML solution pen-injector injection Inject 28 Units under the skin into the appropriate area as directed Every Night.   Taking    ipratropium-albuterol (DUO-NEB) 0.5-2.5 mg/3 ml nebulizer Take 3 mL by nebulization Every 6 (Six) Hours As Needed for Shortness of Air.   Taking As Needed    levothyroxine (SYNTHROID, LEVOTHROID) 200 MCG tablet Take 1 tablet by mouth Every Morning.   Taking    lisinopril (PRINIVIL,ZESTRIL) 20 MG tablet Take 1 tablet by mouth Daily.   Taking    Melatonin 10 MG tablet Take 1 tablet by mouth Daily As Needed (sleep).   Taking As Needed    metoprolol tartrate (LOPRESSOR) 25 MG tablet Take 1 tablet by mouth 2 (Two) Times a Day.   Taking    nystatin (MYCOSTATIN) 923044 UNIT/GM powder Apply 1 Application topically to the appropriate area as directed Every 12 (Twelve) Hours. Abd folds   Taking    phenylephrine-mineral oil-petrolatum (PREPARATION H) 0.25-14-74.9 % ointment hemorrhoidal ointment Insert 1 Application into the rectum Every 6 (Six) Hours As Needed (hemmorhoids).   Taking As Needed    polyethylene glycol (MIRALAX) 17 g packet Take 17 g by mouth Daily As Needed  (Use if senna-docusate is ineffective). 1 each 0 Taking As Needed    Scopolamine 1 MG/3DAYS patch Place 1 patch on the skin as directed by provider Every 72 (Seventy-Two) Hours.   Taking    vitamin B-12 (CYANOCOBALAMIN) 1000 MCG tablet Take 1 tablet by mouth Daily.   Taking       Medicines:  Current Facility-Administered Medications   Medication Dose Route Frequency Provider Last Rate Last Admin    acetaminophen (TYLENOL) tablet 650 mg  650 mg Oral Q4H PRN Victor Manuel Andrews MD        Or    acetaminophen (TYLENOL) 160 MG/5ML oral solution 650 mg  650 mg Oral Q4H PRN Victor Manuel Andrews MD        Or    acetaminophen (TYLENOL) suppository 650 mg  650 mg Rectal Q4H PRN Victor Manuel Andrews MD        apixaban (ELIQUIS) tablet 2.5 mg  2.5 mg Oral Q12H Victor Manuel Andrews MD   2.5 mg at 03/19/25 0907    azithromycin (ZITHROMAX) 500 mg in sodium chloride 0.9 % 250 mL IVPB-VTB  500 mg Intravenous Q24H Victor Manuel Andrews MD        busPIRone (BUSPAR) tablet 7.5 mg  7.5 mg Oral TID With Meals Vandana Churchill MD   7.5 mg at 03/19/25 0834    Calcium Replacement - Follow Nurse / BPA Driven Protocol   Not Applicable PRVictor Manuel Beckett MD        cefTRIAXone (ROCEPHIN) 2,000 mg in sodium chloride 0.9 % 100 mL MBP  2,000 mg Intravenous Q24H Victor Manuel Andrews MD        ipratropium-albuterol (DUO-NEB) nebulizer solution 3 mL  3 mL Nebulization 4x Daily - RT Victor Manuel Andrews MD   3 mL at 03/19/25 0652    Magnesium Low Dose Replacement - Follow Nurse / BPA Driven Protocol   Not Applicable PRVictor Manuel Beckett MD        metoprolol tartrate (LOPRESSOR) tablet 25 mg  25 mg Oral Q12H Victor Manuel Andrews MD   25 mg at 03/19/25 0907    ondansetron (ZOFRAN) injection 4 mg  4 mg Intravenous Q6H PRN Victor Manuel Andrews MD        Phosphorus Replacement - Follow Nurse / BPA Driven Protocol   Not Applicable PRVictor Manuel Beckett MD        Potassium Replacement - Follow Nurse / BPA Driven Protocol   Not  Applicable Victor Manuel Flores MD        sodium chloride 0.9 % flush 10 mL  10 mL Intravenous Q12H Victor Manuel Andrews MD   10 mL at 03/19/25 0837    sodium chloride 0.9 % flush 10 mL  10 mL Intravenous PRVictor Manuel Beckett MD        sodium chloride 0.9 % infusion 40 mL  40 mL Intravenous Victor Manuel Flores MD           Past Medical History:  Past Medical History:   Diagnosis Date    Abnormality of gait and mobility     Anemia     Anxiety     Cerebral palsy     Cognitive communication deficit     Depression     Diabetes mellitus     Disease of thyroid gland     Hyperglycemia     Hyperlipidemia     Hypertension     Hypokalemia     Insomnia     Lymphedema     Neuropathy     Urge incontinence        Past Surgical History:  History reviewed. No pertinent surgical history.    Family History  History reviewed. No pertinent family history.    Social History  Social History     Socioeconomic History    Marital status: Single   Tobacco Use    Smoking status: Never     Passive exposure: Past   Vaping Use    Vaping status: Never Used   Substance and Sexual Activity    Alcohol use: Never    Drug use: Never    Sexual activity: Not Currently         Review of Systems:  History obtained from chart review and the patient  General ROS: No fever or chills  Respiratory ROS: No cough, shortness of breath, wheezing  Cardiovascular ROS: No chest pain or palpitations  Gastrointestinal ROS: No abdominal pain or melena  Genito-Urinary ROS: No dysuria or hematuria  Psych ROS: No anxiety and depression  14 point ROS reviewed with the patient and negative except as noted above and in the HPI unless unable to obtain.      Objective:  Patient Vitals for the past 24 hrs:   BP Temp Temp src Pulse Resp SpO2 Height Weight   03/19/25 0905 91/59 97.7 °F (36.5 °C) Axillary 94 22 100 % -- --   03/19/25 0700 -- -- -- 88 23 100 % -- --   03/19/25 0652 -- -- -- 86 22 100 % -- --   03/19/25 0350 123/96 98.7 °F (37.1 °C) Axillary 79 24 94  "% -- 129 kg (283 lb 14.4 oz)   03/19/25 0232 -- -- -- 75 22 95 % -- --   03/18/25 2326 106/81 98.6 °F (37 °C) Axillary 96 22 96 % -- --   03/18/25 2248 -- -- -- 64 23 98 % -- --   03/18/25 2014 101/87 98.8 °F (37.1 °C) Axillary 70 20 100 % -- --   03/18/25 1910 -- -- -- 68 20 96 % -- --   03/18/25 1709 117/59 97.3 °F (36.3 °C) Axillary 66 22 100 % -- --   03/18/25 1056 -- 97.6 °F (36.4 °C) Oral -- -- -- 165.1 cm (65\") 132 kg (291 lb 6.4 oz)   03/18/25 1031 148/82 -- -- 86 22 94 % -- --   03/18/25 1016 157/86 -- -- 75 -- 95 % -- --       Intake/Output Summary (Last 24 hours) at 3/19/2025 0951  Last data filed at 3/19/2025 0600  Gross per 24 hour   Intake 280 ml   Output 150 ml   Net 130 ml     General: awake/alert   Chest:  clear to auscultation bilaterally without respiratory distress  CVS: regular rate and rhythm  Abdominal: soft, nontender, positive bowel sounds  Extremities:  chronic lymphedema  Skin: warm and dry without rash      Labs:  Results from last 7 days   Lab Units 03/19/25  0409 03/18/25  1055   WBC 10*3/mm3 15.87* 12.66*   HEMOGLOBIN g/dL 12.4 12.5   HEMATOCRIT % 40.4 40.0   PLATELETS 10*3/mm3 296 323         Results from last 7 days   Lab Units 03/19/25  0808 03/19/25  0409 03/18/25  1117 03/18/25  1055   SODIUM mmol/L 139 144  --  141   SODIUM, ARTERIAL mmol/L  --   --  142  --    POTASSIUM mmol/L 3.8 3.9  --  3.6   CHLORIDE mmol/L 96* 98  --  95*   CO2 mmol/L 34.0* 36.0*  --  35.0*   BUN mg/dL 30* 30*  --  30*   CREATININE mg/dL 1.52* 1.48*  --  1.46*   CALCIUM mg/dL 8.8 8.9  --  9.6   EGFR mL/min/1.73 33.9* 35.0*  --  35.6*   BILIRUBIN mg/dL  --  0.4  --  0.3   ALK PHOS U/L  --  127*  --  146*   ALT (SGPT) U/L  --  16  --  17   AST (SGOT) U/L  --  14  --  14   GLUCOSE mg/dL 79 63*  --  139*       Radiology:   Imaging Results (Last 72 Hours)       Procedure Component Value Units Date/Time    XR Chest 1 View [[id number]] Collected: 03/18/25 1051     Updated: 03/18/25 1055    Narrative:      " "EXAMINATION: XR CHEST 1 VW-  3/18/2025 10:51 AM     HISTORY: Shortness of breath and cough.     FINDINGS: Today's exam is compared to previous study 1/13/2025. The  heart is mildly enlarged. There is patchy bibasilar airspace disease  either related to atelectasis or infiltrate. Blunting of the left  lateral costophrenic angle suggests a small effusion or pleural  thickening. There is no free air beneath the diaphragms.       Impression:      1.. Expiratory chest. Patchy bibasilar airspace disease with  differential as above.  2. There is blunting of the left lateral costophrenic angle suggesting  either a small effusion or pleural thickening.     This report was signed and finalized on 3/18/2025 10:52 AM by Dr. Jay Nova MD.               Culture:  No results found for: \"BLOODCX\", \"URINECX\", \"WOUNDCX\", \"MRSACX\", \"RESPCX\", \"STOOLCX\"      Assessment    Chronic kidney disease stage 4  Type 2 diabetes  Cerebral palsy  Hypertension  Pneumonia     Plan:   Renal function is currently above her baseline level  2.  Further assessment and plan pending Dr Arroyo's evaluation of patient      Thank you for the consult, we appreciate the opportunity to provide care to your patients.  Feel free to contact me if I can be of any further assistance.      Willis Gary, APRN  3/19/2025  09:51 CDT  "

## 2025-03-19 NOTE — H&P
History and Physical      CHIEF COMPLAINT:  SOA    Reason for Admission:  Acute on chronic resp failure with hypoxia and hypercapnia    History Obtained From:  chart, patient    HISTORY OF PRESENT ILLNESS:      The patient is a 84 y.o. female who was admitted from ER with worsening SOA, non productive cough. She denies any chest pain. No fever. She has had no dysuria. She has had no GI issues.     Past Medical History:    Past Medical History:   Diagnosis Date    Abnormality of gait and mobility     Anemia     Anxiety     Cerebral palsy     Cognitive communication deficit     Depression     Diabetes mellitus     Disease of thyroid gland     Hyperglycemia     Hyperlipidemia     Hypertension     Hypokalemia     Insomnia     Lymphedema     Neuropathy     Urge incontinence      Past Surgical History:    History reviewed. No pertinent surgical history.      Medications Prior to Admission:    Medications Prior to Admission   Medication Sig Dispense Refill Last Dose/Taking    apixaban (ELIQUIS) 2.5 MG tablet tablet Take 1 tablet by mouth Every 12 (Twelve) Hours. Indications: Atrial Fibrillation 60 tablet 0 Taking    atorvastatin (LIPITOR) 40 MG tablet Take 1 tablet by mouth Every Night.   Taking    bumetanide (BUMEX) 1 MG tablet Take 3 tablets by mouth Daily.   Taking    busPIRone (BUSPAR) 7.5 MG tablet Take 1 tablet by mouth 3 (Three) Times a Day. For anxiety   Taking    cloNIDine (CATAPRES) 0.1 MG tablet Take 1 tablet by mouth Every 8 (Eight) Hours As Needed for High Blood Pressure (SBP >160 or DBP >90).   Taking As Needed    colestipol (COLESTID) 1 g tablet Take 1 tablet by mouth 2 (Two) Times a Day.   Taking    gabapentin (NEURONTIN) 300 MG capsule Take 1 capsule by mouth Every Night. 30 capsule 0 Taking    hydrocortisone 1 % ointment Apply 1 Application topically to the appropriate area as directed Every Night. Bilat heels   Taking    Insulin Aspart (novoLOG) 100 UNIT/ML injection Inject 8 Units under the skin into  the appropriate area as directed 3 (Three) Times a Day Before Meals. Hold for BS <100   Taking    insulin degludec (Tresiba FlexTouch) 100 UNIT/ML solution pen-injector injection Inject 28 Units under the skin into the appropriate area as directed Every Night.   Taking    levothyroxine (SYNTHROID, LEVOTHROID) 200 MCG tablet Take 1 tablet by mouth Every Morning.   Taking    lisinopril (PRINIVIL,ZESTRIL) 10 MG tablet Take 2 tablets by mouth Daily.   Taking    metoprolol tartrate (LOPRESSOR) 25 MG tablet Take 1 tablet by mouth 2 (Two) Times a Day.   Taking    nystatin (MYCOSTATIN) 188684 UNIT/GM powder Apply 1 Application topically to the appropriate area as directed Every 12 (Twelve) Hours. Abd folds   Taking    Scopolamine 1 MG/3DAYS patch Place 1 patch on the skin as directed by provider Every 72 (Seventy-Two) Hours.   Taking    sodium zirconium cyclosilicate (LOKELMA) 10 g packet Take 10 g by mouth Daily. Empty entire contents of the packet(s) into a glass with at least 3 tablespoons (45 mL) of water. Stir well and drink immediately; if powder remains in the glass, add water, stir and drink immediately; repeat until no powder remains. Administer other oral medications at least 2 hours before or 2 hours after dose.   Taking    vitamin B-12 (CYANOCOBALAMIN) 1000 MCG tablet Take 1 tablet by mouth Daily.   Taking    acetaminophen (TYLENOL) 325 MG tablet Take 2 tablets by mouth Every 6 (Six) Hours As Needed for Mild Pain or Fever.       dextrose (GLUTOSE) 40 % gel Take 15 g by mouth As Needed for Low Blood Sugar (every 15 minutes).       glucagon (GLUCAGEN) 1 MG injection Inject 1 mg under the skin into the appropriate area as directed 1 (One) Time As Needed for Low Blood Sugar.       ipratropium-albuterol (DUO-NEB) 0.5-2.5 mg/3 ml nebulizer Take 3 mL by nebulization Every 6 (Six) Hours As Needed for Shortness of Air.       Melatonin 10 MG tablet Take 1 tablet by mouth Daily As Needed (sleep).        "phenylephrine-mineral oil-petrolatum (PREPARATION H) 0.25-14-74.9 % ointment hemorrhoidal ointment Insert 1 Application into the rectum Every 6 (Six) Hours As Needed (hemmorhoids).       polyethylene glycol (MIRALAX) 17 g packet Take 17 g by mouth Daily As Needed (Use if senna-docusate is ineffective). 1 each 0        Allergies:  Hydralazine hcl and Nsaids    Social History:   TOBACCO:   reports that she has never smoked. She has been exposed to tobacco smoke. She does not have any smokeless tobacco history on file.  ETOH:   reports no history of alcohol use.  DRUGS:   reports no history of drug use.  MARITAL STATUS:  not   OCCUPATION:  not working  Patient currently lives at a SNF      Family History:   History reviewed. No pertinent family history.  REVIEW OF SYSTEMS:  Constitutional: Negative   CV: Negative  Pulmonary: SOA, cough  GI: Negative  : Negative  Psych: Negative  Neuro: Negative  Skin: Negative  MusculoSkeletal: Negative  HEENT: Negative  Joints: Negative  Vitals:  /54 (BP Location: Left arm, Patient Position: Lying)   Pulse 84   Temp 97.4 °F (36.3 °C) (Oral)   Resp 20   Ht 165.1 cm (65\")   Wt 129 kg (283 lb 14.4 oz)   SpO2 99%   BMI 47.24 kg/m²     PHYSICAL EXAM (per notes):  Gen: NAD, alert, pleasant  HEENT: WNL  Lymph: no LAD  Neck: no JVD or masses  Chest: decreased BS  CV: RRR  Abdomen: NT/ND  Extrem: no C/C/2+ LE edema--chronic  Neuro: Nonfocal  Skin: no rashes  Joints: no redness  DATA:  I have reviewed the admission labs and imaging tests.    ASSESSMENT AND PLAN:      Acute Resp Failure with hypoxia and hypercapnia----follow with supportive care, Pulm consult ordered, continue antibiotics, O2 and resp treatments  P A fib  CKD, CHRISTOPHER--Nephrology consult  HTN--follow BP  Cerebral Palsy  Depression/Anxiety  DM2  Chronic LE Lymphedema/Swelling      Vandana Churchill MD  13:44 CDT 3/19/2025  "

## 2025-03-20 LAB
ALBUMIN SERPL-MCNC: 3 G/DL (ref 3.5–5.2)
ALBUMIN UR-MCNC: 5.2 MG/DL
ALBUMIN/GLOB SERPL: 0.9 G/DL
ALP SERPL-CCNC: 146 U/L (ref 39–117)
ALT SERPL W P-5'-P-CCNC: 17 U/L (ref 1–33)
ANION GAP SERPL CALCULATED.3IONS-SCNC: 13 MMOL/L (ref 5–15)
ARTERIAL PATENCY WRIST A: POSITIVE
AST SERPL-CCNC: 16 U/L (ref 1–32)
ATMOSPHERIC PRESS: 746 MMHG
BASE EXCESS BLDA CALC-SCNC: 9.4 MMOL/L (ref 0–2)
BASOPHILS # BLD AUTO: 0.05 10*3/MM3 (ref 0–0.2)
BASOPHILS NFR BLD AUTO: 0.2 % (ref 0–1.5)
BDY SITE: ABNORMAL
BILIRUB SERPL-MCNC: 0.2 MG/DL (ref 0–1.2)
BODY TEMPERATURE: 37
BUN SERPL-MCNC: 36 MG/DL (ref 8–23)
BUN/CREAT SERPL: 19.5 (ref 7–25)
CA-I BLD-MCNC: 4.6 MG/DL (ref 4.6–5.4)
CALCIUM SPEC-SCNC: 9.3 MG/DL (ref 8.6–10.5)
CHLORIDE SERPL-SCNC: 95 MMOL/L (ref 98–107)
CO2 SERPL-SCNC: 29 MMOL/L (ref 22–29)
COHGB MFR BLD: 1.2 % (ref 0–5)
CREAT SERPL-MCNC: 1.85 MG/DL (ref 0.57–1)
CREAT UR-MCNC: 95.2 MG/DL
DEPRECATED RDW RBC AUTO: 55.1 FL (ref 37–54)
EGFRCR SERPLBLD CKD-EPI 2021: 26.8 ML/MIN/1.73
EOSINOPHIL # BLD AUTO: 0 10*3/MM3 (ref 0–0.4)
EOSINOPHIL NFR BLD AUTO: 0 % (ref 0.3–6.2)
ERYTHROCYTE [DISTWIDTH] IN BLOOD BY AUTOMATED COUNT: 14.7 % (ref 12.3–15.4)
GAS FLOW AIRWAY: 1.5 LPM
GLOBULIN UR ELPH-MCNC: 3.4 GM/DL
GLUCOSE BLDC GLUCOMTR-MCNC: 201 MG/DL (ref 70–130)
GLUCOSE BLDC GLUCOMTR-MCNC: 232 MG/DL (ref 70–130)
GLUCOSE BLDC GLUCOMTR-MCNC: 288 MG/DL (ref 70–130)
GLUCOSE BLDC GLUCOMTR-MCNC: 300 MG/DL (ref 70–130)
GLUCOSE SERPL-MCNC: 208 MG/DL (ref 65–99)
HCO3 BLDA-SCNC: 35 MMOL/L (ref 20–26)
HCT VFR BLD AUTO: 37.4 % (ref 34–46.6)
HCT VFR BLD CALC: 34.5 % (ref 38–51)
HGB BLD-MCNC: 11.6 G/DL (ref 12–15.9)
HGB BLDA-MCNC: 11.2 G/DL (ref 12–16)
IMM GRANULOCYTES # BLD AUTO: 0.17 10*3/MM3 (ref 0–0.05)
IMM GRANULOCYTES NFR BLD AUTO: 0.7 % (ref 0–0.5)
LYMPHOCYTES # BLD AUTO: 0.5 10*3/MM3 (ref 0.7–3.1)
LYMPHOCYTES NFR BLD AUTO: 2.1 % (ref 19.6–45.3)
Lab: ABNORMAL
MCH RBC QN AUTO: 31.9 PG (ref 26.6–33)
MCHC RBC AUTO-ENTMCNC: 31 G/DL (ref 31.5–35.7)
MCV RBC AUTO: 102.7 FL (ref 79–97)
METHGB BLD QL: 0.3 % (ref 0–3)
MICROALBUMIN/CREAT UR: 54.6 MG/G (ref 0–29)
MODALITY: ABNORMAL
MONOCYTES # BLD AUTO: 0.06 10*3/MM3 (ref 0.1–0.9)
MONOCYTES NFR BLD AUTO: 0.2 % (ref 5–12)
NEUTROPHILS NFR BLD AUTO: 23.23 10*3/MM3 (ref 1.7–7)
NEUTROPHILS NFR BLD AUTO: 96.8 % (ref 42.7–76)
NRBC BLD AUTO-RTO: 0 /100 WBC (ref 0–0.2)
OXYHGB MFR BLDV: 92 % (ref 94–99)
PCO2 BLDA: 52 MM HG (ref 35–45)
PCO2 TEMP ADJ BLD: 52 MM HG (ref 35–45)
PH BLDA: 7.44 PH UNITS (ref 7.35–7.45)
PH, TEMP CORRECTED: 7.44 PH UNITS (ref 7.35–7.45)
PLATELET # BLD AUTO: 284 10*3/MM3 (ref 140–450)
PMV BLD AUTO: 11.1 FL (ref 6–12)
PO2 BLDA: 65 MM HG (ref 83–108)
PO2 TEMP ADJ BLD: 65 MM HG (ref 83–108)
POTASSIUM BLDA-SCNC: 4 MMOL/L (ref 3.5–5.2)
POTASSIUM SERPL-SCNC: 3.9 MMOL/L (ref 3.5–5.2)
PROT SERPL-MCNC: 6.4 G/DL (ref 6–8.5)
RBC # BLD AUTO: 3.64 10*6/MM3 (ref 3.77–5.28)
SAO2 % BLDCOA: 93.4 % (ref 94–99)
SODIUM BLDA-SCNC: 139 MMOL/L (ref 136–145)
SODIUM SERPL-SCNC: 137 MMOL/L (ref 136–145)
VENTILATOR MODE: ABNORMAL
WBC NRBC COR # BLD AUTO: 24.01 10*3/MM3 (ref 3.4–10.8)

## 2025-03-20 PROCEDURE — 80053 COMPREHEN METABOLIC PANEL: CPT | Performed by: INTERNAL MEDICINE

## 2025-03-20 PROCEDURE — 94660 CPAP INITIATION&MGMT: CPT

## 2025-03-20 PROCEDURE — 94799 UNLISTED PULMONARY SVC/PX: CPT

## 2025-03-20 PROCEDURE — 83050 HGB METHEMOGLOBIN QUAN: CPT

## 2025-03-20 PROCEDURE — 63710000001 INSULIN GLARGINE PER 5 UNITS: Performed by: FAMILY MEDICINE

## 2025-03-20 PROCEDURE — 82375 ASSAY CARBOXYHB QUANT: CPT

## 2025-03-20 PROCEDURE — 25010000002 CEFTRIAXONE PER 250 MG: Performed by: INTERNAL MEDICINE

## 2025-03-20 PROCEDURE — 99232 SBSQ HOSP IP/OBS MODERATE 35: CPT | Performed by: INTERNAL MEDICINE

## 2025-03-20 PROCEDURE — 36600 WITHDRAWAL OF ARTERIAL BLOOD: CPT

## 2025-03-20 PROCEDURE — 85025 COMPLETE CBC W/AUTO DIFF WBC: CPT | Performed by: INTERNAL MEDICINE

## 2025-03-20 PROCEDURE — 82948 REAGENT STRIP/BLOOD GLUCOSE: CPT

## 2025-03-20 PROCEDURE — 94669 MECHANICAL CHEST WALL OSCILL: CPT

## 2025-03-20 PROCEDURE — 97162 PT EVAL MOD COMPLEX 30 MIN: CPT | Performed by: PHYSICAL THERAPIST

## 2025-03-20 PROCEDURE — 94761 N-INVAS EAR/PLS OXIMETRY MLT: CPT

## 2025-03-20 PROCEDURE — 25010000002 AZITHROMYCIN PER 500 MG: Performed by: INTERNAL MEDICINE

## 2025-03-20 PROCEDURE — 25010000002 METHYLPREDNISOLONE PER 40 MG: Performed by: FAMILY MEDICINE

## 2025-03-20 PROCEDURE — 82805 BLOOD GASES W/O2 SATURATION: CPT

## 2025-03-20 PROCEDURE — 25010000002 METHYLPREDNISOLONE PER 40 MG: Performed by: INTERNAL MEDICINE

## 2025-03-20 PROCEDURE — 25810000003 SODIUM CHLORIDE 0.9 % SOLUTION 250 ML FLEX CONT: Performed by: INTERNAL MEDICINE

## 2025-03-20 PROCEDURE — 63710000001 INSULIN LISPRO (HUMAN) PER 5 UNITS: Performed by: FAMILY MEDICINE

## 2025-03-20 RX ORDER — METHYLPREDNISOLONE SODIUM SUCCINATE 40 MG/ML
40 INJECTION, POWDER, LYOPHILIZED, FOR SOLUTION INTRAMUSCULAR; INTRAVENOUS EVERY 12 HOURS
Status: DISCONTINUED | OUTPATIENT
Start: 2025-03-20 | End: 2025-03-21

## 2025-03-20 RX ADMIN — INSULIN LISPRO 4 UNITS: 100 INJECTION, SOLUTION INTRAVENOUS; SUBCUTANEOUS at 17:49

## 2025-03-20 RX ADMIN — ATORVASTATIN CALCIUM 40 MG: 40 TABLET, FILM COATED ORAL at 20:47

## 2025-03-20 RX ADMIN — IPRATROPIUM BROMIDE AND ALBUTEROL SULFATE 3 ML: .5; 3 SOLUTION RESPIRATORY (INHALATION) at 05:49

## 2025-03-20 RX ADMIN — IPRATROPIUM BROMIDE AND ALBUTEROL SULFATE 3 ML: .5; 3 SOLUTION RESPIRATORY (INHALATION) at 13:50

## 2025-03-20 RX ADMIN — METHYLPREDNISOLONE SODIUM SUCCINATE 40 MG: 40 INJECTION, POWDER, FOR SOLUTION INTRAMUSCULAR; INTRAVENOUS at 09:37

## 2025-03-20 RX ADMIN — LEVOTHYROXINE SODIUM 200 MCG: 100 TABLET ORAL at 05:35

## 2025-03-20 RX ADMIN — BUSPIRONE HYDROCHLORIDE 7.5 MG: 5 TABLET ORAL at 09:37

## 2025-03-20 RX ADMIN — CYANOCOBALAMIN TAB 500 MCG 1000 MCG: 500 TAB at 09:37

## 2025-03-20 RX ADMIN — BUMETANIDE 1 MG: 1 TABLET ORAL at 09:37

## 2025-03-20 RX ADMIN — BUSPIRONE HYDROCHLORIDE 7.5 MG: 5 TABLET ORAL at 13:11

## 2025-03-20 RX ADMIN — Medication 10 MG: at 20:47

## 2025-03-20 RX ADMIN — CEFTRIAXONE SODIUM 2000 MG: 2 INJECTION, POWDER, FOR SOLUTION INTRAMUSCULAR; INTRAVENOUS at 13:11

## 2025-03-20 RX ADMIN — INSULIN LISPRO 4 UNITS: 100 INJECTION, SOLUTION INTRAVENOUS; SUBCUTANEOUS at 13:11

## 2025-03-20 RX ADMIN — Medication 10 ML: at 20:49

## 2025-03-20 RX ADMIN — IPRATROPIUM BROMIDE AND ALBUTEROL SULFATE 3 ML: .5; 3 SOLUTION RESPIRATORY (INHALATION) at 11:10

## 2025-03-20 RX ADMIN — METHYLPREDNISOLONE SODIUM SUCCINATE 40 MG: 40 INJECTION, POWDER, FOR SOLUTION INTRAMUSCULAR; INTRAVENOUS at 20:46

## 2025-03-20 RX ADMIN — AZITHROMYCIN DIHYDRATE 500 MG: 500 INJECTION, POWDER, LYOPHILIZED, FOR SOLUTION INTRAVENOUS at 16:09

## 2025-03-20 RX ADMIN — METOPROLOL TARTRATE 25 MG: 25 TABLET, FILM COATED ORAL at 20:47

## 2025-03-20 RX ADMIN — APIXABAN 2.5 MG: 2.5 TABLET, FILM COATED ORAL at 20:47

## 2025-03-20 RX ADMIN — BUSPIRONE HYDROCHLORIDE 7.5 MG: 5 TABLET ORAL at 17:49

## 2025-03-20 RX ADMIN — LISINOPRIL 20 MG: 20 TABLET ORAL at 09:37

## 2025-03-20 RX ADMIN — IPRATROPIUM BROMIDE AND ALBUTEROL SULFATE 3 ML: .5; 3 SOLUTION RESPIRATORY (INHALATION) at 20:17

## 2025-03-20 RX ADMIN — APIXABAN 2.5 MG: 2.5 TABLET, FILM COATED ORAL at 09:37

## 2025-03-20 RX ADMIN — METOPROLOL TARTRATE 25 MG: 25 TABLET, FILM COATED ORAL at 09:37

## 2025-03-20 RX ADMIN — Medication 10 ML: at 09:38

## 2025-03-20 RX ADMIN — GABAPENTIN 100 MG: 100 CAPSULE ORAL at 20:47

## 2025-03-20 RX ADMIN — INSULIN GLARGINE 20 UNITS: 100 INJECTION, SOLUTION SUBCUTANEOUS at 20:47

## 2025-03-20 RX ADMIN — METHYLPREDNISOLONE SODIUM SUCCINATE 40 MG: 40 INJECTION, POWDER, FOR SOLUTION INTRAMUSCULAR; INTRAVENOUS at 03:47

## 2025-03-20 NOTE — THERAPY EVALUATION
Patient Name: Mar Rodriguez  : 1940    MRN: [id number]                              Today's Date: 3/20/2025       Admit Date: 3/18/2025    Visit Dx:     ICD-10-CM ICD-9-CM   1. Community acquired bilateral lower lobe pneumonia  J18.9 486   2. Acute respiratory failure with hypoxia  J96.01 518.81   3. Pleural effusion on left  J90 511.9   4. Chronic renal insufficiency, stage 2 (mild)  N18.2 585.2   5. Urinary tract infection without hematuria, site unspecified  N39.0 599.0   6. Impaired mobility [Z74.09]  Z74.09 799.89     Patient Active Problem List   Diagnosis    New onset atrial fibrillation    Pulmonary vascular congestion    Acute hyperkalemia    Respiratory failure with hypoxia     Past Medical History:   Diagnosis Date    Abnormality of gait and mobility     Anemia     Anxiety     Cerebral palsy     Cognitive communication deficit     Depression     Diabetes mellitus     Disease of thyroid gland     Hyperglycemia     Hyperlipidemia     Hypertension     Hypokalemia     Insomnia     Lymphedema     Neuropathy     Urge incontinence      History reviewed. No pertinent surgical history.   General Information       Row Name 25 1517          Physical Therapy Time and Intention    Document Type evaluation  -SB     Mode of Treatment physical therapy  -SB       Row Name 25 1517          General Information    Patient Profile Reviewed yes  -SB     Prior Level of Function independent:;feeding;bed mobility;w/c or scooter;mod assist:;transfer  -SB     Existing Precautions/Restrictions fall;oxygen therapy device and L/min  1.5L  -SB     Barriers to Rehab medically complex;previous functional deficit;hearing deficit  -SB       Row Name 25 151          Living Environment    Current Living Arrangements extended care facility  -SB     People in Home facility resident  -SB       Row Name 25 1517          Cognition    Orientation Status (Cognition) oriented x 4  -SB       Row Name 25  1517          Safety Issues/Impairments Affecting Functional Mobility    Impairments Affecting Function (Mobility) endurance/activity tolerance;strength;shortness of breath;range of motion (ROM)  -SB               User Key  (r) = Recorded By, (t) = Taken By, (c) = Cosigned By      Initials Name Provider Type    Sapna Simental PT DPT Physical Therapist                   Mobility       Row Name 03/20/25 1517          Bed Mobility    Bed Mobility rolling right;rolling left  -SB     Rolling Left Bertie (Bed Mobility) contact guard  -SB     Rolling Right Bertie (Bed Mobility) contact guard  -SB     Assistive Device (Bed Mobility) bed rails  -SB     Comment, (Bed Mobility) refused further mobility at this time  -SB               User Key  (r) = Recorded By, (t) = Taken By, (c) = Cosigned By      Initials Name Provider Type    Sapna Simental, ENA DPT Physical Therapist                   Obj/Interventions       Row Name 03/20/25 1517          Range of Motion Comprehensive    General Range of Motion lower extremity range of motion deficits identified  -SB     Comment, General Range of Motion hip and knees imp 50% due to body habitus and lymphedema  -SB       Row Name 03/20/25 1517          Strength Comprehensive (MMT)    General Manual Muscle Testing (MMT) Assessment lower extremity strength deficits identified  -SB     Comment, General Manual Muscle Testing (MMT) Assessment B hip flex and knee flex 2-/5, DF 4-/5  -SB       Row Name 03/20/25 1517          Balance    Balance Assessment --  -SB       Row Name 03/20/25 1517          Sensory Assessment (Somatosensory)    Sensory Assessment (Somatosensory) LE sensation intact  -SB               User Key  (r) = Recorded By, (t) = Taken By, (c) = Cosigned By      Initials Name Provider Type    Sapna Simental PT DPT Physical Therapist                   Goals/Plan       Row Name 03/20/25 1517          Bed Mobility Goal 1 (PT)    Activity/Assistive Device (Bed  Mobility Goal 1, PT) sit to supine;supine to sit;rolling to left;rolling to right  -SB     [place] Level/Cues Needed (Bed Mobility Goal 1, PT) minimum assist (75% or more patient effort)  -SB     Time Frame (Bed Mobility Goal 1, PT) long term goal (LTG)  -SB     Progress/Outcomes (Bed Mobility Goal 1, PT) new goal  -SB       Row Name 03/20/25 1517          Transfer Goal 1 (PT)    Activity/Assistive Device (Transfer Goal 1, PT) sit-to-stand/stand-to-sit;bed-to-chair/chair-to-bed;walker, rolling  -SB     [place] Level/Cues Needed (Transfer Goal 1, PT) moderate assist (50-74% patient effort)  -SB     Time Frame (Transfer Goal 1, PT) long term goal (LTG)  -SB     Progress/Outcome (Transfer Goal 1, PT) new goal  -SB       Row Name 03/20/25 1517          Problem Specific Goal 1 (PT)    Problem Specific Goal 1 (PT) Pt will propel w/c 50 feet with CGA  -SB     Time Frame (Problem Specific Goal 1, PT) long-term goal (LTG)  -SB     Progress/Outcome (Problem Specific Goal 1, PT) new goal  -SB       Row Name 03/20/25 1517          Therapy Assessment/Plan (PT)    Planned Therapy Interventions (PT) balance training;strengthening;bed mobility training;patient/family education;transfer training;wheelchair management/propulsion training;ROM (range of motion)  -SB               User Key  (r) = Recorded By, (t) = Taken By, (c) = Cosigned By      Initials Name Provider Type    Sapna Simental, PT DPT Physical Therapist                   Clinical Impression       Row Name 03/20/25 1517          Pain    Pretreatment Pain Rating 0/10 - no pain  -SB     Posttreatment Pain Rating 0/10 - no pain  -SB     Pre/Posttreatment Pain Comment c/o fatigue  -SB       Row Name 03/20/25 1517          Plan of Care Review    Plan of Care Reviewed With patient  -SB     Progress no change  -SB     Outcome Evaluation PT eval completed. Pt alert and oriented x4 with c/o fatigue. Pt resides at [facility] and typically transfers with assist of 2 and  sits in the wheelchair most of the day. Today, pt demos weakness in BLE and impaired ROM due to lymphedema. Pt rolls L And R in bed with CGA and use of bedrails, but quickly becomes fatigued and SOA. Pt refused further mobility at this time. Pt will benefit from skilled PT to improve fxl mob and endurance and to return to baseline. Rec d/c extended care facility.  -SB       Row Name 03/20/25 1517          Therapy Assessment/Plan (PT)    Patient/Family Therapy Goals Statement (PT) go back to [facility]  -SB     Rehab Potential (PT) good  -SB     Criteria for Skilled Interventions Met (PT) yes;meets criteria;skilled treatment is necessary  -SB     Therapy Frequency (PT) 2 times/day  -SB     Predicted Duration of Therapy Intervention (PT) until d/c or goals met  -SB       Row Name 03/20/25 1517          Vital Signs    Pre SpO2 (%) 95  -SB     O2 Delivery Pre Treatment nasal cannula  1.5L  -SB     O2 Delivery Intra Treatment nasal cannula  -SB     Post SpO2 (%) 93  -SB     O2 Delivery Post Treatment nasal cannula  -SB       Row Name 03/20/25 1517          Positioning and Restraints    Pre-Treatment Position in bed  -SB     Post Treatment Position bed  -SB     In Bed fowlers;call light within reach;encouraged to call for assist;with family/caregiver;exit alarm on;heels elevated;legs elevated  -SB               User Key  (r) = Recorded By, (t) = Taken By, (c) = Cosigned By      Initials Name Provider Type    Sapna Simental, PT DPT Physical Therapist                   Outcome Measures       Row Name 03/20/25 1517 03/20/25 0757       How much help from another person do you currently need...    Turning from your back to your side while in flat bed without using bedrails? 2  -SB 2  -KS    Moving from lying on back to sitting on the side of a flat bed without bedrails? 2  -SB 1  -KS    Moving to and from a bed to a chair (including a wheelchair)? 1  -SB 1  -KS    Standing up from a chair using your arms (e.g., wheelchair,  bedside chair)? 1  -SB 1  -KS    Climbing 3-5 steps with a railing? 1  -SB 1  -KS    To walk in hospital room? 1  -SB 1  -KS    AM-PAC 6 Clicks Score (PT) 8  -SB 7  -KS    Highest Level of Mobility Goal 3 --> Sit at edge of bed  -SB 2 --> Bed activities/dependent transfer  -KS      Row Name 03/20/25 1517          Functional Assessment    Outcome Measure Options AM-PAC 6 Clicks Basic Mobility (PT)  -SB               User Key  (r) = Recorded By, (t) = Taken By, (c) = Cosigned By      Initials Name Provider Type    KS Shira Jacome, RN Registered Nurse    Sapna Simental, PT DPT Physical Therapist                                 Physical Therapy Education       Title: PT OT SLP Therapies (In Progress)       Topic: Physical Therapy (In Progress)       Point: Mobility training (In Progress)       Learning Progress Summary            Patient Acceptance, E, NR by SB at 3/20/2025 1522    Comment: pt edu on POC, benefits of act and d/c plans                      Point: Home exercise program (Not Started)       Learner Progress:  Not documented in this visit.              Point: Body mechanics (Not Started)       Learner Progress:  Not documented in this visit.              Point: Precautions (In Progress)       Learning Progress Summary            Patient Acceptance, E, NR by SB at 3/20/2025 1522    Comment: pt edu on POC, benefits of act and d/c plans                                      User Key       Initials Effective Dates Name Provider Type Discipline     07/11/23 -  Sapna Zafar, PT DPT Physical Therapist PT                  PT Recommendation and Plan  Planned Therapy Interventions (PT): balance training, strengthening, bed mobility training, patient/family education, transfer training, wheelchair management/propulsion training, ROM (range of motion)  Progress: no change  Outcome Evaluation: PT eval completed. Pt alert and oriented x4 with c/o fatigue. Pt resides at [facility] and typically transfers with assist  of 2 and sits in the wheelchair most of the day. Today, pt demos weakness in BLE and impaired ROM due to lymphedema. Pt rolls L And R in bed with CGA and use of bedrails, but quickly becomes fatigued and SOA. Pt refused further mobility at this time. Pt will benefit from skilled PT to improve fxl mob and endurance and to return to baseline. Rec d/c extended care facility.     Time Calculation:         PT Charges       Row Name 03/20/25 1558             Time Calculation    Start Time 1517  -SB      Stop Time 1540  -SB      Time Calculation (min) 23 min  -SB      PT Received On 03/20/25  -SB      PT Goal Re-Cert Due Date 03/30/25  -SB         Untimed Charges    PT Eval/Re-eval Minutes 23  -SB         Total Minutes    Untimed Charges Total Minutes 23  -SB       Total Minutes 23  -SB                User Key  (r) = Recorded By, (t) = Taken By, (c) = Cosigned By      Initials Name Provider Type    SB Sapna Zafar, PT DPT Physical Therapist                  Therapy Charges for Today       Code Description Service Date Service Provider Modifiers Qty    [id number] HC PT EVAL MOD COMPLEXITY 2 3/20/2025 Sapna Zafar PT DPT GP 1            PT G-Codes  Outcome Measure Options: AM-PAC 6 Clicks Basic Mobility (PT)  AM-PAC 6 Clicks Score (PT): 8  PT Discharge Summary  Anticipated Discharge Disposition (PT): extended care facility    Sapna Zafar PT DPT  3/20/2025     Benzoyl Peroxide Counseling: Patient counseled that medicine may cause skin irritation and bleach clothing.  In the event of skin irritation, the patient was advised to reduce the amount of the drug applied or use it less frequently.   The patient verbalized understanding of the proper use and possible adverse effects of benzoyl peroxide.  All of the patient's questions and concerns were addressed.

## 2025-03-20 NOTE — PROGRESS NOTES
Nephrology ([facility]) Progress Note      Patient:  Mar Rodriguez  YOB: 1940  Date of Service: 3/20/2025  MRN: [id number]   Acct: [id number]   Primary Care Physician: Vandana Churchill MD  Advance Directive:   There are no questions and answers to display.     Admit Date: 3/18/2025       Hospital Day: 2  Referring Provider: No Known Provider      Patient personally seen and examined.  Complete chart including Consults, Notes, Operative Reports, Labs, Cardiology, and Radiology studies reviewed as able.        Subjective:  Mar Rodriguez is a 84 y.o. female for whom we were consulted for evaluation and treatment of chronic kidney disease stage 4. Baseline creatinine 1.6-1.7. History of cerebral palsy, type 2 diabetes, hypertension, lymphedema. Presented to ER with dyspnea. Labs showed creatinine 1.48. Admitted to medical floor with diagnosis of pneumonia. Started on IV antibiotics. Denied n/v/d. Denied fever/chills. No cough. Lower extremity swelling was at her baseline level. Urine output nonoliguric with no recent changes in urination.     Today is awake and alert. No new complaints. Urine output nonoliguric    Allergies:  Hydralazine hcl and Nsaids    Home Meds:  Medications Prior to Admission   Medication Sig Dispense Refill Last Dose/Taking    apixaban (ELIQUIS) 2.5 MG tablet tablet Take 1 tablet by mouth Every 12 (Twelve) Hours. Indications: Atrial Fibrillation 60 tablet 0 Taking    atorvastatin (LIPITOR) 40 MG tablet Take 1 tablet by mouth Every Night.   Taking    bumetanide (BUMEX) 1 MG tablet Take 3 tablets by mouth Daily.   Taking    busPIRone (BUSPAR) 7.5 MG tablet Take 1 tablet by mouth 3 (Three) Times a Day. For anxiety   Taking    cloNIDine (CATAPRES) 0.1 MG tablet Take 1 tablet by mouth Every 8 (Eight) Hours As Needed for High Blood Pressure (SBP >160 or DBP >90).   Taking As Needed    colestipol (COLESTID) 1 g tablet Take 1 tablet by mouth 2  (Two) Times a Day.   Taking    gabapentin (NEURONTIN) 300 MG capsule Take 1 capsule by mouth Every Night. 30 capsule 0 Taking    hydrocortisone 1 % ointment Apply 1 Application topically to the appropriate area as directed Every Night. Bilat heels   Taking    Insulin Aspart (novoLOG) 100 UNIT/ML injection Inject 8 Units under the skin into the appropriate area as directed 3 (Three) Times a Day Before Meals. Hold for BS <100   Taking    insulin degludec (Tresiba FlexTouch) 100 UNIT/ML solution pen-injector injection Inject 28 Units under the skin into the appropriate area as directed Every Night.   Taking    levothyroxine (SYNTHROID, LEVOTHROID) 200 MCG tablet Take 1 tablet by mouth Every Morning.   Taking    lisinopril (PRINIVIL,ZESTRIL) 10 MG tablet Take 2 tablets by mouth Daily.   Taking    metoprolol tartrate (LOPRESSOR) 25 MG tablet Take 1 tablet by mouth 2 (Two) Times a Day.   Taking    nystatin (MYCOSTATIN) 657074 UNIT/GM powder Apply 1 Application topically to the appropriate area as directed Every 12 (Twelve) Hours. Abd folds   Taking    Scopolamine 1 MG/3DAYS patch Place 1 patch on the skin as directed by provider Every 72 (Seventy-Two) Hours.   Taking    sodium zirconium cyclosilicate (LOKELMA) 10 g packet Take 10 g by mouth Daily. Empty entire contents of the packet(s) into a glass with at least 3 tablespoons (45 mL) of water. Stir well and drink immediately; if powder remains in the glass, add water, stir and drink immediately; repeat until no powder remains. Administer other oral medications at least 2 hours before or 2 hours after dose.   Taking    vitamin B-12 (CYANOCOBALAMIN) 1000 MCG tablet Take 1 tablet by mouth Daily.   Taking    acetaminophen (TYLENOL) 325 MG tablet Take 2 tablets by mouth Every 6 (Six) Hours As Needed for Mild Pain or Fever.       dextrose (GLUTOSE) 40 % gel Take 15 g by mouth As Needed for Low Blood Sugar (every 15 minutes).       glucagon (GLUCAGEN) 1 MG injection Inject 1 mg  under the skin into the appropriate area as directed 1 (One) Time As Needed for Low Blood Sugar.       ipratropium-albuterol (DUO-NEB) 0.5-2.5 mg/3 ml nebulizer Take 3 mL by nebulization Every 6 (Six) Hours As Needed for Shortness of Air.       Melatonin 10 MG tablet Take 1 tablet by mouth Daily As Needed (sleep).       phenylephrine-mineral oil-petrolatum (PREPARATION H) 0.25-14-74.9 % ointment hemorrhoidal ointment Insert 1 Application into the rectum Every 6 (Six) Hours As Needed (hemmorhoids).       polyethylene glycol (MIRALAX) 17 g packet Take 17 g by mouth Daily As Needed (Use if senna-docusate is ineffective). 1 each 0        Medicines:  Current Facility-Administered Medications   Medication Dose Route Frequency Provider Last Rate Last Admin    acetaminophen (TYLENOL) tablet 650 mg  650 mg Oral Q4H PRN Victor Manuel Andrews MD        Or    acetaminophen (TYLENOL) 160 MG/5ML oral solution 650 mg  650 mg Oral Q4H PRN Victor Manuel Andrews MD        Or    acetaminophen (TYLENOL) suppository 650 mg  650 mg Rectal Q4H PRN Victor Manuel Andrews MD        apixaban (ELIQUIS) tablet 2.5 mg  2.5 mg Oral Q12H Victor Manuel Andrews MD   2.5 mg at 03/20/25 0937    atorvastatin (LIPITOR) tablet 40 mg  40 mg Oral Nightly Vandana Churchill MD   40 mg at 03/19/25 2156    azithromycin (ZITHROMAX) 500 mg in sodium chloride 0.9 % 250 mL IVPB-VTB  500 mg Intravenous Q24H Victor Manuel Andrews MD   500 mg at 03/19/25 1543    bumetanide (BUMEX) tablet 1 mg  1 mg Oral Daily Orlando Arroyo MD   1 mg at 03/20/25 0937    busPIRone (BUSPAR) tablet 7.5 mg  7.5 mg Oral TID With Meals Vandana Churchill MD   7.5 mg at 03/20/25 0937    Calcium Replacement - Follow Nurse / BPA Driven Protocol   Not Applicable PRN Victor Manuel Andrews MD        cefTRIAXone (ROCEPHIN) 2,000 mg in sodium chloride 0.9 % 100 mL MBP  2,000 mg Intravenous Q24H Victor Manuel Andrews  mL/hr at 03/19/25 1218 2,000 mg at 03/19/25 1218    gabapentin  (NEURONTIN) capsule 100 mg  100 mg Oral Nightly Vandana Churchill MD   100 mg at 03/19/25 2156    insulin glargine (LANTUS, SEMGLEE) injection 20 Units  20 Units Subcutaneous Nightly Vandana Churchill MD   20 Units at 03/19/25 2157    Insulin Lispro (humaLOG) injection 4 Units  4 Units Subcutaneous TID With Meals Vandana Churchill MD   4 Units at 03/19/25 1830    ipratropium-albuterol (DUO-NEB) nebulizer solution 3 mL  3 mL Nebulization 4x Daily - RT Victor Manuel Andrews MD   3 mL at 03/20/25 0549    levothyroxine (SYNTHROID, LEVOTHROID) tablet 200 mcg  200 mcg Oral Q AM Vandana Churchill MD   200 mcg at 03/20/25 0535    lisinopril (PRINIVIL,ZESTRIL) tablet 20 mg  20 mg Oral Daily Vandana Churchill MD   20 mg at 03/20/25 0937    Magnesium Low Dose Replacement - Follow Nurse / BPA Driven Protocol   Not Applicable PRN Victor Manuel Andrews MD        melatonin tablet 10 mg  10 mg Oral Nightly Vandana Churchill MD   10 mg at 03/19/25 2157    methylPREDNISolone sodium succinate (SOLU-Medrol) injection 40 mg  40 mg Intravenous Q6H Vandana Churchill MD   40 mg at 03/20/25 0937    metoprolol tartrate (LOPRESSOR) tablet 25 mg  25 mg Oral Q12H Victor Manuel Andrews MD   25 mg at 03/20/25 0937    ondansetron (ZOFRAN) injection 4 mg  4 mg Intravenous Q6H PRN Victor Manuel Andrews MD        Phosphorus Replacement - Follow Nurse / BPA Driven Protocol   Not Applicable PRVictor Manuel Beckett MD        polyethylene glycol (MIRALAX) packet 17 g  17 g Oral Daily PRN Vandana Churchill MD        Potassium Replacement - Follow Nurse / BPA Driven Protocol   Not Applicable Victor Manuel Flores MD        sodium chloride 0.9 % flush 10 mL  10 mL Intravenous Q12H Victor Manuel Andrews MD   10 mL at 03/20/25 0938    sodium chloride 0.9 % flush 10 mL  10 mL Intravenous PRN Victor Manuel Andrews MD        sodium chloride 0.9 % infusion 40 mL  40 mL Intravenous PRN Victor Manuel Andrews MD        sodium  zirconium cyclosilicate (LOKELMA) packet 10 g  10 g Oral Every Other Day Vandana Churchill MD        vitamin B-12 (CYANOCOBALAMIN) tablet 1,000 mcg  1,000 mcg Oral Daily Vandana Churchill MD   1,000 mcg at 03/20/25 0937       Past Medical History:  Past Medical History:   Diagnosis Date    Abnormality of gait and mobility     Anemia     Anxiety     Cerebral palsy     Cognitive communication deficit     Depression     Diabetes mellitus     Disease of thyroid gland     Hyperglycemia     Hyperlipidemia     Hypertension     Hypokalemia     Insomnia     Lymphedema     Neuropathy     Urge incontinence        Past Surgical History:  History reviewed. No pertinent surgical history.    Family History  History reviewed. No pertinent family history.    Social History  Social History     Socioeconomic History    Marital status: Single   Tobacco Use    Smoking status: Never     Passive exposure: Past   Vaping Use    Vaping status: Never Used   Substance and Sexual Activity    Alcohol use: Never    Drug use: Never    Sexual activity: Not Currently       Review of Systems:  History obtained from chart review and the patient  General ROS: No fever or chills  Respiratory ROS: No cough, shortness of breath, wheezing  Cardiovascular ROS: No chest pain or palpitations  Gastrointestinal ROS: No abdominal pain or melena  Genito-Urinary ROS: No dysuria or hematuria  Psych ROS: No anxiety and depression  14 point ROS reviewed with the patient and negative except as noted above and in the HPI unless unable to obtain.    Objective:  Patient Vitals for the past 24 hrs:   BP Temp Temp src Pulse Resp SpO2 Weight   03/20/25 0845 125/63 98.1 °F (36.7 °C) Oral 114 18 96 % --   03/20/25 0557 -- -- -- -- -- -- 125 kg (276 lb 1.6 oz)   03/20/25 0554 -- -- -- 100 18 -- --   03/20/25 0549 -- -- -- 102 18 99 % --   03/20/25 0359 113/54 97.8 °F (36.6 °C) Oral 85 20 98 % --   03/20/25 0100 -- -- -- 85 20 -- --   03/19/25 2336 106/50 98.7 °F (37.1  °C) Oral 110 20 98 % --   03/19/25 2156 133/72 -- -- 116 -- -- --   03/19/25 2051 129/69 98.2 °F (36.8 °C) Oral 110 20 97 % --   03/19/25 2008 -- -- -- 113 20 97 % --   03/19/25 2001 -- -- -- 116 20 99 % --   03/19/25 1805 101/50 97.7 °F (36.5 °C) Oral 95 20 96 % --   03/19/25 1506 -- -- -- 99 20 96 % --   03/19/25 1458 -- -- -- 104 20 98 % --   03/19/25 1232 103/54 97.4 °F (36.3 °C) Oral 84 20 99 % --   03/19/25 1113 -- -- -- 82 20 95 % --   03/19/25 1106 -- -- -- 80 20 97 % --       Intake/Output Summary (Last 24 hours) at 3/20/2025 0956  Last data filed at 3/20/2025 0351  Gross per 24 hour   Intake 150 ml   Output 950 ml   Net -800 ml     General: awake/alert   Chest:  clear to auscultation bilaterally without respiratory distress  CVS: regular rate and rhythm  Abdominal: soft, nontender, positive bowel sounds  Extremities:  chronic lymphedema  Skin: warm and dry without rash      Labs:  Results from last 7 days   Lab Units 03/20/25 0428 03/19/25  0409 03/18/25  1055   WBC 10*3/mm3 24.01* 15.87* 12.66*   HEMOGLOBIN g/dL 11.6* 12.4 12.5   HEMATOCRIT % 37.4 40.4 40.0   PLATELETS 10*3/mm3 284 296 323         Results from last 7 days   Lab Units 03/20/25  0428 03/20/25  0331 03/19/25  0808 03/19/25  0409 03/18/25  1117 03/18/25  1055   SODIUM mmol/L 137  --  139 144  --  141   SODIUM, ARTERIAL mmol/L  --  139  --   --    < >  --    POTASSIUM mmol/L 3.9  --  3.8 3.9  --  3.6   CHLORIDE mmol/L 95*  --  96* 98  --  95*   CO2 mmol/L 29.0  --  34.0* 36.0*  --  35.0*   BUN mg/dL 36*  --  30* 30*  --  30*   CREATININE mg/dL 1.85*  --  1.52* 1.48*  --  1.46*   CALCIUM mg/dL 9.3  --  8.8 8.9  --  9.6   EGFR mL/min/1.73 26.8*  --  33.9* 35.0*  --  35.6*   BILIRUBIN mg/dL 0.2  --   --  0.4  --  0.3   ALK PHOS U/L 146*  --   --  127*  --  146*   ALT (SGPT) U/L 17  --   --  16  --  17   AST (SGOT) U/L 16  --   --  14  --  14   GLUCOSE mg/dL 208*  --  79 63*  --  139*    < > = values in this interval not displayed.        Radiology:   Imaging Results (Last 72 Hours)       Procedure Component Value Units Date/Time    XR Chest 1 View [[id number]] Collected: 03/18/25 1051     Updated: 03/18/25 1055    Narrative:      EXAMINATION: XR CHEST 1 VW-  3/18/2025 10:51 AM     HISTORY: Shortness of breath and cough.     FINDINGS: Today's exam is compared to previous study 1/13/2025. The  heart is mildly enlarged. There is patchy bibasilar airspace disease  either related to atelectasis or infiltrate. Blunting of the left  lateral costophrenic angle suggests a small effusion or pleural  thickening. There is no free air beneath the diaphragms.       Impression:      1.. Expiratory chest. Patchy bibasilar airspace disease with  differential as above.  2. There is blunting of the left lateral costophrenic angle suggesting  either a small effusion or pleural thickening.     This report was signed and finalized on 3/18/2025 10:52 AM by Dr. Jay Nova MD.               Culture:  Blood Culture   Date Value Ref Range Status   03/18/2025 No growth at 24 hours  Preliminary   03/18/2025 No growth at 24 hours  Preliminary         Assessment    Chronic kidney disease stage 4  Type 2 diabetes  Cerebral palsy  Hypertension  Pneumonia     Plan:   Continue current treatments  Renal function remains close to her baseline level      Willis Gary, APRN  3/20/2025  09:56 CDT

## 2025-03-20 NOTE — PROGRESS NOTES
"Progress Note  Mar Rodriguez  3/20/2025 13:50 CDT  Subjective:   Admit Date:   3/18/2025      CC/ADMIT DX:       Interval History:   Reviewed overnight events and nursing notes.  She feels better. She has less SOA. No CP.       I have reviewed all labs/diagnostics from the last 24hrs.       ROS:   I have done a 10 point ROS and all are negative, except what is mentioned in the HPI.    Diet: Regular/House; Fluid Consistency: Thin (IDDSI 0)    Medications:        apixaban, 2.5 mg, Oral, Q12H  atorvastatin, 40 mg, Oral, Nightly  azithromycin, 500 mg, Intravenous, Q24H  bumetanide, 1 mg, Oral, Daily  busPIRone, 7.5 mg, Oral, TID With Meals  cefTRIAXone, 2,000 mg, Intravenous, Q24H  gabapentin, 100 mg, Oral, Nightly  insulin glargine, 20 Units, Subcutaneous, Nightly  insulin lispro, 4 Units, Subcutaneous, TID With Meals  ipratropium-albuterol, 3 mL, Nebulization, 4x Daily - RT  levothyroxine, 200 mcg, Oral, Q AM  lisinopril, 20 mg, Oral, Daily  melatonin, 10 mg, Oral, Nightly  methylPREDNISolone sodium succinate, 40 mg, Intravenous, Q12H  metoprolol tartrate, 25 mg, Oral, Q12H  sodium chloride, 10 mL, Intravenous, Q12H  sodium zirconium cyclosilicate, 10 g, Oral, Every Other Day  vitamin B-12, 1,000 mcg, Oral, Daily            Objective:   Vitals: /64 (BP Location: Left arm, Patient Position: Lying)   Pulse 100   Temp 98.1 °F (36.7 °C) (Oral)   Resp 20   Ht 165.1 cm (65\")   Wt 125 kg (276 lb 1.6 oz)   SpO2 93%   BMI 45.95 kg/m²    Intake/Output Summary (Last 24 hours) at 3/20/2025 1350  Last data filed at 3/20/2025 0351  Gross per 24 hour   Intake 150 ml   Output 950 ml   Net -800 ml     General appearance: alert and cooperative with exam  Lungs: coarse  Heart: reg  Abdomen: soft, non-tender; bowel sounds normal; no masses,  no organomegaly  Extremities: no C/C 2+ LE edema  Neurologic:  No obvious focal neurologic deficits.    Assessment and Plan:     Respiratory failure with hypoxia    Cerebral " Palsy    CHRISTOPHER    CKD    HTN    DM2    Chronic LE edema    Plan:   Continue present medication(s)    Follow with Pulm and Nephrology   Follow labs   Monitor glucose   Continue antibiotics and steroids   Wean O2 as able      Discharge planning:   a skilled nursing facility     Reviewed treatment plans with the patient and/or family.             Electronically signed by Vandana Churchill MD on 3/20/2025 at 13:50 CDT

## 2025-03-20 NOTE — PROGRESS NOTES
[facility] PULMONARY AND CRITICAL CARE PROGRESS NOTE - [facility]    Patient: Mar Rodriguez  1940   MR# [id number]   Acct# [id number]  03/20/25   10:48 CDT  Referring Provider: Vandana Churchill MD    Chief Complaint: Shortness of breath  Interval history: She feels she is doing better.  She is coughing and cannot clear secretions.  She is not hooked to the BiPAP this morning.  On nasal oxygen.  No other new complaints.  Meds:  apixaban, 2.5 mg, Oral, Q12H  atorvastatin, 40 mg, Oral, Nightly  azithromycin, 500 mg, Intravenous, Q24H  bumetanide, 1 mg, Oral, Daily  busPIRone, 7.5 mg, Oral, TID With Meals  cefTRIAXone, 2,000 mg, Intravenous, Q24H  gabapentin, 100 mg, Oral, Nightly  insulin glargine, 20 Units, Subcutaneous, Nightly  insulin lispro, 4 Units, Subcutaneous, TID With Meals  ipratropium-albuterol, 3 mL, Nebulization, 4x Daily - RT  levothyroxine, 200 mcg, Oral, Q AM  lisinopril, 20 mg, Oral, Daily  melatonin, 10 mg, Oral, Nightly  methylPREDNISolone sodium succinate, 40 mg, Intravenous, Q6H  metoprolol tartrate, 25 mg, Oral, Q12H  sodium chloride, 10 mL, Intravenous, Q12H  sodium zirconium cyclosilicate, 10 g, Oral, Every Other Day  vitamin B-12, 1,000 mcg, Oral, Daily         Physical Exam:  SpO2 Percentage    03/20/25 0359 03/20/25 0549 03/20/25 0845   SpO2: 98% 99% 96%     Body mass index is 45.95 kg/m².   Temp:  [97.4 °F (36.3 °C)-98.7 °F (37.1 °C)] 98.1 °F (36.7 °C)  Heart Rate:  [] 114  Resp:  [18-20] 18  BP: (101-133)/(50-72) 125/63  Intake/Output Summary (Last 24 hours) at 3/20/2025 1048  Last data filed at 3/20/2025 0351  Gross per 24 hour   Intake 150 ml   Output 950 ml   Net -800 ml     Physical Exam  Constitutional:       General: She is not in acute distress.     Appearance: She is ill-appearing. She is not toxic-appearing.   Pulmonary:      Effort: No respiratory distress.      Breath sounds: Wheezing and rhonchi present.   Neurological:      Mental Status: She  is alert.       Laboratory Data:  Results from last 7 days   Lab Units 03/20/25  0428 03/19/25  0409 03/18/25  1055   WBC 10*3/mm3 24.01* 15.87* 12.66*   HEMOGLOBIN g/dL 11.6* 12.4 12.5   PLATELETS 10*3/mm3 284 296 323     Results from last 7 days   Lab Units 03/20/25  0428 03/20/25  0331 03/19/25  0808 03/19/25  0409 03/18/25  1117 03/18/25  1055   SODIUM mmol/L 137  --  139 144  --  141   SODIUM, ARTERIAL mmol/L  --  139  --   --    < >  --    POTASSIUM mmol/L 3.9  --  3.8 3.9  --  3.6   BUN mg/dL 36*  --  30* 30*  --  30*   CREATININE mg/dL 1.85*  --  1.52* 1.48*  --  1.46*   INR   --   --   --   --   --  1.08    < > = values in this interval not displayed.     Results from last 7 days   Lab Units 03/20/25  0331 03/19/25  0820 03/18/25  1618   PH, ARTERIAL pH units 7.437 7.449 7.371   PCO2, ARTERIAL mm Hg 52.0* 53.8* 65.6*   PO2 ART mm Hg 65.0* 95.4 126.0*   FIO2 %  --  25 36     Microbiology Results (last 10 days)       Procedure Component Value - Date/Time    Blood Culture - Blood, Arm, Right [[id number]]  (Normal) Collected: 03/18/25 1131    Lab Status: Preliminary result Specimen: Blood from Arm, Right Updated: 03/19/25 1200     Blood Culture No growth at 24 hours    Respiratory Panel PCR w/COVID-19(SARS-CoV-2) [facility]T/[facility]CA/PAD/COR/COREY In-House, NP Swab in UTM/VTM, 2 HR TAT - Swab, Nasopharynx [[id number]]  (Normal) Collected: 03/18/25 1059    Lab Status: Final result Specimen: Swab from Nasopharynx Updated: 03/18/25 1210     ADENOVIRUS, PCR Not Detected     Coronavirus 229E Not Detected     Coronavirus HKU1 Not Detected     Coronavirus NL63 Not Detected     Coronavirus OC43 Not Detected     COVID19 Not Detected     Human Metapneumovirus Not Detected     Human Rhinovirus/Enterovirus Not Detected     Influenza A PCR Not Detected     Influenza B PCR Not Detected     Parainfluenza Virus 1 Not Detected     Parainfluenza Virus 2 Not Detected     Parainfluenza Virus 3 Not Detected     Parainfluenza Virus 4 Not  Detected     RSV, PCR Not Detected     Bordetella pertussis pcr Not Detected     Bordetella parapertussis PCR Not Detected     Chlamydophila pneumoniae PCR Not Detected     Mycoplasma pneumo by PCR Not Detected    Narrative:      In the setting of a positive respiratory panel with a viral infection PLUS a negative procalcitonin without other underlying concern for bacterial infection, consider observing off antibiotics or discontinuation of antibiotics and continue supportive care. If the respiratory panel is positive for atypical bacterial infection (Bordetella pertussis, Chlamydophila pneumoniae, or Mycoplasma pneumoniae), consider antibiotic de-escalation to target atypical bacterial infection.    Blood Culture - Blood, Arm, Right [[id number]]  (Normal) Collected: 03/18/25 1055    Lab Status: Preliminary result Specimen: Blood from Arm, Right Updated: 03/19/25 1115     Blood Culture No growth at 24 hours          Recent films:  XR Chest 1 View  Result Date: 3/18/2025  1.. Expiratory chest. Patchy bibasilar airspace disease with differential as above. 2. There is blunting of the left lateral costophrenic angle suggesting either a small effusion or pleural thickening.  This report was signed and finalized on 3/18/2025 10:52 AM by Dr. Jay Nova MD.        Pulmonary Assessment:  Acute respiratory failure with hypercapnia  Cerebral palsy  Possible pneumonia.  See yesterday's note for details of my impression of that.    Recommend:   Continue steroids and taper  Continue antibiotics  Begin vest therapy    Electronically signed by Aaron Brenner MD, 03/20/25, 10:48 CDT

## 2025-03-21 ENCOUNTER — APPOINTMENT (OUTPATIENT)
Dept: GENERAL RADIOLOGY | Facility: HOSPITAL | Age: 85
DRG: 189 | End: 2025-03-21
Payer: MEDICARE

## 2025-03-21 LAB
ALBUMIN SERPL-MCNC: 3 G/DL (ref 3.5–5.2)
ALBUMIN/GLOB SERPL: 1 G/DL
ALP SERPL-CCNC: 119 U/L (ref 39–117)
ALT SERPL W P-5'-P-CCNC: 15 U/L (ref 1–33)
ANION GAP SERPL CALCULATED.3IONS-SCNC: 16 MMOL/L (ref 5–15)
AST SERPL-CCNC: 13 U/L (ref 1–32)
BASOPHILS # BLD AUTO: 0.07 10*3/MM3 (ref 0–0.2)
BASOPHILS NFR BLD AUTO: 0.2 % (ref 0–1.5)
BILIRUB SERPL-MCNC: 0.2 MG/DL (ref 0–1.2)
BUN SERPL-MCNC: 48 MG/DL (ref 8–23)
BUN/CREAT SERPL: 22.1 (ref 7–25)
CALCIUM SPEC-SCNC: 9.3 MG/DL (ref 8.6–10.5)
CHLORIDE SERPL-SCNC: 94 MMOL/L (ref 98–107)
CO2 SERPL-SCNC: 29 MMOL/L (ref 22–29)
CREAT SERPL-MCNC: 2.17 MG/DL (ref 0.57–1)
DEPRECATED RDW RBC AUTO: 56.5 FL (ref 37–54)
EGFRCR SERPLBLD CKD-EPI 2021: 22.1 ML/MIN/1.73
EOSINOPHIL # BLD AUTO: 0 10*3/MM3 (ref 0–0.4)
EOSINOPHIL NFR BLD AUTO: 0 % (ref 0.3–6.2)
ERYTHROCYTE [DISTWIDTH] IN BLOOD BY AUTOMATED COUNT: 15.2 % (ref 12.3–15.4)
GLOBULIN UR ELPH-MCNC: 3 GM/DL
GLUCOSE BLDC GLUCOMTR-MCNC: 216 MG/DL (ref 70–130)
GLUCOSE BLDC GLUCOMTR-MCNC: 241 MG/DL (ref 70–130)
GLUCOSE BLDC GLUCOMTR-MCNC: 298 MG/DL (ref 70–130)
GLUCOSE BLDC GLUCOMTR-MCNC: 323 MG/DL (ref 70–130)
GLUCOSE BLDC GLUCOMTR-MCNC: 331 MG/DL (ref 70–130)
GLUCOSE SERPL-MCNC: 213 MG/DL (ref 65–99)
HCT VFR BLD AUTO: 33.9 % (ref 34–46.6)
HGB BLD-MCNC: 10.5 G/DL (ref 12–15.9)
IMM GRANULOCYTES # BLD AUTO: 0.35 10*3/MM3 (ref 0–0.05)
IMM GRANULOCYTES NFR BLD AUTO: 1.2 % (ref 0–0.5)
LYMPHOCYTES # BLD AUTO: 0.62 10*3/MM3 (ref 0.7–3.1)
LYMPHOCYTES NFR BLD AUTO: 2.1 % (ref 19.6–45.3)
MCH RBC QN AUTO: 32 PG (ref 26.6–33)
MCHC RBC AUTO-ENTMCNC: 31 G/DL (ref 31.5–35.7)
MCV RBC AUTO: 103.4 FL (ref 79–97)
MONOCYTES # BLD AUTO: 0.49 10*3/MM3 (ref 0.1–0.9)
MONOCYTES NFR BLD AUTO: 1.6 % (ref 5–12)
NEUTROPHILS NFR BLD AUTO: 28.25 10*3/MM3 (ref 1.7–7)
NEUTROPHILS NFR BLD AUTO: 94.9 % (ref 42.7–76)
NRBC BLD AUTO-RTO: 0 /100 WBC (ref 0–0.2)
PLATELET # BLD AUTO: 280 10*3/MM3 (ref 140–450)
PMV BLD AUTO: 11.2 FL (ref 6–12)
POTASSIUM SERPL-SCNC: 4 MMOL/L (ref 3.5–5.2)
PROT SERPL-MCNC: 6 G/DL (ref 6–8.5)
RBC # BLD AUTO: 3.28 10*6/MM3 (ref 3.77–5.28)
SODIUM SERPL-SCNC: 139 MMOL/L (ref 136–145)
WBC NRBC COR # BLD AUTO: 29.78 10*3/MM3 (ref 3.4–10.8)

## 2025-03-21 PROCEDURE — 25010000002 CEFTRIAXONE PER 250 MG: Performed by: INTERNAL MEDICINE

## 2025-03-21 PROCEDURE — 25010000002 METHYLPREDNISOLONE PER 40 MG: Performed by: INTERNAL MEDICINE

## 2025-03-21 PROCEDURE — 94669 MECHANICAL CHEST WALL OSCILL: CPT

## 2025-03-21 PROCEDURE — 85025 COMPLETE CBC W/AUTO DIFF WBC: CPT | Performed by: INTERNAL MEDICINE

## 2025-03-21 PROCEDURE — 99232 SBSQ HOSP IP/OBS MODERATE 35: CPT | Performed by: INTERNAL MEDICINE

## 2025-03-21 PROCEDURE — 63710000001 INSULIN LISPRO (HUMAN) PER 5 UNITS: Performed by: FAMILY MEDICINE

## 2025-03-21 PROCEDURE — 71046 X-RAY EXAM CHEST 2 VIEWS: CPT

## 2025-03-21 PROCEDURE — 63710000001 PREDNISONE PER 1 MG: Performed by: INTERNAL MEDICINE

## 2025-03-21 PROCEDURE — 82948 REAGENT STRIP/BLOOD GLUCOSE: CPT

## 2025-03-21 PROCEDURE — 94799 UNLISTED PULMONARY SVC/PX: CPT

## 2025-03-21 PROCEDURE — 97110 THERAPEUTIC EXERCISES: CPT

## 2025-03-21 PROCEDURE — 94761 N-INVAS EAR/PLS OXIMETRY MLT: CPT

## 2025-03-21 PROCEDURE — 63710000001 INSULIN GLARGINE PER 5 UNITS: Performed by: FAMILY MEDICINE

## 2025-03-21 PROCEDURE — 80053 COMPREHEN METABOLIC PANEL: CPT | Performed by: INTERNAL MEDICINE

## 2025-03-21 PROCEDURE — 97530 THERAPEUTIC ACTIVITIES: CPT

## 2025-03-21 PROCEDURE — 94664 DEMO&/EVAL PT USE INHALER: CPT

## 2025-03-21 PROCEDURE — 63710000001 INSULIN LISPRO (HUMAN) PER 5 UNITS: Performed by: INTERNAL MEDICINE

## 2025-03-21 RX ORDER — NICOTINE POLACRILEX 4 MG
15 LOZENGE BUCCAL
Status: DISCONTINUED | OUTPATIENT
Start: 2025-03-21 | End: 2025-03-22 | Stop reason: HOSPADM

## 2025-03-21 RX ORDER — PREDNISONE 20 MG/1
20 TABLET ORAL
Status: DISCONTINUED | OUTPATIENT
Start: 2025-03-21 | End: 2025-03-22 | Stop reason: HOSPADM

## 2025-03-21 RX ORDER — BUMETANIDE 1 MG/1
3 TABLET ORAL DAILY
Status: DISCONTINUED | OUTPATIENT
Start: 2025-03-21 | End: 2025-03-22 | Stop reason: HOSPADM

## 2025-03-21 RX ORDER — DEXTROSE MONOHYDRATE 25 G/50ML
25 INJECTION, SOLUTION INTRAVENOUS
Status: DISCONTINUED | OUTPATIENT
Start: 2025-03-21 | End: 2025-03-22 | Stop reason: HOSPADM

## 2025-03-21 RX ORDER — INSULIN LISPRO 100 [IU]/ML
2-9 INJECTION, SOLUTION INTRAVENOUS; SUBCUTANEOUS
Status: DISCONTINUED | OUTPATIENT
Start: 2025-03-21 | End: 2025-03-22 | Stop reason: HOSPADM

## 2025-03-21 RX ORDER — IBUPROFEN 600 MG/1
1 TABLET ORAL
Status: DISCONTINUED | OUTPATIENT
Start: 2025-03-21 | End: 2025-03-22 | Stop reason: HOSPADM

## 2025-03-21 RX ADMIN — LISINOPRIL 20 MG: 20 TABLET ORAL at 08:47

## 2025-03-21 RX ADMIN — Medication 10 ML: at 21:26

## 2025-03-21 RX ADMIN — INSULIN LISPRO 4 UNITS: 100 INJECTION, SOLUTION INTRAVENOUS; SUBCUTANEOUS at 08:47

## 2025-03-21 RX ADMIN — Medication 10 ML: at 08:48

## 2025-03-21 RX ADMIN — INSULIN GLARGINE 20 UNITS: 100 INJECTION, SOLUTION SUBCUTANEOUS at 21:25

## 2025-03-21 RX ADMIN — CEFTRIAXONE SODIUM 2000 MG: 2 INJECTION, POWDER, FOR SOLUTION INTRAMUSCULAR; INTRAVENOUS at 12:43

## 2025-03-21 RX ADMIN — IPRATROPIUM BROMIDE AND ALBUTEROL SULFATE 3 ML: .5; 3 SOLUTION RESPIRATORY (INHALATION) at 07:23

## 2025-03-21 RX ADMIN — BUMETANIDE 3 MG: 1 TABLET ORAL at 08:47

## 2025-03-21 RX ADMIN — BUSPIRONE HYDROCHLORIDE 7.5 MG: 5 TABLET ORAL at 17:42

## 2025-03-21 RX ADMIN — ATORVASTATIN CALCIUM 40 MG: 40 TABLET, FILM COATED ORAL at 21:24

## 2025-03-21 RX ADMIN — INSULIN LISPRO 4 UNITS: 100 INJECTION, SOLUTION INTRAVENOUS; SUBCUTANEOUS at 12:43

## 2025-03-21 RX ADMIN — APIXABAN 2.5 MG: 2.5 TABLET, FILM COATED ORAL at 08:47

## 2025-03-21 RX ADMIN — Medication 10 MG: at 21:25

## 2025-03-21 RX ADMIN — METOPROLOL TARTRATE 25 MG: 25 TABLET, FILM COATED ORAL at 21:24

## 2025-03-21 RX ADMIN — IPRATROPIUM BROMIDE AND ALBUTEROL SULFATE 3 ML: .5; 3 SOLUTION RESPIRATORY (INHALATION) at 11:12

## 2025-03-21 RX ADMIN — CYANOCOBALAMIN TAB 500 MCG 1000 MCG: 500 TAB at 08:47

## 2025-03-21 RX ADMIN — METOPROLOL TARTRATE 25 MG: 25 TABLET, FILM COATED ORAL at 08:47

## 2025-03-21 RX ADMIN — APIXABAN 2.5 MG: 2.5 TABLET, FILM COATED ORAL at 21:25

## 2025-03-21 RX ADMIN — INSULIN LISPRO 7 UNITS: 100 INJECTION, SOLUTION INTRAVENOUS; SUBCUTANEOUS at 21:56

## 2025-03-21 RX ADMIN — SODIUM ZIRCONIUM CYCLOSILICATE 10 G: 10 POWDER, FOR SUSPENSION ORAL at 08:47

## 2025-03-21 RX ADMIN — INSULIN LISPRO 4 UNITS: 100 INJECTION, SOLUTION INTRAVENOUS; SUBCUTANEOUS at 17:42

## 2025-03-21 RX ADMIN — LEVOTHYROXINE SODIUM 200 MCG: 100 TABLET ORAL at 05:05

## 2025-03-21 RX ADMIN — GABAPENTIN 100 MG: 100 CAPSULE ORAL at 21:25

## 2025-03-21 RX ADMIN — IPRATROPIUM BROMIDE AND ALBUTEROL SULFATE 3 ML: .5; 3 SOLUTION RESPIRATORY (INHALATION) at 19:08

## 2025-03-21 RX ADMIN — IPRATROPIUM BROMIDE AND ALBUTEROL SULFATE 3 ML: .5; 3 SOLUTION RESPIRATORY (INHALATION) at 14:56

## 2025-03-21 RX ADMIN — BUSPIRONE HYDROCHLORIDE 7.5 MG: 5 TABLET ORAL at 08:47

## 2025-03-21 RX ADMIN — BUSPIRONE HYDROCHLORIDE 7.5 MG: 5 TABLET ORAL at 12:43

## 2025-03-21 RX ADMIN — METHYLPREDNISOLONE SODIUM SUCCINATE 40 MG: 40 INJECTION, POWDER, FOR SOLUTION INTRAMUSCULAR; INTRAVENOUS at 08:47

## 2025-03-21 RX ADMIN — PREDNISONE 20 MG: 20 TABLET ORAL at 17:42

## 2025-03-21 NOTE — PROGRESS NOTES
[facility] [place] PULMONARY AND CRITICAL CARE PROGRESS NOTE - [facility]    Patient: Mar Rodriguez  1940   MR# [id number]   Acct# [id number]  03/21/25   12:46 CDT  Referring Provider: Vandana Churchill MD    Chief Complaint: Shortness of breath  Interval history: She feels that she is doing much better.  She is hopeful that she can go home soon.  She remains on IV steroids and antibiotics.  She is off BiPAP on oxygen.  Meds:  apixaban, 2.5 mg, Oral, Q12H  atorvastatin, 40 mg, Oral, Nightly  bumetanide, 3 mg, Oral, Daily  busPIRone, 7.5 mg, Oral, TID With Meals  cefTRIAXone, 2,000 mg, Intravenous, Q24H  gabapentin, 100 mg, Oral, Nightly  insulin glargine, 20 Units, Subcutaneous, Nightly  insulin lispro, 4 Units, Subcutaneous, TID With Meals  ipratropium-albuterol, 3 mL, Nebulization, 4x Daily - RT  levothyroxine, 200 mcg, Oral, Q AM  lisinopril, 20 mg, Oral, Daily  melatonin, 10 mg, Oral, Nightly  methylPREDNISolone sodium succinate, 40 mg, Intravenous, Q12H  metoprolol tartrate, 25 mg, Oral, Q12H  sodium chloride, 10 mL, Intravenous, Q12H  sodium zirconium cyclosilicate, 10 g, Oral, Every Other Day  vitamin B-12, 1,000 mcg, Oral, Daily         Physical Exam:  SpO2 Percentage    03/21/25 1112 03/21/25 1121 03/21/25 1147   SpO2: 93% 92% 95%     Body mass index is 45.43 kg/m².   Temp:  [97.6 °F (36.4 °C)-98.6 °F (37 °C)] 97.9 °F (36.6 °C)  Heart Rate:  [] 107  Resp:  [16-20] 18  BP: (106-140)/(48-70) 110/64  Intake/Output Summary (Last 24 hours) at 3/21/2025 1246  Last data filed at 3/21/2025 0506  Gross per 24 hour   Intake 240 ml   Output 275 ml   Net -35 ml     Physical Exam  Constitutional:       General: She is not in acute distress.  Pulmonary:      Effort: No respiratory distress.      Breath sounds: No wheezing.   Abdominal:      General: There is no distension.   Skin:     General: Skin is warm and dry.   Neurological:      Mental Status: She is alert.       Laboratory Data:  Results  from last 7 days   Lab Units 03/21/25  0425 03/20/25  0428 03/19/25  0409   WBC 10*3/mm3 29.78* 24.01* 15.87*   HEMOGLOBIN g/dL 10.5* 11.6* 12.4   PLATELETS 10*3/mm3 280 284 296     Results from last 7 days   Lab Units 03/21/25  0425 03/20/25  0428 03/20/25  0331 03/19/25  0808 03/18/25  1117 03/18/25  1055   SODIUM mmol/L 139 137  --  139   < > 141   SODIUM, ARTERIAL mmol/L  --   --  139  --    < >  --    POTASSIUM mmol/L 4.0 3.9  --  3.8   < > 3.6   BUN mg/dL 48* 36*  --  30*   < > 30*   CREATININE mg/dL 2.17* 1.85*  --  1.52*   < > 1.46*   INR   --   --   --   --   --  1.08    < > = values in this interval not displayed.     Results from last 7 days   Lab Units 03/20/25  0331 03/19/25  0820 03/18/25  1618   PH, ARTERIAL pH units 7.437 7.449 7.371   PCO2, ARTERIAL mm Hg 52.0* 53.8* 65.6*   PO2 ART mm Hg 65.0* 95.4 126.0*   FIO2 %  --  25 36     Microbiology Results (last 10 days)       Procedure Component Value - Date/Time    Blood Culture - Blood, Arm, Right [[id number]]  (Normal) Collected: 03/18/25 1131    Lab Status: Preliminary result Specimen: Blood from Arm, Right Updated: 03/21/25 1200     Blood Culture No growth at 3 days    Respiratory Panel PCR w/COVID-19(SARS-CoV-2) [facility]T/ANCA/PAD/COR/COREY In-House, NP Swab in UTM/VTM, 2 HR TAT - Swab, Nasopharynx [[id number]]  (Normal) Collected: 03/18/25 1059    Lab Status: Final result Specimen: Swab from Nasopharynx Updated: 03/18/25 1210     ADENOVIRUS, PCR Not Detected     Coronavirus 229E Not Detected     Coronavirus HKU1 Not Detected     Coronavirus NL63 Not Detected     Coronavirus OC43 Not Detected     COVID19 Not Detected     Human Metapneumovirus Not Detected     Human Rhinovirus/Enterovirus Not Detected     Influenza A PCR Not Detected     Influenza B PCR Not Detected     Parainfluenza Virus 1 Not Detected     Parainfluenza Virus 2 Not Detected     Parainfluenza Virus 3 Not Detected     Parainfluenza Virus 4 Not Detected     RSV, PCR Not Detected      Bordetella pertussis pcr Not Detected     Bordetella parapertussis PCR Not Detected     Chlamydophila pneumoniae PCR Not Detected     Mycoplasma pneumo by PCR Not Detected    Narrative:      In the setting of a positive respiratory panel with a viral infection PLUS a negative procalcitonin without other underlying concern for bacterial infection, consider observing off antibiotics or discontinuation of antibiotics and continue supportive care. If the respiratory panel is positive for atypical bacterial infection (Bordetella pertussis, Chlamydophila pneumoniae, or Mycoplasma pneumoniae), consider antibiotic de-escalation to target atypical bacterial infection.    Blood Culture - Blood, Arm, Right [[id number]]  (Normal) Collected: 03/18/25 1055    Lab Status: Preliminary result Specimen: Blood from Arm, Right Updated: 03/21/25 1115     Blood Culture No growth at 3 days          Recent films:  No radiology results for the last day      Pulmonary Assessment:  Acute respiratory failure with hypercapnia improved  Suspected pneumonia  Leukocytosis due to the above  Cerebral palsy  Chronic kidney disease with worsened creatinine    Recommend:   Transition to oral steroids  Continue antibiotics; can transition to p.o. when ready for home  Continue vest therapy while she is here  Follow-up chest x-ray  May be ready to go home soon    Electronically signed by Aaron Brenner MD, 03/21/25, 12:46 CDT

## 2025-03-21 NOTE — CASE MANAGEMENT/SOCIAL WORK
Continued Stay Note   Rafael     Patient Name: Mar Rodriguez  MRN: [id number]  Today's Date: 3/21/2025    Admit Date: 3/18/2025        Discharge Plan       Row Name 03/21/25 0829       Plan    Plan Comments D/c plan still remains, pt will d/c back to [facility] when ready.                   Discharge Codes    No documentation.                       Issac Potts

## 2025-03-21 NOTE — PROGRESS NOTES
Nephrology ([facility]) Progress Note      Patient:  Mar Rodriguez  YOB: 1940  Date of Service: 3/21/2025  MRN: [id number]   Acct: [id number]   Primary Care Physician: Vandana Churchill MD  Advance Directive:   There are no questions and answers to display.     Admit Date: 3/18/2025       Hospital Day: 3  Referring Provider: No Known Provider      Patient personally seen and examined.  Complete chart including Consults, Notes, Operative Reports, Labs, Cardiology, and Radiology studies reviewed as able.        Subjective:  Mar Rodriguez is a 84 y.o. female for whom we were consulted for evaluation and treatment of chronic kidney disease stage 4. Baseline creatinine 1.6-1.7. History of cerebral palsy, type 2 diabetes, hypertension, lymphedema. Presented to ER with dyspnea. Labs showed creatinine 1.48. Admitted to medical floor with diagnosis of pneumonia. Started on IV antibiotics. Denied n/v/d. Denied fever/chills. No cough. Lower extremity swelling was at her baseline level. Urine output nonoliguric with no recent changes in urination.     Today is awake and alert. No new complaints or overnight issues. Urine output nonoliguric    Allergies:  Hydralazine hcl and Nsaids    Home Meds:  Medications Prior to Admission   Medication Sig Dispense Refill Last Dose/Taking    apixaban (ELIQUIS) 2.5 MG tablet tablet Take 1 tablet by mouth Every 12 (Twelve) Hours. Indications: Atrial Fibrillation 60 tablet 0 Taking    atorvastatin (LIPITOR) 40 MG tablet Take 1 tablet by mouth Every Night.   Taking    bumetanide (BUMEX) 1 MG tablet Take 3 tablets by mouth Daily.   Taking    busPIRone (BUSPAR) 7.5 MG tablet Take 1 tablet by mouth 3 (Three) Times a Day. For anxiety   Taking    cloNIDine (CATAPRES) 0.1 MG tablet Take 1 tablet by mouth Every 8 (Eight) Hours As Needed for High Blood Pressure (SBP >160 or DBP >90).   Taking As Needed    colestipol (COLESTID) 1 g tablet Take 1  tablet by mouth 2 (Two) Times a Day.   Taking    gabapentin (NEURONTIN) 300 MG capsule Take 1 capsule by mouth Every Night. 30 capsule 0 Taking    hydrocortisone 1 % ointment Apply 1 Application topically to the appropriate area as directed Every Night. Bilat heels   Taking    Insulin Aspart (novoLOG) 100 UNIT/ML injection Inject 8 Units under the skin into the appropriate area as directed 3 (Three) Times a Day Before Meals. Hold for BS <100   Taking    insulin degludec (Tresiba FlexTouch) 100 UNIT/ML solution pen-injector injection Inject 28 Units under the skin into the appropriate area as directed Every Night.   Taking    levothyroxine (SYNTHROID, LEVOTHROID) 200 MCG tablet Take 1 tablet by mouth Every Morning.   Taking    lisinopril (PRINIVIL,ZESTRIL) 10 MG tablet Take 2 tablets by mouth Daily.   Taking    metoprolol tartrate (LOPRESSOR) 25 MG tablet Take 1 tablet by mouth 2 (Two) Times a Day.   Taking    nystatin (MYCOSTATIN) 456651 UNIT/GM powder Apply 1 Application topically to the appropriate area as directed Every 12 (Twelve) Hours. Abd folds   Taking    Scopolamine 1 MG/3DAYS patch Place 1 patch on the skin as directed by provider Every 72 (Seventy-Two) Hours.   Taking    sodium zirconium cyclosilicate (LOKELMA) 10 g packet Take 10 g by mouth Daily. Empty entire contents of the packet(s) into a glass with at least 3 tablespoons (45 mL) of water. Stir well and drink immediately; if powder remains in the glass, add water, stir and drink immediately; repeat until no powder remains. Administer other oral medications at least 2 hours before or 2 hours after dose.   Taking    vitamin B-12 (CYANOCOBALAMIN) 1000 MCG tablet Take 1 tablet by mouth Daily.   Taking    acetaminophen (TYLENOL) 325 MG tablet Take 2 tablets by mouth Every 6 (Six) Hours As Needed for Mild Pain or Fever.       dextrose (GLUTOSE) 40 % gel Take 15 g by mouth As Needed for Low Blood Sugar (every 15 minutes).       glucagon (GLUCAGEN) 1 MG  injection Inject 1 mg under the skin into the appropriate area as directed 1 (One) Time As Needed for Low Blood Sugar.       ipratropium-albuterol (DUO-NEB) 0.5-2.5 mg/3 ml nebulizer Take 3 mL by nebulization Every 6 (Six) Hours As Needed for Shortness of Air.       Melatonin 10 MG tablet Take 1 tablet by mouth Daily As Needed (sleep).       phenylephrine-mineral oil-petrolatum (PREPARATION H) 0.25-14-74.9 % ointment hemorrhoidal ointment Insert 1 Application into the rectum Every 6 (Six) Hours As Needed (hemmorhoids).       polyethylene glycol (MIRALAX) 17 g packet Take 17 g by mouth Daily As Needed (Use if senna-docusate is ineffective). 1 each 0        Medicines:  Current Facility-Administered Medications   Medication Dose Route Frequency Provider Last Rate Last Admin    acetaminophen (TYLENOL) tablet 650 mg  650 mg Oral Q4H PRN Victor Manuel Andrews MD        Or    acetaminophen (TYLENOL) 160 MG/5ML oral solution 650 mg  650 mg Oral Q4H PRN Victor Manuel Andrews MD        Or    acetaminophen (TYLENOL) suppository 650 mg  650 mg Rectal Q4H PRN Victor Manuel Andrews MD        apixaban (ELIQUIS) tablet 2.5 mg  2.5 mg Oral Q12H Victor Manuel Andrews MD   2.5 mg at 03/21/25 0847    atorvastatin (LIPITOR) tablet 40 mg  40 mg Oral Nightly Vandana Churchill MD   40 mg at 03/20/25 2047    bumetanide (BUMEX) tablet 3 mg  3 mg Oral Daily Cecy Thorpe APRN   3 mg at 03/21/25 0847    busPIRone (BUSPAR) tablet 7.5 mg  7.5 mg Oral TID With Meals Vandana Churchill MD   7.5 mg at 03/21/25 0847    Calcium Replacement - Follow Nurse / BPA Driven Protocol   Not Applicable PRN Victor Manuel Andrews MD        cefTRIAXone (ROCEPHIN) 2,000 mg in sodium chloride 0.9 % 100 mL MBP  2,000 mg Intravenous Q24H Victor Manuel Andrews  mL/hr at 03/20/25 1311 2,000 mg at 03/20/25 1311    gabapentin (NEURONTIN) capsule 100 mg  100 mg Oral Nightly Vandana Churchill MD   100 mg at 03/20/25 2047    insulin glargine (LANTUS,  SEMGLEE) injection 20 Units  20 Units Subcutaneous Nightly Vandana Churchill MD   20 Units at 03/20/25 2047    Insulin Lispro (humaLOG) injection 4 Units  4 Units Subcutaneous TID With Meals Vandana Churchill MD   4 Units at 03/21/25 0847    ipratropium-albuterol (DUO-NEB) nebulizer solution 3 mL  3 mL Nebulization 4x Daily - RT Victor Manuel Andrews MD   3 mL at 03/21/25 0723    levothyroxine (SYNTHROID, LEVOTHROID) tablet 200 mcg  200 mcg Oral Q AM Vandana Churchill MD   200 mcg at 03/21/25 0505    lisinopril (PRINIVIL,ZESTRIL) tablet 20 mg  20 mg Oral Daily Vandana Churchill MD   20 mg at 03/21/25 0847    Magnesium Low Dose Replacement - Follow Nurse / BPA Driven Protocol   Not Applicable PRN Victor Manuel Andrews MD        melatonin tablet 10 mg  10 mg Oral Nightly Vandana Churchill MD   10 mg at 03/20/25 2047    methylPREDNISolone sodium succinate (SOLU-Medrol) injection 40 mg  40 mg Intravenous Q12H Aaron Brenner MD   40 mg at 03/21/25 0847    metoprolol tartrate (LOPRESSOR) tablet 25 mg  25 mg Oral Q12H Victor Manuel Andrews MD   25 mg at 03/21/25 0847    ondansetron (ZOFRAN) injection 4 mg  4 mg Intravenous Q6H PRN Victor Manuel Andrews MD        Phosphorus Replacement - Follow Nurse / BPA Driven Protocol   Not Applicable PRVictor Manuel Beckett MD        polyethylene glycol (MIRALAX) packet 17 g  17 g Oral Daily PRN Vandana Churchill MD        Potassium Replacement - Follow Nurse / BPA Driven Protocol   Not Applicable Victor Manuel Flores MD        sodium chloride 0.9 % flush 10 mL  10 mL Intravenous Q12H Victor Manuel Andrews MD   10 mL at 03/21/25 0848    sodium chloride 0.9 % flush 10 mL  10 mL Intravenous PRN Victor Manuel Andrews MD        sodium chloride 0.9 % infusion 40 mL  40 mL Intravenous PRN Victor Manuel Andrews MD        sodium zirconium cyclosilicate (LOKELMA) packet 10 g  10 g Oral Every Other Day Vandana Churchill MD   10 g at 03/21/25 0802     vitamin B-12 (CYANOCOBALAMIN) tablet 1,000 mcg  1,000 mcg Oral Daily Vandana Churchill MD   1,000 mcg at 03/21/25 0847       Past Medical History:  Past Medical History:   Diagnosis Date    Abnormality of gait and mobility     Anemia     Anxiety     Cerebral palsy     Cognitive communication deficit     Depression     Diabetes mellitus     Disease of thyroid gland     Hyperglycemia     Hyperlipidemia     Hypertension     Hypokalemia     Insomnia     Lymphedema     Neuropathy     Urge incontinence        Past Surgical History:  History reviewed. No pertinent surgical history.    Family History  History reviewed. No pertinent family history.    Social History  Social History     Socioeconomic History    Marital status: Single   Tobacco Use    Smoking status: Never     Passive exposure: Past   Vaping Use    Vaping status: Never Used   Substance and Sexual Activity    Alcohol use: Never    Drug use: Never    Sexual activity: Not Currently       Review of Systems:  History obtained from chart review and the patient  General ROS: No fever or chills  Respiratory ROS: No cough, shortness of breath, wheezing  Cardiovascular ROS: No chest pain or palpitations  Gastrointestinal ROS: No abdominal pain or melena  Genito-Urinary ROS: No dysuria or hematuria  Psych ROS: No anxiety and depression  14 point ROS reviewed with the patient and negative except as noted above and in the HPI unless unable to obtain.    Objective:  Patient Vitals for the past 24 hrs:   BP Temp Temp src Pulse Resp SpO2 Weight   03/21/25 0838 133/70 97.6 °F (36.4 °C) Oral 105 18 97 % --   03/21/25 0737 -- -- -- 102 18 -- --   03/21/25 0723 -- -- -- 93 18 93 % --   03/21/25 0425 -- -- -- -- -- -- 124 kg (273 lb)   03/21/25 0334 106/48 98.6 °F (37 °C) Oral 100 18 96 % --   03/21/25 0002 140/58 98.6 °F (37 °C) Oral 93 18 94 % --   03/20/25 2028 -- -- -- 111 20 97 % --   03/20/25 2017 -- -- -- 117 20 95 % --   03/20/25 2010 107/51 98.2 °F (36.8 °C) Oral 110  16 98 % --   03/20/25 1646 112/49 98.3 °F (36.8 °C) Oral 112 18 92 % --   03/20/25 1401 -- -- -- 113 18 -- --   03/20/25 1350 -- -- -- 103 18 96 % --   03/20/25 1232 120/64 98.1 °F (36.7 °C) Oral 100 20 93 % --   03/20/25 1121 -- -- -- 109 20 -- --   03/20/25 1110 -- -- -- 115 20 94 % --       Intake/Output Summary (Last 24 hours) at 3/21/2025 1033  Last data filed at 3/21/2025 0506  Gross per 24 hour   Intake 240 ml   Output 275 ml   Net -35 ml     General: awake/alert   Chest:  clear to auscultation bilaterally without respiratory distress  CVS: regular rate and rhythm  Abdominal: soft, nontender, positive bowel sounds  Extremities:  chronic lymphedema  Skin: warm and dry without rash      Labs:  Results from last 7 days   Lab Units 03/21/25  0425 03/20/25  0428 03/19/25  0409   WBC 10*3/mm3 29.78* 24.01* 15.87*   HEMOGLOBIN g/dL 10.5* 11.6* 12.4   HEMATOCRIT % 33.9* 37.4 40.4   PLATELETS 10*3/mm3 280 284 296         Results from last 7 days   Lab Units 03/21/25  0425 03/20/25  0428 03/20/25  0331 03/19/25  0808 03/19/25  0409   SODIUM mmol/L 139 137  --  139 144   SODIUM, ARTERIAL mmol/L  --   --  139  --   --    POTASSIUM mmol/L 4.0 3.9  --  3.8 3.9   CHLORIDE mmol/L 94* 95*  --  96* 98   CO2 mmol/L 29.0 29.0  --  34.0* 36.0*   BUN mg/dL 48* 36*  --  30* 30*   CREATININE mg/dL 2.17* 1.85*  --  1.52* 1.48*   CALCIUM mg/dL 9.3 9.3  --  8.8 8.9   EGFR mL/min/1.73 22.1* 26.8*  --  33.9* 35.0*   BILIRUBIN mg/dL 0.2 0.2  --   --  0.4   ALK PHOS U/L 119* 146*  --   --  127*   ALT (SGPT) U/L 15 17  --   --  16   AST (SGOT) U/L 13 16  --   --  14   GLUCOSE mg/dL 213* 208*  --  79 63*       Radiology:   Imaging Results (Last 72 Hours)       Procedure Component Value Units Date/Time    XR Chest 1 View [[id number]] Collected: 03/18/25 1051     Updated: 03/18/25 1055    Narrative:      EXAMINATION: XR CHEST 1 VW-  3/18/2025 10:51 AM     HISTORY: Shortness of breath and cough.     FINDINGS: Today's exam is compared to previous  study 1/13/2025. The  heart is mildly enlarged. There is patchy bibasilar airspace disease  either related to atelectasis or infiltrate. Blunting of the left  lateral costophrenic angle suggests a small effusion or pleural  thickening. There is no free air beneath the diaphragms.       Impression:      1.. Expiratory chest. Patchy bibasilar airspace disease with  differential as above.  2. There is blunting of the left lateral costophrenic angle suggesting  either a small effusion or pleural thickening.     This report was signed and finalized on 3/18/2025 10:52 AM by Dr. Jay Nova MD.               Culture:  Blood Culture   Date Value Ref Range Status   03/18/2025 No growth at 24 hours  Preliminary   03/18/2025 No growth at 24 hours  Preliminary         Assessment    Chronic kidney disease stage 4  Type 2 diabetes  Cerebral palsy  Hypertension  Pneumonia     Plan:  Renal function slightly lower today  Agree with resuming home dose of her Bumex  Monitor labs      Willis Gary, LAZARO  3/21/2025  10:33 CDT

## 2025-03-21 NOTE — PROGRESS NOTES
RT EQUIPMENT DEVICE RELATED - SKIN ASSESSMENT    Hayes Score:  Hayes Score: 17     RT Medical Equipment/Device:     NIV Mask:  Under-the-nose   size:  B    Skin Assessment:      Cheek:  Intact  Chin:  Intact  Nares:  Intact  Neck:  Intact    Device Skin Pressure Protection:  Positioning supports utilized and Skin-to-device areas padded:  None Required    Nurse Notification:  Mellisa Franco, RRT

## 2025-03-21 NOTE — PROGRESS NOTES
RT EQUIPMENT DEVICE RELATED - SKIN ASSESSMENT    Hayes Score:  Hayes Score: 17     RT Medical Equipment/Device:     NIV Mask:  Under-the-nose   size:  B    Skin Assessment:      Cheek:  Intact  Nares:  Intact  Lips:  Intact  Mouth:  Intact    Device Skin Pressure Protection:  Pressure points protected    Nurse Notification:  Mellisa Knight, RRT

## 2025-03-21 NOTE — THERAPY TREATMENT NOTE
Acute Care - Physical Therapy Treatment Note  [facility]     Patient Name: Mar Rodriguez  : 1940  MRN: [id number]  Today's Date: 3/21/2025      Visit Dx:     ICD-10-CM ICD-9-CM   1. Community acquired bilateral lower lobe pneumonia  J18.9 486   2. Acute respiratory failure with hypoxia  J96.01 518.81   3. Pleural effusion on left  J90 511.9   4. Chronic renal insufficiency, stage 2 (mild)  N18.2 585.2   5. Urinary tract infection without hematuria, site unspecified  N39.0 599.0   6. Impaired mobility [Z74.09]  Z74.09 799.89     Patient Active Problem List   Diagnosis    New onset atrial fibrillation    Pulmonary vascular congestion    Acute hyperkalemia    Respiratory failure with hypoxia     Past Medical History:   Diagnosis Date    Abnormality of gait and mobility     Anemia     Anxiety     Cerebral palsy     Cognitive communication deficit     Depression     Diabetes mellitus     Disease of thyroid gland     Hyperglycemia     Hyperlipidemia     Hypertension     Hypokalemia     Insomnia     Lymphedema     Neuropathy     Urge incontinence      History reviewed. No pertinent surgical history.  PT Assessment (Last 12 Hours)       PT Evaluation and Treatment       Row Name 25 1106          Physical Therapy Time and Intention    Subjective Information no complaints  -     Document Type therapy note (daily note)  -MARYSOL     Mode of Treatment physical therapy  -       Row Name 25 1106          General Information    Existing Precautions/Restrictions fall;oxygen therapy device and L/min  -       Row Name 25 1106          Pain    Pretreatment Pain Rating 0/10 - no pain  -MARYSOL     Posttreatment Pain Rating 0/10 - no pain  -MARYSOL       Row Name 25 1106          Bed Mobility    Bed Mobility supine-sit;sit-supine  -MARYSOL     Rolling Left [place] (Bed Mobility) standby assist  -MARYSOL     Rolling Right [place] (Bed Mobility) standby assist  -MARYSOL     Supine-Sit [place] (Bed Mobility)  verbal cues;contact guard  -MARYSOL     Sit-Supine [place] (Bed Mobility) verbal cues;minimum assist (75% patient effort)  -MARYSOL     Assistive Device (Bed Mobility) bed rails;head of bed elevated  -MARYSOL       Row Name 03/21/25 1106          Balance    Comment, Balance sitting EOB with SBA  -MARYSOL       Row Name 03/21/25 1106          Motor Skills    Comments, Therapeutic Exercise sitting AROM BLE X 15  -MARYSOL     Additional Documentation Comments, Therapeutic Exercise (Row)  -MARYSOL       Row Name 03/21/25 1106          Positioning and Restraints    Pre-Treatment Position in bed  -MARYSOL     Post Treatment Position bed  -MARYSOL     In Bed fowlers;call light within reach;encouraged to call for assist;with family/caregiver;side rails up x2  -MARYSOL               User Key  (r) = Recorded By, (t) = Taken By, (c) = Cosigned By      Initials Name Provider Type    MARYSOL Mati Tineo, PTA Physical Therapist Assistant                    Physical Therapy Education       Title: PT OT SLP Therapies (In Progress)       Topic: Physical Therapy (In Progress)       Point: Mobility training (In Progress)       Learning Progress Summary            Patient Acceptance, E, NR by SB at 3/20/2025 1522    Comment: pt edu on POC, benefits of act and d/c plans                      Point: Home exercise program (Not Started)       Learner Progress:  Not documented in this visit.              Point: Body mechanics (Not Started)       Learner Progress:  Not documented in this visit.              Point: Precautions (In Progress)       Learning Progress Summary            Patient Acceptance, E, NR by SB at 3/20/2025 1522    Comment: pt edu on POC, benefits of act and d/c plans                                      User Key       Initials Effective Dates Name Provider Type Discipline    SB 07/11/23 -  Sapna Zafar, PT DPT Physical Therapist PT                  PT Recommendation and Plan     Progress: improving  Outcome Evaluation: Pt was in bed, agreed to therapy.  Able  to transfer supine to sitting with CGA with HOB elevated and using the bed rail.  Sitting EOB, pt  maintained balance with SBA, while perform AROM exercises to BLE X 15.  Min assist for sit to supine.  Pt able to roll L and R with SBA.  Will continue to work with pt to increase strength and progress with transfers.   Outcome Measures       Row Name 03/21/25 1106             How much help from another person do you currently need...    Turning from your back to your side while in flat bed without using bedrails? 3  -MARYSOL      Moving from lying on back to sitting on the side of a flat bed without bedrails? 2  -MARYSOL      Moving to and from a bed to a chair (including a wheelchair)? 1  -MARYSOL      Standing up from a chair using your arms (e.g., wheelchair, bedside chair)? 1  -MARYSOL      Climbing 3-5 steps with a railing? 1  -MARYSOL      To walk in hospital room? 1  -MARYSOL      AM-PAC 6 Clicks Score (PT) 9  -MARYSOL         Functional Assessment    Outcome Measure Options AM-PAC 6 Clicks Basic Mobility (PT)  -MARYSOL                User Key  (r) = Recorded By, (t) = Taken By, (c) = Cosigned By      Initials Name Provider Type    Mati Ingram PTA Physical Therapist Assistant                     Time Calculation:    PT Charges       Row Name 03/21/25 1106             Time Calculation    Start Time 1106  -MARYSOL      Stop Time 1138  -MARYSOL      Time Calculation (min) 32 min  -MARYSOL      PT Received On 03/21/25  -MARYSOL         Time Calculation- PT    Total Timed Code Minutes- PT 32 minute(s)  -MARYSOL         Timed Charges    92420 - PT Therapeutic Exercise Minutes 16  -MARYSOL      48601 - PT Therapeutic Activity Minutes 16  -MARYSOL         Total Minutes    Timed Charges Total Minutes 32  -MARYSOL       Total Minutes 32  -MARYSOL                User Key  (r) = Recorded By, (t) = Taken By, (c) = Cosigned By      Initials Name Provider Type    Mati Ingram PTA Physical Therapist Assistant                  Therapy Charges for Today       Code Description Service Date  Service Provider Modifiers Qty    90464335962 HC PT THERAPEUTIC ACT EA 15 MIN 3/21/2025 Mati Tineo, PTA GP 1    67642124921 HC PT THER PROC EA 15 MIN 3/21/2025 Mati Tineo, PTA GP 1            PT G-Codes  Outcome Measure Options: AM-PAC 6 Clicks Basic Mobility (PT)  AM-PAC 6 Clicks Score (PT): 9    Mati Tineo PTA  3/21/2025

## 2025-03-21 NOTE — PROGRESS NOTES
"Progress Note  Mar Rodriguez  3/21/2025 13:51 CDT  Subjective:   Admit Date:   3/18/2025      CC/ADMIT DX:       Interval History:   Reviewed overnight events and nursing notes.  She is improving. Still with cough.       I have reviewed all labs/diagnostics from the last 24hrs.       ROS:   I have done a 10 point ROS and all are negative, except what is mentioned in the HPI.    Diet: Regular/House; Fluid Consistency: Thin (IDDSI 0)    Medications:        apixaban, 2.5 mg, Oral, Q12H  atorvastatin, 40 mg, Oral, Nightly  bumetanide, 3 mg, Oral, Daily  busPIRone, 7.5 mg, Oral, TID With Meals  cefTRIAXone, 2,000 mg, Intravenous, Q24H  gabapentin, 100 mg, Oral, Nightly  insulin glargine, 20 Units, Subcutaneous, Nightly  insulin lispro, 4 Units, Subcutaneous, TID With Meals  ipratropium-albuterol, 3 mL, Nebulization, 4x Daily - RT  levothyroxine, 200 mcg, Oral, Q AM  lisinopril, 20 mg, Oral, Daily  melatonin, 10 mg, Oral, Nightly  metoprolol tartrate, 25 mg, Oral, Q12H  predniSONE, 20 mg, Oral, Daily With Breakfast  sodium chloride, 10 mL, Intravenous, Q12H  sodium zirconium cyclosilicate, 10 g, Oral, Every Other Day  vitamin B-12, 1,000 mcg, Oral, Daily            Objective:   Vitals: /64 (BP Location: Right arm, Patient Position: Lying)   Pulse 107   Temp 97.9 °F (36.6 °C) (Oral)   Resp 18   Ht 165.1 cm (65\")   Wt 124 kg (273 lb)   SpO2 95%   BMI 45.43 kg/m²    Intake/Output Summary (Last 24 hours) at 3/21/2025 1351  Last data filed at 3/21/2025 0506  Gross per 24 hour   Intake 240 ml   Output 275 ml   Net -35 ml     General appearance: alert and cooperative with exam  Lungs: coarse  Heart: reg  Abdomen: soft, non-tender; bowel sounds normal; no masses,  no organomegaly  Extremities: no C/C 2+ LE edema  Neurologic:  No obvious focal neurologic deficits.    Assessment and Plan:     Respiratory failure with hypoxia    Cerebral Palsy    CHRISTOPHER    CKD    HTN    DM2    Chronic LE edema    Plan:   Continue " present medication(s)    Follow with Pulm and Nephrology   Follow labs   Monitor glucose with steroids   Continue antibiotics and steroids   Wean O2 as able      Discharge planning:   a skilled nursing facility     Reviewed treatment plans with the patient and/or family.             Electronically signed by Vandana Churchill MD on 3/21/2025 at 13:51 CDT

## 2025-03-22 VITALS
DIASTOLIC BLOOD PRESSURE: 77 MMHG | HEIGHT: 65 IN | SYSTOLIC BLOOD PRESSURE: 137 MMHG | RESPIRATION RATE: 18 BRPM | WEIGHT: 284.1 LBS | OXYGEN SATURATION: 94 % | BODY MASS INDEX: 47.34 KG/M2 | HEART RATE: 105 BPM | TEMPERATURE: 97.7 F

## 2025-03-22 LAB
ALBUMIN SERPL-MCNC: 3 G/DL (ref 3.5–5.2)
ALBUMIN/GLOB SERPL: 1 G/DL
ALP SERPL-CCNC: 115 U/L (ref 39–117)
ALT SERPL W P-5'-P-CCNC: 20 U/L (ref 1–33)
ANION GAP SERPL CALCULATED.3IONS-SCNC: 13 MMOL/L (ref 5–15)
AST SERPL-CCNC: 18 U/L (ref 1–32)
BASOPHILS # BLD AUTO: 0.05 10*3/MM3 (ref 0–0.2)
BASOPHILS NFR BLD AUTO: 0.2 % (ref 0–1.5)
BILIRUB SERPL-MCNC: <0.2 MG/DL (ref 0–1.2)
BUN SERPL-MCNC: 59 MG/DL (ref 8–23)
BUN/CREAT SERPL: 28.6 (ref 7–25)
CALCIUM SPEC-SCNC: 8.9 MG/DL (ref 8.6–10.5)
CHLORIDE SERPL-SCNC: 92 MMOL/L (ref 98–107)
CO2 SERPL-SCNC: 28 MMOL/L (ref 22–29)
CREAT SERPL-MCNC: 2.06 MG/DL (ref 0.57–1)
DEPRECATED RDW RBC AUTO: 56.2 FL (ref 37–54)
EGFRCR SERPLBLD CKD-EPI 2021: 23.5 ML/MIN/1.73
EOSINOPHIL # BLD AUTO: 0.01 10*3/MM3 (ref 0–0.4)
EOSINOPHIL NFR BLD AUTO: 0 % (ref 0.3–6.2)
ERYTHROCYTE [DISTWIDTH] IN BLOOD BY AUTOMATED COUNT: 15.3 % (ref 12.3–15.4)
GLOBULIN UR ELPH-MCNC: 3.1 GM/DL
GLUCOSE BLDC GLUCOMTR-MCNC: 184 MG/DL (ref 70–130)
GLUCOSE BLDC GLUCOMTR-MCNC: 209 MG/DL (ref 70–130)
GLUCOSE BLDC GLUCOMTR-MCNC: 235 MG/DL (ref 70–130)
GLUCOSE SERPL-MCNC: 198 MG/DL (ref 65–99)
HCT VFR BLD AUTO: 32.6 % (ref 34–46.6)
HGB BLD-MCNC: 10.4 G/DL (ref 12–15.9)
IMM GRANULOCYTES # BLD AUTO: 0.17 10*3/MM3 (ref 0–0.05)
IMM GRANULOCYTES NFR BLD AUTO: 0.8 % (ref 0–0.5)
LYMPHOCYTES # BLD AUTO: 0.84 10*3/MM3 (ref 0.7–3.1)
LYMPHOCYTES NFR BLD AUTO: 3.8 % (ref 19.6–45.3)
MCH RBC QN AUTO: 32.5 PG (ref 26.6–33)
MCHC RBC AUTO-ENTMCNC: 31.9 G/DL (ref 31.5–35.7)
MCV RBC AUTO: 101.9 FL (ref 79–97)
MONOCYTES # BLD AUTO: 0.71 10*3/MM3 (ref 0.1–0.9)
MONOCYTES NFR BLD AUTO: 3.2 % (ref 5–12)
NEUTROPHILS NFR BLD AUTO: 20.17 10*3/MM3 (ref 1.7–7)
NEUTROPHILS NFR BLD AUTO: 92 % (ref 42.7–76)
NRBC BLD AUTO-RTO: 0 /100 WBC (ref 0–0.2)
PLATELET # BLD AUTO: 284 10*3/MM3 (ref 140–450)
PMV BLD AUTO: 11.4 FL (ref 6–12)
POTASSIUM SERPL-SCNC: 4 MMOL/L (ref 3.5–5.2)
PROT SERPL-MCNC: 6.1 G/DL (ref 6–8.5)
RBC # BLD AUTO: 3.2 10*6/MM3 (ref 3.77–5.28)
SODIUM SERPL-SCNC: 133 MMOL/L (ref 136–145)
WBC NRBC COR # BLD AUTO: 21.95 10*3/MM3 (ref 3.4–10.8)

## 2025-03-22 PROCEDURE — 63710000001 INSULIN LISPRO (HUMAN) PER 5 UNITS: Performed by: FAMILY MEDICINE

## 2025-03-22 PROCEDURE — 94799 UNLISTED PULMONARY SVC/PX: CPT

## 2025-03-22 PROCEDURE — 94669 MECHANICAL CHEST WALL OSCILL: CPT

## 2025-03-22 PROCEDURE — 63710000001 INSULIN LISPRO (HUMAN) PER 5 UNITS: Performed by: INTERNAL MEDICINE

## 2025-03-22 PROCEDURE — 82948 REAGENT STRIP/BLOOD GLUCOSE: CPT

## 2025-03-22 PROCEDURE — 94664 DEMO&/EVAL PT USE INHALER: CPT

## 2025-03-22 PROCEDURE — 94761 N-INVAS EAR/PLS OXIMETRY MLT: CPT

## 2025-03-22 PROCEDURE — 80053 COMPREHEN METABOLIC PANEL: CPT | Performed by: INTERNAL MEDICINE

## 2025-03-22 PROCEDURE — 94660 CPAP INITIATION&MGMT: CPT

## 2025-03-22 PROCEDURE — 85025 COMPLETE CBC W/AUTO DIFF WBC: CPT | Performed by: INTERNAL MEDICINE

## 2025-03-22 PROCEDURE — 99232 SBSQ HOSP IP/OBS MODERATE 35: CPT | Performed by: INTERNAL MEDICINE

## 2025-03-22 PROCEDURE — 63710000001 PREDNISONE PER 1 MG: Performed by: INTERNAL MEDICINE

## 2025-03-22 RX ORDER — CEFDINIR 300 MG/1
300 CAPSULE ORAL 2 TIMES DAILY
Qty: 14 CAPSULE | Refills: 0 | Status: SHIPPED | OUTPATIENT
Start: 2025-03-22 | End: 2025-03-29

## 2025-03-22 RX ORDER — PREDNISONE 10 MG/1
TABLET ORAL
Qty: 9 TABLET | Refills: 0 | Status: SHIPPED | OUTPATIENT
Start: 2025-03-22 | End: 2025-03-28

## 2025-03-22 RX ADMIN — BUSPIRONE HYDROCHLORIDE 7.5 MG: 5 TABLET ORAL at 18:19

## 2025-03-22 RX ADMIN — IPRATROPIUM BROMIDE AND ALBUTEROL SULFATE 3 ML: .5; 3 SOLUTION RESPIRATORY (INHALATION) at 06:57

## 2025-03-22 RX ADMIN — IPRATROPIUM BROMIDE AND ALBUTEROL SULFATE 3 ML: .5; 3 SOLUTION RESPIRATORY (INHALATION) at 11:31

## 2025-03-22 RX ADMIN — INSULIN LISPRO 4 UNITS: 100 INJECTION, SOLUTION INTRAVENOUS; SUBCUTANEOUS at 18:20

## 2025-03-22 RX ADMIN — LISINOPRIL 20 MG: 20 TABLET ORAL at 08:19

## 2025-03-22 RX ADMIN — INSULIN LISPRO 4 UNITS: 100 INJECTION, SOLUTION INTRAVENOUS; SUBCUTANEOUS at 11:43

## 2025-03-22 RX ADMIN — Medication 10 ML: at 08:14

## 2025-03-22 RX ADMIN — CYANOCOBALAMIN TAB 500 MCG 1000 MCG: 500 TAB at 08:13

## 2025-03-22 RX ADMIN — INSULIN LISPRO 2 UNITS: 100 INJECTION, SOLUTION INTRAVENOUS; SUBCUTANEOUS at 11:42

## 2025-03-22 RX ADMIN — IPRATROPIUM BROMIDE AND ALBUTEROL SULFATE 3 ML: .5; 3 SOLUTION RESPIRATORY (INHALATION) at 14:50

## 2025-03-22 RX ADMIN — PREDNISONE 20 MG: 20 TABLET ORAL at 08:12

## 2025-03-22 RX ADMIN — METOPROLOL TARTRATE 25 MG: 25 TABLET, FILM COATED ORAL at 08:13

## 2025-03-22 RX ADMIN — LEVOTHYROXINE SODIUM 200 MCG: 100 TABLET ORAL at 06:27

## 2025-03-22 RX ADMIN — BUSPIRONE HYDROCHLORIDE 7.5 MG: 5 TABLET ORAL at 08:11

## 2025-03-22 RX ADMIN — INSULIN LISPRO 4 UNITS: 100 INJECTION, SOLUTION INTRAVENOUS; SUBCUTANEOUS at 08:13

## 2025-03-22 RX ADMIN — APIXABAN 2.5 MG: 2.5 TABLET, FILM COATED ORAL at 08:12

## 2025-03-22 RX ADMIN — BUMETANIDE 3 MG: 1 TABLET ORAL at 08:13

## 2025-03-22 NOTE — PLAN OF CARE
Problem: Adult Inpatient Plan of Care  Goal: Plan of Care Review  Outcome: Progressing  Flowsheets (Taken 3/19/2025 1799)  Progress: no change  Outcome Evaluation: Afib- Pt on bipap overnight, called for orders for metoprolol and eliquis. Q2 turns overnight, family has a sitter at bedside for comfort  Plan of Care Reviewed With: patient                
  Problem: Adult Inpatient Plan of Care  Goal: Plan of Care Review  Outcome: Progressing  Flowsheets (Taken 3/20/2025 4337)  Progress: improving  Outcome Evaluation: AF - Pt. continues to maintain o2 saturation on 2L NC.  CHG bath completed this am.  Provider notified regarding pt flagging for sepsis.  Safety maintained  Plan of Care Reviewed With: patient     
  Problem: Noninvasive Ventilation Acute  Goal: Effective Unassisted Ventilation and Oxygenation  Outcome: Not Progressing   Goal Outcome Evaluation:   Pt refusing at hs                                         
  Problem: Noninvasive Ventilation Acute  Goal: Effective Unassisted Ventilation and Oxygenation  Outcome: Progressing   Goal Outcome Evaluation:                                            
  Problem: Noninvasive Ventilation Acute  Goal: Effective Unassisted Ventilation and Oxygenation  Outcome: Progressing   Goal Outcome Evaluation:      Pt has been off BIPAP all day and did not use last night per night shift RT. Pt is in no distress sats in the low 90's on 2 lpm/nc.                                      
  Problem: Noninvasive Ventilation Acute  Goal: Effective Unassisted Ventilation and Oxygenation  Outcome: Progressing   Goal Outcome Evaluation:   Pt has tolerated being off BIPAP most of the day sat on 2 lpm upper 90's, she is awake, alert and in no distress. Will continue to monitor.                                         
  Problem: Skin Injury Risk Increased  Goal: Skin Health and Integrity  Outcome: Progressing   RT EQUIPMENT DEVICE RELATED - SKIN ASSESSMENT    Hayes Score:  Hayes Score: 17     RT Medical Equipment/Device:     NIV Mask:  Under-the-nose   size:  B    Skin Assessment:      Cheek:  Intact  Nose:  Intact  Lips:  Intact  Mouth:  Intact    Device Skin Pressure Protection:  Pressure points protected    Nurse Notification:  Mellisa Arnold, RRTGoal Outcome Evaluation:                                            
Goal Outcome Evaluation:              Outcome Evaluation: Pt has remained in bed throughout the day. VSS. Patient has continued to refuse turns throughout the day, regaurdless of teaching about the risk of skin breakdown and pressure injuries. AF  per tele. safety maintained.                             
Goal Outcome Evaluation:  Plan of Care Reviewed With: patient        Progress: improving  Outcome Evaluation: Pt was in bed, agreed to therapy.  Able to transfer supine to sitting with CGA with HOB elevated and using the bed rail.  Sitting EOB, pt  maintained balance with SBA, while perform AROM exercises to BLE X 15.  Min assist for sit to supine.  Pt able to roll L and R with SBA.  Will continue to work with pt to increase strength and progress with transfers.                             
Goal Outcome Evaluation:  Plan of Care Reviewed With: patient        Progress: improving  Outcome Evaluation: VSS.  AF 83-99, per tele.  No c/o pain.  UOP has improved.  Pt has declined q2h turns despite education.  Safety maintained.                             
Goal Outcome Evaluation:  Plan of Care Reviewed With: patient        Progress: improving  Outcome Evaluation: VSS. Afib. bipap removed and placed on 2L NC and has tolerated it well. no c/o pain. safety maintained.                             
Goal Outcome Evaluation:  Plan of Care Reviewed With: patient        Progress: no change  Outcome Evaluation: PT eval completed. Pt alert and oriented x4 with c/o fatigue. Pt resides at [facility] and typically transfers with assist of 2 and sits in the wheelchair most of the day. Today, pt demos weakness in BLE and impaired ROM due to lymphedema. Pt rolls L And R in bed with CGA and use of bedrails, but quickly becomes fatigued and SOA. Pt refused further mobility at this time. Pt will benefit from skilled PT to improve fxl mob and endurance and to return to baseline. Rec d/c extended care facility.    Anticipated Discharge Disposition (PT): extended care facility                        
Goal Outcome Evaluation:  Plan of Care Reviewed With: patient        Progress: no change  Outcome Evaluation: VSS.  AF , per tele.  No c/o pain.  Pt has refused turns all night despite education on skin breakdown prevention. Pt has remained on 2L NC.  Safety maintained.                             
[FreeTextEntry1] : Multifocal areas of nodular tree-in-bud opacities likely infectious inflammatory in origin.  Consider chronic infectious etiology such as atypical mycobacterial disease. Pulmonary nodules. Left upper lobe abnormality also most likely infectious inflammatory in origin but underlying neoplastic process cannot be excluded.  Warrants follow-up. Cough resolved. Component of mild chronic obstructive pulmonary disease.  Relatively stable.  Now discontinued smoking and vaping.

## 2025-03-22 NOTE — DISCHARGE SUMMARY
DISCHARGE SUMMARY       Date of Admission: 3/18/2025  Date of Discharge:  3/22/2025  Primary Care Physician: Vandana Churchill MD    Discharge Diagnoses:    Respiratory failure with hypoxia        Presenting Problem/History of Present Illness  Respiratory failure with hypoxia [J96.91]       Hospital Course  Patient is an 84-year-old female with a past medical history of cerebral palsy, diabetes mellitus, hyperlipidemia.  She was admitted to the hospital 3 days ago for worsening shortness of breath and nonproductive cough.  Denied any chest discomfort.  She was treated for pneumonia and seen by pulmonology.  She improved with antibiotics, oxygen, and respiratory treatments.  She also has a history of stage IV CKD and was seen by nephrology for CHRISTOPHER.  Renal function has been stable after adding back oral diuretics.  White blood cell count is trending down.  She was weaned off of her oxygen today and she is 94% on room air.  She is requesting to return back to [facility].  Overall condition appears to be stable and improved.  She will be discharged back with oral antibiotics and steroids.    Procedures Performed         Consults:   Consults       Date and Time Order Name Status Description    3/19/2025  7:58 AM Inpatient Nephrology Consult Completed     3/19/2025  7:58 AM Inpatient Pulmonology Consult Completed             Pertinent Test Results:  Lab Results (most recent)       Procedure Component Value Units Date/Time    Comprehensive Metabolic Panel [[id number]]  (Abnormal) Collected: 03/22/25 0645    Specimen: Blood Updated: 03/22/25 0752     Glucose 198 mg/dL      BUN 59 mg/dL      Creatinine 2.06 mg/dL      Sodium 133 mmol/L      Potassium 4.0 mmol/L      Comment: Slight hemolysis detected by analyzer. Result may be falsely elevated.        Chloride 92 mmol/L      CO2 28.0 mmol/L      Calcium 8.9 mg/dL      Total Protein 6.1 g/dL      Albumin 3.0 g/dL      ALT (SGPT) 20 U/L      AST (SGOT) 18 U/L       Comment: Slight hemolysis detected by analyzer. Result may be falsely elevated.        Alkaline Phosphatase 115 U/L      Total Bilirubin <0.2 mg/dL      Globulin 3.1 gm/dL      A/G Ratio 1.0 g/dL      BUN/Creatinine Ratio 28.6     Anion Gap 13.0 mmol/L      eGFR 23.5 mL/min/1.73     Narrative:      GFR Categories in Chronic Kidney Disease (CKD)      GFR Category          GFR (mL/min/1.73)    Interpretation  G1                     90 or greater         Normal or high (1)  G2                      60-89                Mild decrease (1)  G3a                   45-59                Mild to moderate decrease  G3b                   30-44                Moderate to severe decrease  G4                    15-29                Severe decrease  G5                    14 or less           Kidney failure          (1)In the absence of evidence of kidney disease, neither GFR category G1 or G2 fulfill the criteria for CKD.    eGFR calculation 2021 CKD-EPI creatinine equation, which does not include race as a factor    CBC & Differential [[id number]]  (Abnormal) Collected: 03/22/25 0348    Specimen: Blood Updated: 03/22/25 0449    Narrative:      The following orders were created for panel order CBC & Differential.  Procedure                               Abnormality         Status                     ---------                               -----------         ------                     CBC Auto Differential[[id number]]        Abnormal            Final result                 Please view results for these tests on the individual orders.    CBC Auto Differential [[id number]]  (Abnormal) Collected: 03/22/25 0348    Specimen: Blood Updated: 03/22/25 0449     WBC 21.95 10*3/mm3      RBC 3.20 10*6/mm3      Hemoglobin 10.4 g/dL      Hematocrit 32.6 %      .9 fL      MCH 32.5 pg      MCHC 31.9 g/dL      RDW 15.3 %      RDW-SD 56.2 fl      MPV 11.4 fL      Platelets 284 10*3/mm3      Neutrophil % 92.0 %      Lymphocyte % 3.8 %       Monocyte % 3.2 %      Eosinophil % 0.0 %      Basophil % 0.2 %      Immature Grans % 0.8 %      Neutrophils, Absolute 20.17 10*3/mm3      Lymphocytes, Absolute 0.84 10*3/mm3      Monocytes, Absolute 0.71 10*3/mm3      Eosinophils, Absolute 0.01 10*3/mm3      Basophils, Absolute 0.05 10*3/mm3      Immature Grans, Absolute 0.17 10*3/mm3      nRBC 0.0 /100 WBC     POC Glucose Once [[id number]]  (Abnormal) Collected: 03/22/25 0404    Specimen: Blood Updated: 03/22/25 0415     Glucose 209 mg/dL      Comment: : [id number] Wagner EmilyMeter ID: [id number]       POC Glucose Once [[id number]]  (Abnormal) Collected: 03/21/25 2150    Specimen: Blood Updated: 03/21/25 2201     Glucose 331 mg/dL      Comment: : [id number] Austen CarolineMeter ID: [id number]       Blood Culture - Blood, Arm, Right [[id number]]  (Normal) Collected: 03/18/25 1131    Specimen: Blood from Arm, Right Updated: 03/21/25 1200     Blood Culture No growth at 3 days    Blood Culture - Blood, Arm, Right [[id number]]  (Normal) Collected: 03/18/25 1055    Specimen: Blood from Arm, Right Updated: 03/21/25 1115     Blood Culture No growth at 3 days    Comprehensive Metabolic Panel [[id number]]  (Abnormal) Collected: 03/21/25 0425    Specimen: Blood Updated: 03/21/25 0551     Glucose 213 mg/dL      BUN 48 mg/dL      Creatinine 2.17 mg/dL      Sodium 139 mmol/L      Potassium 4.0 mmol/L      Chloride 94 mmol/L      CO2 29.0 mmol/L      Calcium 9.3 mg/dL      Total Protein 6.0 g/dL      Albumin 3.0 g/dL      ALT (SGPT) 15 U/L      AST (SGOT) 13 U/L      Alkaline Phosphatase 119 U/L      Total Bilirubin 0.2 mg/dL      Globulin 3.0 gm/dL      A/G Ratio 1.0 g/dL      BUN/Creatinine Ratio 22.1     Anion Gap 16.0 mmol/L      eGFR 22.1 mL/min/1.73     Narrative:      GFR Categories in Chronic Kidney Disease (CKD)      GFR Category          GFR (mL/min/1.73)    Interpretation  G1                     90 or greater         Normal or high (1)  G2                       60-89                Mild decrease (1)  G3a                   45-59                Mild to moderate decrease  G3b                   30-44                Moderate to severe decrease  G4                    15-29                Severe decrease  G5                    14 or less           Kidney failure          (1)In the absence of evidence of kidney disease, neither GFR category G1 or G2 fulfill the criteria for CKD.    eGFR calculation 2021 CKD-EPI creatinine equation, which does not include race as a factor    CBC & Differential [[id number]]  (Abnormal) Collected: 03/21/25 0425    Specimen: Blood Updated: 03/21/25 0529    Narrative:      The following orders were created for panel order CBC & Differential.  Procedure                               Abnormality         Status                     ---------                               -----------         ------                     CBC Auto Differential[[id number]]        Abnormal            Final result                 Please view results for these tests on the individual orders.    CBC Auto Differential [[id number]]  (Abnormal) Collected: 03/21/25 0425    Specimen: Blood Updated: 03/21/25 0529     WBC 29.78 10*3/mm3      RBC 3.28 10*6/mm3      Hemoglobin 10.5 g/dL      Hematocrit 33.9 %      .4 fL      MCH 32.0 pg      MCHC 31.0 g/dL      RDW 15.2 %      RDW-SD 56.5 fl      MPV 11.2 fL      Platelets 280 10*3/mm3      Neutrophil % 94.9 %      Lymphocyte % 2.1 %      Monocyte % 1.6 %      Eosinophil % 0.0 %      Basophil % 0.2 %      Immature Grans % 1.2 %      Neutrophils, Absolute 28.25 10*3/mm3      Lymphocytes, Absolute 0.62 10*3/mm3      Monocytes, Absolute 0.49 10*3/mm3      Eosinophils, Absolute 0.00 10*3/mm3      Basophils, Absolute 0.07 10*3/mm3      Immature Grans, Absolute 0.35 10*3/mm3      nRBC 0.0 /100 WBC     Blood Gas, Arterial With Co-Ox [[id number]]  (Abnormal) Collected: 03/20/25 0331    Specimen: Arterial Blood Updated: 03/20/25 0333     Site  Right Radial     Saud's Test Positive     pH, Arterial 7.437 pH units      pCO2, Arterial 52.0 mm Hg      Comment: 83 Value above reference range        pO2, Arterial 65.0 mm Hg      Comment: 84 Value below reference range        HCO3, Arterial 35.0 mmol/L      Comment: 83 Value above reference range        Base Excess, Arterial 9.4 mmol/L      Comment: 83 Value above reference range        O2 Saturation, Arterial 93.4 %      Comment: 84 Value below reference range        Hemoglobin, Blood Gas 11.2 g/dL      Comment: 84 Value below reference range        Hematocrit, Blood Gas 34.5 %      Comment: 84 Value below reference range        Oxyhemoglobin 92.0 %      Comment: 84 Value below reference range        Methemoglobin 0.30 %      Carboxyhemoglobin 1.2 %      Temperature 37.0     Sodium, Arterial 139 mmol/L      Potassium, Arterial 4.0 mmol/L      Ionized Calcium 4.60 mg/dL      Barometric Pressure for Blood Gas 746 mmHg      Modality Nasal Cannula     Flow Rate 1.5 lpm      Ventilator Mode NA     Collected by [id number]     Comment: Meter: [id number]     :  Jazmyne Reynaga RRT        pH, Temp Corrected 7.437 pH Units      pCO2, Temperature Corrected 52.0 mm Hg      pO2, Temperature Corrected 65.0 mm Hg     Microalbumin / Creatinine Urine Ratio - Urine, Clean Catch [[id number]]  (Abnormal) Collected: 03/19/25 1446    Specimen: Urine, Clean Catch Updated: 03/20/25 0052     Microalbumin/Creatinine Ratio 54.6 mg/g      Creatinine, Urine 95.2 mg/dL      Microalbumin, Urine 5.2 mg/dL     Sodium, Urine, Random - Urine, Clean Catch [[id number]] Collected: 03/19/25 1446    Specimen: Urine, Clean Catch Updated: 03/19/25 1510     Sodium, Urine 50 mmol/L     Narrative:      Reference intervals for random urine have not been established.  Clinical usage is dependent upon physician's interpretation in combination with other laboratory tests.       Uric Acid [[id number]]  (Abnormal) Collected: 03/19/25 0808    Specimen:  Blood Updated: 03/19/25 1509     Uric Acid 9.6 mg/dL     Basic Metabolic Panel [[id number]]  (Abnormal) Collected: 03/19/25 0808    Specimen: Blood Updated: 03/19/25 0838     Glucose 79 mg/dL      BUN 30 mg/dL      Creatinine 1.52 mg/dL      Sodium 139 mmol/L      Potassium 3.8 mmol/L      Chloride 96 mmol/L      CO2 34.0 mmol/L      Calcium 8.8 mg/dL      BUN/Creatinine Ratio 19.7     Anion Gap 9.0 mmol/L      eGFR 33.9 mL/min/1.73     Narrative:      GFR Categories in Chronic Kidney Disease (CKD)      GFR Category          GFR (mL/min/1.73)    Interpretation  G1                     90 or greater         Normal or high (1)  G2                      60-89                Mild decrease (1)  G3a                   45-59                Mild to moderate decrease  G3b                   30-44                Moderate to severe decrease  G4                    15-29                Severe decrease  G5                    14 or less           Kidney failure          (1)In the absence of evidence of kidney disease, neither GFR category G1 or G2 fulfill the criteria for CKD.    eGFR calculation 2021 CKD-EPI creatinine equation, which does not include race as a factor    Blood Gas, Arterial - [[id number]]  (Abnormal) Collected: 03/19/25 0820    Specimen: Arterial Blood Updated: 03/19/25 0822     Site Right Radial     Saud's Test Positive     pH, Arterial 7.449 pH units      pCO2, Arterial 53.8 mm Hg      Comment: 83 Value above reference range        pO2, Arterial 95.4 mm Hg      HCO3, Arterial 37.3 mmol/L      Comment: 83 Value above reference range        Base Excess, Arterial 11.4 mmol/L      Comment: 83 Value above reference range        O2 Saturation, Arterial 98.4 %      Temperature 37.0     Barometric Pressure for Blood Gas 745 mmHg      Modality BiPap     FIO2 25 %      Ventilator Mode BiPAP     Set [facility] Resp Rate 20.0     IPAP 20     Comment: Meter: [id number]     :  Raeann Franco, RRT        EPAP 6      Collected by [id number]     pCO2, Temperature Corrected 53.8 mm Hg      pH, Temp Corrected 7.449 pH Units      pO2, Temperature Corrected 95.4 mm Hg      PO2/FIO2 382    Blood Gas, Arterial With Co-Ox [[id number]]  (Abnormal) Collected: 03/18/25 1117    Specimen: Arterial Blood Updated: 03/18/25 1630     Site Right Brachial     Comment: Corrected result. Previous result was Arterial Line on 3/18/2025 at 1336 CDT.        Saud's Test Positive     pH, Arterial 7.421 pH units      pCO2, Arterial 58.2 mm Hg      Comment: 83 Value above reference range        pO2, Arterial 82.6 mm Hg      Comment: 84 Value below reference range        HCO3, Arterial 37.8 mmol/L      Comment: 83 Value above reference range        Base Excess, Arterial 11.1 mmol/L      Comment: 83 Value above reference range        O2 Saturation, Arterial 97.2 %      Hemoglobin, Blood Gas 12.6 g/dL      Hematocrit, Blood Gas 38.6 %      Oxyhemoglobin 95.4 %      Methemoglobin 0.10 %      Carboxyhemoglobin 1.8 %      Temperature 37.0     Sodium, Arterial 142 mmol/L      Potassium, Arterial 3.4 mmol/L      Comment: 84 Value below reference range        Barometric Pressure for Blood Gas 752 mmHg      Modality Nasal Cannula     Flow Rate 3.0 lpm      Ventilator Mode NA     Collected by [id number]     Comment: Meter: [id number]     :  Serena Casanova CRT        pH, Temp Corrected 7.421 pH Units      pCO2, Temperature Corrected 58.2 mm Hg      pO2, Temperature Corrected 82.6 mm Hg     Blood Gas, Arterial - [[id number]]  (Abnormal) Collected: 03/18/25 1618    Specimen: Arterial Blood Updated: 03/18/25 1624     Site Left Radial     Saud's Test Positive     pH, Arterial 7.371 pH units      pCO2, Arterial 65.6 mm Hg      Comment: 83 Value above reference range        pO2, Arterial 126.0 mm Hg      Comment: 83 Value above reference range        HCO3, Arterial 38.0 mmol/L      Comment: 83 Value above reference range        Base Excess, Arterial 10.4 mmol/L       Comment: 83 Value above reference range        O2 Saturation, Arterial 99.5 %      Comment: 83 Value above reference range        Temperature 37.0     Barometric Pressure for Blood Gas 748 mmHg      Modality BiPap     FIO2 36 %      Ventilator Mode NA     Set [facility] Resp Rate 20.0     IPAP 18     Comment: Meter: [id number]     :  Serena Casanova, CRT        EPAP 8     Collected by [id number]     pCO2, Temperature Corrected 65.6 mm Hg      pH, Temp Corrected 7.371 pH Units      pO2, Temperature Corrected 126 mm Hg      PO2/FIO2 350    Urinalysis With Culture If Indicated - Urine, Catheter [[id number]]  (Abnormal) Collected: 03/18/25 1246    Specimen: Urine, Catheter Updated: 03/18/25 1308     Color, UA Yellow     Appearance, UA Clear     pH, UA 5.5     Specific Gravity, UA 1.014     Glucose, UA Negative     Ketones, UA Negative     Bilirubin, UA Negative     Blood, UA Negative     Protein, UA 30 mg/dL (1+)     Leuk Esterase, UA Trace     Nitrite, UA Negative     Urobilinogen, UA 0.2 E.U./dL    Narrative:      In absence of clinical symptoms, the presence of pyuria, bacteria, and/or nitrites on the urinalysis result does not correlate with infection.    Urinalysis, Microscopic Only - Urine, Catheter [[id number]]  (Abnormal) Collected: 03/18/25 1246    Specimen: Urine, Catheter Updated: 03/18/25 1308     RBC, UA 0-2 /HPF      WBC, UA 3-5 /HPF      Comment: Urine culture not indicated.        Bacteria, UA 4+ /HPF      Squamous Epithelial Cells, UA 0-2 /HPF      Hyaline Casts, UA None Seen /LPF      Methodology Automated Microscopy    High Sensitivity Troponin T 1Hr [[id number]]  (Abnormal) Collected: 03/18/25 1224    Specimen: Blood Updated: 03/18/25 1257     HS Troponin T 16 ng/L      Troponin T Numeric Delta -1 ng/L      Troponin T % Delta -6    Narrative:      High Sensitive Troponin T Reference Range:  <14.0 ng/L- Negative Female for AMI  <22.0 ng/L- Negative Male for AMI  >=14 - Abnormal Female indicating  possible myocardial injury.  >=22 - Abnormal Male indicating possible myocardial injury.   Clinicians would have to utilize clinical acumen, EKG, Troponin, and serial changes to determine if it is an Acute Myocardial Infarction or myocardial injury due to an underlying chronic condition.         Respiratory Panel PCR w/COVID-19(SARS-CoV-2) [facility] In-House, NP Swab in UTM/VTM, 2 HR TAT - Swab, Nasopharynx [[id number]]  (Normal) Collected: 03/18/25 1059    Specimen: Swab from Nasopharynx Updated: 03/18/25 1210     ADENOVIRUS, PCR Not Detected     Coronavirus 229E Not Detected     Coronavirus HKU1 Not Detected     Coronavirus NL63 Not Detected     Coronavirus OC43 Not Detected     COVID19 Not Detected     Human Metapneumovirus Not Detected     Human Rhinovirus/Enterovirus Not Detected     Influenza A PCR Not Detected     Influenza B PCR Not Detected     Parainfluenza Virus 1 Not Detected     Parainfluenza Virus 2 Not Detected     Parainfluenza Virus 3 Not Detected     Parainfluenza Virus 4 Not Detected     RSV, PCR Not Detected     Bordetella pertussis pcr Not Detected     Bordetella parapertussis PCR Not Detected     Chlamydophila pneumoniae PCR Not Detected     Mycoplasma pneumo by PCR Not Detected    Narrative:      In the setting of a positive respiratory panel with a viral infection PLUS a negative procalcitonin without other underlying concern for bacterial infection, consider observing off antibiotics or discontinuation of antibiotics and continue supportive care. If the respiratory panel is positive for atypical bacterial infection (Bordetella pertussis, Chlamydophila pneumoniae, or Mycoplasma pneumoniae), consider antibiotic de-escalation to target atypical bacterial infection.    CK [[id number]]  (Normal) Collected: 03/18/25 1055    Specimen: Blood Updated: 03/18/25 1128     Creatine Kinase 40 U/L     Lactic Acid, Plasma [[id number]]  (Normal) Collected: 03/18/25 1055    Specimen: Blood Updated:  03/18/25 1126     Lactate 1.2 mmol/L     BNP [[id number]]  (Normal) Collected: 03/18/25 1055    Specimen: Blood Updated: 03/18/25 1126     proBNP 1,459.0 pg/mL     Narrative:      This assay is used as an aid in the diagnosis of individuals suspected of having heart failure. It can be used as an aid in the diagnosis of acute decompensated heart failure (ADHF) in patients presenting with signs and symptoms of ADHF to the emergency department (ED). In addition, NT-proBNP of <300 pg/mL indicates ADHF is not likely.    Age Range Result Interpretation  NT-proBNP Concentration (pg/mL:      <50             Positive            >450                   Gray                 300-450                    Negative             <300    50-75           Positive            >900                  Gray                300-900                  Negative            <300      >75             Positive            >1800                  Gray                300-1800                  Negative            <300    High Sensitivity Troponin T [[id number]]  (Abnormal) Collected: 03/18/25 1055    Specimen: Blood Updated: 03/18/25 1125     HS Troponin T 17 ng/L     Narrative:      High Sensitive Troponin T Reference Range:  <14.0 ng/L- Negative Female for AMI  <22.0 ng/L- Negative Male for AMI  >=14 - Abnormal Female indicating possible myocardial injury.  >=22 - Abnormal Male indicating possible myocardial injury.   Clinicians would have to utilize clinical acumen, EKG, Troponin, and serial changes to determine if it is an Acute Myocardial Infarction or myocardial injury due to an underlying chronic condition.         Magnesium [[id number]]  (Normal) Collected: 03/18/25 1055    Specimen: Blood Updated: 03/18/25 1124     Magnesium 1.9 mg/dL     Protime-INR [[id number]]  (Normal) Collected: 03/18/25 1055    Specimen: Blood Updated: 03/18/25 1116     Protime 14.6 Seconds      INR 1.08    aPTT [[id number]]  (Normal) Collected: 03/18/25 1055    Specimen: Blood  Updated: 03/18/25 1116     PTT 32.8 seconds     Narrative:      PTT = The equivalent PTT values for the therapeutic range of heparin levels at 0.3 to 0.7 U/ml are 77 - 99 seconds.            Condition on Discharge: Stable    Discharge Disposition  Skilled Nursing Facility (DC - External)    Discharge Medications     Discharge Medications        New Medications        Instructions Start Date   cefdinir 300 MG capsule  Commonly known as: OMNICEF   300 mg, Oral, 2 Times Daily      predniSONE 10 MG tablet  Commonly known as: DELTASONE   Take 2 tablets by mouth Daily for 3 days, THEN 1 tablet Daily for 3 days.   Start Date: March 22, 2025            Continue These Medications        Instructions Start Date   acetaminophen 325 MG tablet  Commonly known as: TYLENOL   650 mg, Every 6 Hours PRN      apixaban 2.5 MG tablet tablet  Commonly known as: ELIQUIS   2.5 mg, Oral, Every 12 Hours Scheduled      atorvastatin 40 MG tablet  Commonly known as: LIPITOR   40 mg, Nightly      bumetanide 1 MG tablet  Commonly known as: BUMEX   3 mg, Oral, Daily      busPIRone 7.5 MG tablet  Commonly known as: BUSPAR   7.5 mg, Oral, 3 Times Daily, For anxiety      cloNIDine 0.1 MG tablet  Commonly known as: CATAPRES   0.1 mg, Oral, Every 8 Hours PRN      colestipol 1 g tablet  Commonly known as: COLESTID   1 g, Oral, 2 Times Daily      dextrose 40 % gel  Commonly known as: GLUTOSE   15 g, As Needed      gabapentin 300 MG capsule  Commonly known as: NEURONTIN   300 mg, Oral, Nightly      glucagon 1 MG injection  Commonly known as: GLUCAGEN   1 mg, Subcutaneous, Once As Needed      hydrocortisone 1 % ointment   1 Application, Topical, Nightly, Bilat heels      Insulin Aspart 100 UNIT/ML injection  Commonly known as: novoLOG   8 Units, Subcutaneous, 3 Times Daily Before Meals, Hold for BS <100      ipratropium-albuterol 0.5-2.5 mg/3 ml nebulizer  Commonly known as: DUO-NEB   3 mL, Every 6 Hours PRN      levothyroxine 200 MCG tablet  Commonly  known as: SYNTHROID, LEVOTHROID   200 mcg, Oral, Every Early Morning      lisinopril 10 MG tablet  Commonly known as: PRINIVIL,ZESTRIL   20 mg, Oral, Daily      Melatonin 10 MG tablet   1 tablet, Daily PRN      metoprolol tartrate 25 MG tablet  Commonly known as: LOPRESSOR   25 mg, 2 Times Daily      nystatin 111745 UNIT/GM powder  Commonly known as: MYCOSTATIN   1 Application, Every 12 Hours      phenylephrine-mineral oil-petrolatum 0.25-14-74.9 % ointment hemorrhoidal ointment  Commonly known as: PREPARATION H   1 Application, Every 6 Hours PRN      polyethylene glycol 17 g packet  Commonly known as: MIRALAX   17 g, Oral, Daily PRN      Scopolamine 1 MG/3DAYS patch   1 patch, Transdermal, Every 72 Hours      sodium zirconium cyclosilicate 10 g packet  Commonly known as: LOKELMA   10 g, Oral, Daily, Empty entire contents of the packet(s) into a glass with at least 3 tablespoons (45 mL) of water. Stir well and drink immediately; if powder remains in the glass, add water, stir and drink immediately; repeat until no powder remains. Administer other oral medications at least 2 hours before or 2 hours after dose.      Tresiba FlexTouch 100 UNIT/ML solution pen-injector injection  Generic drug: insulin degludec   28 Units, Subcutaneous, Nightly      vitamin B-12 1000 MCG tablet  Commonly known as: CYANOCOBALAMIN   1,000 mcg, Daily               Discharge Diet:     Discharge Care Plan / Instructions:    Activity at Discharge:     Follow-up Appointments  No future appointments.      Test Results Pending at Discharge  Pending Labs       Order Current Status    Blood Culture - Blood, Arm, Right Preliminary result    Blood Culture - Blood, Arm, Right Preliminary result             LAZARO Franklin  03/22/25  11:15 CDT    Time: Discharge 35 min    Part of this note may be an electronic transcription/translation of spoken language to printed text using the Dragon Dictation System.

## 2025-03-22 NOTE — PROGRESS NOTES
Chief Complaint: Shortness of breath      Interval History:     No new events overnight.  Continues to improve.  Still has a mild cough.  Patient is adamant to go home.  Sitting up in bed eating breakfast this morning.    Review of Systems:   General ROS: negative for - chills or fever  Respiratory ROS: no cough, shortness of breath, or wheezing  Cardiovascular ROS: no chest pain or dyspnea on exertion  Gastrointestinal ROS: negative for - abdominal pain, diarrhea or nausea/vomiting       Vital Signs  Temp:  [97.2 °F (36.2 °C)-98.6 °F (37 °C)] 97.8 °F (36.6 °C)  Heart Rate:  [] 100  Resp:  [18] 18  BP: (100-116)/(50-64) 100/56    Intake/Output Summary (Last 24 hours) at 3/22/2025 1051  Last data filed at 3/22/2025 0428  Gross per 24 hour   Intake 240 ml   Output 750 ml   Net -510 ml       Physical Exam:     General Appearance:    Alert, cooperative, in no acute distress   Head:    N/A   Throat:   N/A   Neck:   N/A   Lungs:     Clear to auscultation,respirations regular, even and                  unlabored    Heart:    Regular rhythm and normal rate, normal S1 and S2, no            murmur, no gallop, no rub   Abdomen:     Normal bowel sounds, no masses, no organomegaly, soft        non-tender, non-distended, no guarding, no rebound                tenderness   Extremities:   No edema, no cyanosis, no clubbing   Pulses:   N/A   Skin:   N/A   Lymph nodes:   N/A   Neurologic:   N/A          Lab Review:       Lab Results (last 24 hours)       Procedure Component Value Units Date/Time    Comprehensive Metabolic Panel [[id number]]  (Abnormal) Collected: 03/22/25 0645    Specimen: Blood Updated: 03/22/25 0752     Glucose 198 mg/dL      BUN 59 mg/dL      Creatinine 2.06 mg/dL      Sodium 133 mmol/L      Potassium 4.0 mmol/L      Comment: Slight hemolysis detected by analyzer. Result may be falsely elevated.        Chloride 92 mmol/L      CO2 28.0 mmol/L      Calcium 8.9 mg/dL      Total Protein 6.1 g/dL      Albumin  3.0 g/dL      ALT (SGPT) 20 U/L      AST (SGOT) 18 U/L      Comment: Slight hemolysis detected by analyzer. Result may be falsely elevated.        Alkaline Phosphatase 115 U/L      Total Bilirubin <0.2 mg/dL      Globulin 3.1 gm/dL      A/G Ratio 1.0 g/dL      BUN/Creatinine Ratio 28.6     Anion Gap 13.0 mmol/L      eGFR 23.5 mL/min/1.73     Narrative:      GFR Categories in Chronic Kidney Disease (CKD)      GFR Category          GFR (mL/min/1.73)    Interpretation  G1                     90 or greater         Normal or high (1)  G2                      60-89                Mild decrease (1)  G3a                   45-59                Mild to moderate decrease  G3b                   30-44                Moderate to severe decrease  G4                    15-29                Severe decrease  G5                    14 or less           Kidney failure          (1)In the absence of evidence of kidney disease, neither GFR category G1 or G2 fulfill the criteria for CKD.    eGFR calculation 2021 CKD-EPI creatinine equation, which does not include race as a factor    CBC & Differential [[id number]]  (Abnormal) Collected: 03/22/25 0348    Specimen: Blood Updated: 03/22/25 0449    Narrative:      The following orders were created for panel order CBC & Differential.  Procedure                               Abnormality         Status                     ---------                               -----------         ------                     CBC Auto Differential[[id number]]        Abnormal            Final result                 Please view results for these tests on the individual orders.    CBC Auto Differential [[id number]]  (Abnormal) Collected: 03/22/25 0348    Specimen: Blood Updated: 03/22/25 0449     WBC 21.95 10*3/mm3      RBC 3.20 10*6/mm3      Hemoglobin 10.4 g/dL      Hematocrit 32.6 %      .9 fL      MCH 32.5 pg      MCHC 31.9 g/dL      RDW 15.3 %      RDW-SD 56.2 fl      MPV 11.4 fL      Platelets 284 10*3/mm3       Neutrophil % 92.0 %      Lymphocyte % 3.8 %      Monocyte % 3.2 %      Eosinophil % 0.0 %      Basophil % 0.2 %      Immature Grans % 0.8 %      Neutrophils, Absolute 20.17 10*3/mm3      Lymphocytes, Absolute 0.84 10*3/mm3      Monocytes, Absolute 0.71 10*3/mm3      Eosinophils, Absolute 0.01 10*3/mm3      Basophils, Absolute 0.05 10*3/mm3      Immature Grans, Absolute 0.17 10*3/mm3      nRBC 0.0 /100 WBC     POC Glucose Once [[id number]]  (Abnormal) Collected: 03/22/25 0404    Specimen: Blood Updated: 03/22/25 0415     Glucose 209 mg/dL      Comment: : [id number] Wagner GemmailyMeter ID: [id number]       POC Glucose Once [[id number]]  (Abnormal) Collected: 03/21/25 2150    Specimen: Blood Updated: 03/21/25 2201     Glucose 331 mg/dL      Comment: : [id number] Austen BhatiaineMeter ID: [id number]       POC Glucose Once [[id number]]  (Abnormal) Collected: 03/21/25 1958    Specimen: Blood Updated: 03/21/25 2009     Glucose 323 mg/dL      Comment: : [id number] Wagner GemmailyMeter ID: [id number]       POC Glucose Once [[id number]]  (Abnormal) Collected: 03/21/25 1607    Specimen: Blood Updated: 03/21/25 1656     Glucose 298 mg/dL      Comment: : [id number] Kaylah BelaeMeter ID: [id number]       POC Glucose Once [[id number]]  (Abnormal) Collected: 03/21/25 1150    Specimen: Blood Updated: 03/21/25 1210     Glucose 241 mg/dL      Comment: : [id number] Kaylah Luis ArmandorpeMeter ID: [id number]       Blood Culture - Blood, Arm, Right [[id number]]  (Normal) Collected: 03/18/25 1131    Specimen: Blood from Arm, Right Updated: 03/21/25 1200     Blood Culture No growth at 3 days    Blood Culture - Blood, Arm, Right [[id number]]  (Normal) Collected: 03/18/25 1055    Specimen: Blood from Arm, Right Updated: 03/21/25 1115     Blood Culture No growth at 3 days          Cultures:    Blood Culture   Date Value Ref Range Status   03/18/2025 No growth at 3 days  Preliminary   03/18/2025 No growth at 3 days  Preliminary        Radiology Review:  Imaging Results (Last 24 Hours)       Procedure Component Value Units Date/Time    XR Chest 2 View [[id number]] Collected: 03/21/25 1614     Updated: 03/21/25 1620    Narrative:      EXAMINATION:  XR CHEST 2 VW-  3/21/2025 2:47 PM     HISTORY: Follow-up for pneumonia. J18.9-Pneumonia, unspecified organism;  J96.01-Acute respiratory failure with hypoxia; C88-Qksxhdm effusion, not  elsewhere classified; N18.2-Chronic kidney disease, stage 2 (mild);  N39.0-Urinary tract infection, site not specified; Z74.09-Other reduced  mobility.     COMPARISON: 3/18/2025.     TECHNIQUE: 2 views were obtained.     FINDINGS: There is mild hypoventilation. Lower lobe infiltrates are  essentially resolved. No new infiltrate is seen. There is stable  bronchial wall thickening. There is continued cardiomegaly. There is  minimal blunting of the left costophrenic angle. The thoracic aorta is  atheromatous. There are degenerative changes of the spine.          Impression:      1. Bilateral lower lobe infiltrates/edema are essentially resolved.  2. Minimal residual blunting of the left costophrenic angle consistent  with a small effusion.  3. Cardiomegaly.  4. Stable bronchial wall thickening.           This report was signed and finalized on 3/21/2025 4:16 PM by Dr. Dewayne Omalley MD.                        ASSESSMENT:      Respiratory failure with hypoxia      PLAN:    1.  Attempt to wean oxygen today.  Pulmonology following.  Continue with oral steroids and antibiotics.  2.  Labs been stable.  Nephrology following.  Renal function electrolytes are stable.  Monitor daily labs.  3.  Potential discharge later today or tomorrow.    Further orders per Dr. Andrews.          Jeferson Haddad, LAZARO  03/22/25  10:51 CDT        Part of this note may be an electronic transcription/translation of spoken language to printed text using the Dragon Dictation System.

## 2025-03-22 NOTE — PROGRESS NOTES
Nephrology ([facility]) Progress Note      Patient:  Mar Rodriguez  YOB: 1940  Date of Service: 3/22/2025  MRN: [id number]   Acct: [id number]   Primary Care Physician: Vandana Churchill MD  Advance Directive:   There are no questions and answers to display.     Admit Date: 3/18/2025       Hospital Day: 4  Referring Provider: No Known Provider      Patient personally seen and examined.  Complete chart including Consults, Notes, Operative Reports, Labs, Cardiology, and Radiology studies reviewed as able.    Chief complaint: Abnormal labs.    Subjective:  Mar Rodriguez is a 84 y.o. female for whom we were consulted for evaluation and treatment of chronic kidney disease stage 4. Baseline creatinine 1.6-1.7. History of cerebral palsy, type 2 diabetes, hypertension, lymphedema. Presented to ER with dyspnea. Labs showed creatinine 1.48. Admitted to medical floor with diagnosis of pneumonia. Started on IV antibiotics. Denied n/v/d. Denied fever/chills. No cough. Lower extremity swelling was at her baseline level. Urine output nonoliguric with no recent changes in urination.     This afternoon patient feels well.  She denies any shortness of breath.  Her renal function continue to be stable.    Allergies:  Hydralazine hcl and Nsaids    Home Meds:  Medications Prior to Admission   Medication Sig Dispense Refill Last Dose/Taking    apixaban (ELIQUIS) 2.5 MG tablet tablet Take 1 tablet by mouth Every 12 (Twelve) Hours. Indications: Atrial Fibrillation 60 tablet 0 Taking    atorvastatin (LIPITOR) 40 MG tablet Take 1 tablet by mouth Every Night.   Taking    bumetanide (BUMEX) 1 MG tablet Take 3 tablets by mouth Daily.   Taking    busPIRone (BUSPAR) 7.5 MG tablet Take 1 tablet by mouth 3 (Three) Times a Day. For anxiety   Taking    cloNIDine (CATAPRES) 0.1 MG tablet Take 1 tablet by mouth Every 8 (Eight) Hours As Needed for High Blood Pressure (SBP >160 or DBP >90).    Taking As Needed    colestipol (COLESTID) 1 g tablet Take 1 tablet by mouth 2 (Two) Times a Day.   Taking    gabapentin (NEURONTIN) 300 MG capsule Take 1 capsule by mouth Every Night. 30 capsule 0 Taking    hydrocortisone 1 % ointment Apply 1 Application topically to the appropriate area as directed Every Night. Bilat heels   Taking    Insulin Aspart (novoLOG) 100 UNIT/ML injection Inject 8 Units under the skin into the appropriate area as directed 3 (Three) Times a Day Before Meals. Hold for BS <100   Taking    insulin degludec (Tresiba FlexTouch) 100 UNIT/ML solution pen-injector injection Inject 28 Units under the skin into the appropriate area as directed Every Night.   Taking    levothyroxine (SYNTHROID, LEVOTHROID) 200 MCG tablet Take 1 tablet by mouth Every Morning.   Taking    lisinopril (PRINIVIL,ZESTRIL) 10 MG tablet Take 2 tablets by mouth Daily.   Taking    metoprolol tartrate (LOPRESSOR) 25 MG tablet Take 1 tablet by mouth 2 (Two) Times a Day.   Taking    nystatin (MYCOSTATIN) 534511 UNIT/GM powder Apply 1 Application topically to the appropriate area as directed Every 12 (Twelve) Hours. Abd folds   Taking    Scopolamine 1 MG/3DAYS patch Place 1 patch on the skin as directed by provider Every 72 (Seventy-Two) Hours.   Taking    sodium zirconium cyclosilicate (LOKELMA) 10 g packet Take 10 g by mouth Daily. Empty entire contents of the packet(s) into a glass with at least 3 tablespoons (45 mL) of water. Stir well and drink immediately; if powder remains in the glass, add water, stir and drink immediately; repeat until no powder remains. Administer other oral medications at least 2 hours before or 2 hours after dose.   Taking    vitamin B-12 (CYANOCOBALAMIN) 1000 MCG tablet Take 1 tablet by mouth Daily.   Taking    acetaminophen (TYLENOL) 325 MG tablet Take 2 tablets by mouth Every 6 (Six) Hours As Needed for Mild Pain or Fever.       dextrose (GLUTOSE) 40 % gel Take 15 g by mouth As Needed for Low Blood  Sugar (every 15 minutes).       glucagon (GLUCAGEN) 1 MG injection Inject 1 mg under the skin into the appropriate area as directed 1 (One) Time As Needed for Low Blood Sugar.       ipratropium-albuterol (DUO-NEB) 0.5-2.5 mg/3 ml nebulizer Take 3 mL by nebulization Every 6 (Six) Hours As Needed for Shortness of Air.       Melatonin 10 MG tablet Take 1 tablet by mouth Daily As Needed (sleep).       phenylephrine-mineral oil-petrolatum (PREPARATION H) 0.25-14-74.9 % ointment hemorrhoidal ointment Insert 1 Application into the rectum Every 6 (Six) Hours As Needed (hemmorhoids).       polyethylene glycol (MIRALAX) 17 g packet Take 17 g by mouth Daily As Needed (Use if senna-docusate is ineffective). 1 each 0        Medicines:  Current Facility-Administered Medications   Medication Dose Route Frequency Provider Last Rate Last Admin    acetaminophen (TYLENOL) tablet 650 mg  650 mg Oral Q4H PRN Victor Manuel Andrews MD        Or    acetaminophen (TYLENOL) 160 MG/5ML oral solution 650 mg  650 mg Oral Q4H PRN Victor Manuel Andrews MD        Or    acetaminophen (TYLENOL) suppository 650 mg  650 mg Rectal Q4H PRN Victor Manuel Andrews MD        apixaban (ELIQUIS) tablet 2.5 mg  2.5 mg Oral Q12H Victor Manuel Andrews MD   2.5 mg at 03/22/25 0812    atorvastatin (LIPITOR) tablet 40 mg  40 mg Oral Nightly Vandana Churchill MD   40 mg at 03/21/25 2124    bumetanide (BUMEX) tablet 3 mg  3 mg Oral Daily Cecy Thorpe APRN   3 mg at 03/22/25 0813    busPIRone (BUSPAR) tablet 7.5 mg  7.5 mg Oral TID With Meals Vandana Churchill MD   7.5 mg at 03/22/25 0811    Calcium Replacement - Follow Nurse / BPA Driven Protocol   Not Applicable PRN Victor Manuel Andrews MD        dextrose (D50W) (25 g/50 mL) IV injection 25 g  25 g Intravenous Q15 Min PRN Victor Manuel Andrews MD        dextrose (GLUTOSE) oral gel 15 g  15 g Oral Q15 Min PRN Victor Manuel Andrews MD        gabapentin (NEURONTIN) capsule 100 mg  100 mg Oral Nightly  Vandana Churchill MD   100 mg at 03/21/25 2125    glucagon (GLUCAGEN) injection 1 mg  1 mg Intramuscular Q15 Min PRN Victor Manuel Andrews MD        insulin glargine (LANTUS, SEMGLEE) injection 20 Units  20 Units Subcutaneous Nightly Vandana Churchill MD   20 Units at 03/21/25 2125    Insulin Lispro (humaLOG) injection 2-9 Units  2-9 Units Subcutaneous 4x Daily AC & at Bedtime Victor Manuel Andrews MD   2 Units at 03/22/25 1142    Insulin Lispro (humaLOG) injection 4 Units  4 Units Subcutaneous TID With Meals Vandana Churchill MD   4 Units at 03/22/25 1143    ipratropium-albuterol (DUO-NEB) nebulizer solution 3 mL  3 mL Nebulization 4x Daily - RT Victor Manuel Andrews MD   3 mL at 03/22/25 1131    levothyroxine (SYNTHROID, LEVOTHROID) tablet 200 mcg  200 mcg Oral Q AM Vandana Churchill MD   200 mcg at 03/22/25 0627    lisinopril (PRINIVIL,ZESTRIL) tablet 20 mg  20 mg Oral Daily Vandana Churchill MD   20 mg at 03/22/25 0819    Magnesium Low Dose Replacement - Follow Nurse / BPA Driven Protocol   Not Applicable PRN Victor Manuel Andrews MD        melatonin tablet 10 mg  10 mg Oral Nightly Vandana Churchill MD   10 mg at 03/21/25 2125    metoprolol tartrate (LOPRESSOR) tablet 25 mg  25 mg Oral Q12H Victor Manuel Andrews MD   25 mg at 03/22/25 0813    ondansetron (ZOFRAN) injection 4 mg  4 mg Intravenous Q6H PRN Victor Manuel Andrews MD        Phosphorus Replacement - Follow Nurse / BPA Driven Protocol   Not Applicable PRVictor Manuel Beckett MD        polyethylene glycol (MIRALAX) packet 17 g  17 g Oral Daily PRN Vandana Churchill MD        Potassium Replacement - Follow Nurse / BPA Driven Protocol   Not Applicable PRN Victor Manuel Andrews MD        predniSONE (DELTASONE) tablet 20 mg  20 mg Oral Daily With Breakfast Aaron Brenner MD   20 mg at 03/22/25 0812    sodium chloride 0.9 % flush 10 mL  10 mL Intravenous Q12H Victor Manuel Andrews MD   10 mL at 03/22/25 0814    sodium  chloride 0.9 % flush 10 mL  10 mL Intravenous PRN Victor Manuel Andrews MD        sodium chloride 0.9 % infusion 40 mL  40 mL Intravenous PRN Victor Manuel Andrews MD        sodium zirconium cyclosilicate (LOKELMA) packet 10 g  10 g Oral Every Other Day Vandana Churchill MD   10 g at 03/21/25 0847    vitamin B-12 (CYANOCOBALAMIN) tablet 1,000 mcg  1,000 mcg Oral Daily Vandana Churchill MD   1,000 mcg at 03/22/25 0813       Past Medical History:  Past Medical History:   Diagnosis Date    Abnormality of gait and mobility     Anemia     Anxiety     Cerebral palsy     Cognitive communication deficit     Depression     Diabetes mellitus     Disease of thyroid gland     Hyperglycemia     Hyperlipidemia     Hypertension     Hypokalemia     Insomnia     Lymphedema     Neuropathy     Urge incontinence        Past Surgical History  None    Family History  History reviewed. No pertinent family history.    Social History  Social History     Socioeconomic History    Marital status: Single   Tobacco Use    Smoking status: Never     Passive exposure: Past   Vaping Use    Vaping status: Never Used   Substance and Sexual Activity    Alcohol use: Never    Drug use: Never    Sexual activity: Not Currently       Review of Systems:  History obtained from chart review and the patient  General ROS: No fever or chills  Respiratory ROS: No cough, shortness of breath, wheezing  Cardiovascular ROS: No chest pain or palpitations  Gastrointestinal ROS: No abdominal pain or melena  Genito-Urinary ROS: No dysuria or hematuria  Psych ROS: No anxiety and depression  14 point ROS reviewed with the patient and negative except as noted above and in the HPI unless unable to obtain.    Objective:  Patient Vitals for the past 24 hrs:   BP Temp Temp src Pulse Resp SpO2 Weight   03/22/25 1143 -- -- -- 98 18 92 % --   03/22/25 1131 -- -- -- 98 18 95 % --   03/22/25 1053 137/77 97.7 °F (36.5 °C) Oral 98 18 91 % --   03/22/25 0705 100/56 97.8 °F (36.6  °C) Oral 100 18 92 % --   03/22/25 0657 -- -- -- 91 18 95 % --   03/22/25 0428 107/50 98.6 °F (37 °C) Oral 92 18 96 % 129 kg (284 lb 1.6 oz)   03/22/25 0007 112/57 97.6 °F (36.4 °C) Oral 92 18 92 % --   03/21/25 2000 116/57 97.7 °F (36.5 °C) Oral 98 18 94 % --   03/21/25 1908 -- -- -- 100 18 90 % --   03/21/25 1608 113/60 97.2 °F (36.2 °C) Oral 100 18 96 % --   03/21/25 1509 -- -- -- 101 18 98 % --   03/21/25 1456 -- -- -- 107 18 90 % --       Intake/Output Summary (Last 24 hours) at 3/22/2025 1337  Last data filed at 3/22/2025 0428  Gross per 24 hour   Intake 240 ml   Output 750 ml   Net -510 ml     General: awake/alert   HEENT: Normocephalic atraumatic head  Neck: Supple with no JVD or carotid bruits.  Chest:  clear to auscultation bilaterally without respiratory distress  CVS: regular rate and rhythm  Abdominal: soft, nontender, positive bowel sounds  Extremities:  chronic lymphedema  Skin: warm and dry without rash      Labs:  Results from last 7 days   Lab Units 03/22/25  0348 03/21/25  0425 03/20/25  0428   WBC 10*3/mm3 21.95* 29.78* 24.01*   HEMOGLOBIN g/dL 10.4* 10.5* 11.6*   HEMATOCRIT % 32.6* 33.9* 37.4   PLATELETS 10*3/mm3 284 280 284         Results from last 7 days   Lab Units 03/22/25  0645 03/21/25  0425 03/20/25  0428   SODIUM mmol/L 133* 139 137   POTASSIUM mmol/L 4.0 4.0 3.9   CHLORIDE mmol/L 92* 94* 95*   CO2 mmol/L 28.0 29.0 29.0   BUN mg/dL 59* 48* 36*   CREATININE mg/dL 2.06* 2.17* 1.85*   CALCIUM mg/dL 8.9 9.3 9.3   EGFR mL/min/1.73 23.5* 22.1* 26.8*   BILIRUBIN mg/dL <0.2 0.2 0.2   ALK PHOS U/L 115 119* 146*   ALT (SGPT) U/L 20 15 17   AST (SGOT) U/L 18 13 16   GLUCOSE mg/dL 198* 213* 208*       Radiology:   Imaging Results (Last 72 Hours)       Procedure Component Value Units Date/Time    XR Chest 2 View [[id number]] Collected: 03/21/25 1614     Updated: 03/21/25 1620    Narrative:      EXAMINATION:  XR CHEST 2 VW-  3/21/2025 2:47 PM     HISTORY: Follow-up for pneumonia. J18.9-Pneumonia,  unspecified organism;  J96.01-Acute respiratory failure with hypoxia; D97-[place] effusion, not  elsewhere classified; N18.2-Chronic kidney disease, stage 2 (mild);  N39.0-Urinary tract infection, site not specified; Z74.09-Other reduced  mobility.     COMPARISON: 3/18/2025.     TECHNIQUE: 2 views were obtained.     FINDINGS: There is mild hypoventilation. Lower lobe infiltrates are  essentially resolved. No new infiltrate is seen. There is stable  bronchial wall thickening. There is continued cardiomegaly. There is  minimal blunting of the left costophrenic angle. The thoracic aorta is  atheromatous. There are degenerative changes of the spine.          Impression:      1. Bilateral lower lobe infiltrates/edema are essentially resolved.  2. Minimal residual blunting of the left costophrenic angle consistent  with a small effusion.  3. Cardiomegaly.  4. Stable bronchial wall thickening.           This report was signed and finalized on 3/21/2025 4:16 PM by Dr. Dewayne Omalley MD.               Culture:  Blood Culture   Date Value Ref Range Status   03/18/2025 No growth at 24 hours  Preliminary   03/18/2025 No growth at 24 hours  Preliminary         Assessment    Chronic kidney disease stage 4  Type 2 diabetes with renal disease  History of cerebral palsy  Benign essential hypertension.  Bilateral lower lobe pneumonia  Anemia of chronic kidney disease.    Plan:  Continue IV antibiotics.  Agree with resuming home dose of her Bumex  Continue to monitor renal function      Orlando Arroyo MD  3/22/2025  13:37 CDT

## 2025-03-22 NOTE — PROGRESS NOTES
[facility] PULMONARY PROGRESS NOTE - [facility]    Patient: Mar Rodriguez  1940   MR# [id number]   Acct# [id number]  03/22/25   08:26 CDT  Referring Provider: Vandana Churchill MD    Chief Complaint: Shortness of breath  Interval history: She is seen awake and alert resting in bed.  She feels like she is breathing fine today.  She is ready to go home soon.  Prednisone is tapering.  Tolerating 1 L nasal cannula.  WBC trending down.  Afebrile.  No acute events reported overnight.  Meds:  apixaban, 2.5 mg, Oral, Q12H  atorvastatin, 40 mg, Oral, Nightly  bumetanide, 3 mg, Oral, Daily  busPIRone, 7.5 mg, Oral, TID With Meals  cefTRIAXone, 2,000 mg, Intravenous, Q24H  gabapentin, 100 mg, Oral, Nightly  insulin glargine, 20 Units, Subcutaneous, Nightly  insulin lispro, 2-9 Units, Subcutaneous, 4x Daily AC & at Bedtime  insulin lispro, 4 Units, Subcutaneous, TID With Meals  ipratropium-albuterol, 3 mL, Nebulization, 4x Daily - RT  levothyroxine, 200 mcg, Oral, Q AM  lisinopril, 20 mg, Oral, Daily  melatonin, 10 mg, Oral, Nightly  metoprolol tartrate, 25 mg, Oral, Q12H  predniSONE, 20 mg, Oral, Daily With Breakfast  sodium chloride, 10 mL, Intravenous, Q12H  sodium zirconium cyclosilicate, 10 g, Oral, Every Other Day  vitamin B-12, 1,000 mcg, Oral, Daily           Physical Exam:  SpO2 Percentage    03/22/25 0428 03/22/25 0657 03/22/25 0705   SpO2: 96% 95% 92%     Body mass index is 47.28 kg/m².   Temp:  [97.2 °F (36.2 °C)-98.6 °F (37 °C)] 97.8 °F (36.6 °C)  Heart Rate:  [] 100  Resp:  [18] 18  BP: (100-133)/(50-70) 100/56  Intake/Output Summary (Last 24 hours) at 3/22/2025 0826  Last data filed at 3/22/2025 0428  Gross per 24 hour   Intake 240 ml   Output 750 ml   Net -510 ml     Physical Exam  Constitutional:       General: She is not in acute distress.     Appearance: She is obese.      Interventions: Nasal cannula in place.   HENT:      Head: Normocephalic.      Nose: Nose normal.       Mouth/Throat:      Mouth: Mucous membranes are moist.   Eyes:      General: No scleral icterus.  Cardiovascular:      Rate and Rhythm: Normal rate.   Pulmonary:      Effort: No respiratory distress.      Breath sounds: Decreased breath sounds present.   Abdominal:      General: There is no distension.   Musculoskeletal:      Right lower leg: Edema present.      Left lower leg: Edema present.   Neurological:      Mental Status: She is alert. Mental status is at baseline.   Psychiatric:         Mood and Affect: Mood normal.         Behavior: Behavior is cooperative.         Time spent by me: 7 minutes  Electronically signed by LAZARO Russo, 3/22/2025, 08:26 CDT      Physician Substantive Portion: Medical Decision Making to follow:        Result Review    Laboratory Data:  Results from last 7 days   Lab Units 03/22/25  0348 03/21/25  0425 03/20/25  0428   WBC 10*3/mm3 21.95* 29.78* 24.01*   HEMOGLOBIN g/dL 10.4* 10.5* 11.6*   PLATELETS 10*3/mm3 284 280 284     Results from last 7 days   Lab Units 03/22/25  0645 03/21/25  0425 03/20/25  0428 03/18/25  1117 03/18/25  1055   SODIUM mmol/L 133* 139 137   < > 141   SODIUM, ARTERIAL   --   --   --    < >  --    POTASSIUM mmol/L 4.0 4.0 3.9   < > 3.6   CHLORIDE mmol/L 92* 94* 95*   < > 95*   CO2 mmol/L 28.0 29.0 29.0   < > 35.0*   BUN mg/dL 59* 48* 36*   < > 30*   CREATININE mg/dL 2.06* 2.17* 1.85*   < > 1.46*   CALCIUM mg/dL 8.9 9.3 9.3   < > 9.6   ALK PHOS U/L 115 119* 146*   < > 146*   ALT (SGPT) U/L 20 15 17   < > 17   AST (SGOT) U/L 18 13 16   < > 14   LACTATE mmol/L  --   --   --   --  1.2    < > = values in this interval not displayed.         Lab 03/22/25  0645 03/21/25  0425 03/20/25  0428   GLUCOSE 198* 213* 208*     Results from last 7 days   Lab Units 03/20/25  0331 03/19/25  0820 03/18/25  1618   PH, ARTERIAL pH units 7.437 7.449 7.371   PCO2, ARTERIAL mm Hg 52.0* 53.8* 65.6*   PO2 ART mm Hg 65.0* 95.4 126.0*   FIO2 %  --  25 36         Lab 03/18/25  1224  03/18/25  1055   PROBNP  --  1,459.0   HSTROP T 16* 17*   INR  --  1.08     Microbiology Results (last 10 days)       Procedure Component Value - Date/Time    Blood Culture - Blood, Arm, Right [[id number]]  (Normal) Collected: 03/18/25 1131    Lab Status: Preliminary result Specimen: Blood from Arm, Right Updated: 03/21/25 1200     Blood Culture No growth at 3 days    Respiratory Panel PCR w/COVID-19(SARS-CoV-2) [facility]/COR/[facility]Y In-House, NP Swab in UTM/VTM, 2 HR TAT - Swab, Nasopharynx [[id number]]  (Normal) Collected: 03/18/25 1059    Lab Status: Final result Specimen: Swab from Nasopharynx Updated: 03/18/25 1210     ADENOVIRUS, PCR Not Detected     Coronavirus 229E Not Detected     Coronavirus HKU1 Not Detected     Coronavirus NL63 Not Detected     Coronavirus OC43 Not Detected     COVID19 Not Detected     Human Metapneumovirus Not Detected     Human Rhinovirus/Enterovirus Not Detected     Influenza A PCR Not Detected     Influenza B PCR Not Detected     Parainfluenza Virus 1 Not Detected     Parainfluenza Virus 2 Not Detected     Parainfluenza Virus 3 Not Detected     Parainfluenza Virus 4 Not Detected     RSV, PCR Not Detected     Bordetella pertussis pcr Not Detected     Bordetella parapertussis PCR Not Detected     Chlamydophila pneumoniae PCR Not Detected     Mycoplasma pneumo by PCR Not Detected    Narrative:      In the setting of a positive respiratory panel with a viral infection PLUS a negative procalcitonin without other underlying concern for bacterial infection, consider observing off antibiotics or discontinuation of antibiotics and continue supportive care. If the respiratory panel is positive for atypical bacterial infection (Bordetella pertussis, Chlamydophila pneumoniae, or Mycoplasma pneumoniae), consider antibiotic de-escalation to target atypical bacterial infection.    Blood Culture - Blood, Arm, Right [[id number]]  (Normal) Collected: 03/18/25 1055    Lab Status: Preliminary result  Specimen: Blood from Arm, Right Updated: 03/22/25 1116     Blood Culture No growth at 4 days           Recent films:  XR Chest 2 View  Result Date: 3/21/2025  EXAMINATION:  XR CHEST 2 VW-  3/21/2025 2:47 PM  HISTORY: Follow-up for pneumonia. J18.9-Pneumonia, unspecified organism; J96.01-Acute respiratory failure with hypoxia; P20-[place] effusion, not elsewhere classified; N18.2-Chronic kidney disease, stage 2 (mild); N39.0-Urinary tract infection, site not specified; Z74.09-Other reduced mobility.  COMPARISON: 3/18/2025.  TECHNIQUE: 2 views were obtained.  FINDINGS: There is mild hypoventilation. Lower lobe infiltrates are essentially resolved. No new infiltrate is seen. There is stable bronchial wall thickening. There is continued cardiomegaly. There is minimal blunting of the left costophrenic angle. The thoracic aorta is atheromatous. There are degenerative changes of the spine.       Impression: 1. Bilateral lower lobe infiltrates/edema are essentially resolved. 2. Minimal residual blunting of the left costophrenic angle consistent with a small effusion. 3. Cardiomegaly. 4. Stable bronchial wall thickening.    This report was signed and finalized on 3/21/2025 4:16 PM by Dr. Dewayne Omalley MD.             Pulmonary Assessment:  Acute respiratory failure with hypercapnia improved  Suspected pneumonia  Leukocytosis due to the above  Cerebral palsy  Chronic kidney disease with worsened creatinine    Recommend/plan:   -Continue supplemental oxygen as needed and wean as tolerated, goal O2 sat of 90% and above.  Transition to room air today  -Continue steroid wean  -Complete antibiotic course  -Vest therapy  -Pulmonary clinic follow-up on discharge    This visit was performed by both a physician and an APRN.  I performed all aspects of the medical decision making as documented.  Total time spent by me: 20 minutes    Electronically signed by Rhonda Maki DO, 3/22/2025, 11:44 CDT

## 2025-03-23 LAB
BACTERIA SPEC AEROBE CULT: NORMAL
BACTERIA SPEC AEROBE CULT: NORMAL

## 2025-03-23 NOTE — THERAPY DISCHARGE NOTE
Acute Care - Physical Therapy Discharge Summary  [facility]       Patient Name: Mar Rodriguez  : 1940  MRN: [id number]    Today's Date: 3/23/2025                 Admit Date: 3/18/2025      PT Recommendation and Plan    Visit Dx:    ICD-10-CM ICD-9-CM   1. Community acquired bilateral lower lobe pneumonia  J18.9 486   2. Acute respiratory failure with hypoxia  J96.01 518.81   3. Pleural effusion on left  J90 511.9   4. Chronic renal insufficiency, stage 2 (mild)  N18.2 585.2   5. Urinary tract infection without hematuria, site unspecified  N39.0 599.0   6. Impaired mobility [Z74.09]  Z74.09 799.89        Outcome Measures       Row Name 25 1106             How much help from another person do you currently need...    Turning from your back to your side while in flat bed without using bedrails? 3  -MARYSOL      Moving from lying on back to sitting on the side of a flat bed without bedrails? 2  -MARYSOL      Moving to and from a bed to a chair (including a wheelchair)? 1  -MARYSOL      Standing up from a chair using your arms (e.g., wheelchair, bedside chair)? 1  -MARYSOL      Climbing 3-5 steps with a railing? 1  -MARYSOL      To walk in hospital room? 1  -MARYSOL      AM-PAC 6 Clicks Score (PT) 9  -MARYSOL         Functional Assessment    Outcome Measure Options AM-PAC 6 Clicks Basic Mobility (PT)  -MARYSOL                User Key  (r) = Recorded By, (t) = Taken By, (c) = Cosigned By      Initials Name Provider Type    Mati Ingram PTA Physical Therapist Assistant                         PT Rehab Goals       Row Name 25 0600             Bed Mobility Goal 1 (PT)    Activity/Assistive Device (Bed Mobility Goal 1, PT) sit to supine;supine to sit;rolling to left;rolling to right  -AB      [place] Level/Cues Needed (Bed Mobility Goal 1, PT) minimum assist (75% or more patient effort)  -AB      Time Frame (Bed Mobility Goal 1, PT) long term goal (LTG)  -AB      Progress/Outcomes (Bed Mobility Goal 1, PT) goal met  -AB          Transfer Goal 1 (PT)    Activity/Assistive Device (Transfer Goal 1, PT) sit-to-stand/stand-to-sit;bed-to-chair/chair-to-bed;walker, rolling  -AB      [place] Level/Cues Needed (Transfer Goal 1, PT) moderate assist (50-74% patient effort)  -AB      Time Frame (Transfer Goal 1, PT) long term goal (LTG)  -AB      Progress/Outcome (Transfer Goal 1, PT) goal not met  -AB         Problem Specific Goal 1 (PT)    Problem Specific Goal 1 (PT) Pt will propel w/c 50 feet with CGA  -AB      Time Frame (Problem Specific Goal 1, PT) long-term goal (LTG)  -AB      Progress/Outcome (Problem Specific Goal 1, PT) goal not met  -AB                User Key  (r) = Recorded By, (t) = Taken By, (c) = Cosigned By      Initials Name Provider Type Discipline    Karrie Hernandez, PTA Physical Therapist Assistant PT                        PT Discharge Summary  Anticipated Discharge Disposition (PT): extended care facility  Reason for Discharge: Discharge from facility  Outcomes Achieved: Refer to plan of care for updates on goals achieved  Discharge Destination: SNF      Karrie Chavez PTA   3/23/2025

## 2025-04-04 ENCOUNTER — HOSPITAL ENCOUNTER (INPATIENT)
Facility: HOSPITAL | Age: 85
LOS: 5 days | Discharge: NURSING, REHAB OR LONG-TERM CARE | DRG: 308 | End: 2025-04-09
Attending: EMERGENCY MEDICINE | Admitting: FAMILY MEDICINE
Payer: MEDICARE

## 2025-04-04 ENCOUNTER — APPOINTMENT (OUTPATIENT)
Dept: GENERAL RADIOLOGY | Facility: HOSPITAL | Age: 85
DRG: 308 | End: 2025-04-04
Payer: MEDICARE

## 2025-04-04 DIAGNOSIS — J81.0 ACUTE PULMONARY EDEMA: ICD-10-CM

## 2025-04-04 DIAGNOSIS — Z74.09 IMPAIRED MOBILITY: ICD-10-CM

## 2025-04-04 DIAGNOSIS — J40 BRONCHITIS: ICD-10-CM

## 2025-04-04 DIAGNOSIS — I48.91 ATRIAL FIBRILLATION WITH RVR: ICD-10-CM

## 2025-04-04 DIAGNOSIS — J96.21 ACUTE ON CHRONIC RESPIRATORY FAILURE WITH HYPOXIA: Primary | ICD-10-CM

## 2025-04-04 DIAGNOSIS — B34.8 RHINOVIRUS INFECTION: ICD-10-CM

## 2025-04-04 LAB
ALBUMIN SERPL-MCNC: 3.6 G/DL (ref 3.5–5.2)
ALBUMIN/GLOB SERPL: 1 G/DL
ALP SERPL-CCNC: 148 U/L (ref 39–117)
ALT SERPL W P-5'-P-CCNC: 50 U/L (ref 1–33)
ANION GAP SERPL CALCULATED.3IONS-SCNC: 11 MMOL/L (ref 5–15)
ARTERIAL PATENCY WRIST A: ABNORMAL
AST SERPL-CCNC: 39 U/L (ref 1–32)
ATMOSPHERIC PRESS: 751 MMHG
B PARAPERT DNA SPEC QL NAA+PROBE: NOT DETECTED
B PERT DNA SPEC QL NAA+PROBE: NOT DETECTED
BASE EXCESS BLDA CALC-SCNC: 5.5 MMOL/L (ref 0–2)
BASOPHILS # BLD AUTO: 0.04 10*3/MM3 (ref 0–0.2)
BASOPHILS NFR BLD AUTO: 0.2 % (ref 0–1.5)
BDY SITE: ABNORMAL
BILIRUB SERPL-MCNC: 0.3 MG/DL (ref 0–1.2)
BODY TEMPERATURE: 37
BUN SERPL-MCNC: 41 MG/DL (ref 8–23)
BUN/CREAT SERPL: 20.7 (ref 7–25)
C PNEUM DNA NPH QL NAA+NON-PROBE: NOT DETECTED
CALCIUM SPEC-SCNC: 9.1 MG/DL (ref 8.6–10.5)
CHLORIDE SERPL-SCNC: 98 MMOL/L (ref 98–107)
CO2 SERPL-SCNC: 31 MMOL/L (ref 22–29)
CREAT SERPL-MCNC: 1.98 MG/DL (ref 0.57–1)
D-LACTATE SERPL-SCNC: 1.7 MMOL/L (ref 0.5–2)
DEPRECATED RDW RBC AUTO: 58.4 FL (ref 37–54)
EGFRCR SERPLBLD CKD-EPI 2021: 24.7 ML/MIN/1.73
EOSINOPHIL # BLD AUTO: 0.27 10*3/MM3 (ref 0–0.4)
EOSINOPHIL NFR BLD AUTO: 1.4 % (ref 0.3–6.2)
EPAP: 6
ERYTHROCYTE [DISTWIDTH] IN BLOOD BY AUTOMATED COUNT: 15.2 % (ref 12.3–15.4)
FLUAV SUBTYP SPEC NAA+PROBE: NOT DETECTED
FLUBV RNA ISLT QL NAA+PROBE: NOT DETECTED
GEN 5 1HR TROPONIN T REFLEX: 21 NG/L
GLOBULIN UR ELPH-MCNC: 3.7 GM/DL
GLUCOSE SERPL-MCNC: 227 MG/DL (ref 65–99)
HADV DNA SPEC NAA+PROBE: NOT DETECTED
HCO3 BLDA-SCNC: 30.9 MMOL/L (ref 20–26)
HCOV 229E RNA SPEC QL NAA+PROBE: NOT DETECTED
HCOV HKU1 RNA SPEC QL NAA+PROBE: NOT DETECTED
HCOV NL63 RNA SPEC QL NAA+PROBE: NOT DETECTED
HCOV OC43 RNA SPEC QL NAA+PROBE: NOT DETECTED
HCT VFR BLD AUTO: 38.1 % (ref 34–46.6)
HGB BLD-MCNC: 11.9 G/DL (ref 12–15.9)
HMPV RNA NPH QL NAA+NON-PROBE: NOT DETECTED
HPIV1 RNA ISLT QL NAA+PROBE: NOT DETECTED
HPIV2 RNA SPEC QL NAA+PROBE: NOT DETECTED
HPIV3 RNA NPH QL NAA+PROBE: NOT DETECTED
HPIV4 P GENE NPH QL NAA+PROBE: NOT DETECTED
IMM GRANULOCYTES # BLD AUTO: 0.11 10*3/MM3 (ref 0–0.05)
IMM GRANULOCYTES NFR BLD AUTO: 0.6 % (ref 0–0.5)
INHALED O2 CONCENTRATION: 35 %
INR PPP: 1.18 (ref 0.91–1.09)
IPAP: 16
LYMPHOCYTES # BLD AUTO: 0.47 10*3/MM3 (ref 0.7–3.1)
LYMPHOCYTES NFR BLD AUTO: 2.4 % (ref 19.6–45.3)
Lab: ABNORMAL
M PNEUMO IGG SER IA-ACNC: NOT DETECTED
MAGNESIUM SERPL-MCNC: 1.7 MG/DL (ref 1.6–2.4)
MCH RBC QN AUTO: 32.7 PG (ref 26.6–33)
MCHC RBC AUTO-ENTMCNC: 31.2 G/DL (ref 31.5–35.7)
MCV RBC AUTO: 104.7 FL (ref 79–97)
MODALITY: ABNORMAL
MONOCYTES # BLD AUTO: 0.93 10*3/MM3 (ref 0.1–0.9)
MONOCYTES NFR BLD AUTO: 4.7 % (ref 5–12)
NEUTROPHILS NFR BLD AUTO: 17.9 10*3/MM3 (ref 1.7–7)
NEUTROPHILS NFR BLD AUTO: 90.7 % (ref 42.7–76)
NRBC BLD AUTO-RTO: 0 /100 WBC (ref 0–0.2)
NT-PROBNP SERPL-MCNC: 2686 PG/ML (ref 0–1800)
PCO2 BLDA: 48 MM HG (ref 35–45)
PCO2 TEMP ADJ BLD: 48 MM HG (ref 35–45)
PH BLDA: 7.42 PH UNITS (ref 7.35–7.45)
PH, TEMP CORRECTED: 7.42 PH UNITS (ref 7.35–7.45)
PLATELET # BLD AUTO: 231 10*3/MM3 (ref 140–450)
PMV BLD AUTO: 10.7 FL (ref 6–12)
PO2 BLD: 275 MM[HG] (ref 0–500)
PO2 BLDA: 96.4 MM HG (ref 83–108)
PO2 TEMP ADJ BLD: 96.4 MM HG (ref 83–108)
POTASSIUM SERPL-SCNC: 4.7 MMOL/L (ref 3.5–5.2)
PROCALCITONIN SERPL-MCNC: 0.24 NG/ML (ref 0–0.25)
PROT SERPL-MCNC: 7.3 G/DL (ref 6–8.5)
PROTHROMBIN TIME: 15.7 SECONDS (ref 11.8–14.8)
RBC # BLD AUTO: 3.64 10*6/MM3 (ref 3.77–5.28)
RHINOVIRUS RNA SPEC NAA+PROBE: DETECTED
RSV RNA NPH QL NAA+NON-PROBE: NOT DETECTED
SAO2 % BLDCOA: 99.4 % (ref 94–99)
SARS-COV-2 RNA RESP QL NAA+PROBE: NOT DETECTED
SODIUM SERPL-SCNC: 140 MMOL/L (ref 136–145)
TROPONIN T % DELTA: -9
TROPONIN T NUMERIC DELTA: -2 NG/L
TROPONIN T SERPL HS-MCNC: 23 NG/L
VENTILATOR MODE: ABNORMAL
WBC NRBC COR # BLD AUTO: 19.72 10*3/MM3 (ref 3.4–10.8)

## 2025-04-04 PROCEDURE — 25010000002 AZITHROMYCIN PER 500 MG: Performed by: EMERGENCY MEDICINE

## 2025-04-04 PROCEDURE — 99285 EMERGENCY DEPT VISIT HI MDM: CPT

## 2025-04-04 PROCEDURE — 83880 ASSAY OF NATRIURETIC PEPTIDE: CPT | Performed by: EMERGENCY MEDICINE

## 2025-04-04 PROCEDURE — 36600 WITHDRAWAL OF ARTERIAL BLOOD: CPT

## 2025-04-04 PROCEDURE — 84484 ASSAY OF TROPONIN QUANT: CPT | Performed by: EMERGENCY MEDICINE

## 2025-04-04 PROCEDURE — 82803 BLOOD GASES ANY COMBINATION: CPT

## 2025-04-04 PROCEDURE — 84145 PROCALCITONIN (PCT): CPT | Performed by: EMERGENCY MEDICINE

## 2025-04-04 PROCEDURE — 94640 AIRWAY INHALATION TREATMENT: CPT

## 2025-04-04 PROCEDURE — 94799 UNLISTED PULMONARY SVC/PX: CPT

## 2025-04-04 PROCEDURE — 25810000003 SODIUM CHLORIDE 0.9 % SOLUTION 250 ML FLEX CONT: Performed by: EMERGENCY MEDICINE

## 2025-04-04 PROCEDURE — 93010 ELECTROCARDIOGRAM REPORT: CPT | Performed by: INTERNAL MEDICINE

## 2025-04-04 PROCEDURE — 85025 COMPLETE CBC W/AUTO DIFF WBC: CPT | Performed by: EMERGENCY MEDICINE

## 2025-04-04 PROCEDURE — 25010000002 METHYLPREDNISOLONE PER 125 MG: Performed by: EMERGENCY MEDICINE

## 2025-04-04 PROCEDURE — 0202U NFCT DS 22 TRGT SARS-COV-2: CPT | Performed by: EMERGENCY MEDICINE

## 2025-04-04 PROCEDURE — 80053 COMPREHEN METABOLIC PANEL: CPT | Performed by: EMERGENCY MEDICINE

## 2025-04-04 PROCEDURE — 85610 PROTHROMBIN TIME: CPT | Performed by: EMERGENCY MEDICINE

## 2025-04-04 PROCEDURE — 25010000002 FUROSEMIDE PER 20 MG: Performed by: EMERGENCY MEDICINE

## 2025-04-04 PROCEDURE — 87040 BLOOD CULTURE FOR BACTERIA: CPT | Performed by: EMERGENCY MEDICINE

## 2025-04-04 PROCEDURE — 83735 ASSAY OF MAGNESIUM: CPT | Performed by: EMERGENCY MEDICINE

## 2025-04-04 PROCEDURE — 94660 CPAP INITIATION&MGMT: CPT

## 2025-04-04 PROCEDURE — 71045 X-RAY EXAM CHEST 1 VIEW: CPT

## 2025-04-04 PROCEDURE — 93005 ELECTROCARDIOGRAM TRACING: CPT | Performed by: EMERGENCY MEDICINE

## 2025-04-04 PROCEDURE — 82948 REAGENT STRIP/BLOOD GLUCOSE: CPT

## 2025-04-04 PROCEDURE — 83605 ASSAY OF LACTIC ACID: CPT | Performed by: EMERGENCY MEDICINE

## 2025-04-04 PROCEDURE — 36415 COLL VENOUS BLD VENIPUNCTURE: CPT

## 2025-04-04 RX ORDER — IPRATROPIUM BROMIDE AND ALBUTEROL SULFATE 2.5; .5 MG/3ML; MG/3ML
3 SOLUTION RESPIRATORY (INHALATION) ONCE
Status: DISCONTINUED | OUTPATIENT
Start: 2025-04-04 | End: 2025-04-04

## 2025-04-04 RX ORDER — SODIUM CHLORIDE 0.9 % (FLUSH) 0.9 %
10 SYRINGE (ML) INJECTION AS NEEDED
Status: DISCONTINUED | OUTPATIENT
Start: 2025-04-04 | End: 2025-04-09 | Stop reason: HOSPADM

## 2025-04-04 RX ORDER — METHYLPREDNISOLONE SODIUM SUCCINATE 125 MG/2ML
60 INJECTION, POWDER, LYOPHILIZED, FOR SOLUTION INTRAMUSCULAR; INTRAVENOUS EVERY 8 HOURS
Status: DISCONTINUED | OUTPATIENT
Start: 2025-04-05 | End: 2025-04-06

## 2025-04-04 RX ORDER — IPRATROPIUM BROMIDE AND ALBUTEROL SULFATE 2.5; .5 MG/3ML; MG/3ML
3 SOLUTION RESPIRATORY (INHALATION) ONCE
Status: COMPLETED | OUTPATIENT
Start: 2025-04-04 | End: 2025-04-04

## 2025-04-04 RX ORDER — FUROSEMIDE 10 MG/ML
40 INJECTION INTRAMUSCULAR; INTRAVENOUS ONCE
Status: COMPLETED | OUTPATIENT
Start: 2025-04-04 | End: 2025-04-04

## 2025-04-04 RX ORDER — METHYLPREDNISOLONE SODIUM SUCCINATE 125 MG/2ML
125 INJECTION, POWDER, LYOPHILIZED, FOR SOLUTION INTRAMUSCULAR; INTRAVENOUS ONCE
Status: COMPLETED | OUTPATIENT
Start: 2025-04-04 | End: 2025-04-04

## 2025-04-04 RX ORDER — IPRATROPIUM BROMIDE AND ALBUTEROL SULFATE 2.5; .5 MG/3ML; MG/3ML
3 SOLUTION RESPIRATORY (INHALATION) EVERY 6 HOURS PRN
Status: DISCONTINUED | OUTPATIENT
Start: 2025-04-04 | End: 2025-04-05

## 2025-04-04 RX ORDER — DILTIAZEM HCL/D5W 125 MG/125
5-15 PLASTIC BAG, INJECTION (ML) INTRAVENOUS
Status: DISCONTINUED | OUTPATIENT
Start: 2025-04-04 | End: 2025-04-07

## 2025-04-04 RX ADMIN — METHYLPREDNISOLONE SODIUM SUCCINATE 125 MG: 125 INJECTION, POWDER, FOR SOLUTION INTRAMUSCULAR; INTRAVENOUS at 20:10

## 2025-04-04 RX ADMIN — AZITHROMYCIN DIHYDRATE 500 MG: 500 INJECTION, POWDER, LYOPHILIZED, FOR SOLUTION INTRAVENOUS at 20:10

## 2025-04-04 RX ADMIN — IPRATROPIUM BROMIDE AND ALBUTEROL SULFATE 3 ML: .5; 3 SOLUTION RESPIRATORY (INHALATION) at 22:04

## 2025-04-04 RX ADMIN — Medication 5 MG/HR: at 16:31

## 2025-04-04 RX ADMIN — FUROSEMIDE 40 MG: 10 INJECTION, SOLUTION INTRAVENOUS at 18:29

## 2025-04-04 NOTE — ED PROVIDER NOTES
Subjective   History of Present Illness  84-year-old female presents to the emergency department with complaint of respiratory distress.  She has a history of cerebral palsy, hypertension, hyperlipidemia, atrial fibrillation on Eliquis, chronic lower extremity lymphedema and has recent admission for acute on chronic respiratory failure due to suspected pneumonia.  She currently resides at a skilled nursing facility, [facility] in [facility].  Patient reports onset of cough and shortness of breath that began yesterday.  Shortness of breath worsened overnight and patient was placed on oxygen via nasal cannula.  Symptoms continue to worsen so staff called 911.  When paramedics arrived on scene, they report the patient was in respiratory distress.  She was on 2 L of oxygen via nasal cannula, tachypneic and was struggling to breathe.  They immediately placed her on BiPAP and transported to the emergency department for further evaluation.  Prompted the ED work of breathing had improved a little but she remained in respiratory distress.  Was found to be in atrial fibrillation with RVR on arrival to the ED.   No reports of fever or chills.  Has had productive cough with yellow sputum.  No chest pain, no abdominal pain, nausea or vomiting    History provided by:  Patient      Review of Systems   All other systems reviewed and are negative.      Past Medical History:   Diagnosis Date    Abnormality of gait and mobility     Anemia     Anxiety     Cerebral palsy     Cognitive communication deficit     Depression     Diabetes mellitus     Disease of thyroid gland     Hyperglycemia     Hyperlipidemia     Hypertension     Hypokalemia     Insomnia     Lymphedema     Neuropathy     Urge incontinence        Allergies   Allergen Reactions    Hydralazine Hcl Hives and Rash    Nsaids Other (See Comments)     No NSAIDS r/t renal disease       No past surgical history on file.    No family history on file.    Social History     Socioeconomic  History    Marital status: Single   Tobacco Use    Smoking status: Never     Passive exposure: Past   Vaping Use    Vaping status: Never Used   Substance and Sexual Activity    Alcohol use: Never    Drug use: Never    Sexual activity: Not Currently           Objective   Physical Exam  Vitals and nursing note reviewed.   Constitutional:       Comments: Generally weak and ill-appearing elderly female who appears to be in respiratory distress   HENT:      Head: Normocephalic and atraumatic.      Nose: Nose normal. No congestion or rhinorrhea.      Mouth/Throat:      Mouth: Mucous membranes are moist.   Eyes:      Extraocular Movements: Extraocular movements intact.      Comments: Strabismus   Neck:      Comments: No JVD  Cardiovascular:      Rate and Rhythm: Tachycardia present. Rhythm irregular.   Pulmonary:      Breath sounds: Rhonchi and rales present.   Abdominal:      General: Abdomen is flat. Bowel sounds are normal.      Palpations: Abdomen is soft.      Tenderness: There is no abdominal tenderness.   Musculoskeletal:      Right lower leg: Edema present.      Left lower leg: Edema present.   Skin:     General: Skin is warm and dry.      Capillary Refill: Capillary refill takes less than 2 seconds.   Neurological:      General: No focal deficit present.      Mental Status: She is oriented to person, place, and time. Mental status is at baseline.         Procedures       Lab Results (last 24 hours)       Procedure Component Value Units Date/Time    Respiratory Panel PCR w/COVID-19(SARS-CoV-2) [facility]T/[facility]CA/[facility]Y In-House, NP Swab in UTM/VTM, 2 HR TAT - Swab, Nasopharynx [[id number]]  (Abnormal) Collected: 04/04/25 1607    Specimen: Swab from Nasopharynx Updated: 04/04/25 1751     ADENOVIRUS, PCR Not Detected     Coronavirus 229E Not Detected     Coronavirus HKU1 Not Detected     Coronavirus NL63 Not Detected     Coronavirus OC43 Not Detected     COVID19 Not Detected     Human Metapneumovirus Not Detected      Human Rhinovirus/Enterovirus Detected     Influenza A PCR Not Detected     Influenza B PCR Not Detected     Parainfluenza Virus 1 Not Detected     Parainfluenza Virus 2 Not Detected     Parainfluenza Virus 3 Not Detected     Parainfluenza Virus 4 Not Detected     RSV, PCR Not Detected     Bordetella pertussis pcr Not Detected     Bordetella parapertussis PCR Not Detected     Chlamydophila pneumoniae PCR Not Detected     Mycoplasma pneumo by PCR Not Detected    Narrative:      In the setting of a positive respiratory panel with a viral infection PLUS a negative procalcitonin without other underlying concern for bacterial infection, consider observing off antibiotics or discontinuation of antibiotics and continue supportive care. If the respiratory panel is positive for atypical bacterial infection (Bordetella pertussis, Chlamydophila pneumoniae, or Mycoplasma pneumoniae), consider antibiotic de-escalation to target atypical bacterial infection.    Blood Gas, Arterial - [[id number]]  (Abnormal) Collected: 04/04/25 1608    Specimen: Arterial Blood Updated: 04/04/25 1607     Site Right Brachial     Saud's Test N/A     pH, Arterial 7.417 pH units      pCO2, Arterial 48.0 mm Hg      Comment: 83 Value above reference range        pO2, Arterial 96.4 mm Hg      HCO3, Arterial 30.9 mmol/L      Comment: 83 Value above reference range        Base Excess, Arterial 5.5 mmol/L      Comment: 83 Value above reference range        O2 Saturation, Arterial 99.4 %      Comment: 83 Value above reference range        Temperature 37.0     Barometric Pressure for Blood Gas 751 mmHg      Modality BiPap     FIO2 35 %      Ventilator Mode NA     IPAP 16     Comment: Meter: [id number]     :  Oneyda Solares, RRT        EPAP 6     Collected by [id number]     pCO2, Temperature Corrected 48.0 mm Hg      pH, Temp Corrected 7.417 pH Units      pO2, Temperature Corrected 96.4 mm Hg      PO2/FIO2 275    CBC & Differential [[id number]]   (Abnormal) Collected: 04/04/25 1618    Specimen: Blood Updated: 04/04/25 1630    Narrative:      The following orders were created for panel order CBC & Differential.  Procedure                               Abnormality         Status                     ---------                               -----------         ------                     CBC Auto Differential[[id number]]        Abnormal            Final result                 Please view results for these tests on the individual orders.    Comprehensive Metabolic Panel [[id number]]  (Abnormal) Collected: 04/04/25 1618    Specimen: Blood Updated: 04/04/25 1651     Glucose 227 mg/dL      BUN 41 mg/dL      Creatinine 1.98 mg/dL      Sodium 140 mmol/L      Potassium 4.7 mmol/L      Chloride 98 mmol/L      CO2 31.0 mmol/L      Calcium 9.1 mg/dL      Total Protein 7.3 g/dL      Albumin 3.6 g/dL      ALT (SGPT) 50 U/L      AST (SGOT) 39 U/L      Alkaline Phosphatase 148 U/L      Total Bilirubin 0.3 mg/dL      Globulin 3.7 gm/dL      A/G Ratio 1.0 g/dL      BUN/Creatinine Ratio 20.7     Anion Gap 11.0 mmol/L      eGFR 24.7 mL/min/1.73     Narrative:      GFR Categories in Chronic Kidney Disease (CKD)      GFR Category          GFR (mL/min/1.73)    Interpretation  G1                     90 or greater         Normal or high (1)  G2                      60-89                Mild decrease (1)  G3a                   45-59                Mild to moderate decrease  G3b                   30-44                Moderate to severe decrease  G4                    15-29                Severe decrease  G5                    14 or less           Kidney failure          (1)In the absence of evidence of kidney disease, neither GFR category G1 or G2 fulfill the criteria for CKD.    eGFR calculation 2021 CKD-EPI creatinine equation, which does not include race as a factor    Protime-INR [[id number]]  (Abnormal) Collected: 04/04/25 1618    Specimen: Blood Updated: 04/04/25 1645     Protime 15.7  Seconds      INR 1.18    Magnesium [[id number]]  (Normal) Collected: 04/04/25 1618    Specimen: Blood Updated: 04/04/25 1651     Magnesium 1.7 mg/dL     BNP [[id number]]  (Abnormal) Collected: 04/04/25 1618    Specimen: Blood Updated: 04/04/25 1647     proBNP 2,686.0 pg/mL     Narrative:      This assay is used as an aid in the diagnosis of individuals suspected of having heart failure. It can be used as an aid in the diagnosis of acute decompensated heart failure (ADHF) in patients presenting with signs and symptoms of ADHF to the emergency department (ED). In addition, NT-proBNP of <300 pg/mL indicates ADHF is not likely.    Age Range Result Interpretation  NT-proBNP Concentration (pg/mL:      <50             Positive            >450                   Gray                 300-450                    Negative             <300    50-75           Positive            >900                  Gray                300-900                  Negative            <300      >75             Positive            >1800                  Gray                300-1800                  Negative            <300    High Sensitivity Troponin T [[id number]]  (Abnormal) Collected: 04/04/25 1618    Specimen: Blood Updated: 04/04/25 1646     HS Troponin T 23 ng/L     Narrative:      High Sensitive Troponin T Reference Range:  <14.0 ng/L- Negative Female for AMI  <22.0 ng/L- Negative Male for AMI  >=14 - Abnormal Female indicating possible myocardial injury.  >=22 - Abnormal Male indicating possible myocardial injury.   Clinicians would have to utilize clinical acumen, EKG, Troponin, and serial changes to determine if it is an Acute Myocardial Infarction or myocardial injury due to an underlying chronic condition.         Blood Culture - Blood, Arm, Right [[id number]] Collected: 04/04/25 1618    Specimen: Blood from Arm, Right Updated: 04/04/25 1627    Lactic Acid, Plasma [[id number]]  (Normal) Collected: 04/04/25 1618    Specimen: Blood Updated:  "04/04/25 1647     Lactate 1.7 mmol/L     Procalcitonin [[id number]]  (Normal) Collected: 04/04/25 1618    Specimen: Blood Updated: 04/04/25 1656     Procalcitonin 0.24 ng/mL     Narrative:      As a Marker for Sepsis (Non-Neonates):    1. <0.5 ng/mL represents a low risk of severe sepsis and/or septic shock.  2. >2 ng/mL represents a high risk of severe sepsis and/or septic shock.    As a Marker for Lower Respiratory Tract Infections that require antibiotic therapy:    PCT on Admission    Antibiotic Therapy       6-12 Hrs later    >0.5                Strongly Recommended  >0.25 - <0.5        Recommended   0.1 - 0.25          Discouraged              Remeasure/reassess PCT  <0.1                Strongly Discouraged     Remeasure/reassess PCT    As 28 day mortality risk marker: \"Change in Procalcitonin Result\" (>80% or <=80%) if Day 0 (or Day 1) and Day 4 values are available. Refer to http://www.[facility] [email or web address] [facility] [email or web address]cal[place]    Change in PCT <=80%  A decrease of PCT levels below or equal to 80% defines a positive change in PCT test result representing a higher risk for 28-day all-cause mortality of patients diagnosed with severe sepsis for septic shock.    Change in PCT >80%  A decrease of PCT levels of more than 80% defines a negative change in PCT result representing a lower risk for 28-day all-cause mortality of patients diagnosed with severe sepsis or septic shock.       CBC Auto Differential [[id number]]  (Abnormal) Collected: 04/04/25 1618    Specimen: Blood Updated: 04/04/25 1630     WBC 19.72 10*3/mm3      RBC 3.64 10*6/mm3      Hemoglobin 11.9 g/dL      Hematocrit 38.1 %      .7 fL      MCH 32.7 pg      MCHC 31.2 g/dL      RDW 15.2 %      RDW-SD 58.4 fl      MPV 10.7 fL      Platelets 231 10*3/mm3      Neutrophil % 90.7 %      Lymphocyte % 2.4 %      Monocyte % 4.7 %      Eosinophil % 1.4 %      Basophil % 0.2 %      Immature Grans % 0.6 %      Neutrophils, Absolute 17.90 10*3/mm3      Lymphocytes, Absolute " 0.47 10*3/mm3      Monocytes, Absolute 0.93 10*3/mm3      Eosinophils, Absolute 0.27 10*3/mm3      Basophils, Absolute 0.04 10*3/mm3      Immature Grans, Absolute 0.11 10*3/mm3      nRBC 0.0 /100 WBC     Blood Culture - Blood, Arm, Left [[id number]] Collected: 04/04/25 1619    Specimen: Blood from Arm, Left Updated: 04/04/25 1628    High Sensitivity Troponin T 1Hr [[id number]]  (Abnormal) Collected: 04/04/25 1827    Specimen: Blood Updated: 04/04/25 1856     HS Troponin T 21 ng/L      Troponin T Numeric Delta -2 ng/L      Troponin T % Delta -9    Narrative:      High Sensitive Troponin T Reference Range:  <14.0 ng/L- Negative Female for AMI  <22.0 ng/L- Negative Male for AMI  >=14 - Abnormal Female indicating possible myocardial injury.  >=22 - Abnormal Male indicating possible myocardial injury.   Clinicians would have to utilize clinical acumen, EKG, Troponin, and serial changes to determine if it is an Acute Myocardial Infarction or myocardial injury due to an underlying chronic condition.              XR Chest 1 View  Result Date: 4/4/2025  XR CHEST 1 VW-  HISTORY: dyspnea  COMPARISON: 3/21/2025  FINDINGS: Frontal view of the chest obtained.  Similar cardiomegaly. Prominent central pulmonary vascular structures with minimal interstitial prominence.. Stable granulomatous calcifications. No acute consolidation. No pleural effusion or pneumothorax. No acute regional bony pathology.      1. Cardiomegaly with findings suggestive for mild CHF. Stable granulomatous calcifications with no acute consolidation.     This report was signed and finalized on 4/4/2025 4:36 PM by Dr. Racquel Tejeda MD.         ED Course  ED Course as of 04/04/25 1924 Fri Apr 04, 2025 1819 84-year-old female presents to the ED with complaint of shortness of breath/respiratory distress.  History of cerebral palsy, hypertension, hyperlipidemia, A-fib on Eliquis, chronic lower extremity lymphedema had a recent admission 3/18 through 3/22 for  acute on chronic respiratory failure due to suspected pneumonia and was discharged with antibiotics and steroids.    Patient will need admission for shortness of breath and difficulty breathing secondary to pulmonary edema in the setting of atrial fibrillation with RVR requiring diltiazem drip to maintain heart rate within an acceptable range.  Patient also has human rhinovirus likely causing bronchitis.    She has had productive cough of yellow sputum and worsening shortness of breath since yesterday.  Requiring oxygen via 2 L nasal cannula at [facility] in [facility].  Developed shortness of breath/respiratory distress earlier today so 911 was called.  Placed on BiPAP en route to the hospital.  Patient was transition to BiPAP when she arrived to the ED.  ABG was obtained and looked pretty good, pCO2 minimally elevated at 48 but pH normal at 7.41.  , sats 99%.  Lungs did sound a little wet and BNP was elevated to 2686, chest x-ray consistent with mild CHF.  We maintained the patient on BiPAP for comfort but have been able to wean her to nasal cannula.  She received 40 mg IV Lasix.  She was in A-fib with RVR on arrival to the ED, placed on diltiazem drip and heart rate has improved to the low 100s.  White count 19,000, this is improved from 30,002 weeks ago.  No evidence of pneumonia on chest x-ray.  Respiratory panel positive for human rhinovirus.  Lactic acid normal at 1.7, procalcitonin normal at 0.24.  Respiratory panel positive for human rhinovirus.  Likely viral etiology, not bacterial etiology.  Will hold antibiotics for now.   Plan for admission to the hospitalist for acute on chronic respiratory failure likely due to pulmonary edema/volume overload in the setting of A-fib with RVR.    [AW]   1922 Patient has been off of BiPAP for about an hour now.  Reassessed her, she states she continues to feel okay but she now has some wheezing and increased work of breathing, mild tachypnea.  Wheezing was not present  earlier but she did receive a DuoNeb en route to the hospital by EMS.  Concern for bronchoconstriction in the setting of rhinovirus infection.  Will give steroids and nebs, add azithromycin for its anti-inflammatory effects. [AW]      ED Course User Index  [AW] Danial Felder MD                                                       Medical Decision Making  Amount and/or Complexity of Data Reviewed  Labs: ordered.  Radiology: ordered.  ECG/medicine tests: ordered.    Risk  Prescription drug management.        Final diagnoses:   Acute on chronic respiratory failure with hypoxia   Acute pulmonary edema   Atrial fibrillation with RVR   Rhinovirus infection   Bronchitis       ED Disposition  ED Disposition       ED Disposition   Decision to Admit    Condition   --    Comment   Level of Care: Telemetry [5]   Diagnosis: Atrial fibrillation with RVR [[id number]]   Admitting Physician: SOMMER ANDRADE [[id number]]   Attending Physician: SOMMER ANDRADE [[id number]]   Certification: I Certify That Inpatient Hospital Services Are Medically Necessary For Greater Than 2 Midnights                 No follow-up provider specified.       Medication List      No changes were made to your prescriptions during this visit.            Danial Felder MD  04/04/25 1924

## 2025-04-05 LAB
ANION GAP SERPL CALCULATED.3IONS-SCNC: 10 MMOL/L (ref 5–15)
BASOPHILS # BLD AUTO: 0.02 10*3/MM3 (ref 0–0.2)
BASOPHILS NFR BLD AUTO: 0.1 % (ref 0–1.5)
BUN SERPL-MCNC: 40 MG/DL (ref 8–23)
BUN/CREAT SERPL: 21.7 (ref 7–25)
CALCIUM SPEC-SCNC: 9.3 MG/DL (ref 8.6–10.5)
CHLORIDE SERPL-SCNC: 101 MMOL/L (ref 98–107)
CO2 SERPL-SCNC: 28 MMOL/L (ref 22–29)
CREAT SERPL-MCNC: 1.84 MG/DL (ref 0.57–1)
DEPRECATED RDW RBC AUTO: 56.3 FL (ref 37–54)
EGFRCR SERPLBLD CKD-EPI 2021: 26.9 ML/MIN/1.73
EOSINOPHIL # BLD AUTO: 0 10*3/MM3 (ref 0–0.4)
EOSINOPHIL NFR BLD AUTO: 0 % (ref 0.3–6.2)
ERYTHROCYTE [DISTWIDTH] IN BLOOD BY AUTOMATED COUNT: 15 % (ref 12.3–15.4)
GLUCOSE BLDC GLUCOMTR-MCNC: 221 MG/DL (ref 70–130)
GLUCOSE BLDC GLUCOMTR-MCNC: 237 MG/DL (ref 70–130)
GLUCOSE BLDC GLUCOMTR-MCNC: 287 MG/DL (ref 70–130)
GLUCOSE BLDC GLUCOMTR-MCNC: 354 MG/DL (ref 70–130)
GLUCOSE SERPL-MCNC: 267 MG/DL (ref 65–99)
HCT VFR BLD AUTO: 35.1 % (ref 34–46.6)
HGB BLD-MCNC: 11 G/DL (ref 12–15.9)
IMM GRANULOCYTES # BLD AUTO: 0.07 10*3/MM3 (ref 0–0.05)
IMM GRANULOCYTES NFR BLD AUTO: 0.5 % (ref 0–0.5)
LYMPHOCYTES # BLD AUTO: 0.36 10*3/MM3 (ref 0.7–3.1)
LYMPHOCYTES NFR BLD AUTO: 2.7 % (ref 19.6–45.3)
MCH RBC QN AUTO: 32.2 PG (ref 26.6–33)
MCHC RBC AUTO-ENTMCNC: 31.3 G/DL (ref 31.5–35.7)
MCV RBC AUTO: 102.6 FL (ref 79–97)
MONOCYTES # BLD AUTO: 0.07 10*3/MM3 (ref 0.1–0.9)
MONOCYTES NFR BLD AUTO: 0.5 % (ref 5–12)
NEUTROPHILS NFR BLD AUTO: 13.05 10*3/MM3 (ref 1.7–7)
NEUTROPHILS NFR BLD AUTO: 96.2 % (ref 42.7–76)
NRBC BLD AUTO-RTO: 0 /100 WBC (ref 0–0.2)
PLATELET # BLD AUTO: 191 10*3/MM3 (ref 140–450)
PMV BLD AUTO: 10.9 FL (ref 6–12)
POTASSIUM SERPL-SCNC: 4.9 MMOL/L (ref 3.5–5.2)
RBC # BLD AUTO: 3.42 10*6/MM3 (ref 3.77–5.28)
SODIUM SERPL-SCNC: 139 MMOL/L (ref 136–145)
WBC NRBC COR # BLD AUTO: 13.57 10*3/MM3 (ref 3.4–10.8)

## 2025-04-05 PROCEDURE — 63710000001 INSULIN LISPRO (HUMAN) PER 5 UNITS: Performed by: PHYSICIAN ASSISTANT

## 2025-04-05 PROCEDURE — 94799 UNLISTED PULMONARY SVC/PX: CPT

## 2025-04-05 PROCEDURE — 25010000002 FUROSEMIDE PER 20 MG: Performed by: PHYSICIAN ASSISTANT

## 2025-04-05 PROCEDURE — 80048 BASIC METABOLIC PNL TOTAL CA: CPT | Performed by: PHYSICIAN ASSISTANT

## 2025-04-05 PROCEDURE — 94660 CPAP INITIATION&MGMT: CPT

## 2025-04-05 PROCEDURE — 25810000003 SODIUM CHLORIDE 0.9 % SOLUTION 250 ML FLEX CONT: Performed by: PHYSICIAN ASSISTANT

## 2025-04-05 PROCEDURE — 85025 COMPLETE CBC W/AUTO DIFF WBC: CPT | Performed by: PHYSICIAN ASSISTANT

## 2025-04-05 PROCEDURE — 63710000001 INSULIN GLARGINE PER 5 UNITS: Performed by: PHYSICIAN ASSISTANT

## 2025-04-05 PROCEDURE — 82948 REAGENT STRIP/BLOOD GLUCOSE: CPT

## 2025-04-05 PROCEDURE — 25010000002 METHYLPREDNISOLONE PER 125 MG: Performed by: PHYSICIAN ASSISTANT

## 2025-04-05 PROCEDURE — 25010000002 AZITHROMYCIN PER 500 MG: Performed by: PHYSICIAN ASSISTANT

## 2025-04-05 RX ORDER — IPRATROPIUM BROMIDE AND ALBUTEROL SULFATE 2.5; .5 MG/3ML; MG/3ML
3 SOLUTION RESPIRATORY (INHALATION)
Status: DISCONTINUED | OUTPATIENT
Start: 2025-04-05 | End: 2025-04-05

## 2025-04-05 RX ORDER — BISACODYL 5 MG/1
5 TABLET, DELAYED RELEASE ORAL DAILY PRN
Status: DISCONTINUED | OUTPATIENT
Start: 2025-04-05 | End: 2025-04-09 | Stop reason: HOSPADM

## 2025-04-05 RX ORDER — NITROGLYCERIN 0.4 MG/1
0.4 TABLET SUBLINGUAL
Status: DISCONTINUED | OUTPATIENT
Start: 2025-04-05 | End: 2025-04-09 | Stop reason: HOSPADM

## 2025-04-05 RX ORDER — ACETAMINOPHEN 325 MG/1
650 TABLET ORAL EVERY 4 HOURS PRN
Status: DISCONTINUED | OUTPATIENT
Start: 2025-04-05 | End: 2025-04-09 | Stop reason: HOSPADM

## 2025-04-05 RX ORDER — AMOXICILLIN 250 MG
2 CAPSULE ORAL 2 TIMES DAILY PRN
Status: DISCONTINUED | OUTPATIENT
Start: 2025-04-05 | End: 2025-04-09 | Stop reason: HOSPADM

## 2025-04-05 RX ORDER — POLYETHYLENE GLYCOL 3350 17 G/17G
17 POWDER, FOR SOLUTION ORAL DAILY PRN
Status: DISCONTINUED | OUTPATIENT
Start: 2025-04-05 | End: 2025-04-05

## 2025-04-05 RX ORDER — NICOTINE POLACRILEX 4 MG
15 LOZENGE BUCCAL
Status: DISCONTINUED | OUTPATIENT
Start: 2025-04-05 | End: 2025-04-09 | Stop reason: HOSPADM

## 2025-04-05 RX ORDER — ACETAMINOPHEN 650 MG/1
650 SUPPOSITORY RECTAL EVERY 4 HOURS PRN
Status: DISCONTINUED | OUTPATIENT
Start: 2025-04-05 | End: 2025-04-09 | Stop reason: HOSPADM

## 2025-04-05 RX ORDER — SODIUM CHLORIDE 9 MG/ML
40 INJECTION, SOLUTION INTRAVENOUS AS NEEDED
Status: DISCONTINUED | OUTPATIENT
Start: 2025-04-05 | End: 2025-04-09 | Stop reason: HOSPADM

## 2025-04-05 RX ORDER — CHOLESTYRAMINE LIGHT 4 G/5.7G
1 POWDER, FOR SUSPENSION ORAL EVERY 12 HOURS SCHEDULED
Status: DISCONTINUED | OUTPATIENT
Start: 2025-04-05 | End: 2025-04-09 | Stop reason: HOSPADM

## 2025-04-05 RX ORDER — ACETAMINOPHEN 325 MG/1
650 TABLET ORAL EVERY 6 HOURS PRN
Status: DISCONTINUED | OUTPATIENT
Start: 2025-04-05 | End: 2025-04-05 | Stop reason: SDUPTHER

## 2025-04-05 RX ORDER — BISACODYL 10 MG
10 SUPPOSITORY, RECTAL RECTAL DAILY PRN
Status: DISCONTINUED | OUTPATIENT
Start: 2025-04-05 | End: 2025-04-09 | Stop reason: HOSPADM

## 2025-04-05 RX ORDER — INSULIN LISPRO 100 [IU]/ML
2-9 INJECTION, SOLUTION INTRAVENOUS; SUBCUTANEOUS
Status: DISCONTINUED | OUTPATIENT
Start: 2025-04-05 | End: 2025-04-06 | Stop reason: SDUPTHER

## 2025-04-05 RX ORDER — SODIUM CHLORIDE 0.9 % (FLUSH) 0.9 %
10 SYRINGE (ML) INJECTION AS NEEDED
Status: DISCONTINUED | OUTPATIENT
Start: 2025-04-05 | End: 2025-04-07

## 2025-04-05 RX ORDER — METOPROLOL TARTRATE 25 MG/1
25 TABLET, FILM COATED ORAL 2 TIMES DAILY
Status: DISCONTINUED | OUTPATIENT
Start: 2025-04-05 | End: 2025-04-09 | Stop reason: HOSPADM

## 2025-04-05 RX ORDER — IBUPROFEN 600 MG/1
1 TABLET ORAL
Status: DISCONTINUED | OUTPATIENT
Start: 2025-04-05 | End: 2025-04-09 | Stop reason: HOSPADM

## 2025-04-05 RX ORDER — POLYETHYLENE GLYCOL 3350 17 G/17G
17 POWDER, FOR SOLUTION ORAL DAILY PRN
Status: DISCONTINUED | OUTPATIENT
Start: 2025-04-05 | End: 2025-04-09 | Stop reason: HOSPADM

## 2025-04-05 RX ORDER — BUSPIRONE HYDROCHLORIDE 5 MG/1
7.5 TABLET ORAL 3 TIMES DAILY
Status: DISCONTINUED | OUTPATIENT
Start: 2025-04-05 | End: 2025-04-09 | Stop reason: HOSPADM

## 2025-04-05 RX ORDER — DEXTROSE MONOHYDRATE 25 G/50ML
25 INJECTION, SOLUTION INTRAVENOUS
Status: DISCONTINUED | OUTPATIENT
Start: 2025-04-05 | End: 2025-04-09 | Stop reason: HOSPADM

## 2025-04-05 RX ORDER — CLONIDINE HYDROCHLORIDE 0.1 MG/1
0.1 TABLET ORAL EVERY 8 HOURS PRN
Status: DISCONTINUED | OUTPATIENT
Start: 2025-04-05 | End: 2025-04-09 | Stop reason: HOSPADM

## 2025-04-05 RX ORDER — LEVOTHYROXINE SODIUM 100 UG/1
200 TABLET ORAL
Status: DISCONTINUED | OUTPATIENT
Start: 2025-04-05 | End: 2025-04-09 | Stop reason: HOSPADM

## 2025-04-05 RX ORDER — ONDANSETRON 2 MG/ML
4 INJECTION INTRAMUSCULAR; INTRAVENOUS EVERY 6 HOURS PRN
Status: DISCONTINUED | OUTPATIENT
Start: 2025-04-05 | End: 2025-04-09 | Stop reason: HOSPADM

## 2025-04-05 RX ORDER — ATORVASTATIN CALCIUM 40 MG/1
40 TABLET, FILM COATED ORAL NIGHTLY
Status: DISCONTINUED | OUTPATIENT
Start: 2025-04-05 | End: 2025-04-09 | Stop reason: HOSPADM

## 2025-04-05 RX ORDER — ACETAMINOPHEN 160 MG/5ML
650 SOLUTION ORAL EVERY 4 HOURS PRN
Status: DISCONTINUED | OUTPATIENT
Start: 2025-04-05 | End: 2025-04-09 | Stop reason: HOSPADM

## 2025-04-05 RX ORDER — NYSTATIN 100000 U/G
CREAM TOPICAL EVERY 12 HOURS SCHEDULED
Status: DISCONTINUED | OUTPATIENT
Start: 2025-04-05 | End: 2025-04-09 | Stop reason: HOSPADM

## 2025-04-05 RX ORDER — SODIUM CHLORIDE 0.9 % (FLUSH) 0.9 %
10 SYRINGE (ML) INJECTION EVERY 12 HOURS SCHEDULED
Status: DISCONTINUED | OUTPATIENT
Start: 2025-04-05 | End: 2025-04-09 | Stop reason: HOSPADM

## 2025-04-05 RX ORDER — LISINOPRIL 20 MG/1
20 TABLET ORAL DAILY
Status: DISCONTINUED | OUTPATIENT
Start: 2025-04-05 | End: 2025-04-09 | Stop reason: HOSPADM

## 2025-04-05 RX ADMIN — LEVOTHYROXINE SODIUM 200 MCG: 100 TABLET ORAL at 10:30

## 2025-04-05 RX ADMIN — METHYLPREDNISOLONE SODIUM SUCCINATE 60 MG: 125 INJECTION, POWDER, FOR SOLUTION INTRAMUSCULAR; INTRAVENOUS at 03:23

## 2025-04-05 RX ADMIN — Medication 10 ML: at 20:15

## 2025-04-05 RX ADMIN — Medication 10 ML: at 15:46

## 2025-04-05 RX ADMIN — CHOLESTYRAMINE 4 G: 4 POWDER, FOR SUSPENSION ORAL at 12:34

## 2025-04-05 RX ADMIN — INSULIN LISPRO 8 UNITS: 100 INJECTION, SOLUTION INTRAVENOUS; SUBCUTANEOUS at 20:13

## 2025-04-05 RX ADMIN — CHOLESTYRAMINE 4 G: 4 POWDER, FOR SUSPENSION ORAL at 20:15

## 2025-04-05 RX ADMIN — AZITHROMYCIN DIHYDRATE 500 MG: 500 INJECTION, POWDER, LYOPHILIZED, FOR SOLUTION INTRAVENOUS at 20:15

## 2025-04-05 RX ADMIN — METOPROLOL TARTRATE 25 MG: 25 TABLET, FILM COATED ORAL at 10:31

## 2025-04-05 RX ADMIN — METOPROLOL TARTRATE 25 MG: 25 TABLET, FILM COATED ORAL at 20:12

## 2025-04-05 RX ADMIN — FUROSEMIDE 5 MG/HR: 10 INJECTION, SOLUTION INTRAMUSCULAR; INTRAVENOUS at 10:22

## 2025-04-05 RX ADMIN — BUSPIRONE HYDROCHLORIDE 7.5 MG: 5 TABLET ORAL at 20:12

## 2025-04-05 RX ADMIN — BUSPIRONE HYDROCHLORIDE 7.5 MG: 5 TABLET ORAL at 17:04

## 2025-04-05 RX ADMIN — BUSPIRONE HYDROCHLORIDE 7.5 MG: 5 TABLET ORAL at 10:29

## 2025-04-05 RX ADMIN — METHYLPREDNISOLONE SODIUM SUCCINATE 60 MG: 125 INJECTION, POWDER, FOR SOLUTION INTRAMUSCULAR; INTRAVENOUS at 12:35

## 2025-04-05 RX ADMIN — INSULIN LISPRO 6 UNITS: 100 INJECTION, SOLUTION INTRAVENOUS; SUBCUTANEOUS at 17:05

## 2025-04-05 RX ADMIN — LISINOPRIL 20 MG: 20 TABLET ORAL at 10:30

## 2025-04-05 RX ADMIN — METHYLPREDNISOLONE SODIUM SUCCINATE 60 MG: 125 INJECTION, POWDER, FOR SOLUTION INTRAMUSCULAR; INTRAVENOUS at 20:13

## 2025-04-05 RX ADMIN — APIXABAN 2.5 MG: 2.5 TABLET, FILM COATED ORAL at 20:12

## 2025-04-05 RX ADMIN — IPRATROPIUM BROMIDE AND ALBUTEROL SULFATE 3 ML: .5; 3 SOLUTION RESPIRATORY (INHALATION) at 07:19

## 2025-04-05 RX ADMIN — INSULIN GLARGINE 10 UNITS: 100 INJECTION, SOLUTION SUBCUTANEOUS at 20:14

## 2025-04-05 RX ADMIN — ATORVASTATIN CALCIUM 40 MG: 40 TABLET, FILM COATED ORAL at 20:12

## 2025-04-05 RX ADMIN — INSULIN LISPRO 4 UNITS: 100 INJECTION, SOLUTION INTRAVENOUS; SUBCUTANEOUS at 12:35

## 2025-04-05 RX ADMIN — NYSTATIN: 100000 CREAM TOPICAL at 10:31

## 2025-04-05 RX ADMIN — NYSTATIN: 100000 CREAM TOPICAL at 20:16

## 2025-04-05 RX ADMIN — APIXABAN 2.5 MG: 2.5 TABLET, FILM COATED ORAL at 10:30

## 2025-04-05 NOTE — PROGRESS NOTES
RT EQUIPMENT DEVICE RELATED - SKIN ASSESSMENT    Hayes Score:        RT Medical Equipment/Device:     NIV Mask:  Under-the-nose   size:  B    Skin Assessment:      Nose:  Intact    Device Skin Pressure Protection:  Skin-to-device areas padded:  None Required    Nurse Notification:  Mellisa Wiseman, RRT

## 2025-04-05 NOTE — PROGRESS NOTES
Assessment tool to be used for patients with existing breathing treatments ordered by hospitalist                               Respiratory Therapist Driven Protocol - RT to Assess and Treat Algorithm    Item 0 Points 1 Point 2 Points 3 Points 4 Points Subtotal   Mental Status Alert, orientated, cooperative Lethargic, follows commands Confused, not following commands Obtunded or Somnolent Comatose 0   Respiratory Pattern Regular RR  8-16 breaths/minute Increased RR  18-25 breaths/minute Dyspnea on exertion, irregular RR  26-30/minute Shortness of breath,  RR 31-35 breaths/minute Accessory muscle use, severe SOB  RR > 35 breath/minute 1   Breath Sounds Clear Decreased unilaterally Decreased bilaterally Basilar crackles Wheezing and/or rhonchi 4   Cough Strong, spontaneous non-productive Strong productive Weak, non-productive Weak productive or weak with rhonchi Absent or may require suctioning 1   Pulmonary Status Nonsmoker or no previous history > 1 year quit < 1 PPD  < 1 year quit >  or = 1 PPD Diagnosed pulmonary disease (severe or chronic) Severe or chronic pulmonary disease with exacerbation 0   Surgical Status None General surgery (non-abdominal or non-thoracic) Lower abdominal Thoracic or upper abdominal Thoracic with pulmonary disease 0   Chest X-ray Clear Chronic changes Infiltrates, atelectasis or pleural effusion Infiltrates > 1 lobe Diffuse infiltrates and atelectasis and/or effusions 1   Activity level Ambulatory Ambulatory with assistance Non-ambulatory Paraplegic Quadriplegic 1                     Total Score 8   Score    Drug Therapy Frequency  20 or >    Q4 Duoneb & Q3 Albuterol PRN 15 - 19     Q6 Duoneb & Q4 Albuterol PRN 10 - 14    QID Duoneb & Q4 Albuterol PRN 5 - 9    TID Duoneb & Q6 Albuterol PRN 0 - 4    Q4 PRN Duoneb or Q4 PRN Albuterol    Incentive Spirometry - Initial RT instruct    Lung Expansion Therapy (PEP) Bronchopulmonary Hygiene (CPT)   Q4 & PRN - Severe atelectasis,  poor oxygenation Q4 - copious secretions, dyspnea, unable to sleep, mucus plugging   QID - High risk for persistent atelectasis, existence of atelectasis QID & Q4 PRN - Moderate secretion production   TID - At risk for developing atelectasis TID - small amounts of secretions with poor cough   BID - prevention of atelectasis BID - unable to breathe deeply and cough spontaneously   *RT Protocol patients will be re-assessed/re-evaluated every 48 hours.    *Patients who are home nebulizer treatments will be protocoled to no less than their home regimen and will remain     on their home regimen with re-evaluations as needed with changes in patient condition.    RT Comments/Recommendations: Ordering Nebs TID per RT protocol

## 2025-04-05 NOTE — H&P
[facility]  History and Physical    Patient:  Mar Rodriguez  MRN: [id number]    CHIEF COMPLAINT:  sob    History Obtained From: the patient   PCP: Vandana Churchill MD    HISTORY OF PRESENT ILLNESS:   The patient is a 84 y.o. female who presents with increased shortness of breath while residing at [facility] in [facility].  EMS was contacted and patient evaluated in ED. Her BNP elevated at 2686 and she is positive for Human Rhinoviurs.  Upon arrival she was noted to be in afib with RVR and started on cardizem drip, rate stable afib 80s. She had recent admission for chronic respiratory failure and pneumonia. She has known history of cerebral palsy,DM, hyperlipidemia and atrial fibrillation on Eliquis currently.     REVIEW OF SYSTEMS:    Constitutional: Negative for activity change, appetite change, chills, fatigue and fever.   HENT: Negative.  Negative for congestion, sinus pressure and sore throat.    Eyes: Negative for pain and discharge.   Respiratory: positive for cough, shortness of breath and wheezing, negative for chest tightness  Cardiovascular: Negative for chest pain and leg swelling.   Gastrointestinal: Negative for abdominal distention, abdominal pain, diarrhea, nausea and vomiting.   Genitourinary: Negative for difficulty urinating, flank pain, hematuria and urgency.   Musculoskeletal: Negative for arthralgias and back pain.   Skin: Negative for color change, pallor and rash.   Neurological: Negative for dizziness, syncope, numbness and headaches.   Psychiatric/Behavioral: Negative for agitation, behavioral problems and confusion.       Past Medical History:  Past Medical History:   Diagnosis Date    Abnormality of gait and mobility     Anemia     Anxiety     Cerebral palsy     Cognitive communication deficit     Depression     Diabetes mellitus     Disease of thyroid gland     Hyperglycemia     Hyperlipidemia     Hypertension     Hypokalemia     Insomnia     Lymphedema      Neuropathy     Urge incontinence        Past Surgical History:  History reviewed. No pertinent surgical history.    Medications Prior to Admission:    Medications Prior to Admission   Medication Sig Dispense Refill Last Dose/Taking    apixaban (ELIQUIS) 2.5 MG tablet tablet Take 1 tablet by mouth Every 12 (Twelve) Hours. Indications: Atrial Fibrillation 60 tablet 0 Taking    atorvastatin (LIPITOR) 40 MG tablet Take 1 tablet by mouth Every Night.   Taking    bumetanide (BUMEX) 1 MG tablet Take 3 tablets by mouth Daily.   Taking    busPIRone (BUSPAR) 7.5 MG tablet Take 1 tablet by mouth 3 (Three) Times a Day. For anxiety   Taking    colestipol (COLESTID) 1 g tablet Take 1 tablet by mouth 2 (Two) Times a Day.   Taking    gabapentin (NEURONTIN) 300 MG capsule Take 1 capsule by mouth Every Night. 30 capsule 0 Taking    hydrocortisone 1 % ointment Apply 1 Application topically to the appropriate area as directed Every Night. Bilat heels   Taking    Insulin Aspart (novoLOG) 100 UNIT/ML injection Inject 5 Units under the skin into the appropriate area as directed 3 (Three) Times a Day Before Meals. Hold for BS <100   Taking    insulin degludec (Tresiba FlexTouch) 100 UNIT/ML solution pen-injector injection Inject 34 Units under the skin into the appropriate area as directed Every Night.   Taking    levothyroxine (SYNTHROID, LEVOTHROID) 200 MCG tablet Take 1 tablet by mouth Every Morning.   Taking    lisinopril (PRINIVIL,ZESTRIL) 20 MG tablet Take 1 tablet by mouth Daily.   Taking    metoprolol tartrate (LOPRESSOR) 25 MG tablet Take 1 tablet by mouth 2 (Two) Times a Day.   Taking    nystatin (MYCOSTATIN) 021281 UNIT/GM powder Apply 1 Application topically to the appropriate area as directed Every 12 (Twelve) Hours. Abd folds   Taking    Scopolamine 1 MG/3DAYS patch Place 1 patch on the skin as directed by provider Every 72 (Seventy-Two) Hours.   Taking    vitamin B-12 (CYANOCOBALAMIN) 1000 MCG tablet Take 1 tablet by mouth  "Daily.   Taking    acetaminophen (TYLENOL) 325 MG tablet Take 2 tablets by mouth Every 6 (Six) Hours As Needed for Mild Pain or Fever.       cloNIDine (CATAPRES) 0.1 MG tablet Take 1 tablet by mouth Every 8 (Eight) Hours As Needed for High Blood Pressure (SBP >160 or DBP >90).       dextrose (GLUTOSE) 40 % gel Take 15 g by mouth As Needed for Low Blood Sugar (every 15 minutes).       glucagon (GLUCAGEN) 1 MG injection Inject 1 mg under the skin into the appropriate area as directed 1 (One) Time As Needed for Low Blood Sugar.       ipratropium-albuterol (DUO-NEB) 0.5-2.5 mg/3 ml nebulizer Take 3 mL by nebulization Every 6 (Six) Hours As Needed for Shortness of Air.       Melatonin 10 MG tablet Take 1 tablet by mouth Daily As Needed (sleep).       phenylephrine-mineral oil-petrolatum (PREPARATION H) 0.25-14-74.9 % ointment hemorrhoidal ointment Insert 1 Application into the rectum Every 6 (Six) Hours As Needed (hemmorhoids).       polyethylene glycol (MIRALAX) 17 g packet Take 17 g by mouth Daily As Needed (Use if senna-docusate is ineffective). 1 each 0        Allergies:  Hydralazine hcl and Nsaids    Social History:   Social History     Socioeconomic History    Marital status: Single   Tobacco Use    Smoking status: Never     Passive exposure: Past    Smokeless tobacco: Never   Vaping Use    Vaping status: Never Used   Substance and Sexual Activity    Alcohol use: Never    Drug use: Never    Sexual activity: Not Currently       Family History:   History reviewed. No pertinent family history.        Physical Exam:    Vitals: /74 (BP Location: Right arm, Patient Position: Lying)   Pulse 78   Temp 98.2 °F (36.8 °C) (Axillary)   Resp 18   Ht 165.1 cm (65\")   Wt 128 kg (282 lb)   SpO2 97%   BMI 46.93 kg/m²        General Appearance:    Alert, cooperative, in no acute distress   Head:    Normocephalic, without obvious abnormality, atraumatic   Eyes:            Lids and lashes normal, conjunctivae and sclerae " normal, no   icterus, no pallor, corneas clear, PERRLA   Ears:    Ears appear intact with no abnormalities noted   Throat:   No oral lesions, no thrush, oral mucosa moist   Neck:   No adenopathy, supple, trachea midline, no thyromegaly, no   carotid bruit, no JVD   Back:     No kyphosis present, no scoliosis present, no skin lesions,      erythema or scars, no tenderness to percussion or                   palpation,   range of motion normal   Lungs:     Diffuse wheezing    Heart:    irregular rhythm and normal rate, normal S1 and S2, no            murmur, no gallop, no rub, no click   Chest Wall:    No abnormalities observed   Abdomen:     Normal bowel sounds, no masses, no organomegaly, soft        non-tender, non-distended, no guarding, no rebound                tenderness   Rectal:     Deferred   Extremities:   Moves all extremities well, no edema, no cyanosis, no             redness   Pulses:   Pulses palpable and equal bilaterally   Skin:   No bleeding, bruising or rash   Lymph nodes:   No palpable adenopathy   Neurologic:   Cranial nerves 2 - 12 grossly intact, sensation intact, DTR       present and equal bilaterally       Lab Results (last 24 hours)       Procedure Component Value Units Date/Time    POC Glucose Once [[id number]]  (Abnormal) Collected: 04/04/25 2353    Specimen: Blood Updated: 04/05/25 0006     Glucose 221 mg/dL      Comment: : [id number] Juju AlexisMeter ID: [id number]       High Sensitivity Troponin T 1Hr [[id number]]  (Abnormal) Collected: 04/04/25 1827    Specimen: Blood Updated: 04/04/25 1856     HS Troponin T 21 ng/L      Troponin T Numeric Delta -2 ng/L      Troponin T % Delta -9    Narrative:      High Sensitive Troponin T Reference Range:  <14.0 ng/L- Negative Female for AMI  <22.0 ng/L- Negative Male for AMI  >=14 - Abnormal Female indicating possible myocardial injury.  >=22 - Abnormal Male indicating possible myocardial injury.   Clinicians would have to utilize clinical acumen,  EKG, Troponin, and serial changes to determine if it is an Acute Myocardial Infarction or myocardial injury due to an underlying chronic condition.         Respiratory Panel PCR w/COVID-19(SARS-CoV-2) [facility] In-House, NP Swab in UTM/VTM, 2 HR TAT - Swab, Nasopharynx [[id number]]  (Abnormal) Collected: 04/04/25 1607    Specimen: Swab from Nasopharynx Updated: 04/04/25 1751     ADENOVIRUS, PCR Not Detected     Coronavirus 229E Not Detected     Coronavirus HKU1 Not Detected     Coronavirus NL63 Not Detected     Coronavirus OC43 Not Detected     COVID19 Not Detected     Human Metapneumovirus Not Detected     Human Rhinovirus/Enterovirus Detected     Influenza A PCR Not Detected     Influenza B PCR Not Detected     Parainfluenza Virus 1 Not Detected     Parainfluenza Virus 2 Not Detected     Parainfluenza Virus 3 Not Detected     Parainfluenza Virus 4 Not Detected     RSV, PCR Not Detected     Bordetella pertussis pcr Not Detected     Bordetella parapertussis PCR Not Detected     Chlamydophila pneumoniae PCR Not Detected     Mycoplasma pneumo by PCR Not Detected    Narrative:      In the setting of a positive respiratory panel with a viral infection PLUS a negative procalcitonin without other underlying concern for bacterial infection, consider observing off antibiotics or discontinuation of antibiotics and continue supportive care. If the respiratory panel is positive for atypical bacterial infection (Bordetella pertussis, Chlamydophila pneumoniae, or Mycoplasma pneumoniae), consider antibiotic de-escalation to target atypical bacterial infection.    Procalcitonin [[id number]]  (Normal) Collected: 04/04/25 1618    Specimen: Blood Updated: 04/04/25 1656     Procalcitonin 0.24 ng/mL     Narrative:      As a Marker for Sepsis (Non-Neonates):    1. <0.5 ng/mL represents a low risk of severe sepsis and/or septic shock.  2. >2 ng/mL represents a high risk of severe sepsis and/or septic shock.    As a Marker for  "Lower Respiratory Tract Infections that require antibiotic therapy:    PCT on Admission    Antibiotic Therapy       6-12 Hrs later    >0.5                Strongly Recommended  >0.25 - <0.5        Recommended   0.1 - 0.25          Discouraged              Remeasure/reassess PCT  <0.1                Strongly Discouraged     Remeasure/reassess PCT    As 28 day mortality risk marker: \"Change in Procalcitonin Result\" (>80% or <=80%) if Day 0 (or Day 1) and Day 4 values are available. Refer to [email or web address]-[email or web address]-[email or web address]    Change in PCT <=80%  A decrease of PCT levels below or equal to 80% defines a positive change in PCT test result representing a higher risk for 28-day all-cause mortality of patients diagnosed with severe sepsis for septic shock.    Change in PCT >80%  A decrease of PCT levels of more than 80% defines a negative change in PCT result representing a lower risk for 28-day all-cause mortality of patients diagnosed with severe sepsis or septic shock.       Comprehensive Metabolic Panel [[id number]]  (Abnormal) Collected: 04/04/25 1618    Specimen: Blood Updated: 04/04/25 1651     Glucose 227 mg/dL      BUN 41 mg/dL      Creatinine 1.98 mg/dL      Sodium 140 mmol/L      Potassium 4.7 mmol/L      Chloride 98 mmol/L      CO2 31.0 mmol/L      Calcium 9.1 mg/dL      Total Protein 7.3 g/dL      Albumin 3.6 g/dL      ALT (SGPT) 50 U/L      AST (SGOT) 39 U/L      Alkaline Phosphatase 148 U/L      Total Bilirubin 0.3 mg/dL      Globulin 3.7 gm/dL      A/G Ratio 1.0 g/dL      BUN/Creatinine Ratio 20.7     Anion Gap 11.0 mmol/L      eGFR 24.7 mL/min/1.73     Narrative:      GFR Categories in Chronic Kidney Disease (CKD)      GFR Category          GFR (mL/min/1.73)    Interpretation  G1                     90 or greater         Normal or high (1)  G2                      60-89                Mild decrease (1)  G3a                   45-59                Mild to moderate decrease  G3b                   30-44      "           Moderate to severe decrease  G4                    15-29                Severe decrease  G5                    14 or less           Kidney failure          (1)In the absence of evidence of kidney disease, neither GFR category G1 or G2 fulfill the criteria for CKD.    eGFR calculation 2021 CKD-EPI creatinine equation, which does not include race as a factor    Magnesium [[id number]]  (Normal) Collected: 04/04/25 1618    Specimen: Blood Updated: 04/04/25 1651     Magnesium 1.7 mg/dL     Lactic Acid, Plasma [[id number]]  (Normal) Collected: 04/04/25 1618    Specimen: Blood Updated: 04/04/25 1647     Lactate 1.7 mmol/L     BNP [[id number]]  (Abnormal) Collected: 04/04/25 1618    Specimen: Blood Updated: 04/04/25 1647     proBNP 2,686.0 pg/mL     Narrative:      This assay is used as an aid in the diagnosis of individuals suspected of having heart failure. It can be used as an aid in the diagnosis of acute decompensated heart failure (ADHF) in patients presenting with signs and symptoms of ADHF to the emergency department (ED). In addition, NT-proBNP of <300 pg/mL indicates ADHF is not likely.    Age Range Result Interpretation  NT-proBNP Concentration (pg/mL:      <50             Positive            >450                   Gray                 300-450                    Negative             <300    50-75           Positive            >900                  Gray                300-900                  Negative            <300      >75             Positive            >1800                  Gray                300-1800                  Negative            <300    High Sensitivity Troponin T [[id number]]  (Abnormal) Collected: 04/04/25 1618    Specimen: Blood Updated: 04/04/25 1646     HS Troponin T 23 ng/L     Narrative:      High Sensitive Troponin T Reference Range:  <14.0 ng/L- Negative Female for AMI  <22.0 ng/L- Negative Male for AMI  >=14 - Abnormal Female indicating possible myocardial injury.  >=22 - Abnormal  Male indicating possible myocardial injury.   Clinicians would have to utilize clinical acumen, EKG, Troponin, and serial changes to determine if it is an Acute Myocardial Infarction or myocardial injury due to an underlying chronic condition.         Protime-INR [[id number]]  (Abnormal) Collected: 04/04/25 1618    Specimen: Blood Updated: 04/04/25 1645     Protime 15.7 Seconds      INR 1.18    CBC & Differential [[id number]]  (Abnormal) Collected: 04/04/25 1618    Specimen: Blood Updated: 04/04/25 1630    Narrative:      The following orders were created for panel order CBC & Differential.  Procedure                               Abnormality         Status                     ---------                               -----------         ------                     CBC Auto Differential[[id number]]        Abnormal            Final result                 Please view results for these tests on the individual orders.    CBC Auto Differential [[id number]]  (Abnormal) Collected: 04/04/25 1618    Specimen: Blood Updated: 04/04/25 1630     WBC 19.72 10*3/mm3      RBC 3.64 10*6/mm3      Hemoglobin 11.9 g/dL      Hematocrit 38.1 %      .7 fL      MCH 32.7 pg      MCHC 31.2 g/dL      RDW 15.2 %      RDW-SD 58.4 fl      MPV 10.7 fL      Platelets 231 10*3/mm3      Neutrophil % 90.7 %      Lymphocyte % 2.4 %      Monocyte % 4.7 %      Eosinophil % 1.4 %      Basophil % 0.2 %      Immature Grans % 0.6 %      Neutrophils, Absolute 17.90 10*3/mm3      Lymphocytes, Absolute 0.47 10*3/mm3      Monocytes, Absolute 0.93 10*3/mm3      Eosinophils, Absolute 0.27 10*3/mm3      Basophils, Absolute 0.04 10*3/mm3      Immature Grans, Absolute 0.11 10*3/mm3      nRBC 0.0 /100 WBC     Blood Culture - Blood, Arm, Left [[id number]] Collected: 04/04/25 1619    Specimen: Blood from Arm, Left Updated: 04/04/25 1628    Blood Culture - Blood, Arm, Right [[id number]] Collected: 04/04/25 1618    Specimen: Blood from Arm, Right Updated: 04/04/25 1627     Blood Gas, Arterial - [[id number]]  (Abnormal) Collected: 04/04/25 1608    Specimen: Arterial Blood Updated: 04/04/25 1607     Site Right Brachial     Saud's Test N/A     pH, Arterial 7.417 pH units      pCO2, Arterial 48.0 mm Hg      Comment: 83 Value above reference range        pO2, Arterial 96.4 mm Hg      HCO3, Arterial 30.9 mmol/L      Comment: 83 Value above reference range        Base Excess, Arterial 5.5 mmol/L      Comment: 83 Value above reference range        O2 Saturation, Arterial 99.4 %      Comment: 83 Value above reference range        Temperature 37.0     Barometric Pressure for Blood Gas 751 mmHg      Modality BiPap     FIO2 35 %      Ventilator Mode NA     IPAP 16     Comment: Meter: [id number]     :  Oneyda Solares, RRT        EPAP 6     Collected by [id number]     pCO2, Temperature Corrected 48.0 mm Hg      pH, Temp Corrected 7.417 pH Units      pO2, Temperature Corrected 96.4 mm Hg      PO2/FIO2 275             -----------------------------------------------------------------  EKG: see attached  Radiology:     XR Chest 1 View  Result Date: 4/4/2025  XR CHEST 1 VW-  HISTORY: dyspnea  COMPARISON: 3/21/2025  FINDINGS: Frontal view of the chest obtained.  Similar cardiomegaly. Prominent central pulmonary vascular structures with minimal interstitial prominence.. Stable granulomatous calcifications. No acute consolidation. No pleural effusion or pneumothorax. No acute regional bony pathology.      1. Cardiomegaly with findings suggestive for mild CHF. Stable granulomatous calcifications with no acute consolidation.     This report was signed and finalized on 4/4/2025 4:36 PM by Dr. Racquel Tejeda MD.        Assessment and Plan   Afib with RVR  Human rhinovirus  Lymphedema  DM  HL  Cerebral palsy  Anemia  Acute on chronic CKD    Recent echo 1/2025  Left ventricular systolic function is normal. Left ventricular ejection fraction appears to be 61 - 65%.  Normal right ventricular cavity  size and systolic function noted. There is no significant (greater than mild) valvular dysfunction.    Continue to monitor rate control, continue eliquis.  Will start lasix drip and monitor renal function. Suspect volume increase due to adjustments of lasix due to recent CHRISTOPHER. Will add Duonebs, steroids and azithromycin for URI.           GIGI Rodriguez    Pt. was seen and independently examined by me.  I have reviewed the PA/ARNP's note and agree with above.      Electronically signed by Dustin Paz MD on 4/5/2025 at 09:45 CDT

## 2025-04-05 NOTE — PLAN OF CARE
Pt tolerating 4L NC. Needs help with breathing exercises.  Shortness of breath improving.         Problem: Noninvasive Ventilation Acute  Goal: Effective Unassisted Ventilation and Oxygenation  Outcome: Progressing  Intervention: Monitor and Manage Noninvasive Ventilation  Recent Flowsheet Documentation  Taken 4/5/2025 0930 by Gavi Monterroso RN  Airway/Ventilation Management:   airway patency maintained   calming measures promoted   humidification applied   oxygen therapy provided   position adjusted   pulmonary hygiene promoted     Problem: Adult Inpatient Plan of Care  Goal: Plan of Care Review  Outcome: Progressing  Goal: Patient-Specific Goal (Individualized)  Outcome: Progressing  Goal: Absence of Hospital-Acquired Illness or Injury  Outcome: Progressing  Intervention: Identify and Manage Fall Risk  Recent Flowsheet Documentation  Taken 4/5/2025 1445 by Gavi Monterroso RN  Safety Promotion/Fall Prevention:   safety round/check completed   room organization consistent  Taken 4/5/2025 1230 by Gavi Monterroso RN  Safety Promotion/Fall Prevention:   room organization consistent   safety round/check completed  Taken 4/5/2025 1015 by Gavi Monterroso RN  Safety Promotion/Fall Prevention:   safety round/check completed   toileting scheduled  Taken 4/5/2025 0930 by Gavi Monterroso RN  Safety Promotion/Fall Prevention:   activity supervised   assistive device/personal items within reach   fall prevention program maintained   lighting adjusted   muscle strengthening facilitated   nonskid shoes/slippers when out of bed   room organization consistent   safety round/check completed  Taken 4/5/2025 0721 by Gavi Monterroso RN  Safety Promotion/Fall Prevention:   safety round/check completed   toileting scheduled  Intervention: Prevent Skin Injury  Recent Flowsheet Documentation  Taken 4/5/2025 1445 by Gavi Monterroso RN  Body Position:    turned   supine  Taken 4/5/2025 1230 by Gavi Monterroso RN  Body Position:   right   turned  Taken 4/5/2025 1015 by Gavi Monterroso RN  Body Position:   turned   supine  Taken 4/5/2025 0930 by Gavi Monterroso RN  Skin Protection:   incontinence pads utilized   drying agents applied  Taken 4/5/2025 0800 by Gavi Monterroso RN  Body Position:   turned   left  Intervention: Prevent Infection  Recent Flowsheet Documentation  Taken 4/5/2025 0930 by Gavi Monterroso RN  Infection Prevention:   cohorting utilized   environmental surveillance performed   equipment surfaces disinfected   hand hygiene promoted   personal protective equipment utilized   rest/sleep promoted   single patient room provided   visitors restricted/screened  Goal: Optimal Comfort and Wellbeing  Outcome: Progressing  Intervention: Provide Person-Centered Care  Recent Flowsheet Documentation  Taken 4/5/2025 0930 by Gavi Monterroso RN  Trust Relationship/Rapport:   care explained   choices provided   emotional support provided   empathic listening provided   questions encouraged   reassurance provided  Goal: Readiness for Transition of Care  Outcome: Progressing     Problem: Skin Injury Risk Increased  Goal: Skin Health and Integrity  Outcome: Progressing  Intervention: Optimize Skin Protection  Recent Flowsheet Documentation  Taken 4/5/2025 0930 by Gavi Monterroso RN  Activity Management: activity encouraged  Pressure Reduction Techniques:   frequent weight shift encouraged   rest period provided between sit times   weight shift assistance provided  Pressure Reduction Devices:   foam padding utilized   heel offloading device utilized   positioning supports utilized  Skin Protection:   incontinence pads utilized   drying agents applied     Problem: Comorbidity Management  Goal: Maintenance of COPD Symptom Control  Outcome: Progressing  Intervention: Maintain COPD (Chronic  Obstructive Pulmonary Disease) Symptom Control  Recent Flowsheet Documentation  Taken 4/5/2025 0930 by Gavi Monterroso RN  Breathing Techniques/Airway Clearance:   deep/controlled cough encouraged   diaphragmatic breathing promoted   pursed-lip breathing encouraged   aleman-cough encouraged   airway clearance techniques utilized   lung expansion therapy utilized   pharmacologic therapy provided  Medication Review/Management: medications reviewed  Goal: Blood Glucose Level Within Target Range  Outcome: Progressing  Intervention: Monitor and Manage Glycemia  Recent Flowsheet Documentation  Taken 4/5/2025 0930 by Gavi Monterroso RN  Medication Review/Management: medications reviewed  Goal: Blood Pressure in Desired Range  Outcome: Progressing  Intervention: Maintain Blood Pressure Management  Recent Flowsheet Documentation  Taken 4/5/2025 0930 by Gavi Monterroso RN  Medication Review/Management: medications reviewed     Problem: Fall Injury Risk  Goal: Absence of Fall and Fall-Related Injury  Outcome: Progressing  Intervention: Identify and Manage Contributors  Recent Flowsheet Documentation  Taken 4/5/2025 0930 by Gavi Monterroso RN  Medication Review/Management: medications reviewed  Intervention: Promote Injury-Free Environment  Recent Flowsheet Documentation  Taken 4/5/2025 1445 by Gavi Monterroso RN  Safety Promotion/Fall Prevention:   safety round/check completed   room organization consistent  Taken 4/5/2025 1230 by Gavi Monterroso RN  Safety Promotion/Fall Prevention:   room organization consistent   safety round/check completed  Taken 4/5/2025 1015 by Gavi Monterroso RN  Safety Promotion/Fall Prevention:   safety round/check completed   toileting scheduled  Taken 4/5/2025 0930 by Gavi Monterroso RN  Safety Promotion/Fall Prevention:   activity supervised   assistive device/personal items within reach   fall prevention  program maintained   lighting adjusted   muscle strengthening facilitated   nonskid shoes/slippers when out of bed   room organization consistent   safety round/check completed  Taken 4/5/2025 0721 by Gavi Monterroso, RN  Safety Promotion/Fall Prevention:   safety round/check completed   toileting scheduled   Goal Outcome Evaluation:

## 2025-04-05 NOTE — CASE MANAGEMENT/SOCIAL WORK
Discharge Planning Assessment  [facility]     Patient Name: Mar Rodriguez  MRN: [id number]  Today's Date: 4/5/2025    Admit Date: 4/4/2025        Discharge Needs Assessment       Row Name 04/05/25 1017       Living Environment    People in Home facility resident    Current Living Arrangements residential facility    Potentially Unsafe Housing Conditions none    Primary Care Provided by other (see comments)    Provides Primary Care For no one, unable/limited ability to care for self    Family Caregiver if Needed other (see comments)    Quality of Family Relationships helpful;involved;supportive    Able to Return to Prior Arrangements yes       Resource/Environmental Concerns    Resource/Environmental Concerns none    Transportation Concerns other (see comments)       Transition Planning    Patient/Family Anticipates Transition to inpatient rehabilitation facility    Patient/Family Anticipated Services at Transition skilled nursing    Transportation Anticipated other (see comments)       Discharge Needs Assessment    Equipment Currently Used at Home other (see comments);none    Concerns to be Addressed no discharge needs identified    Anticipated Changes Related to Illness none    Equipment Needed After Discharge none    Discharge Coordination/Progress Pt is from [facility] and anticipates to return at d/c. Kely spoke with SANDEE Wyman who confirmed d/c plan for pt. At this time, [facility] is not able to provide transportation to return pt back but sounded agreeable to other options that she would like to discuss on Monday. Kely will contine to follow and assist.                   Discharge Plan    No documentation.                 Selected Continued Care - Prior Encounters Includes continued care and service providers with selected services from prior encounters from 1/4/2025 to 4/5/2025      Discharged on 3/22/2025 Admission date: 3/18/2025 - Discharge disposition: Skilled Nursing Facility (DC - External)       Destination       Service Provider Services Address Phone Fax Patient Preferred    [facility] [place] [facility] [address] , [place] [address] [phone] --                      Discharged on 1/16/2025 Admission date: 1/13/2025 - Discharge disposition: Skilled Nursing Facility (DC - External)      Destination       Service Provider Services Address Phone Fax Patient Preferred    [facility] [place] [address] , [place] [address] [phone] --                      Discharged on 1/6/2025 Admission date: 12/31/2024 - Discharge disposition: Skilled Nursing Facility (DC - External)      Destination       Service Provider Services Address Phone Fax Patient Preferred    [facility] [address] , [place] [address] [phone] --                             Demographic Summary    No documentation.                  Functional Status    No documentation.                  Psychosocial    No documentation.                  Abuse/Neglect    No documentation.                  Legal    No documentation.                  Substance Abuse    No documentation.                  Patient Forms    No documentation.                     Issac Potts

## 2025-04-05 NOTE — PLAN OF CARE
Problem: Adult Inpatient Plan of Care  Goal: Plan of Care Review  Outcome: Progressing  Flowsheets (Taken 4/5/2025 0406)  Progress: improving  Outcome Evaluation: Pt arrived to the floor from ED between 2100 and 2200 last night accompanied by personal caregiver. Pt was drowsy upon arrival but more alert later on. Ox4. PERRLA. VSS. Telemetry a-fib 72-86. Pt was on cardizem gtt @ 5 mg/hr upon arrival to floor. Cardizem gtt stopped around 0200 d/t HR under control. IV steroid administered per order. Pt has rested well with bipap in place.  Plan of Care Reviewed With: patient

## 2025-04-06 ENCOUNTER — APPOINTMENT (OUTPATIENT)
Dept: GENERAL RADIOLOGY | Facility: HOSPITAL | Age: 85
DRG: 308 | End: 2025-04-06
Payer: MEDICARE

## 2025-04-06 LAB
ANION GAP SERPL CALCULATED.3IONS-SCNC: 11 MMOL/L (ref 5–15)
BASOPHILS # BLD AUTO: 0.02 10*3/MM3 (ref 0–0.2)
BASOPHILS NFR BLD AUTO: 0.1 % (ref 0–1.5)
BUN SERPL-MCNC: 50 MG/DL (ref 8–23)
BUN/CREAT SERPL: 24.4 (ref 7–25)
CALCIUM SPEC-SCNC: 9.4 MG/DL (ref 8.6–10.5)
CHLORIDE SERPL-SCNC: 98 MMOL/L (ref 98–107)
CO2 SERPL-SCNC: 29 MMOL/L (ref 22–29)
CREAT SERPL-MCNC: 2.05 MG/DL (ref 0.57–1)
DEPRECATED RDW RBC AUTO: 57.4 FL (ref 37–54)
EGFRCR SERPLBLD CKD-EPI 2021: 23.7 ML/MIN/1.73
EOSINOPHIL # BLD AUTO: 0 10*3/MM3 (ref 0–0.4)
EOSINOPHIL NFR BLD AUTO: 0 % (ref 0.3–6.2)
ERYTHROCYTE [DISTWIDTH] IN BLOOD BY AUTOMATED COUNT: 15 % (ref 12.3–15.4)
GLUCOSE BLDC GLUCOMTR-MCNC: 277 MG/DL (ref 70–130)
GLUCOSE BLDC GLUCOMTR-MCNC: 350 MG/DL (ref 70–130)
GLUCOSE BLDC GLUCOMTR-MCNC: 359 MG/DL (ref 70–130)
GLUCOSE BLDC GLUCOMTR-MCNC: 376 MG/DL (ref 70–130)
GLUCOSE SERPL-MCNC: 287 MG/DL (ref 65–99)
HBA1C MFR BLD: 7.3 % (ref 4.8–5.6)
HCT VFR BLD AUTO: 34.1 % (ref 34–46.6)
HGB BLD-MCNC: 10.7 G/DL (ref 12–15.9)
IMM GRANULOCYTES # BLD AUTO: 0.13 10*3/MM3 (ref 0–0.05)
IMM GRANULOCYTES NFR BLD AUTO: 0.8 % (ref 0–0.5)
LYMPHOCYTES # BLD AUTO: 0.69 10*3/MM3 (ref 0.7–3.1)
LYMPHOCYTES NFR BLD AUTO: 4 % (ref 19.6–45.3)
MCH RBC QN AUTO: 32.3 PG (ref 26.6–33)
MCHC RBC AUTO-ENTMCNC: 31.4 G/DL (ref 31.5–35.7)
MCV RBC AUTO: 103 FL (ref 79–97)
MONOCYTES # BLD AUTO: 0.37 10*3/MM3 (ref 0.1–0.9)
MONOCYTES NFR BLD AUTO: 2.1 % (ref 5–12)
NEUTROPHILS NFR BLD AUTO: 16.03 10*3/MM3 (ref 1.7–7)
NEUTROPHILS NFR BLD AUTO: 93 % (ref 42.7–76)
NRBC BLD AUTO-RTO: 0 /100 WBC (ref 0–0.2)
PLATELET # BLD AUTO: 223 10*3/MM3 (ref 140–450)
PMV BLD AUTO: 11.1 FL (ref 6–12)
POTASSIUM SERPL-SCNC: 4.7 MMOL/L (ref 3.5–5.2)
RBC # BLD AUTO: 3.31 10*6/MM3 (ref 3.77–5.28)
SODIUM SERPL-SCNC: 138 MMOL/L (ref 136–145)
WBC NRBC COR # BLD AUTO: 17.24 10*3/MM3 (ref 3.4–10.8)

## 2025-04-06 PROCEDURE — 94660 CPAP INITIATION&MGMT: CPT

## 2025-04-06 PROCEDURE — 97162 PT EVAL MOD COMPLEX 30 MIN: CPT | Performed by: PHYSICAL THERAPIST

## 2025-04-06 PROCEDURE — 80048 BASIC METABOLIC PNL TOTAL CA: CPT | Performed by: PHYSICIAN ASSISTANT

## 2025-04-06 PROCEDURE — 63710000001 INSULIN LISPRO (HUMAN) PER 5 UNITS: Performed by: FAMILY MEDICINE

## 2025-04-06 PROCEDURE — 85025 COMPLETE CBC W/AUTO DIFF WBC: CPT | Performed by: PHYSICIAN ASSISTANT

## 2025-04-06 PROCEDURE — 94799 UNLISTED PULMONARY SVC/PX: CPT

## 2025-04-06 PROCEDURE — 94761 N-INVAS EAR/PLS OXIMETRY MLT: CPT

## 2025-04-06 PROCEDURE — 83036 HEMOGLOBIN GLYCOSYLATED A1C: CPT | Performed by: FAMILY MEDICINE

## 2025-04-06 PROCEDURE — 63710000001 INSULIN LISPRO (HUMAN) PER 5 UNITS: Performed by: PHYSICIAN ASSISTANT

## 2025-04-06 PROCEDURE — 25010000002 METHYLPREDNISOLONE PER 40 MG: Performed by: PHYSICIAN ASSISTANT

## 2025-04-06 PROCEDURE — 25010000002 METHYLPREDNISOLONE PER 125 MG: Performed by: PHYSICIAN ASSISTANT

## 2025-04-06 PROCEDURE — 63710000001 INSULIN GLARGINE PER 5 UNITS: Performed by: PHYSICIAN ASSISTANT

## 2025-04-06 PROCEDURE — 71045 X-RAY EXAM CHEST 1 VIEW: CPT

## 2025-04-06 PROCEDURE — 25810000003 SODIUM CHLORIDE 0.9 % SOLUTION 250 ML FLEX CONT: Performed by: FAMILY MEDICINE

## 2025-04-06 PROCEDURE — 25010000002 AZITHROMYCIN PER 500 MG: Performed by: FAMILY MEDICINE

## 2025-04-06 PROCEDURE — 82948 REAGENT STRIP/BLOOD GLUCOSE: CPT

## 2025-04-06 RX ORDER — INSULIN LISPRO 100 [IU]/ML
2-9 INJECTION, SOLUTION INTRAVENOUS; SUBCUTANEOUS
Status: DISCONTINUED | OUTPATIENT
Start: 2025-04-06 | End: 2025-04-09 | Stop reason: HOSPADM

## 2025-04-06 RX ORDER — METHYLPREDNISOLONE SODIUM SUCCINATE 40 MG/ML
80 INJECTION, POWDER, LYOPHILIZED, FOR SOLUTION INTRAMUSCULAR; INTRAVENOUS EVERY 8 HOURS
Status: DISCONTINUED | OUTPATIENT
Start: 2025-04-06 | End: 2025-04-08

## 2025-04-06 RX ADMIN — METOPROLOL TARTRATE 25 MG: 25 TABLET, FILM COATED ORAL at 20:18

## 2025-04-06 RX ADMIN — ATORVASTATIN CALCIUM 40 MG: 40 TABLET, FILM COATED ORAL at 20:17

## 2025-04-06 RX ADMIN — CHOLESTYRAMINE 4 G: 4 POWDER, FOR SUSPENSION ORAL at 20:30

## 2025-04-06 RX ADMIN — Medication 2.5 MG: at 00:49

## 2025-04-06 RX ADMIN — CHOLESTYRAMINE 4 G: 4 POWDER, FOR SUSPENSION ORAL at 09:11

## 2025-04-06 RX ADMIN — NYSTATIN: 100000 CREAM TOPICAL at 09:12

## 2025-04-06 RX ADMIN — APIXABAN 2.5 MG: 2.5 TABLET, FILM COATED ORAL at 20:18

## 2025-04-06 RX ADMIN — BUSPIRONE HYDROCHLORIDE 7.5 MG: 5 TABLET ORAL at 09:10

## 2025-04-06 RX ADMIN — INSULIN GLARGINE 10 UNITS: 100 INJECTION, SOLUTION SUBCUTANEOUS at 20:20

## 2025-04-06 RX ADMIN — NYSTATIN: 100000 CREAM TOPICAL at 20:30

## 2025-04-06 RX ADMIN — INSULIN LISPRO 8 UNITS: 100 INJECTION, SOLUTION INTRAVENOUS; SUBCUTANEOUS at 20:19

## 2025-04-06 RX ADMIN — BUSPIRONE HYDROCHLORIDE 7.5 MG: 5 TABLET ORAL at 20:18

## 2025-04-06 RX ADMIN — INSULIN LISPRO 8 UNITS: 100 INJECTION, SOLUTION INTRAVENOUS; SUBCUTANEOUS at 17:47

## 2025-04-06 RX ADMIN — LISINOPRIL 20 MG: 20 TABLET ORAL at 09:10

## 2025-04-06 RX ADMIN — APIXABAN 2.5 MG: 2.5 TABLET, FILM COATED ORAL at 09:11

## 2025-04-06 RX ADMIN — INSULIN LISPRO 8 UNITS: 100 INJECTION, SOLUTION INTRAVENOUS; SUBCUTANEOUS at 12:42

## 2025-04-06 RX ADMIN — AZITHROMYCIN DIHYDRATE 500 MG: 500 INJECTION, POWDER, LYOPHILIZED, FOR SOLUTION INTRAVENOUS at 20:29

## 2025-04-06 RX ADMIN — Medication 10 ML: at 09:12

## 2025-04-06 RX ADMIN — METOPROLOL TARTRATE 25 MG: 25 TABLET, FILM COATED ORAL at 09:10

## 2025-04-06 RX ADMIN — BUSPIRONE HYDROCHLORIDE 7.5 MG: 5 TABLET ORAL at 15:56

## 2025-04-06 RX ADMIN — METHYLPREDNISOLONE SODIUM SUCCINATE 60 MG: 125 INJECTION, POWDER, FOR SOLUTION INTRAMUSCULAR; INTRAVENOUS at 05:01

## 2025-04-06 RX ADMIN — METHYLPREDNISOLONE SODIUM SUCCINATE 60 MG: 125 INJECTION, POWDER, FOR SOLUTION INTRAMUSCULAR; INTRAVENOUS at 12:41

## 2025-04-06 RX ADMIN — INSULIN LISPRO 6 UNITS: 100 INJECTION, SOLUTION INTRAVENOUS; SUBCUTANEOUS at 09:09

## 2025-04-06 RX ADMIN — LEVOTHYROXINE SODIUM 200 MCG: 100 TABLET ORAL at 05:01

## 2025-04-06 RX ADMIN — METHYLPREDNISOLONE SODIUM SUCCINATE 80 MG: 40 INJECTION, POWDER, FOR SOLUTION INTRAMUSCULAR; INTRAVENOUS at 20:29

## 2025-04-06 RX ADMIN — Medication 10 ML: at 20:29

## 2025-04-06 NOTE — PLAN OF CARE
Pt has expiratory wheezes. PA ordered a chest x-ray. Pt weaned from 4 to 3L NC. Pt lasix drip stopped per order. Blood sugars elevated today. Turned q2hrs. PA increased steroid dose. No other complaints.         Problem: Noninvasive Ventilation Acute  Goal: Effective Unassisted Ventilation and Oxygenation  Outcome: Progressing     Problem: Adult Inpatient Plan of Care  Goal: Plan of Care Review  Outcome: Progressing  Goal: Patient-Specific Goal (Individualized)  Outcome: Progressing  Goal: Absence of Hospital-Acquired Illness or Injury  Outcome: Progressing  Intervention: Identify and Manage Fall Risk  Recent Flowsheet Documentation  Taken 4/6/2025 1354 by Gavi Monterroso RN  Safety Promotion/Fall Prevention: safety round/check completed  Taken 4/6/2025 1245 by Gavi Monterroso RN  Safety Promotion/Fall Prevention: safety round/check completed  Taken 4/6/2025 1015 by Gavi Monterroso RN  Safety Promotion/Fall Prevention: safety round/check completed  Taken 4/6/2025 0917 by Gavi Monterroso RN  Safety Promotion/Fall Prevention:   safety round/check completed   room organization consistent  Taken 4/6/2025 0830 by Gavi Monterroso RN  Safety Promotion/Fall Prevention: safety round/check completed  Intervention: Prevent Skin Injury  Recent Flowsheet Documentation  Taken 4/6/2025 1354 by Gavi Monterroso RN  Body Position:   turned   supine  Taken 4/6/2025 1130 by Gavi Monterroso RN  Body Position:   turned   left  Taken 4/6/2025 0917 by Gavi Monterroso RN  Body Position:   turned   supine  Goal: Optimal Comfort and Wellbeing  Outcome: Progressing  Goal: Readiness for Transition of Care  Outcome: Progressing     Problem: Skin Injury Risk Increased  Goal: Skin Health and Integrity  Outcome: Progressing     Problem: Comorbidity Management  Goal: Maintenance of COPD Symptom Control  Outcome: Progressing  Goal: Blood Glucose Level  Within Target Range  Outcome: Progressing  Goal: Blood Pressure in Desired Range  Outcome: Progressing     Problem: Fall Injury Risk  Goal: Absence of Fall and Fall-Related Injury  Outcome: Progressing  Intervention: Promote Injury-Free Environment  Recent Flowsheet Documentation  Taken 4/6/2025 1354 by Gavi Monterroso, RN  Safety Promotion/Fall Prevention: safety round/check completed  Taken 4/6/2025 1245 by Gavi Monterroso, RN  Safety Promotion/Fall Prevention: safety round/check completed  Taken 4/6/2025 1015 by Gavi Monterroso, RN  Safety Promotion/Fall Prevention: safety round/check completed  Taken 4/6/2025 0917 by Gavi Monterroso, RN  Safety Promotion/Fall Prevention:   safety round/check completed   room organization consistent  Taken 4/6/2025 0830 by Gavi Monterroso, RN  Safety Promotion/Fall Prevention: safety round/check completed   Goal Outcome Evaluation:

## 2025-04-06 NOTE — PLAN OF CARE
Goal Outcome Evaluation:           Progress: no change  Outcome Evaluation: PT eval completed. Pt alert and oriented x4 with c/o weakness and fatigue. Pt on 3.5L O2 with sats in 90s during session. Pt typically resides at [facility] and performs bed mob and w/c mobility independently and t/fs to manual w/c with assist. Today, pt rolls L and R with min A and using bedrails but requested to defer further OOB mobility due to fatigue. Pt will benefit from skilled PT to improve endurance, strength and mobility. Rec d/c SNF.    Anticipated Discharge Disposition (PT): skilled nursing facility

## 2025-04-06 NOTE — PLAN OF CARE
Goal Outcome Evaluation:  Plan of Care Reviewed With: patient        Progress: no change  Outcome Evaluation: Pt a/o x 4, O2 @ 4 lpm via NC. Pt wore her bipap overnight without issue. Lasix gtt infusing @ 5mg/ml via PIV without difficulty. Purewick intact and draining. Patient had BM this shift. Call light within reach. Safety maintained. Telemetry Afib 71-92.

## 2025-04-06 NOTE — PROGRESS NOTES
[facility]  Daily Progress Note  Mar Rodriguez  MRN: [id number] LOS: 2    Admit Date: 4/4/2025 4/6/2025 09:27 CDT    Subjective:  Sitting up in bed eating breakfast today, alert. No new complaints.         Chief Complaint:  Chief Complaint   Patient presents with    Shortness of Breath       Interval History:    Reviewed overnight events and nursing notes.   Status:  better than before  Pain:  some relief        ROS:  10 point ROS obtained:   Gen: No fevers  HEENT: No migraine or visual change  Lung: No cough, hemoptysis or wheezing  Cv: No chest pain or pressure  Abd: No nausea or vomiting, no change in bowel function, no gi bleeding  Ext: No increased pain or swelling  Neuro: No seizure or syncope  Skin: No new rashes  Endocrine: No polyuria, polyphagia or polydipsia  Psych: No increased anxiety or depression  MS: No increase in joint pain or swelling reported    DIET:  Diet: Cardiac, Diabetic, Renal; Healthy Heart (2-3 Na+); Consistent Carbohydrate; Low Sodium (2-3g), Low Potassium, Low Phosphorus; Fluid Consistency: Thin (IDDSI 0)    Medications:   dilTIAZem, 5-15 mg/hr, Last Rate: Stopped (04/05/25 0159)  furosemide, 5 mg/hr, Last Rate: 5 mg/hr (04/05/25 1022)      apixaban, 2.5 mg, Oral, Q12H  atorvastatin, 40 mg, Oral, Nightly  azithromycin, 500 mg, Intravenous, Q24H  busPIRone, 7.5 mg, Oral, TID  cholestyramine light, 1 packet, Oral, Q12H  insulin glargine, 10 Units, Subcutaneous, Nightly  insulin lispro, 2-9 Units, Subcutaneous, 4x Daily AC & at Bedtime  levothyroxine, 200 mcg, Oral, Q AM  lisinopril, 20 mg, Oral, Daily  methylPREDNISolone sodium succinate, 60 mg, Intravenous, Q8H  metoprolol tartrate, 25 mg, Oral, BID  nystatin, , Topical, Q12H  sodium chloride, 10 mL, Intravenous, Q12H        Data:     Code Status:   Code Status and Medical Interventions: CPR (Attempt to Resuscitate); Full Support   Ordered at: 04/05/25 0984     Code Status (Patient has no pulse and is not  breathing):    CPR (Attempt to Resuscitate)     Medical Interventions (Patient has pulse or is breathing):    Full Support     Level Of Support Discussed With:    Patient       History reviewed. No pertinent family history.  Social History     Socioeconomic History    Marital status: Single   Tobacco Use    Smoking status: Never     Passive exposure: Past    Smokeless tobacco: Never   Vaping Use    Vaping status: Never Used   Substance and Sexual Activity    Alcohol use: Never    Drug use: Never    Sexual activity: Not Currently       Labs:    Lab Results (last 72 hours)       Procedure Component Value Units Date/Time    POC Glucose Once [[id number]]  (Abnormal) Collected: 04/06/25 0908    Specimen: Blood Updated: 04/06/25 0918     Glucose 277 mg/dL      Comment: : epamervin GaticaMeter ID: [id number]       Basic Metabolic Panel [[id number]]  (Abnormal) Collected: 04/06/25 0529    Specimen: Blood Updated: 04/06/25 0619     Glucose 287 mg/dL      BUN 50 mg/dL      Creatinine 2.05 mg/dL      Sodium 138 mmol/L      Potassium 4.7 mmol/L      Chloride 98 mmol/L      CO2 29.0 mmol/L      Calcium 9.4 mg/dL      BUN/Creatinine Ratio 24.4     Anion Gap 11.0 mmol/L      eGFR 23.7 mL/min/1.73     Narrative:      GFR Categories in Chronic Kidney Disease (CKD)      GFR Category          GFR (mL/min/1.73)    Interpretation  G1                     90 or greater         Normal or high (1)  G2                      60-89                Mild decrease (1)  G3a                   45-59                Mild to moderate decrease  G3b                   30-44                Moderate to severe decrease  G4                    15-29                Severe decrease  G5                    14 or less           Kidney failure          (1)In the absence of evidence of kidney disease, neither GFR category G1 or G2 fulfill the criteria for CKD.    eGFR calculation 2021 CKD-EPI creatinine equation, which does not include race as a factor     CBC & Differential [[id number]]  (Abnormal) Collected: 04/06/25 0529    Specimen: Blood Updated: 04/06/25 0554    Narrative:      The following orders were created for panel order CBC & Differential.  Procedure                               Abnormality         Status                     ---------                               -----------         ------                     CBC Auto Differential[[id number]]        Abnormal            Final result                 Please view results for these tests on the individual orders.    CBC Auto Differential [[id number]]  (Abnormal) Collected: 04/06/25 0529    Specimen: Blood Updated: 04/06/25 0554     WBC 17.24 10*3/mm3      RBC 3.31 10*6/mm3      Hemoglobin 10.7 g/dL      Hematocrit 34.1 %      .0 fL      MCH 32.3 pg      MCHC 31.4 g/dL      RDW 15.0 %      RDW-SD 57.4 fl      MPV 11.1 fL      Platelets 223 10*3/mm3      Neutrophil % 93.0 %      Lymphocyte % 4.0 %      Monocyte % 2.1 %      Eosinophil % 0.0 %      Basophil % 0.1 %      Immature Grans % 0.8 %      Neutrophils, Absolute 16.03 10*3/mm3      Lymphocytes, Absolute 0.69 10*3/mm3      Monocytes, Absolute 0.37 10*3/mm3      Eosinophils, Absolute 0.00 10*3/mm3      Basophils, Absolute 0.02 10*3/mm3      Immature Grans, Absolute 0.13 10*3/mm3      nRBC 0.0 /100 WBC     POC Glucose Once [[id number]]  (Abnormal) Collected: 04/05/25 1952    Specimen: Blood Updated: 04/05/25 2005     Glucose 354 mg/dL      Comment: : [id number] Rosaura CraigMeter ID: [id number]       POC Glucose Once [[id number]]  (Abnormal) Collected: 04/05/25 1630    Specimen: Blood Updated: 04/05/25 1642     Glucose 287 mg/dL      Comment: : [id number] René TroyLaine ID: [id number]       Blood Culture - Blood, Arm, Left [[id number]]  (Normal) Collected: 04/04/25 1619    Specimen: Blood from Arm, Left Updated: 04/05/25 1631     Blood Culture No growth at 24 hours    Blood Culture - Blood, Arm, Right [[id number]]  (Normal) Collected:  04/04/25 1618    Specimen: Blood from Arm, Right Updated: 04/05/25 1631     Blood Culture No growth at 24 hours    POC Glucose Once [[id number]]  (Abnormal) Collected: 04/05/25 1234    Specimen: Blood Updated: 04/05/25 1245     Glucose 237 mg/dL      Comment: : mckinley GaticaMeter ID: [id number]       Basic Metabolic Panel [[id number]]  (Abnormal) Collected: 04/05/25 0919    Specimen: Blood Updated: 04/05/25 1002     Glucose 267 mg/dL      BUN 40 mg/dL      Creatinine 1.84 mg/dL      Sodium 139 mmol/L      Potassium 4.9 mmol/L      Chloride 101 mmol/L      CO2 28.0 mmol/L      Calcium 9.3 mg/dL      BUN/Creatinine Ratio 21.7     Anion Gap 10.0 mmol/L      eGFR 26.9 mL/min/1.73     Narrative:      GFR Categories in Chronic Kidney Disease (CKD)      GFR Category          GFR (mL/min/1.73)    Interpretation  G1                     90 or greater         Normal or high (1)  G2                      60-89                Mild decrease (1)  G3a                   45-59                Mild to moderate decrease  G3b                   30-44                Moderate to severe decrease  G4                    15-29                Severe decrease  G5                    14 or less           Kidney failure          (1)In the absence of evidence of kidney disease, neither GFR category G1 or G2 fulfill the criteria for CKD.    eGFR calculation 2021 CKD-EPI creatinine equation, which does not include race as a factor    CBC & Differential [[id number]]  (Abnormal) Collected: 04/05/25 0919    Specimen: Blood Updated: 04/05/25 0942    Narrative:      The following orders were created for panel order CBC & Differential.  Procedure                               Abnormality         Status                     ---------                               -----------         ------                     CBC Auto Differential[[id number]]        Abnormal            Final result                 Please view results for these tests on the  individual orders.    CBC Auto Differential [[id number]]  (Abnormal) Collected: 04/05/25 0919    Specimen: Blood Updated: 04/05/25 0942     WBC 13.57 10*3/mm3      RBC 3.42 10*6/mm3      Hemoglobin 11.0 g/dL      Hematocrit 35.1 %      .6 fL      MCH 32.2 pg      MCHC 31.3 g/dL      RDW 15.0 %      RDW-SD 56.3 fl      MPV 10.9 fL      Platelets 191 10*3/mm3      Neutrophil % 96.2 %      Lymphocyte % 2.7 %      Monocyte % 0.5 %      Eosinophil % 0.0 %      Basophil % 0.1 %      Immature Grans % 0.5 %      Neutrophils, Absolute 13.05 10*3/mm3      Lymphocytes, Absolute 0.36 10*3/mm3      Monocytes, Absolute 0.07 10*3/mm3      Eosinophils, Absolute 0.00 10*3/mm3      Basophils, Absolute 0.02 10*3/mm3      Immature Grans, Absolute 0.07 10*3/mm3      nRBC 0.0 /100 WBC     POC Glucose Once [[id number]]  (Abnormal) Collected: 04/04/25 2353    Specimen: Blood Updated: 04/05/25 0006     Glucose 221 mg/dL      Comment: : [id number] Juju AlexisMeter ID: [id number]       High Sensitivity Troponin T 1Hr [[id number]]  (Abnormal) Collected: 04/04/25 1827    Specimen: Blood Updated: 04/04/25 1856     HS Troponin T 21 ng/L      Troponin T Numeric Delta -2 ng/L      Troponin T % Delta -9    Narrative:      High Sensitive Troponin T Reference Range:  <14.0 ng/L- Negative Female for AMI  <22.0 ng/L- Negative Male for AMI  >=14 - Abnormal Female indicating possible myocardial injury.  >=22 - Abnormal Male indicating possible myocardial injury.   Clinicians would have to utilize clinical acumen, EKG, Troponin, and serial changes to determine if it is an Acute Myocardial Infarction or myocardial injury due to an underlying chronic condition.         Respiratory Panel PCR w/COVID-19(SARS-CoV-2) [facility] In-House, NP Swab in UTM/VTM, 2 HR TAT - Swab, Nasopharynx [[id number]]  (Abnormal) Collected: 04/04/25 1607    Specimen: Swab from Nasopharynx Updated: 04/04/25 5304     ADENOVIRUS, PCR Not Detected     Coronavirus  "229E Not Detected     Coronavirus HKU1 Not Detected     Coronavirus NL63 Not Detected     Coronavirus OC43 Not Detected     COVID19 Not Detected     Human Metapneumovirus Not Detected     Human Rhinovirus/Enterovirus Detected     Influenza A PCR Not Detected     Influenza B PCR Not Detected     Parainfluenza Virus 1 Not Detected     Parainfluenza Virus 2 Not Detected     Parainfluenza Virus 3 Not Detected     Parainfluenza Virus 4 Not Detected     RSV, PCR Not Detected     Bordetella pertussis pcr Not Detected     Bordetella parapertussis PCR Not Detected     Chlamydophila pneumoniae PCR Not Detected     Mycoplasma pneumo by PCR Not Detected    Narrative:      In the setting of a positive respiratory panel with a viral infection PLUS a negative procalcitonin without other underlying concern for bacterial infection, consider observing off antibiotics or discontinuation of antibiotics and continue supportive care. If the respiratory panel is positive for atypical bacterial infection (Bordetella pertussis, Chlamydophila pneumoniae, or Mycoplasma pneumoniae), consider antibiotic de-escalation to target atypical bacterial infection.    Procalcitonin [[id number]]  (Normal) Collected: 04/04/25 1618    Specimen: Blood Updated: 04/04/25 1656     Procalcitonin 0.24 ng/mL     Narrative:      As a Marker for Sepsis (Non-Neonates):    1. <0.5 ng/mL represents a low risk of severe sepsis and/or septic shock.  2. >2 ng/mL represents a high risk of severe sepsis and/or septic shock.    As a Marker for Lower Respiratory Tract Infections that require antibiotic therapy:    PCT on Admission    Antibiotic Therapy       6-12 Hrs later    >0.5                Strongly Recommended  >0.25 - <0.5        Recommended   0.1 - 0.25          Discouraged              Remeasure/reassess PCT  <0.1                Strongly Discouraged     Remeasure/reassess PCT    As 28 day mortality risk marker: \"Change in Procalcitonin Result\" (>80% or <=80%) if Day " 0 (or Day 1) and Day 4 values are available. Refer to [email or web address]    Change in PCT <=80%  A decrease of PCT levels below or equal to 80% defines a positive change in PCT test result representing a higher risk for 28-day all-cause mortality of patients diagnosed with severe sepsis for septic shock.    Change in PCT >80%  A decrease of PCT levels of more than 80% defines a negative change in PCT result representing a lower risk for 28-day all-cause mortality of patients diagnosed with severe sepsis or septic shock.       Comprehensive Metabolic Panel [[id number]]  (Abnormal) Collected: 04/04/25 1618    Specimen: Blood Updated: 04/04/25 1651     Glucose 227 mg/dL      BUN 41 mg/dL      Creatinine 1.98 mg/dL      Sodium 140 mmol/L      Potassium 4.7 mmol/L      Chloride 98 mmol/L      CO2 31.0 mmol/L      Calcium 9.1 mg/dL      Total Protein 7.3 g/dL      Albumin 3.6 g/dL      ALT (SGPT) 50 U/L      AST (SGOT) 39 U/L      Alkaline Phosphatase 148 U/L      Total Bilirubin 0.3 mg/dL      Globulin 3.7 gm/dL      A/G Ratio 1.0 g/dL      BUN/Creatinine Ratio 20.7     Anion Gap 11.0 mmol/L      eGFR 24.7 mL/min/1.73     Narrative:      GFR Categories in Chronic Kidney Disease (CKD)      GFR Category          GFR (mL/min/1.73)    Interpretation  G1                     90 or greater         Normal or high (1)  G2                      60-89                Mild decrease (1)  G3a                   45-59                Mild to moderate decrease  G3b                   30-44                Moderate to severe decrease  G4                    15-29                Severe decrease  G5                    14 or less           Kidney failure          (1)In the absence of evidence of kidney disease, neither GFR category G1 or G2 fulfill the criteria for CKD.    eGFR calculation 2021 CKD-EPI creatinine equation, which does not include race as a factor    Magnesium [[id number]]  (Normal) Collected: 04/04/25 1618     Specimen: Blood Updated: 04/04/25 1651     Magnesium 1.7 mg/dL     Lactic Acid, Plasma [[id number]]  (Normal) Collected: 04/04/25 1618    Specimen: Blood Updated: 04/04/25 1647     Lactate 1.7 mmol/L     BNP [[id number]]  (Abnormal) Collected: 04/04/25 1618    Specimen: Blood Updated: 04/04/25 1647     proBNP 2,686.0 pg/mL     Narrative:      This assay is used as an aid in the diagnosis of individuals suspected of having heart failure. It can be used as an aid in the diagnosis of acute decompensated heart failure (ADHF) in patients presenting with signs and symptoms of ADHF to the emergency department (ED). In addition, NT-proBNP of <300 pg/mL indicates ADHF is not likely.    Age Range Result Interpretation  NT-proBNP Concentration (pg/mL:      <50             Positive            >450                   Gray                 300-450                    Negative             <300    50-75           Positive            >900                  Gray                300-900                  Negative            <300      >75             Positive            >1800                  Gray                300-1800                  Negative            <300    High Sensitivity Troponin T [[id number]]  (Abnormal) Collected: 04/04/25 1618    Specimen: Blood Updated: 04/04/25 1646     HS Troponin T 23 ng/L     Narrative:      High Sensitive Troponin T Reference Range:  <14.0 ng/L- Negative Female for AMI  <22.0 ng/L- Negative Male for AMI  >=14 - Abnormal Female indicating possible myocardial injury.  >=22 - Abnormal Male indicating possible myocardial injury.   Clinicians would have to utilize clinical acumen, EKG, Troponin, and serial changes to determine if it is an Acute Myocardial Infarction or myocardial injury due to an underlying chronic condition.         Protime-INR [[id number]]  (Abnormal) Collected: 04/04/25 1618    Specimen: Blood Updated: 04/04/25 1645     Protime 15.7 Seconds      INR 1.18    CBC & Differential [[id number]]   (Abnormal) Collected: 04/04/25 1618    Specimen: Blood Updated: 04/04/25 1630    Narrative:      The following orders were created for panel order CBC & Differential.  Procedure                               Abnormality         Status                     ---------                               -----------         ------                     CBC Auto Differential[[id number]]        Abnormal            Final result                 Please view results for these tests on the individual orders.    CBC Auto Differential [[id number]]  (Abnormal) Collected: 04/04/25 1618    Specimen: Blood Updated: 04/04/25 1630     WBC 19.72 10*3/mm3      RBC 3.64 10*6/mm3      Hemoglobin 11.9 g/dL      Hematocrit 38.1 %      .7 fL      MCH 32.7 pg      MCHC 31.2 g/dL      RDW 15.2 %      RDW-SD 58.4 fl      MPV 10.7 fL      Platelets 231 10*3/mm3      Neutrophil % 90.7 %      Lymphocyte % 2.4 %      Monocyte % 4.7 %      Eosinophil % 1.4 %      Basophil % 0.2 %      Immature Grans % 0.6 %      Neutrophils, Absolute 17.90 10*3/mm3      Lymphocytes, Absolute 0.47 10*3/mm3      Monocytes, Absolute 0.93 10*3/mm3      Eosinophils, Absolute 0.27 10*3/mm3      Basophils, Absolute 0.04 10*3/mm3      Immature Grans, Absolute 0.11 10*3/mm3      nRBC 0.0 /100 WBC     Blood Gas, Arterial - [[id number]]  (Abnormal) Collected: 04/04/25 1608    Specimen: Arterial Blood Updated: 04/04/25 1607     Site Right Brachial     Saud's Test N/A     pH, Arterial 7.417 pH units      pCO2, Arterial 48.0 mm Hg      Comment: 83 Value above reference range        pO2, Arterial 96.4 mm Hg      HCO3, Arterial 30.9 mmol/L      Comment: 83 Value above reference range        Base Excess, Arterial 5.5 mmol/L      Comment: 83 Value above reference range        O2 Saturation, Arterial 99.4 %      Comment: 83 Value above reference range        Temperature 37.0     Barometric Pressure for Blood Gas 751 mmHg      Modality BiPap     FIO2 35 %      Ventilator Mode NA     IPAP  "16     Comment: Meter: [id number]     :  Oneyda Solares, RRT        EPAP 6     Collected by [id number]     pCO2, Temperature Corrected 48.0 mm Hg      pH, Temp Corrected 7.417 pH Units      pO2, Temperature Corrected 96.4 mm Hg      PO2/FIO2 275              Objective:     Vitals: /78 (BP Location: Left arm, Patient Position: Sitting)   Pulse 87   Temp 97 °F (36.1 °C) (Axillary)   Resp 16   Ht 165.1 cm (65\")   Wt 124 kg (273 lb 8 oz)   SpO2 99%   BMI 45.51 kg/m²    Intake/Output Summary (Last 24 hours) at 2025 09  Last data filed at 2025 0600  Gross per 24 hour   Intake --   Output 3150 ml   Net -3150 ml    Temp (24hrs), Av.3 °F (36.3 °C), Min:96.6 °F (35.9 °C), Max:98 °F (36.7 °C)        Physical Examination:   General appearance - alert, well appearing, and in no distress and oriented to person, place, and time  Mental status - alert, oriented to person, place, and time, normal mood, behavior, speech, dress, motor activity, and thought processes  Eyes - pupils equal and reactive, extraocular eye movements intact  Ears - bilateral TM's and external ear canals normal, hearing grossly normal bilaterally  Mouth - mucous membranes moist, pharynx normal without lesions  Neck - supple, no significant adenopathy  Lymphatics - no palpable lymphadenopathy, no hepatosplenomegaly  Chest -coarse wheezes, no rales or rhonchi, symmetric air entry, no tachypnea, retractions or cyanosis  Heart - normal rate, regular rhythm, normal S1, S2, no murmurs, rubs, clicks or gallops  Abdomen - soft, nontender, nondistended, no masses or organomegaly bowel sounds normal  Neurological - alert, oriented, normal speech, no focal findings or movement disorder noted  Musculoskeletal - no joint tenderness, deformity or swelling  Extremities - peripheral pulses normal, no pedal edema, no clubbing or cyanosis  Skin - normal coloration and turgor, no rashes, no suspicious skin lesions noted      Assessment and " Plan:     Primary Problem:  Atrial fibrillation with RVR    Hospital Problem list:    Atrial fibrillation with RVR      PMH:  Past Medical History:   Diagnosis Date    Abnormality of gait and mobility     Anemia     Anxiety     Cerebral palsy     Cognitive communication deficit     Depression     Diabetes mellitus     Disease of thyroid gland     Hyperglycemia     Hyperlipidemia     Hypertension     Hypokalemia     Insomnia     Lymphedema     Neuropathy     Urge incontinence        Treatment Plan:  Afib with RVR  Human rhinovirus  Lymphedema  DM  HL  Cerebral palsy  Anemia  Acute on chronic CKD     Recent echo 1/2025  Left ventricular systolic function is normal. Left ventricular ejection fraction appears to be 61 - 65%.  Normal right ventricular cavity size and systolic function noted. There is no significant (greater than mild) valvular dysfunction.       Rate stabe, continue eliquis.  Patient off cardizem drip. Patient with continued coarse wheezes, continue steroids and nebs. Renal function slightly worse today, will hold lasix drip and repeat chest xray today.     Discharge planning:   Nursing Home    Reviewed treatment plans with the patient and/or family.   15 minutes spent in face to face interaction and coordination of care.     Electronically signed by GIGI Rodriguez on 4/6/2025 at 09:27 CDT

## 2025-04-06 NOTE — THERAPY EVALUATION
Patient Name: Mar Rodriguez  : 1940    MRN: [id number]                              Today's Date: 2025       Admit Date: 2025    Visit Dx:     ICD-10-CM ICD-9-CM   1. Acute on chronic respiratory failure with hypoxia  J96.21 518.84     799.02   2. Acute pulmonary edema  J81.0 518.4   3. Atrial fibrillation with RVR  I48.91 427.31   4. Rhinovirus infection  B34.8 079.3   5. Bronchitis  J40 490   6. Impaired mobility [Z74.09]  Z74.09 799.89     Patient Active Problem List   Diagnosis    New onset atrial fibrillation    Pulmonary vascular congestion    Acute hyperkalemia    Respiratory failure with hypoxia    Atrial fibrillation with RVR     Past Medical History:   Diagnosis Date    Abnormality of gait and mobility     Anemia     Anxiety     Cerebral palsy     Cognitive communication deficit     Depression     Diabetes mellitus     Disease of thyroid gland     Hyperglycemia     Hyperlipidemia     Hypertension     Hypokalemia     Insomnia     Lymphedema     Neuropathy     Urge incontinence      History reviewed. No pertinent surgical history.   General Information       Row Name 25 1527          Physical Therapy Time and Intention    Document Type evaluation  presents with SOA;  -SB     Mode of Treatment physical therapy  -SB       Row Name 25 1527          General Information    Patient Profile Reviewed yes  -SB     Prior Level of Function independent:;feeding;w/c or scooter;bed mobility;mod assist:;transfer  -SB     Existing Precautions/Restrictions fall;oxygen therapy device and L/min  3.5L  -SB     Barriers to Rehab medically complex;previous functional deficit;physical barrier  -SB       Row Name 25 1527          Living Environment    Current Living Arrangements residential facility  -SB     People in Home facility resident  -SB       Row Name 25 1527          Cognition    Orientation Status (Cognition) oriented x 4  -SB       Row Name 25 1527          Safety  Issues/Impairments Affecting Functional Mobility    Safety Issues Affecting Function (Mobility) friction/shear risk  -SB     Impairments Affecting Function (Mobility) endurance/activity tolerance;shortness of breath;strength  -SB               User Key  (r) = Recorded By, (t) = Taken By, (c) = Cosigned By      Initials Name Provider Type    Sapna Simental PT DPT Physical Therapist                   Mobility       Row Name 04/06/25 1527          Bed Mobility    Bed Mobility rolling right;rolling left  -SB     Rolling Left [place] (Bed Mobility) minimum assist (75% patient effort);verbal cues  -SB     Rolling Right [place] (Bed Mobility) minimum assist (75% patient effort);verbal cues  -SB     Assistive Device (Bed Mobility) bed rails  -SB     Comment, (Bed Mobility) pt requested to defer further mobility due to fatigue  -SB               User Key  (r) = Recorded By, (t) = Taken By, (c) = Cosigned By      Initials Name Provider Type    Sapna Simental, PT DPT Physical Therapist                   Obj/Interventions       Row Name 04/06/25 1527          Range of Motion Comprehensive    General Range of Motion lower extremity range of motion deficits identified  -SB     Comment, General Range of Motion imp 25% due to soft tissue approximation  -SB       Row Name 04/06/25 1527          Strength Comprehensive (MMT)    General Manual Muscle Testing (MMT) Assessment lower extremity strength deficits identified  -SB     Comment, General Manual Muscle Testing (MMT) Assessment BLE 3-/5  -SB       Row Name 04/06/25 1527          Balance    Balance Assessment --  -SB       Row Name 04/06/25 1527          Sensory Assessment (Somatosensory)    Sensory Assessment (Somatosensory) LE sensation intact  -SB               User Key  (r) = Recorded By, (t) = Taken By, (c) = Cosigned By      Initials Name Provider Type    Sapna Simental PT DPT Physical Therapist                   Goals/Plan       Row Name 04/06/25 1527           Bed Mobility Goal 1 (PT)    Activity/Assistive Device (Bed Mobility Goal 1, PT) sit to supine;supine to sit;rolling to left;rolling to right  -SB     [place] Level/Cues Needed (Bed Mobility Goal 1, PT) standby assist  -SB     Time Frame (Bed Mobility Goal 1, PT) long term goal (LTG)  -SB     Progress/Outcomes (Bed Mobility Goal 1, PT) new goal  -SB       Row Name 04/06/25 1527          Transfer Goal 1 (PT)    Activity/Assistive Device (Transfer Goal 1, PT) sit-to-stand/stand-to-sit;bed-to-chair/chair-to-bed;walker, rolling  -SB     [place] Level/Cues Needed (Transfer Goal 1, PT) moderate assist (50-74% patient effort)  -SB     Time Frame (Transfer Goal 1, PT) long term goal (LTG)  -SB     Progress/Outcome (Transfer Goal 1, PT) new goal  -SB       Row Name 04/06/25 1527          Problem Specific Goal 1 (PT)    Problem Specific Goal 1 (PT) Pt will propel manual w/c 25 feet with SV  -SB     Time Frame (Problem Specific Goal 1, PT) long-term goal (LTG)  -SB     Progress/Outcome (Problem Specific Goal 1, PT) new goal  -SB       Row Name 04/06/25 1527          Therapy Assessment/Plan (PT)    Planned Therapy Interventions (PT) balance training;strengthening;bed mobility training;gait training;patient/family education;transfer training;wheelchair management/propulsion training;ROM (range of motion)  -SB               User Key  (r) = Recorded By, (t) = Taken By, (c) = Cosigned By      Initials Name Provider Type    SB Sapna Zafar, PT DPT Physical Therapist                   Clinical Impression       Row Name 04/06/25 1527          Pain    Pretreatment Pain Rating 0/10 - no pain  -SB     Posttreatment Pain Rating 0/10 - no pain  -SB     Pre/Posttreatment Pain Comment c/o weakness and fatigue  -SB       Row Name 04/06/25 1527          Plan of Care Review    Progress no change  -SB     Outcome Evaluation PT eval completed. Pt alert and oriented x4 with c/o weakness and fatigue. Pt on 3.5L O2 with sats in 90s  during session. Pt typically resides at [facility] and performs bed mob and w/c mobility independently and t/fs to manual w/c with assist. Today, pt rolls L and R with min A and using bedrails but requested to defer further OOB mobility due to fatigue. Pt will benefit from skilled PT to improve endurance, strength and mobility. Rec d/c SNF.  -SB       Row Name 04/06/25 1527          Therapy Assessment/Plan (PT)    Patient/Family Therapy Goals Statement (PT) improve function  -SB     Rehab Potential (PT) good  -SB     Criteria for Skilled Interventions Met (PT) yes;meets criteria;skilled treatment is necessary  -SB     Therapy Frequency (PT) 2 times/day  -SB     Predicted Duration of Therapy Intervention (PT) until d/c or goals met  -SB       Row Name 04/06/25 1527          Vital Signs    O2 Delivery Pre Treatment nasal cannula  3.5L  -SB     O2 Delivery Intra Treatment nasal cannula  -SB     O2 Delivery Post Treatment nasal cannula  -SB       Row Name 04/06/25 1527          Positioning and Restraints    Pre-Treatment Position in bed  -SB     Post Treatment Position bed  -SB     In Bed side lying right;call light within reach;encouraged to call for assist;notified nsg;side rails up x3;with family/caregiver  -SB               User Key  (r) = Recorded By, (t) = Taken By, (c) = Cosigned By      Initials Name Provider Type    Sapna Simental, PT DPT Physical Therapist                   Outcome Measures       Row Name 04/06/25 1527          How much help from another person do you currently need...    Turning from your back to your side while in flat bed without using bedrails? 3  -SB     Moving from lying on back to sitting on the side of a flat bed without bedrails? 2  -SB     Moving to and from a bed to a chair (including a wheelchair)? 2  -SB     Standing up from a chair using your arms (e.g., wheelchair, bedside chair)? 2  -SB     Climbing 3-5 steps with a railing? 1  -SB     To walk in hospital room? 1  -SB      -PAC 6 Clicks Score (PT) 11  -SB     Highest Level of Mobility Goal 4 --> Transfer to chair/commode  -       Row Name 04/06/25 1527          Functional Assessment    Outcome Measure Options AM-PAC 6 Clicks Basic Mobility (PT)  -SB               User Key  (r) = Recorded By, (t) = Taken By, (c) = Cosigned By      Initials Name Provider Type    SB Sapna Zafar, PT DPT Physical Therapist                                 Physical Therapy Education       Title: PT OT SLP Therapies (In Progress)       Topic: Physical Therapy (In Progress)       Point: Mobility training (Done)       Learning Progress Summary            Patient Acceptance, E, VU by SB at 4/6/2025 1613    Comment: pt edu on POC, benefits of act and d/c plans   Family Acceptance, E, VU by SB at 4/6/2025 1613    Comment: pt edu on POC, benefits of act and d/c plans                      Point: Home exercise program (Not Started)       Learner Progress:  Not documented in this visit.              Point: Body mechanics (Not Started)       Learner Progress:  Not documented in this visit.              Point: Precautions (Done)       Learning Progress Summary            Patient Acceptance, E, VU by SB at 4/6/2025 1613    Comment: pt edu on POC, benefits of act and d/c plans   Family Acceptance, E, VU by SB at 4/6/2025 1613    Comment: pt edu on POC, benefits of act and d/c plans                                      User Key       Initials Effective Dates Name Provider Type Discipline     07/11/23 -  Sapna Zafar, PT DPT Physical Therapist PT                  PT Recommendation and Plan  Planned Therapy Interventions (PT): balance training, strengthening, bed mobility training, gait training, patient/family education, transfer training, wheelchair management/propulsion training, ROM (range of motion)  Progress: no change  Outcome Evaluation: PT eval completed. Pt alert and oriented x4 with c/o weakness and fatigue. Pt on 3.5L O2 with sats in 90s during  session. Pt typically resides at [facility] and performs bed mob and w/c mobility independently and t/fs to manual w/c with assist. Today, pt rolls L and R with min A and using bedrails but requested to defer further OOB mobility due to fatigue. Pt will benefit from skilled PT to improve endurance, strength and mobility. Rec d/c SNF.     Time Calculation:         PT Charges       Row Name 04/06/25 1613             Time Calculation    Start Time 1527  -SB      Stop Time 1553  -SB      Time Calculation (min) 26 min  -SB      PT Received On 04/06/25  -SB      PT Goal Re-Cert Due Date 04/16/25  -SB         Untimed Charges    PT Eval/Re-eval Minutes 26  -SB         Total Minutes    Untimed Charges Total Minutes 26  -SB       Total Minutes 26  -SB                User Key  (r) = Recorded By, (t) = Taken By, (c) = Cosigned By      Initials Name Provider Type    SB Sapna Zafar, PT DPT Physical Therapist                  Therapy Charges for Today       Code Description Service Date Service Provider Modifiers Qty    20304275811 HC PT EVAL MOD COMPLEXITY 2 4/6/2025 Sapna Zafar PT DPT GP 1            PT G-Codes  Outcome Measure Options: AM-PAC 6 Clicks Basic Mobility (PT)  AM-PAC 6 Clicks Score (PT): 11  PT Discharge Summary  Anticipated Discharge Disposition (PT): skilled nursing facility    Sapna Zafar PT DPT  4/6/2025

## 2025-04-07 LAB
ANION GAP SERPL CALCULATED.3IONS-SCNC: 12 MMOL/L (ref 5–15)
BASOPHILS # BLD AUTO: 0.01 10*3/MM3 (ref 0–0.2)
BASOPHILS NFR BLD AUTO: 0.1 % (ref 0–1.5)
BUN SERPL-MCNC: 60 MG/DL (ref 8–23)
BUN/CREAT SERPL: 28.2 (ref 7–25)
CALCIUM SPEC-SCNC: 9.2 MG/DL (ref 8.6–10.5)
CHLORIDE SERPL-SCNC: 101 MMOL/L (ref 98–107)
CO2 SERPL-SCNC: 28 MMOL/L (ref 22–29)
CREAT SERPL-MCNC: 2.13 MG/DL (ref 0.57–1)
DEPRECATED RDW RBC AUTO: 56.6 FL (ref 37–54)
EGFRCR SERPLBLD CKD-EPI 2021: 22.6 ML/MIN/1.73
EOSINOPHIL # BLD AUTO: 0 10*3/MM3 (ref 0–0.4)
EOSINOPHIL NFR BLD AUTO: 0 % (ref 0.3–6.2)
ERYTHROCYTE [DISTWIDTH] IN BLOOD BY AUTOMATED COUNT: 15 % (ref 12.3–15.4)
GLUCOSE BLDC GLUCOMTR-MCNC: 295 MG/DL (ref 70–130)
GLUCOSE BLDC GLUCOMTR-MCNC: 335 MG/DL (ref 70–130)
GLUCOSE BLDC GLUCOMTR-MCNC: 368 MG/DL (ref 70–130)
GLUCOSE BLDC GLUCOMTR-MCNC: 381 MG/DL (ref 70–130)
GLUCOSE SERPL-MCNC: 271 MG/DL (ref 65–99)
HCT VFR BLD AUTO: 34.1 % (ref 34–46.6)
HGB BLD-MCNC: 10.7 G/DL (ref 12–15.9)
IMM GRANULOCYTES # BLD AUTO: 0.15 10*3/MM3 (ref 0–0.05)
IMM GRANULOCYTES NFR BLD AUTO: 0.9 % (ref 0–0.5)
LYMPHOCYTES # BLD AUTO: 0.51 10*3/MM3 (ref 0.7–3.1)
LYMPHOCYTES NFR BLD AUTO: 3.1 % (ref 19.6–45.3)
MCH RBC QN AUTO: 32.6 PG (ref 26.6–33)
MCHC RBC AUTO-ENTMCNC: 31.4 G/DL (ref 31.5–35.7)
MCV RBC AUTO: 104 FL (ref 79–97)
MONOCYTES # BLD AUTO: 0.26 10*3/MM3 (ref 0.1–0.9)
MONOCYTES NFR BLD AUTO: 1.6 % (ref 5–12)
NEUTROPHILS NFR BLD AUTO: 15.49 10*3/MM3 (ref 1.7–7)
NEUTROPHILS NFR BLD AUTO: 94.3 % (ref 42.7–76)
NRBC BLD AUTO-RTO: 0 /100 WBC (ref 0–0.2)
PLATELET # BLD AUTO: 228 10*3/MM3 (ref 140–450)
PMV BLD AUTO: 11 FL (ref 6–12)
POTASSIUM SERPL-SCNC: 4.7 MMOL/L (ref 3.5–5.2)
QT INTERVAL: 322 MS
QTC INTERVAL: 441 MS
RBC # BLD AUTO: 3.28 10*6/MM3 (ref 3.77–5.28)
SODIUM SERPL-SCNC: 141 MMOL/L (ref 136–145)
WBC NRBC COR # BLD AUTO: 16.42 10*3/MM3 (ref 3.4–10.8)

## 2025-04-07 PROCEDURE — 25010000002 METHYLPREDNISOLONE PER 40 MG: Performed by: PHYSICIAN ASSISTANT

## 2025-04-07 PROCEDURE — 94761 N-INVAS EAR/PLS OXIMETRY MLT: CPT

## 2025-04-07 PROCEDURE — 94799 UNLISTED PULMONARY SVC/PX: CPT

## 2025-04-07 PROCEDURE — 82948 REAGENT STRIP/BLOOD GLUCOSE: CPT

## 2025-04-07 PROCEDURE — 94660 CPAP INITIATION&MGMT: CPT

## 2025-04-07 PROCEDURE — 63710000001 INSULIN GLARGINE PER 5 UNITS: Performed by: PHYSICIAN ASSISTANT

## 2025-04-07 PROCEDURE — 80048 BASIC METABOLIC PNL TOTAL CA: CPT | Performed by: PHYSICIAN ASSISTANT

## 2025-04-07 PROCEDURE — 85025 COMPLETE CBC W/AUTO DIFF WBC: CPT | Performed by: PHYSICIAN ASSISTANT

## 2025-04-07 PROCEDURE — 97110 THERAPEUTIC EXERCISES: CPT

## 2025-04-07 PROCEDURE — 63710000001 INSULIN LISPRO (HUMAN) PER 5 UNITS: Performed by: FAMILY MEDICINE

## 2025-04-07 PROCEDURE — 97530 THERAPEUTIC ACTIVITIES: CPT

## 2025-04-07 RX ORDER — IPRATROPIUM BROMIDE AND ALBUTEROL SULFATE 2.5; .5 MG/3ML; MG/3ML
3 SOLUTION RESPIRATORY (INHALATION) EVERY 4 HOURS PRN
Status: DISCONTINUED | OUTPATIENT
Start: 2025-04-07 | End: 2025-04-09 | Stop reason: HOSPADM

## 2025-04-07 RX ADMIN — Medication 10 ML: at 20:32

## 2025-04-07 RX ADMIN — INSULIN GLARGINE 10 UNITS: 100 INJECTION, SOLUTION SUBCUTANEOUS at 20:30

## 2025-04-07 RX ADMIN — ATORVASTATIN CALCIUM 40 MG: 40 TABLET, FILM COATED ORAL at 20:30

## 2025-04-07 RX ADMIN — BUSPIRONE HYDROCHLORIDE 7.5 MG: 5 TABLET ORAL at 16:31

## 2025-04-07 RX ADMIN — NYSTATIN: 100000 CREAM TOPICAL at 09:00

## 2025-04-07 RX ADMIN — APIXABAN 2.5 MG: 2.5 TABLET, FILM COATED ORAL at 20:30

## 2025-04-07 RX ADMIN — LISINOPRIL 20 MG: 20 TABLET ORAL at 08:56

## 2025-04-07 RX ADMIN — INSULIN LISPRO 7 UNITS: 100 INJECTION, SOLUTION INTRAVENOUS; SUBCUTANEOUS at 13:04

## 2025-04-07 RX ADMIN — METHYLPREDNISOLONE SODIUM SUCCINATE 80 MG: 40 INJECTION, POWDER, FOR SOLUTION INTRAMUSCULAR; INTRAVENOUS at 06:21

## 2025-04-07 RX ADMIN — METHYLPREDNISOLONE SODIUM SUCCINATE 80 MG: 40 INJECTION, POWDER, FOR SOLUTION INTRAMUSCULAR; INTRAVENOUS at 20:33

## 2025-04-07 RX ADMIN — INSULIN LISPRO 8 UNITS: 100 INJECTION, SOLUTION INTRAVENOUS; SUBCUTANEOUS at 20:31

## 2025-04-07 RX ADMIN — LEVOTHYROXINE SODIUM 200 MCG: 100 TABLET ORAL at 06:21

## 2025-04-07 RX ADMIN — CHOLESTYRAMINE 4 G: 4 POWDER, FOR SUSPENSION ORAL at 08:53

## 2025-04-07 RX ADMIN — NYSTATIN: 100000 CREAM TOPICAL at 20:42

## 2025-04-07 RX ADMIN — METOPROLOL TARTRATE 25 MG: 25 TABLET, FILM COATED ORAL at 08:55

## 2025-04-07 RX ADMIN — CHOLESTYRAMINE 4 G: 4 POWDER, FOR SUSPENSION ORAL at 21:20

## 2025-04-07 RX ADMIN — INSULIN LISPRO 8 UNITS: 100 INJECTION, SOLUTION INTRAVENOUS; SUBCUTANEOUS at 17:36

## 2025-04-07 RX ADMIN — BUSPIRONE HYDROCHLORIDE 7.5 MG: 5 TABLET ORAL at 20:29

## 2025-04-07 RX ADMIN — INSULIN LISPRO 6 UNITS: 100 INJECTION, SOLUTION INTRAVENOUS; SUBCUTANEOUS at 08:53

## 2025-04-07 RX ADMIN — APIXABAN 2.5 MG: 2.5 TABLET, FILM COATED ORAL at 08:56

## 2025-04-07 RX ADMIN — METOPROLOL TARTRATE 25 MG: 25 TABLET, FILM COATED ORAL at 20:30

## 2025-04-07 RX ADMIN — BUSPIRONE HYDROCHLORIDE 7.5 MG: 5 TABLET ORAL at 08:53

## 2025-04-07 RX ADMIN — Medication 10 ML: at 08:58

## 2025-04-07 RX ADMIN — METHYLPREDNISOLONE SODIUM SUCCINATE 80 MG: 40 INJECTION, POWDER, FOR SOLUTION INTRAMUSCULAR; INTRAVENOUS at 13:12

## 2025-04-07 NOTE — PLAN OF CARE
Problem: Noninvasive Ventilation Acute  Goal: Effective Unassisted Ventilation and Oxygenation  Outcome: Progressing   Goal Outcome Evaluation:   Pt has tolerated being off BIPAP all day. Sats in the upper 90's no distress.

## 2025-04-07 NOTE — CASE MANAGEMENT/SOCIAL WORK
Continued Stay Note   Mobile     Patient Name: Mar Rodriguez  MRN: [id number]  Today's Date: 4/7/2025    Admit Date: 4/4/2025    Plan: [facility]   Discharge Plan       Row Name 04/07/25 1117       Plan    Plan [facility]    Patient/Family in Agreement with Plan yes    Plan Comments Pt has a bed hold at [facility] per Tracy in admissions.  No precert. required to return.  Phone:  [phone] Fax: [phone].    Final Discharge Disposition Code 03 - skilled nursing facility (SNF)                   Discharge Codes    No documentation.                       RORY GarciaW

## 2025-04-07 NOTE — PLAN OF CARE
Goal Outcome Evaluation: Patient wore NPPV all night long.  Problem: Noninvasive Ventilation Acute  Goal: Effective Unassisted Ventilation and Oxygenation  Outcome: Progressing  Intervention: Monitor and Manage Noninvasive Ventilation  Recent Flowsheet Documentation  Taken 4/7/2025 0255 by Leigh Churchill RRT  Airway/Ventilation Management: airway patency maintained  NPPV/CPAP Maintenance: proper fit/secure     Problem: Skin Injury Risk Increased  Goal: Skin Health and Integrity  Intervention: Optimize Skin Protection  Recent Flowsheet Documentation  Taken 4/7/2025 0255 by Leigh Churchill RRT  Head of Bed (HOB) Positioning: HOB at 30 degrees     Problem: Sepsis/Septic Shock  Goal: Absence of Infection Signs and Symptoms  Intervention: Promote Recovery  Recent Flowsheet Documentation  Taken 4/7/2025 0255 by Leigh Churchill, RRT  Airway/Ventilation Management: airway patency maintained

## 2025-04-07 NOTE — PROGRESS NOTES
RT EQUIPMENT DEVICE RELATED - SKIN ASSESSMENT    Hayes Score:  Hayes Score: 15     RT Medical Equipment/Device:     NIV Mask:  Under-the-nose   size:  B    Skin Assessment:      Cheek:  Intact  Chin:  Intact  Nares:  Intact  Neck:  Intact    Device Skin Pressure Protection:  Positioning supports utilized and Skin-to-device areas padded:  None Required    Nurse Notification:  Mellisa Franco, RRT

## 2025-04-07 NOTE — PROGRESS NOTES
RT EQUIPMENT DEVICE RELATED - SKIN ASSESSMENT    Hayes Score:  Hayes Score: 17     RT Medical Equipment/Device:     NIV Mask:  Under-the-nose   size:  B    Skin Assessment:      Nose:  Intact    Device Skin Pressure Protection:  Skin-to-device areas padded:  None Required    Nurse Notification:  Mellisa Churchill, RRT

## 2025-04-07 NOTE — PLAN OF CARE
Goal Outcome Evaluation:  Plan of Care Reviewed With: patient  Progress: no change  Outcome Evaluation: AF HR: 76-98 on tele. Lung sounds coarse/wheezing noted during nursing assessment. IV steroids given as ordered. IV abx continues and given as ordered.  in place. Pt is on 3.5-3L of O2 per NC during the day/when awake. BiPAP on throughout the night. Monitoring patient's blood sugar as ordered. SQ insulin given as ordered. Patient denies pain. Patient anxious at times. Reassurance provided and active listening utilized. Turning patient q 2 hours with wedges in use. Voiding per external catheter with good output noted. Safety maintained. Bed alarm on. Caregiver at bedside.

## 2025-04-07 NOTE — PROGRESS NOTES
"    Daily Progress Note  Mar Rodriguez  MRN: [id number] LOS: 3    Admit Date: 2025 07:39 CDT    Subjective:         Interval History:    Reviewed overnight events and nursing notes.     Generally confused, sitter at bedside.  States she is at her baseline.    ROS:  Review of Systems   Unable to perform ROS: Mental status change       DIET:  Diet: Cardiac, Diabetic, Renal; Healthy Heart (2-3 Na+); Consistent Carbohydrate; Low Sodium (2-3g), Low Potassium, Low Phosphorus; Fluid Consistency: Thin (IDDSI 0)    Medications:   dilTIAZem, 5-15 mg/hr, Last Rate: Stopped (25 0159)  [Held by provider] furosemide, 5 mg/hr, Last Rate: Stopped (25 0967)      apixaban, 2.5 mg, Oral, Q12H  atorvastatin, 40 mg, Oral, Nightly  azithromycin, 500 mg, Intravenous, Q24H  busPIRone, 7.5 mg, Oral, TID  cholestyramine light, 1 packet, Oral, Q12H  insulin glargine, 10 Units, Subcutaneous, Nightly  insulin lispro, 2-9 Units, Subcutaneous, 4x Daily AC & at Bedtime  levothyroxine, 200 mcg, Oral, Q AM  lisinopril, 20 mg, Oral, Daily  methylPREDNISolone sodium succinate, 80 mg, Intravenous, Q8H  metoprolol tartrate, 25 mg, Oral, BID  nystatin, , Topical, Q12H  sodium chloride, 10 mL, Intravenous, Q12H          Objective:     Vitals: /97 (BP Location: Left arm, Patient Position: Lying)   Pulse 82   Temp 97.5 °F (36.4 °C) (Axillary)   Resp 22   Ht 165.1 cm (65\")   Wt 127 kg (279 lb)   SpO2 96%   BMI 46.43 kg/m²    Intake/Output Summary (Last 24 hours) at 2025 0739  Last data filed at 2025 0400  Gross per 24 hour   Intake 840 ml   Output 1250 ml   Net -410 ml    Temp (24hrs), Av.6 °F (36.4 °C), Min:97 °F (36.1 °C), Max:98.1 °F (36.7 °C)    Glucose:  Lab Results   Component Value Date    POCGLU 376 (H) 2025    POCGLU 359 (H) 2025    POCGLU 350 (H) 2025    POCGLU 277 (H) 2025     Physical Examination:   Physical Exam  Constitutional:       General: She is not in acute " distress.     Appearance: She is obese. She is ill-appearing. She is not toxic-appearing.   Cardiovascular:      Rate and Rhythm: Normal rate and regular rhythm.      Pulses: Normal pulses.      Heart sounds: Normal heart sounds. No murmur heard.  Pulmonary:      Effort: Pulmonary effort is normal. No respiratory distress.      Breath sounds: No stridor. Wheezing present. No rales.   Abdominal:      General: Abdomen is flat. Bowel sounds are normal. There is no distension.      Palpations: Abdomen is soft.      Tenderness: There is no abdominal tenderness.   Musculoskeletal:      Right lower leg: Edema present.      Left lower leg: Edema present.   Neurological:      Mental Status: She is alert.         Labs:  Lab Results (last 24 hours)       Procedure Component Value Units Date/Time    Basic Metabolic Panel [[id number]]  (Abnormal) Collected: 04/07/25 0355    Specimen: Blood Updated: 04/07/25 0444     Glucose 271 mg/dL      BUN 60 mg/dL      Creatinine 2.13 mg/dL      Sodium 141 mmol/L      Potassium 4.7 mmol/L      Chloride 101 mmol/L      CO2 28.0 mmol/L      Calcium 9.2 mg/dL      BUN/Creatinine Ratio 28.2     Anion Gap 12.0 mmol/L      eGFR 22.6 mL/min/1.73     Narrative:      GFR Categories in Chronic Kidney Disease (CKD)      GFR Category          GFR (mL/min/1.73)    Interpretation  G1                     90 or greater         Normal or high (1)  G2                      60-89                Mild decrease (1)  G3a                   45-59                Mild to moderate decrease  G3b                   30-44                Moderate to severe decrease  G4                    15-29                Severe decrease  G5                    14 or less           Kidney failure          (1)In the absence of evidence of kidney disease, neither GFR category G1 or G2 fulfill the criteria for CKD.    eGFR calculation 2021 CKD-EPI creatinine equation, which does not include race as a factor    CBC & Differential [[id number]]   (Abnormal) Collected: 04/07/25 0355    Specimen: Blood Updated: 04/07/25 0424    Narrative:      The following orders were created for panel order CBC & Differential.  Procedure                               Abnormality         Status                     ---------                               -----------         ------                     CBC Auto Differential[[id number]]        Abnormal            Final result                 Please view results for these tests on the individual orders.    CBC Auto Differential [[id number]]  (Abnormal) Collected: 04/07/25 0355    Specimen: Blood Updated: 04/07/25 0424     WBC 16.42 10*3/mm3      RBC 3.28 10*6/mm3      Hemoglobin 10.7 g/dL      Hematocrit 34.1 %      .0 fL      MCH 32.6 pg      MCHC 31.4 g/dL      RDW 15.0 %      RDW-SD 56.6 fl      MPV 11.0 fL      Platelets 228 10*3/mm3      Neutrophil % 94.3 %      Lymphocyte % 3.1 %      Monocyte % 1.6 %      Eosinophil % 0.0 %      Basophil % 0.1 %      Immature Grans % 0.9 %      Neutrophils, Absolute 15.49 10*3/mm3      Lymphocytes, Absolute 0.51 10*3/mm3      Monocytes, Absolute 0.26 10*3/mm3      Eosinophils, Absolute 0.00 10*3/mm3      Basophils, Absolute 0.01 10*3/mm3      Immature Grans, Absolute 0.15 10*3/mm3      nRBC 0.0 /100 WBC     POC Glucose Once [[id number]]  (Abnormal) Collected: 04/06/25 2005    Specimen: Blood Updated: 04/06/25 2028     Glucose 376 mg/dL      Comment: : tbaggarbrooklynn Amrikheatherewelina TravisMeter ID: [id number]       POC Glucose Once [[id number]]  (Abnormal) Collected: 04/06/25 1654    Specimen: Blood Updated: 04/06/25 1710     Glucose 359 mg/dL      Comment: : [id number] Vinayak Landers ID: [id number]       Blood Culture - Blood, Arm, Left [[id number]]  (Normal) Collected: 04/04/25 1619    Specimen: Blood from Arm, Left Updated: 04/06/25 1631     Blood Culture No growth at 2 days    Blood Culture - Blood, Arm, Right [[id number]]  (Normal) Collected: 04/04/25 1618    Specimen: Blood from Arm,  Right Updated: 04/06/25 1631     Blood Culture No growth at 2 days    POC Glucose Once [[id number]]  (Abnormal) Collected: 04/06/25 1240    Specimen: Blood Updated: 04/06/25 1251     Glucose 350 mg/dL      Comment: : mckinley RevelesbethMeter ID: [id number]       Hemoglobin A1c [[id number]]  (Abnormal) Collected: 04/06/25 0529    Specimen: Blood Updated: 04/06/25 1037     Hemoglobin A1C 7.30 %     Narrative:      Hemoglobin A1C Ranges:    Increased Risk for Diabetes  5.7% to 6.4%  Diabetes                     >= 6.5%  Diabetic Goal                < 7.0%    POC Glucose Once [[id number]]  (Abnormal) Collected: 04/06/25 0908    Specimen: Blood Updated: 04/06/25 0918     Glucose 277 mg/dL      Comment: : mckinley WhittzabethMeter ID: [id number]                Imaging:  XR Chest 1 View  Result Date: 4/6/2025  EXAM: XR CHEST 1 VW- 4/6/2025 9:13 AM  HISTORY: wheezing; J96.21-Acute and chronic respiratory failure with hypoxia; J81.0-Acute pulmonary edema; I48.91-Unspecified atrial fibrillation; B34.8-Other viral infections of unspecified site; J40-Bronchitis, not specified as acute or chronic   COMPARISON: 4/4/2025.  TECHNIQUE: Single frontal radiograph of the chest was obtained.  FINDINGS:  Support Devices: None.  Cardiac and Mediastinal Silhouettes: Stable cardiomegaly.  Lungs/Pleura: No focal consolidation. No sizable pleural effusion. No visible pneumothorax.  Osseous structures: No acute osseous finding.  Other: None.      Impression:  No acute cardiopulmonary abnormality.    This report was signed and finalized on 4/6/2025 10:57 AM by Brad Barbour.           Assessment and Plan:     Primary Problem:  Atrial fibrillation with RVR    Hospital Problem list:    Atrial fibrillation with RVR        Assessment: 84-year-old female who is a long-term resident of [facility] and presented with rhinovirus and A-fib with RVR.    Plan:    Atrial fibrillation with rapid ventricular response  RVR resolved,  continue metoprolol and Eliquis.    Acute on chronic hypoxic respiratory failure secondary to rhinovirus  Still slightly wheezy on examination.  Continue steroids breathing treatments today.  Completing azithromycin tomorrow.    Acute on CKD  Baseline appears to be 1.5-1.8.  Mild bump in creatinine today.  Suspect this was due to diuretics and will hold this.  Monitor renal function.    Hopefully we can wean steroids tomorrow and her respiratory status improves.  Plan to discharge back to [facility] mid-to-late week.    Reviewed treatment plans with the patient and/or family.     Code Status:   Code Status and Medical Interventions: CPR (Attempt to Resuscitate); Full Support   Ordered at: 04/05/25 0946     Code Status (Patient has no pulse and is not breathing):    CPR (Attempt to Resuscitate)     Medical Interventions (Patient has pulse or is breathing):    Full Support     Level Of Support Discussed With:    Patient       Electronically signed by Angel Delatorre MD on 4/7/2025 at 07:39 CDT

## 2025-04-07 NOTE — THERAPY TREATMENT NOTE
Acute Care - Physical Therapy Treatment Note  [facility]     Patient Name: Mar Rodriguez  : 1940  MRN: [id number]  Today's Date: 2025      Visit Dx:     ICD-10-CM ICD-9-CM   1. Acute on chronic respiratory failure with hypoxia  J96.21 518.84     799.02   2. Acute pulmonary edema  J81.0 518.4   3. Atrial fibrillation with RVR  I48.91 427.31   4. Rhinovirus infection  B34.8 079.3   5. Bronchitis  J40 490   6. Impaired mobility [Z74.09]  Z74.09 799.89     Patient Active Problem List   Diagnosis    New onset atrial fibrillation    Pulmonary vascular congestion    Acute hyperkalemia    Respiratory failure with hypoxia    Atrial fibrillation with RVR     Past Medical History:   Diagnosis Date    Abnormality of gait and mobility     Anemia     Anxiety     Cerebral palsy     Cognitive communication deficit     Depression     Diabetes mellitus     Disease of thyroid gland     Hyperglycemia     Hyperlipidemia     Hypertension     Hypokalemia     Insomnia     Lymphedema     Neuropathy     Urge incontinence      History reviewed. No pertinent surgical history.  PT Assessment (Last 12 Hours)       PT Evaluation and Treatment       Row Name 25 1313          Physical Therapy Time and Intention    Subjective Information no complaints  -     Document Type therapy note (daily note)  -     Mode of Treatment physical therapy  -       Row Name 25 1313          General Information    Existing Precautions/Restrictions fall;oxygen therapy device and L/min  -       Row Name 25 1313          Pain    Pretreatment Pain Rating 0/10 - no pain  -     Posttreatment Pain Rating 0/10 - no pain  -       Row Name 25 1313          Bed Mobility    Bed Mobility supine-sit;sit-supine  -     Supine-Sit [place] (Bed Mobility) verbal cues;minimum assist (75% patient effort)  -     Sit-Supine [place] (Bed Mobility) verbal cues;minimum assist (75% patient effort)  -       Row Name 25  1313          Motor Skills    Comments, Therapeutic Exercise sitting AROM BLE  -MARYSOL     Additional Documentation Comments, Therapeutic Exercise (Row)  -MARYSOL       Row Name 04/07/25 1313          Positioning and Restraints    Pre-Treatment Position in bed  -MARYSOL     Post Treatment Position bed  -MARYSOL     In Bed fowlers;call light within reach;encouraged to call for assist;side rails up x2  -MARYSOL               User Key  (r) = Recorded By, (t) = Taken By, (c) = Cosigned By      Initials Name Provider Type    MARYSOL Mati Tineo, PTA Physical Therapist Assistant                    Physical Therapy Education       Title: PT OT SLP Therapies (In Progress)       Topic: Physical Therapy (In Progress)       Point: Mobility training (Done)       Learning Progress Summary            Patient Acceptance, E, VU by SB at 4/6/2025 1613    Comment: pt edu on POC, benefits of act and d/c plans   Family Acceptance, E, VU by SB at 4/6/2025 1613    Comment: pt edu on POC, benefits of act and d/c plans                      Point: Home exercise program (Not Started)       Learner Progress:  Not documented in this visit.              Point: Body mechanics (Not Started)       Learner Progress:  Not documented in this visit.              Point: Precautions (Done)       Learning Progress Summary            Patient Acceptance, E, VU by SB at 4/6/2025 1613    Comment: pt edu on POC, benefits of act and d/c plans   Family Acceptance, E, VU by SB at 4/6/2025 1613    Comment: pt edu on POC, benefits of act and d/c plans                                      User Key       Initials Effective Dates Name Provider Type Discipline    SB 07/11/23 -  Sapna Zafar, PT DPT Physical Therapist PT                  PT Recommendation and Plan         Outcome Measures       Row Name 04/07/25 1313             How much help from another person do you currently need...    Turning from your back to your side while in flat bed without using bedrails? 2  -MARYSOL      Moving from  lying on back to sitting on the side of a flat bed without bedrails? 2  -MARYSOL      Moving to and from a bed to a chair (including a wheelchair)? 1  -MARYSOL      Standing up from a chair using your arms (e.g., wheelchair, bedside chair)? 2  -MARYSOL      Climbing 3-5 steps with a railing? 1  -MARYSOL      To walk in hospital room? 1  -MARYSOL      AM-PAC 6 Clicks Score (PT) 9  -MARYSOL         Functional Assessment    Outcome Measure Options AM-PAC 6 Clicks Basic Mobility (PT)  -MARYSOL                User Key  (r) = Recorded By, (t) = Taken By, (c) = Cosigned By      Initials Name Provider Type    Mati Ingram PTA Physical Therapist Assistant                     Time Calculation:    PT Charges       Row Name 04/07/25 1313             Time Calculation    Start Time 1313  -MARYSOL      Stop Time 1337  -MARYSOL      Time Calculation (min) 24 min  -MARYSOL      PT Received On 04/07/25  -MARYSOL         Time Calculation- PT    Total Timed Code Minutes- PT 24 minute(s)  -MARYSOL         Timed Charges    65771 - PT Therapeutic Exercise Minutes 10  -MARYSOL      44899 - PT Therapeutic Activity Minutes 14  -MARYSOL         Total Minutes    Timed Charges Total Minutes 24  -MARYSOL       Total Minutes 24  -MARYSOL                User Key  (r) = Recorded By, (t) = Taken By, (c) = Cosigned By      Initials Name Provider Type    Mati Ingram PTA Physical Therapist Assistant                  Therapy Charges for Today       Code Description Service Date Service Provider Modifiers Qty    58577927739 HC PT THERAPEUTIC ACT EA 15 MIN 4/7/2025 Mati Tineo PTA GP 1    06534647314 HC PT THER PROC EA 15 MIN 4/7/2025 Mati Tineo PTA GP 1            PT G-Codes  Outcome Measure Options: AM-PAC 6 Clicks Basic Mobility (PT)  AM-PAC 6 Clicks Score (PT): 9    Mati Tineo PTA  4/7/2025

## 2025-04-08 PROBLEM — J20.6 ACUTE BRONCHITIS DUE TO RHINOVIRUS: Status: ACTIVE | Noted: 2025-04-08

## 2025-04-08 PROBLEM — Z79.4 TYPE 2 DIABETES MELLITUS, WITH LONG-TERM CURRENT USE OF INSULIN: Chronic | Status: ACTIVE | Noted: 2025-04-08

## 2025-04-08 PROBLEM — E11.9 TYPE 2 DIABETES MELLITUS, WITH LONG-TERM CURRENT USE OF INSULIN: Chronic | Status: ACTIVE | Noted: 2025-04-08

## 2025-04-08 LAB
ANION GAP SERPL CALCULATED.3IONS-SCNC: 11 MMOL/L (ref 5–15)
BUN SERPL-MCNC: 70 MG/DL (ref 8–23)
BUN/CREAT SERPL: 34.1 (ref 7–25)
CALCIUM SPEC-SCNC: 9.1 MG/DL (ref 8.6–10.5)
CHLORIDE SERPL-SCNC: 101 MMOL/L (ref 98–107)
CO2 SERPL-SCNC: 27 MMOL/L (ref 22–29)
CREAT SERPL-MCNC: 2.05 MG/DL (ref 0.57–1)
EGFRCR SERPLBLD CKD-EPI 2021: 23.7 ML/MIN/1.73
GLUCOSE BLDC GLUCOMTR-MCNC: 248 MG/DL (ref 70–130)
GLUCOSE BLDC GLUCOMTR-MCNC: 257 MG/DL (ref 70–130)
GLUCOSE BLDC GLUCOMTR-MCNC: 357 MG/DL (ref 70–130)
GLUCOSE BLDC GLUCOMTR-MCNC: 441 MG/DL (ref 70–130)
GLUCOSE SERPL-MCNC: 263 MG/DL (ref 65–99)
POTASSIUM SERPL-SCNC: 4.6 MMOL/L (ref 3.5–5.2)
SODIUM SERPL-SCNC: 139 MMOL/L (ref 136–145)

## 2025-04-08 PROCEDURE — 63710000001 INSULIN LISPRO (HUMAN) PER 5 UNITS: Performed by: FAMILY MEDICINE

## 2025-04-08 PROCEDURE — 80048 BASIC METABOLIC PNL TOTAL CA: CPT | Performed by: STUDENT IN AN ORGANIZED HEALTH CARE EDUCATION/TRAINING PROGRAM

## 2025-04-08 PROCEDURE — 97530 THERAPEUTIC ACTIVITIES: CPT

## 2025-04-08 PROCEDURE — 82948 REAGENT STRIP/BLOOD GLUCOSE: CPT

## 2025-04-08 PROCEDURE — 25010000002 METHYLPREDNISOLONE PER 40 MG: Performed by: PHYSICIAN ASSISTANT

## 2025-04-08 PROCEDURE — 63710000001 INSULIN GLARGINE PER 5 UNITS: Performed by: STUDENT IN AN ORGANIZED HEALTH CARE EDUCATION/TRAINING PROGRAM

## 2025-04-08 RX ORDER — PREDNISONE 20 MG/1
40 TABLET ORAL
Status: DISCONTINUED | OUTPATIENT
Start: 2025-04-09 | End: 2025-04-09 | Stop reason: HOSPADM

## 2025-04-08 RX ADMIN — LISINOPRIL 20 MG: 20 TABLET ORAL at 08:39

## 2025-04-08 RX ADMIN — INSULIN LISPRO 8 UNITS: 100 INJECTION, SOLUTION INTRAVENOUS; SUBCUTANEOUS at 17:19

## 2025-04-08 RX ADMIN — Medication 10 ML: at 08:41

## 2025-04-08 RX ADMIN — CHOLESTYRAMINE 4 G: 4 POWDER, FOR SUSPENSION ORAL at 21:02

## 2025-04-08 RX ADMIN — CHOLESTYRAMINE 4 G: 4 POWDER, FOR SUSPENSION ORAL at 08:37

## 2025-04-08 RX ADMIN — INSULIN LISPRO 8 UNITS: 100 INJECTION, SOLUTION INTRAVENOUS; SUBCUTANEOUS at 21:02

## 2025-04-08 RX ADMIN — ATORVASTATIN CALCIUM 40 MG: 40 TABLET, FILM COATED ORAL at 21:01

## 2025-04-08 RX ADMIN — INSULIN GLARGINE 15 UNITS: 100 INJECTION, SOLUTION SUBCUTANEOUS at 21:02

## 2025-04-08 RX ADMIN — Medication 10 ML: at 21:08

## 2025-04-08 RX ADMIN — BUSPIRONE HYDROCHLORIDE 7.5 MG: 5 TABLET ORAL at 08:38

## 2025-04-08 RX ADMIN — APIXABAN 2.5 MG: 2.5 TABLET, FILM COATED ORAL at 08:40

## 2025-04-08 RX ADMIN — APIXABAN 2.5 MG: 2.5 TABLET, FILM COATED ORAL at 21:02

## 2025-04-08 RX ADMIN — METHYLPREDNISOLONE SODIUM SUCCINATE 80 MG: 40 INJECTION, POWDER, FOR SOLUTION INTRAMUSCULAR; INTRAVENOUS at 06:04

## 2025-04-08 RX ADMIN — NYSTATIN: 100000 CREAM TOPICAL at 08:44

## 2025-04-08 RX ADMIN — BUSPIRONE HYDROCHLORIDE 7.5 MG: 5 TABLET ORAL at 21:02

## 2025-04-08 RX ADMIN — INSULIN LISPRO 4 UNITS: 100 INJECTION, SOLUTION INTRAVENOUS; SUBCUTANEOUS at 12:34

## 2025-04-08 RX ADMIN — METOPROLOL TARTRATE 25 MG: 25 TABLET, FILM COATED ORAL at 08:40

## 2025-04-08 RX ADMIN — LEVOTHYROXINE SODIUM 200 MCG: 100 TABLET ORAL at 06:04

## 2025-04-08 RX ADMIN — METOPROLOL TARTRATE 25 MG: 25 TABLET, FILM COATED ORAL at 21:01

## 2025-04-08 RX ADMIN — INSULIN LISPRO 6 UNITS: 100 INJECTION, SOLUTION INTRAVENOUS; SUBCUTANEOUS at 08:38

## 2025-04-08 RX ADMIN — BUSPIRONE HYDROCHLORIDE 7.5 MG: 5 TABLET ORAL at 17:19

## 2025-04-08 NOTE — PLAN OF CARE
Goal Outcome Evaluation:  Plan of Care Reviewed With: patient        Progress: no change  Outcome Evaluation: AF HR:  with pvc on tele. Patient has been resting throughout the shift. Denies pain. Turning patient q 2 hours with wedges in use. Patient had one large BM at the beginning of the shift. External catheter changed. Incontinence care provided. Voiding per external catheter. Safety maintained. Bed alarm on. Caregiver at bedside.

## 2025-04-08 NOTE — PROGRESS NOTES
"    Daily Progress Note  Mar Rodriguez  MRN: [id number] LOS: 4    Admit Date: 2025 07:35 CDT    Subjective:         Interval History:    Reviewed overnight events and nursing notes.     Improved oxygen requirement for yesterday.  Oxygen levels up and oxygen needs slightly down.  No other major complaints.    ROS:  Review of Systems   Constitutional:  Negative for chills and fever.   Respiratory:  Positive for cough. Negative for chest tightness and shortness of breath.    Cardiovascular:  Negative for chest pain.   Gastrointestinal:  Negative for abdominal pain, diarrhea, nausea and vomiting.       DIET:  Diet: Cardiac, Diabetic, Renal; Healthy Heart (2-3 Na+); Consistent Carbohydrate; Low Sodium (2-3g), Low Potassium, Low Phosphorus; Fluid Consistency: Thin (IDDSI 0)    Medications:      apixaban, 2.5 mg, Oral, Q12H  atorvastatin, 40 mg, Oral, Nightly  busPIRone, 7.5 mg, Oral, TID  cholestyramine light, 1 packet, Oral, Q12H  insulin glargine, 15 Units, Subcutaneous, Nightly  insulin lispro, 2-9 Units, Subcutaneous, 4x Daily AC & at Bedtime  levothyroxine, 200 mcg, Oral, Q AM  lisinopril, 20 mg, Oral, Daily  metoprolol tartrate, 25 mg, Oral, BID  nystatin, , Topical, Q12H  [START ON 2025] predniSONE, 40 mg, Oral, Daily With Breakfast  sodium chloride, 10 mL, Intravenous, Q12H          Objective:     Vitals: /74 (BP Location: Left arm, Patient Position: Lying)   Pulse 88   Temp 98.7 °F (37.1 °C) (Oral)   Resp 22   Ht 165.1 cm (65\")   Wt 125 kg (275 lb 14.4 oz)   SpO2 96%   BMI 45.91 kg/m²    Intake/Output Summary (Last 24 hours) at 2025 0735  Last data filed at 2025 0357  Gross per 24 hour   Intake 300 ml   Output 1050 ml   Net -750 ml    Temp (24hrs), Av °F (36.7 °C), Min:97.5 °F (36.4 °C), Max:98.7 °F (37.1 °C)    Glucose:  Lab Results   Component Value Date    POCGLU 368 (H) 2025    POCGLU 381 (H) 2025    POCGLU 335 (H) 2025    POCGLU 295 (H) " 04/07/2025     Physical Examination:   Physical Exam  Constitutional:       General: She is not in acute distress.     Appearance: Normal appearance. She is not ill-appearing or toxic-appearing.   Cardiovascular:      Rate and Rhythm: Normal rate and regular rhythm.      Pulses: Normal pulses.      Heart sounds: Normal heart sounds. No murmur heard.  Pulmonary:      Effort: Pulmonary effort is normal. No respiratory distress.      Breath sounds: No stridor. Wheezing and rales present.   Abdominal:      General: Abdomen is flat. Bowel sounds are normal. There is no distension.      Palpations: Abdomen is soft.      Tenderness: There is no abdominal tenderness.   Neurological:      Mental Status: She is alert.         Labs:  Lab Results (last 24 hours)       Procedure Component Value Units Date/Time    Basic Metabolic Panel [[id number]]  (Abnormal) Collected: 04/08/25 0333    Specimen: Blood Updated: 04/08/25 0444     Glucose 263 mg/dL      BUN 70 mg/dL      Creatinine 2.05 mg/dL      Sodium 139 mmol/L      Potassium 4.6 mmol/L      Comment: Slight hemolysis detected by analyzer. Result may be falsely elevated.        Chloride 101 mmol/L      CO2 27.0 mmol/L      Calcium 9.1 mg/dL      BUN/Creatinine Ratio 34.1     Anion Gap 11.0 mmol/L      eGFR 23.7 mL/min/1.73     Narrative:      GFR Categories in Chronic Kidney Disease (CKD)      GFR Category          GFR (mL/min/1.73)    Interpretation  G1                     90 or greater         Normal or high (1)  G2                      60-89                Mild decrease (1)  G3a                   45-59                Mild to moderate decrease  G3b                   30-44                Moderate to severe decrease  G4                    15-29                Severe decrease  G5                    14 or less           Kidney failure          (1)In the absence of evidence of kidney disease, neither GFR category G1 or G2 fulfill the criteria for CKD.    eGFR calculation 2021  CKD-EPI creatinine equation, which does not include race as a factor    POC Glucose Once [[id number]]  (Abnormal) Collected: 04/07/25 1937    Specimen: Blood Updated: 04/07/25 1950     Glucose 368 mg/dL      Comment: : [id number] Juan (Joey) Romina ID: [id number]       POC Glucose Once [[id number]]  (Abnormal) Collected: 04/07/25 1709    Specimen: Blood Updated: 04/07/25 1721     Glucose 381 mg/dL      Comment: : [id number] Tom Chun ID: [id number]       Blood Culture - Blood, Arm, Left [[id number]]  (Normal) Collected: 04/04/25 1619    Specimen: Blood from Arm, Left Updated: 04/07/25 1631     Blood Culture No growth at 3 days    Blood Culture - Blood, Arm, Right [[id number]]  (Normal) Collected: 04/04/25 1618    Specimen: Blood from Arm, Right Updated: 04/07/25 1631     Blood Culture No growth at 3 days    POC Glucose Once [[id number]]  (Abnormal) Collected: 04/07/25 1249    Specimen: Blood Updated: 04/07/25 1302     Glucose 335 mg/dL      Comment: : [id number] Tom Chun ID: [id number]       POC Glucose Once [[id number]]  (Abnormal) Collected: 04/07/25 0752    Specimen: Blood Updated: 04/07/25 0813     Glucose 295 mg/dL      Comment: : [id number] Vinayak Landers ID: [id number]                Imaging:  XR Chest 1 View  Result Date: 4/6/2025  EXAM: XR CHEST 1 VW- 4/6/2025 9:13 AM  HISTORY: wheezing; J96.21-Acute and chronic respiratory failure with hypoxia; J81.0-Acute pulmonary edema; I48.91-Unspecified atrial fibrillation; B34.8-Other viral infections of unspecified site; J40-Bronchitis, not specified as acute or chronic   COMPARISON: 4/4/2025.  TECHNIQUE: Single frontal radiograph of the chest was obtained.  FINDINGS:  Support Devices: None.  Cardiac and Mediastinal Silhouettes: Stable cardiomegaly.  Lungs/Pleura: No focal consolidation. No sizable pleural effusion. No visible pneumothorax.  Osseous structures: No acute osseous finding.  Other: None.      Impression:  No acute  cardiopulmonary abnormality.    This report was signed and finalized on 4/6/2025 10:57 AM by Brad Barbour.           Assessment and Plan:     Primary Problem:  Atrial fibrillation with RVR    Hospital Problem list:    Atrial fibrillation with RVR    Respiratory failure with hypoxia    Acute bronchitis due to Rhinovirus    Type 2 diabetes mellitus, with long-term current use of insulin        Assessment: 84-year-old female who is a long-term resident of [facility] and presented with rhinovirus and A-fib with RVR.     Plan:     Atrial fibrillation with rapid ventricular response  RVR resolved, continue metoprolol and Eliquis.     Acute on chronic hypoxic respiratory failure secondary to rhinovirus  Switching IV steroids to oral prednisone.     CHRISTOPHER on CKD  Baseline appears to be 1.5-1.8.  Monitor.    Type 2 diabetes complicated by hyperglycemia  Hyperglycemia exacerbated by steroids.  Transitioning to prednisone today.     Plan to discharge to [facility] with Hayden tomorrow.    Reviewed treatment plans with the patient and/or family.     Code Status:   Code Status and Medical Interventions: CPR (Attempt to Resuscitate); Full Support   Ordered at: 04/05/25 0946     Code Status (Patient has no pulse and is not breathing):    CPR (Attempt to Resuscitate)     Medical Interventions (Patient has pulse or is breathing):    Full Support     Level Of Support Discussed With:    Patient       Electronically signed by Angel Delatorre MD on 4/8/2025 at 07:35 CDT

## 2025-04-08 NOTE — THERAPY TREATMENT NOTE
Acute Care - Physical Therapy Treatment Note  [facility]     Patient Name: Mar Rodriguez  : 1940  MRN: [id number]  Today's Date: 2025      Visit Dx:     ICD-10-CM ICD-9-CM   1. Acute on chronic respiratory failure with hypoxia  J96.21 518.84     799.02   2. Acute pulmonary edema  J81.0 518.4   3. Atrial fibrillation with RVR  I48.91 427.31   4. Rhinovirus infection  B34.8 079.3   5. Bronchitis  J40 490   6. Impaired mobility [Z74.09]  Z74.09 799.89     Patient Active Problem List   Diagnosis    New onset atrial fibrillation    Pulmonary vascular congestion    Acute hyperkalemia    Respiratory failure with hypoxia    Atrial fibrillation with RVR    Acute bronchitis due to Rhinovirus    Type 2 diabetes mellitus, with long-term current use of insulin     Past Medical History:   Diagnosis Date    Abnormality of gait and mobility     Anemia     Anxiety     Cerebral palsy     Cognitive communication deficit     Depression     Diabetes mellitus     Disease of thyroid gland     Hyperglycemia     Hyperlipidemia     Hypertension     Hypokalemia     Insomnia     Lymphedema     Neuropathy     Urge incontinence      History reviewed. No pertinent surgical history.  PT Assessment (Last 12 Hours)       PT Evaluation and Treatment       Row Name 25 1407          Physical Therapy Time and Intention    Subjective Information no complaints  -     Document Type therapy note (daily note)  -     Mode of Treatment physical therapy  -       Row Name 25 1407          General Information    Existing Precautions/Restrictions fall;oxygen therapy device and L/min  -[facility] Name 25 1407          Pain    Posttreatment Pain Rating 0/10 - no pain  -       Row Name 25 1407          Bed Mobility    Supine-Sit [place] (Bed Mobility) verbal cues;minimum assist (75% patient effort)  -     Sit-Supine [place] (Bed Mobility) verbal cues;minimum assist (75% patient effort)  -      Assistive Device (Bed Mobility) head of bed elevated;bed rails  -       Row Name 04/08/25 1407          Motor Skills    Comments, Therapeutic Exercise AROM B LE  3 x10 in sitting  -       Row Name 04/08/25 1407          Positioning and Restraints    Pre-Treatment Position in bed  -SHAKILA     Post Treatment Position bed  -SHAKILA     In Bed fowlers;call light within reach;encouraged to call for assist  -SHAKILA               User Key  (r) = Recorded By, (t) = Taken By, (c) = Cosigned By      Initials Name Provider Type    SHAKILA Rupal Newell, PTA Physical Therapist Assistant                    Physical Therapy Education       Title: PT OT SLP Therapies (In Progress)       Topic: Physical Therapy (In Progress)       Point: Mobility training (Done)       Learning Progress Summary            Patient Acceptance, E, VU by SB at 4/6/2025 1613    Comment: pt edu on POC, benefits of act and d/c plans   Family Acceptance, E, VU by SB at 4/6/2025 1613    Comment: pt edu on POC, benefits of act and d/c plans                      Point: Home exercise program (Not Started)       Learner Progress:  Not documented in this visit.              Point: Body mechanics (Not Started)       Learner Progress:  Not documented in this visit.              Point: Precautions (Done)       Learning Progress Summary            Patient Acceptance, E, VU by SB at 4/6/2025 1613    Comment: pt edu on POC, benefits of act and d/c plans   Family Acceptance, E, VU by SB at 4/6/2025 1613    Comment: pt edu on POC, benefits of act and d/c plans                                      User Key       Initials Effective Dates Name Provider Type Discipline     07/11/23 -  Sapna Zafar, PT DPT Physical Therapist PT                  PT Recommendation and Plan         Outcome Measures       Row Name 04/07/25 1313             How much help from another person do you currently need...    Turning from your back to your side while in flat bed without using bedrails? 2  -MARYSOL       Moving from lying on back to sitting on the side of a flat bed without bedrails? 2  -MARYSOL      Moving to and from a bed to a chair (including a wheelchair)? 1  -MARYSOL      Standing up from a chair using your arms (e.g., wheelchair, bedside chair)? 2  -MARYSOL      Climbing 3-5 steps with a railing? 1  -MARYSOL      To walk in hospital room? 1  -MARYSOL      AM-PAC 6 Clicks Score (PT) 9  -MARYSOL         Functional Assessment    Outcome Measure Options AM-PAC 6 Clicks Basic Mobility (PT)  -MARYSOL                User Key  (r) = Recorded By, (t) = Taken By, (c) = Cosigned By      Initials Name Provider Type    MARYSOL Mati Tineo PTA Physical Therapist Assistant                     Time Calculation:    PT Charges       Row Name 04/08/25 1529             Time Calculation    Start Time 1407  -SHAKILA      Stop Time 1431  -SHAKILA      Time Calculation (min) 24 min  -SHAKILA         Timed Charges    35167 - PT Therapeutic Activity Minutes 24  -SHAKILA         Total Minutes    Timed Charges Total Minutes 24  -SHAKILA       Total Minutes 24  -SHAKILA                User Key  (r) = Recorded By, (t) = Taken By, (c) = Cosigned By      Initials Name Provider Type    SHAKILA Rupal Newell PTA Physical Therapist Assistant                  Therapy Charges for Today       Code Description Service Date Service Provider Modifiers Qty    76511484996 HC PT THERAPEUTIC ACT EA 15 MIN 4/8/2025 Rupal Newell PTA GP 2            PT G-Codes  Outcome Measure Options: AM-PAC 6 Clicks Basic Mobility (PT)  AM-PAC 6 Clicks Score (PT): 9    Rupal Newell PTA  4/8/2025

## 2025-04-08 NOTE — PLAN OF CARE
Goal Outcome Evaluation:   Problem: Noninvasive Ventilation Acute  Goal: Effective Unassisted Ventilation and Oxygenation  Outcome: Met                                                Statement Selected

## 2025-04-08 NOTE — PLAN OF CARE
Goal Outcome Evaluation:         Patient required Min assist for supine to sit with HOB elevated. Good sitting balance. Patient actively worked thru LE ex's. Also  some trunk rotation with assist. Min assist for sit to supine. Patient tolerated exercises well. Patient will continue to benefit from strength

## 2025-04-09 VITALS
HEIGHT: 65 IN | OXYGEN SATURATION: 97 % | BODY MASS INDEX: 46.55 KG/M2 | TEMPERATURE: 98.1 F | WEIGHT: 279.4 LBS | RESPIRATION RATE: 18 BRPM | SYSTOLIC BLOOD PRESSURE: 144 MMHG | HEART RATE: 103 BPM | DIASTOLIC BLOOD PRESSURE: 74 MMHG

## 2025-04-09 LAB
ANION GAP SERPL CALCULATED.3IONS-SCNC: 12 MMOL/L (ref 5–15)
BACTERIA SPEC AEROBE CULT: NORMAL
BACTERIA SPEC AEROBE CULT: NORMAL
BUN SERPL-MCNC: 79 MG/DL (ref 8–23)
BUN/CREAT SERPL: 34.8 (ref 7–25)
CALCIUM SPEC-SCNC: 9.3 MG/DL (ref 8.6–10.5)
CHLORIDE SERPL-SCNC: 106 MMOL/L (ref 98–107)
CO2 SERPL-SCNC: 25 MMOL/L (ref 22–29)
CREAT SERPL-MCNC: 2.27 MG/DL (ref 0.57–1)
EGFRCR SERPLBLD CKD-EPI 2021: 20.9 ML/MIN/1.73
GLUCOSE BLDC GLUCOMTR-MCNC: 116 MG/DL (ref 70–130)
GLUCOSE SERPL-MCNC: 163 MG/DL (ref 65–99)
POTASSIUM SERPL-SCNC: 4.3 MMOL/L (ref 3.5–5.2)
SODIUM SERPL-SCNC: 143 MMOL/L (ref 136–145)

## 2025-04-09 PROCEDURE — 80048 BASIC METABOLIC PNL TOTAL CA: CPT | Performed by: STUDENT IN AN ORGANIZED HEALTH CARE EDUCATION/TRAINING PROGRAM

## 2025-04-09 PROCEDURE — 94799 UNLISTED PULMONARY SVC/PX: CPT

## 2025-04-09 PROCEDURE — 63710000001 PREDNISONE PER 1 MG: Performed by: STUDENT IN AN ORGANIZED HEALTH CARE EDUCATION/TRAINING PROGRAM

## 2025-04-09 PROCEDURE — 82948 REAGENT STRIP/BLOOD GLUCOSE: CPT

## 2025-04-09 RX ORDER — GABAPENTIN 300 MG/1
300 CAPSULE ORAL NIGHTLY
Qty: 30 CAPSULE | Refills: 0 | Status: SHIPPED | OUTPATIENT
Start: 2025-04-09

## 2025-04-09 RX ORDER — PREDNISONE 20 MG/1
40 TABLET ORAL
Start: 2025-04-09 | End: 2025-04-09

## 2025-04-09 RX ORDER — BUMETANIDE 1 MG/1
2 TABLET ORAL 2 TIMES DAILY
Qty: 90 TABLET | Refills: 1 | Status: SHIPPED | OUTPATIENT
Start: 2025-04-09

## 2025-04-09 RX ORDER — PREDNISONE 20 MG/1
40 TABLET ORAL
Qty: 4 TABLET | Refills: 0 | Status: SHIPPED | OUTPATIENT
Start: 2025-04-09

## 2025-04-09 RX ORDER — BUMETANIDE 1 MG/1
3 TABLET ORAL 2 TIMES DAILY
Start: 2025-04-09 | End: 2025-04-09

## 2025-04-09 RX ORDER — BUMETANIDE 1 MG/1
1 TABLET ORAL
Status: DISCONTINUED | OUTPATIENT
Start: 2025-04-09 | End: 2025-04-09 | Stop reason: HOSPADM

## 2025-04-09 RX ADMIN — LISINOPRIL 20 MG: 20 TABLET ORAL at 09:44

## 2025-04-09 RX ADMIN — APIXABAN 2.5 MG: 2.5 TABLET, FILM COATED ORAL at 09:45

## 2025-04-09 RX ADMIN — PREDNISONE 40 MG: 20 TABLET ORAL at 09:44

## 2025-04-09 RX ADMIN — CHOLESTYRAMINE 4 G: 4 POWDER, FOR SUSPENSION ORAL at 09:44

## 2025-04-09 RX ADMIN — LEVOTHYROXINE SODIUM 200 MCG: 100 TABLET ORAL at 05:38

## 2025-04-09 RX ADMIN — METOPROLOL TARTRATE 25 MG: 25 TABLET, FILM COATED ORAL at 09:44

## 2025-04-09 RX ADMIN — BUSPIRONE HYDROCHLORIDE 7.5 MG: 5 TABLET ORAL at 09:44

## 2025-04-09 RX ADMIN — IPRATROPIUM BROMIDE AND ALBUTEROL SULFATE 3 ML: .5; 3 SOLUTION RESPIRATORY (INHALATION) at 10:15

## 2025-04-09 RX ADMIN — NYSTATIN: 100000 CREAM TOPICAL at 09:44

## 2025-04-09 RX ADMIN — BUMETANIDE 1 MG: 1 TABLET ORAL at 09:44

## 2025-04-09 NOTE — DISCHARGE SUMMARY
Hospital Discharge Summary    Mar Rodriguez  :  1940  MRN:  [id number]    Admit date:  2025  Discharge date: 2025     Admitting Physician:    Vandana Churchill MD    Discharge Diagnoses:      Atrial fibrillation with RVR    Respiratory failure with hypoxia    Acute bronchitis due to Rhinovirus    Type 2 diabetes mellitus, with long-term current use of insulin      Hospital Course:       84-year-old female with past medical history of atrial fibrillation, congestive heart failure, hypertension, hyperlipidemia, and type 2 diabetes who presents with acute respiratory failure that was multifactorial.  She was found to have human rhinovirus and CHF exacerbation.  She was treated with steroids, nebulizations, and diuresis that was briefly complicated by an acute kidney injury.  Because of the acute kidney injury, I am going to start her on a little bit decreased dose Bumex, going from her home dose of 3 times daily to twice daily.  She will also be on a 4-day course of prednisone to fully treat bronchitis.    Recommend she get a basic metabolic panel to check renal function within 1 week of discharge.      Discharge Medications:         Discharge Medications        New Medications        Instructions Start Date   predniSONE 20 MG tablet  Commonly known as: DELTASONE   40 mg, Oral, Daily With Breakfast             Changes to Medications        Instructions Start Date   bumetanide 1 MG tablet  Commonly known as: BUMEX  What changed: when to take this   3 mg, Oral, 2 Times Daily             Continue These Medications        Instructions Start Date   acetaminophen 325 MG tablet  Commonly known as: TYLENOL   650 mg, Every 6 Hours PRN      apixaban 2.5 MG tablet tablet  Commonly known as: ELIQUIS   2.5 mg, Oral, Every 12 Hours Scheduled      atorvastatin 40 MG tablet  Commonly known as: LIPITOR   40 mg, Nightly      busPIRone 7.5 MG tablet  Commonly known as: BUSPAR   7.5 mg, 3 Times Daily       cloNIDine 0.1 MG tablet  Commonly known as: CATAPRES   0.1 mg, Oral, Every 8 Hours PRN      colestipol 1 g tablet  Commonly known as: COLESTID   1 g, 2 Times Daily      dextrose 40 % gel  Commonly known as: GLUTOSE   15 g, As Needed      gabapentin 300 MG capsule  Commonly known as: NEURONTIN   300 mg, Oral, Nightly      glucagon 1 MG injection  Commonly known as: GLUCAGEN   1 mg, Subcutaneous, Once As Needed      hydrocortisone 1 % ointment   1 Application, Nightly      Insulin Aspart 100 UNIT/ML injection  Commonly known as: novoLOG   5 Units, 3 Times Daily Before Meals      ipratropium-albuterol 0.5-2.5 mg/3 ml nebulizer  Commonly known as: DUO-NEB   3 mL, Every 6 Hours PRN      levothyroxine 200 MCG tablet  Commonly known as: SYNTHROID, LEVOTHROID   200 mcg, Every Early Morning      lisinopril 20 MG tablet  Commonly known as: PRINIVIL,ZESTRIL   20 mg, Daily      Melatonin 10 MG tablet   1 tablet, Daily PRN      metoprolol tartrate 25 MG tablet  Commonly known as: LOPRESSOR   25 mg, 2 Times Daily      nystatin 104029 UNIT/GM powder  Commonly known as: MYCOSTATIN   1 Application, Every 12 Hours      phenylephrine-mineral oil-petrolatum 0.25-14-74.9 % ointment hemorrhoidal ointment  Commonly known as: PREPARATION H   1 Application, Every 6 Hours PRN      polyethylene glycol 17 g packet  Commonly known as: MIRALAX   17 g, Oral, Daily PRN      Scopolamine 1 MG/3DAYS patch   1 patch, Every 72 Hours      Tresiba FlexTouch 100 UNIT/ML solution pen-injector injection  Generic drug: insulin degludec   34 Units, Nightly      vitamin B-12 1000 MCG tablet  Commonly known as: CYANOCOBALAMIN   1,000 mcg, Daily               Significant Diagnostic Studies:      XR Chest 1 View  Result Date: 4/6/2025   No acute cardiopulmonary abnormality.    This report was signed and finalized on 4/6/2025 10:57 AM by Brad Barbour.      XR Chest 1 View  Result Date: 4/4/2025  1. Cardiomegaly with findings suggestive for mild CHF. Stable  granulomatous calcifications with no acute consolidation.     This report was signed and finalized on 4/4/2025 4:36 PM by Dr. Racquel Tejeda MD.         [facility]  Follow up with Vandana Churchill MD in 2 weeks.    Signed:  Angel Delatorre MD   4/9/2025, 07:27 CDT

## 2025-04-09 NOTE — PLAN OF CARE
Problem: Adult Inpatient Plan of Care  Goal: Plan of Care Review  Outcome: Progressing  Flowsheets (Taken 4/9/2025 0518)  Outcome Evaluation: VSS. pt A&Ox4. 3L NC. pt refused to be turned. AF  on tele. no c/o pain.  Droplet precautions remain in place. safety maintained, caregiver at bedside.  Plan of Care Reviewed With: patient

## 2025-04-09 NOTE — THERAPY DISCHARGE NOTE
Acute Care - Physical Therapy Discharge Summary  [facility]       Patient Name: Mar Rodriguez  : 1940  MRN: [id number]    Today's Date: 2025                 Admit Date: 2025      PT Recommendation and Plan    Visit Dx:    ICD-10-CM ICD-9-CM   1. Acute on chronic respiratory failure with hypoxia  J96.21 518.84     799.02   2. Acute pulmonary edema  J81.0 518.4   3. Atrial fibrillation with RVR  I48.91 427.31   4. Rhinovirus infection  B34.8 079.3   5. Bronchitis  J40 490   6. Impaired mobility [Z74.09]  Z74.09 799.89        Outcome Measures       Row Name 25 1313             How much help from another person do you currently need...    Turning from your back to your side while in flat bed without using bedrails? 2  -MARYSOL      Moving from lying on back to sitting on the side of a flat bed without bedrails? 2  -MARYSOL      Moving to and from a bed to a chair (including a wheelchair)? 1  -MARYSOL      Standing up from a chair using your arms (e.g., wheelchair, bedside chair)? 2  -MARYSOL      Climbing 3-5 steps with a railing? 1  -MARYSOL      To walk in hospital room? 1  -MARYSOL      AM-PAC 6 Clicks Score (PT) 9  -MARYSOL         Functional Assessment    Outcome Measure Options AM-PAC 6 Clicks Basic Mobility (PT)  -MARYSOL                User Key  (r) = Recorded By, (t) = Taken By, (c) = Cosigned By      Initials Name Provider Type    Mati Ingram PTA Physical Therapist Assistant                         PT Rehab Goals       Row Name 25 1100             Bed Mobility Goal 1 (PT)    Activity/Assistive Device (Bed Mobility Goal 1, PT) sit to supine;supine to sit;rolling to left;rolling to right  -AB      [place] Level/Cues Needed (Bed Mobility Goal 1, PT) standby assist  -AB      Time Frame (Bed Mobility Goal 1, PT) long term goal (LTG)  -AB      Progress/Outcomes (Bed Mobility Goal 1, PT) goal not met  -AB         Transfer Goal 1 (PT)    Activity/Assistive Device (Transfer Goal 1, PT)  sit-to-stand/stand-to-sit;bed-to-chair/chair-to-bed;walker, rolling  -AB      [place] Level/Cues Needed (Transfer Goal 1, PT) moderate assist (50-74% patient effort)  -AB      Time Frame (Transfer Goal 1, PT) long term goal (LTG)  -AB      Progress/Outcome (Transfer Goal 1, PT) goal not met  -AB         Problem Specific Goal 1 (PT)    Problem Specific Goal 1 (PT) Pt will propel manual w/c 25 feet with SV  -AB      Time Frame (Problem Specific Goal 1, PT) long-term goal (LTG)  -AB      Progress/Outcome (Problem Specific Goal 1, PT) goal not met  -AB                User Key  (r) = Recorded By, (t) = Taken By, (c) = Cosigned By      Initials Name Provider Type Discipline    Karrie Hernandez, PTA Physical Therapist Assistant PT                        PT Discharge Summary  Anticipated Discharge Disposition (PT): skilled nursing facility  Reason for Discharge: Discharge from facility  Outcomes Achieved: Refer to plan of care for updates on goals achieved  Discharge Destination: SNF      Karrie Chavez PTA   4/9/2025